# Patient Record
Sex: FEMALE | Race: BLACK OR AFRICAN AMERICAN | Employment: OTHER | URBAN - METROPOLITAN AREA
[De-identification: names, ages, dates, MRNs, and addresses within clinical notes are randomized per-mention and may not be internally consistent; named-entity substitution may affect disease eponyms.]

---

## 2017-07-26 PROBLEM — K29.60 REFLUX GASTRITIS: Status: ACTIVE | Noted: 2017-07-26

## 2018-02-01 PROBLEM — E05.90 THYROTOXICOSIS WITHOUT CRISIS: Status: ACTIVE | Noted: 2018-02-01

## 2018-02-01 PROBLEM — E04.2 MULTIPLE THYROID NODULES: Status: ACTIVE | Noted: 2018-02-01

## 2018-02-12 PROBLEM — E05.90 HYPERTHYROIDISM: Status: ACTIVE | Noted: 2018-02-12

## 2018-02-14 ENCOUNTER — HOSPITAL ENCOUNTER (OUTPATIENT)
Dept: NUCLEAR MEDICINE | Age: 58
Discharge: HOME | End: 2018-02-14
Attending: INTERNAL MEDICINE

## 2018-02-14 DIAGNOSIS — E05.90 THYROTOXICOSIS WITHOUT CRISIS: ICD-10-CM

## 2018-02-14 DIAGNOSIS — E04.2 MULTIPLE THYROID NODULES: ICD-10-CM

## 2018-02-15 ENCOUNTER — HOSPITAL ENCOUNTER (OUTPATIENT)
Dept: NUCLEAR MEDICINE | Age: 58
Discharge: HOME | End: 2018-02-15
Attending: INTERNAL MEDICINE
Payer: COMMERCIAL

## 2018-02-15 PROCEDURE — 78014 THYROID IMAGING W/BLOOD FLOW: CPT

## 2018-03-30 PROBLEM — E05.90 THYROTOXICOSIS WITHOUT CRISIS: Status: RESOLVED | Noted: 2018-02-01 | Resolved: 2018-03-30

## 2022-03-18 PROBLEM — E04.2 MULTIPLE THYROID NODULES: Status: ACTIVE | Noted: 2018-02-01

## 2022-03-19 PROBLEM — E05.90 HYPERTHYROIDISM: Status: ACTIVE | Noted: 2018-02-12

## 2022-03-19 PROBLEM — K29.60 REFLUX GASTRITIS: Status: ACTIVE | Noted: 2017-07-26

## 2022-05-31 DIAGNOSIS — E05.90 HYPERTHYROIDISM: Primary | ICD-10-CM

## 2022-06-27 ENCOUNTER — OFFICE VISIT (OUTPATIENT)
Dept: ENDOCRINOLOGY | Age: 62
End: 2022-06-27
Payer: COMMERCIAL

## 2022-06-27 VITALS
HEART RATE: 71 BPM | SYSTOLIC BLOOD PRESSURE: 136 MMHG | OXYGEN SATURATION: 95 % | BODY MASS INDEX: 30.61 KG/M2 | DIASTOLIC BLOOD PRESSURE: 78 MMHG | WEIGHT: 162 LBS

## 2022-06-27 DIAGNOSIS — E05.90 HYPERTHYROIDISM: Primary | ICD-10-CM

## 2022-06-27 DIAGNOSIS — E04.2 MULTIPLE THYROID NODULES: ICD-10-CM

## 2022-06-27 DIAGNOSIS — E05.00 GRAVES' DISEASE: ICD-10-CM

## 2022-06-27 PROCEDURE — 99214 OFFICE O/P EST MOD 30 MIN: CPT | Performed by: INTERNAL MEDICINE

## 2022-06-27 RX ORDER — METHIMAZOLE 5 MG/1
2.5 TABLET ORAL DAILY
Qty: 15 TABLET | Refills: 11 | Status: SHIPPED | OUTPATIENT
Start: 2022-06-27

## 2022-06-27 ASSESSMENT — ENCOUNTER SYMPTOMS
TROUBLE SWALLOWING: 0
VOICE CHANGE: 0

## 2022-06-27 NOTE — PROGRESS NOTES
Madison Bowers MD, [address] [facility] [place]            Reason for visit: Follow-up of hyperthyroidism and thyroid nodules      ASSESSMENT AND PLAN:    1. Hyperthyroidism  She is biochemically euthyroid on her current methimazole dose. Continue it as prescribed. Return in 6 months with repeat labs. - methIMAzole (TAPAZOLE) 5 MG tablet; Take 0.5 tablets by mouth daily  Dispense: 15 tablet; Refill: 11  - TSH; Future  - T4, Free; Future  - T3; Future  - Hepatic Function Panel; Future    2. Graves' disease    3. Multiple thyroid nodules  Prior biopsies of the dominant nodules in the left lobe in March 2018 were fortunately benign. Ultrasound was repeated in May 2021 and was unchanged. Repeat within the next year. .      Follow-up and Dispositions    · Return in about 6 months (around 12/27/2022). History of Present Illness:    THYROID DYSFUNCTION  Sona Isaacs (\"Ri-wilson-kelsea\") is seen for follow-up of hyperthyroidism (now known to be caused by Graves' disease) and thyroid nodules. Hyperthyroidism appears to have been present (off and on) since at least late 2015. Thyroid nodules were noted in February 2018 as part of the evaluation of hyperthyroidism.      Current symptoms: See review of systems below     Prior treatment: Methimazole 10 mg daily was started 2/12/2018.   Her dose has been adjusted as follows:  -5 mg daily 3/30/2018  -2.5 mg daily 10/5/2018  -discontinued 11/17/2021  -2.5 mg daily 2/25/2022     Pertinent labs:  12/14/2015: TSH 0.090, free T4 1.41, free T3 3.4.  1/4/2016: TSH 0.083.  6/13/2016: TSH 2.090.  1/20/2017: TSH 0.347.  9/21/2017: TSH 1.720.  1/26/2018: TSH less than 0.006.  2/1/2018: Free T4 2.19, T3 176, thyroid-stimulating immunoglobulins 419% (+).  3/26/2018: TSH less than 0.006, free T4 1.33, T3 112.  5/29/2018: TSH 0.006, free T4 1.21, T3 116.  10/4/2018: TSH 4.480, free T4 0.90, T3 89.  1/4/2019: TSH 2.820, free T4 1.13, T3 102.  2019: TSH 1.280, free T4 1.31, T3 95.  2019: TSH 0.734, free T4 1.33, T3 118.  2020: TSH 2.340, free T4 1.10, T3 98.  2020: TSH 2.030, free T4 1.19, T3 98.  2021: TSH 2.920, free T4 1.09, T3 86.  2021: TSH 4.150.  2021: TSH 2.710, free T4 1.15, T3 90, thyroid-stimulating immunoglobulins 0.36 (-).  2022: TSH 0.075, free T4 1.88, T3 96.  2022: TSH 0.457, free T4 1.15, T3 104.     Imagin2018: Ultrasound ([facility])- Right lobe 1.69 x 1.10 x 3.65 cm, isthmus 0.73 cm, left lobe 1.77 x 2.52 x 3.99 cm. Homogeneous echotexture. Normal blood flow. 0.30 x 0.46 x 0.50 cm slightly hyperechoic nodule without calcifications or increased blood flow at the left edge of the isthmus. 1.36 x 1.65 x 1.71 cm isoechoic nodule with small central cystic core and no calcifications or increased blood flow in the midportion of the left lobe. 1.39 x 1.48 x 1.55 cm isoechoic nodule with sonolucent rim and no calcifications or increased blood flow at the left lower pole.     2018: I-123 uptake and scan ([facility])- 4 hour uptake 15.9%, 24 hour uptake 45.4%. Scan demonstrates mildly heterogeneous tracer distribution without dominant photopenic nodules.     10/5/2018: Ultrasound ([facility])- Right lobe 1.48 x 1.21 x 2.93 cm, isthmus 0.54 cm, left lobe 1.77 x 2.62 x 3.46 cm. Homogeneous echotexture. Normal blood flow. 0.31 x 0.47 x 0.38 cm hypoechoic nodule without calcifications or increased blood flow at the left edge of the isthmus. 1.42 x 1.66 x 1.90 cm isoechoic nodule with small central cystic core and no calcifications or increased blood flow in the midportion of the left lobe. 1.73 x 1.79 x 1.55 cm isoechoic nodule with sonolucent rim and no calcifications or increased blood flow at the left lower pole.     2019: Ultrasound ([employer])- Right lobe 1.47 x 1.06 x 3.32 cm, isthmus 0.55 cm, left lobe 2.06 x 2.89 x 3.74 cm. Homogeneous echotexture. Normal blood flow. 0.29 x 0.43 x 0.55 cm slightly hypoechoic nodule without calcifications or increased blood flow at the right upper pole. 1.14 x 1.31 x 1.65 cm isoechoic nodule without calcifications or increased blood flow in the midportion of the left lobe. 1.65 x 1.83 x 1.59 cm isoechoic nodule with sonolucent rim and no calcifications or increased blood flow at the left lower pole.     5/21/2020: Ultrasound ([facility])- Right lobe 1.44 x 1.15 x 3.93 cm, isthmus 0.48 cm, left lobe 2.01 x 2.37 x 3.88 cm. Mildly heterogeneous echotexture. Normal blood flow. 0.32 x 0.64 x 0.69 cm isoechoic nodule without calcifications or increased blood flow at the right upper pole. 1.48 x 1.59 x 1.73 cm isoechoic nodule without calcifications or increased blood flow in the midportion of the left lobe. 1.39 x 1.71 x 1.59 cm slightly hypoechoic nodule without calcifications or increased blood flow at the left lower pole.     5/17/2021: Ultrasound ([facility])- Right lobe 1.74 x 1.12 x 3.8 cm, isthmus 0.41 cm, left lobe 2.07 x 2.12 x 3.66 cm. Mildly heterogeneous echotexture. Normal blood flow. 0.32 x 0.67 x 0.60 cm isoechoic nodule without calcifications or increased blood flow at the right upper pole. 1.63 x 1.55 x 1.69 cm isoechoic nodule with increased peripheral but not central blood flow and no calcifications in the midportion of the left lobe. 1.70 x 1.80 x 1.92 cm complex isoechoic nodule without calcifications or increased blood flow at the left lower pole.     Prior biopsies:  3/30/2018: Fine-needle aspiration biopsy of 1.39 x 1.64 x 1.87 cm hyperechoic nodule in the upper to midportion of the left lobe ([facility])- cytology BENIGN. 3/30/2018: Fine-needle aspiration biopsy of 1.58 x 1.61 x 1.55 cm isoechoic nodule with sonolucent rim at the left lower pole ([facility])- cytology BENIGN. Review of Systems   Constitutional: Positive for fatigue and unexpected weight change (intentionally lost 3 pounds in 7 months).    HENT: Negative for trouble swallowing and voice change. Eyes: Negative for visual disturbance. Cardiovascular: Negative for palpitations. Neurological: Negative for tremors. /78 (Site: Left Upper Arm, Position: Sitting)   Pulse 71   Wt 162 lb (73.5 kg)   SpO2 95%   BMI 30.61 kg/m²   Wt Readings from Last 3 Encounters:   06/27/22 162 lb (73.5 kg)   02/25/22 163 lb (73.9 kg)   11/17/21 165 lb (74.8 kg)       Physical Exam  HENT:      Head: Normocephalic. Neck:      Thyroid: No thyroid mass or thyromegaly. Cardiovascular:      Rate and Rhythm: Normal rate. Pulmonary:      Effort: No respiratory distress. Breath sounds: Normal breath sounds. Neurological:      Mental Status: She is alert.    Psychiatric:         Mood and Affect: Mood normal.         Behavior: Behavior normal.         Orders Placed This Encounter   Procedures    TSH     Standing Status:   Future     Standing Expiration Date:   6/27/2023    T4, Free     Standing Status:   Future     Standing Expiration Date:   6/27/2023    T3     Standing Status:   Future     Standing Expiration Date:   6/27/2023    Hepatic Function Panel     Standing Status:   Future     Standing Expiration Date:   6/27/2023       Current Outpatient Medications   Medication Sig Dispense Refill    methIMAzole (TAPAZOLE) 5 MG tablet Take 0.5 tablets by mouth daily 15 tablet 11    Biotin 2.5 MG TABS Take 2 tablets by mouth daily      cyanocobalamin 2500 MCG SUBL Take 2,500 mcg by mouth daily      fexofenadine-pseudoephedrine (ALLEGRA-D ALLERGY & CONGESTION) 180-240 MG per extended release tablet Take 1 tablet by mouth daily      Krill Oil (OMEGA-3) 500 MG CAPS Take by mouth      meloxicam (MOBIC) 7.5 MG tablet Take 7.5 mg by mouth as needed      omeprazole (PRILOSEC) 20 MG delayed release capsule Take 20 mg by mouth as needed      Red Yeast Rice Extract 600 MG CAPS Take 600 mg by mouth      rosuvastatin (CRESTOR) 20 MG tablet Take 20 mg by mouth No current facility-administered medications for this visit. Tanna Terry MD, FACE      Portions of this note were generated with the assistance of voice recognition software. As such, some errors in transcription may be present.

## 2022-07-11 ENCOUNTER — TELEPHONE (OUTPATIENT)
Dept: FAMILY MEDICINE CLINIC | Facility: CLINIC | Age: 62
End: 2022-07-11

## 2022-07-11 DIAGNOSIS — E78.2 MIXED HYPERLIPIDEMIA: Primary | ICD-10-CM

## 2022-07-11 RX ORDER — ROSUVASTATIN CALCIUM 20 MG/1
20 TABLET, COATED ORAL NIGHTLY
Qty: 30 TABLET | Refills: 0 | Status: SHIPPED | OUTPATIENT
Start: 2022-07-11 | End: 2022-07-27 | Stop reason: SDUPTHER

## 2022-07-11 NOTE — TELEPHONE ENCOUNTER
Pt. Is scheduled for 7/27/2022, but needs partial refill of Crestor sent to [facility] on [address]

## 2022-07-27 ENCOUNTER — OFFICE VISIT (OUTPATIENT)
Dept: FAMILY MEDICINE CLINIC | Facility: CLINIC | Age: 62
End: 2022-07-27
Payer: COMMERCIAL

## 2022-07-27 VITALS
SYSTOLIC BLOOD PRESSURE: 118 MMHG | OXYGEN SATURATION: 98 % | HEIGHT: 63 IN | TEMPERATURE: 97.2 F | HEART RATE: 79 BPM | DIASTOLIC BLOOD PRESSURE: 70 MMHG | BODY MASS INDEX: 29.06 KG/M2 | WEIGHT: 164 LBS | RESPIRATION RATE: 18 BRPM

## 2022-07-27 DIAGNOSIS — E78.2 MIXED HYPERLIPIDEMIA: Primary | ICD-10-CM

## 2022-07-27 DIAGNOSIS — E05.90 HYPERTHYROIDISM: ICD-10-CM

## 2022-07-27 DIAGNOSIS — Z11.59 ENCOUNTER FOR HEPATITIS C SCREENING TEST FOR LOW RISK PATIENT: ICD-10-CM

## 2022-07-27 LAB — HCV AB SER QL: NONREACTIVE

## 2022-07-27 PROCEDURE — 99213 OFFICE O/P EST LOW 20 MIN: CPT | Performed by: NURSE PRACTITIONER

## 2022-07-27 RX ORDER — ROSUVASTATIN CALCIUM 20 MG/1
20 TABLET, COATED ORAL NIGHTLY
Qty: 90 TABLET | Refills: 3 | Status: SHIPPED | OUTPATIENT
Start: 2022-07-27

## 2022-07-27 ASSESSMENT — ENCOUNTER SYMPTOMS
CONSTIPATION: 0
COUGH: 0
EYES NEGATIVE: 1
SHORTNESS OF BREATH: 0
SINUS PAIN: 0
PHOTOPHOBIA: 0
SORE THROAT: 0
NAUSEA: 0
GASTROINTESTINAL NEGATIVE: 1
ALLERGIC/IMMUNOLOGIC NEGATIVE: 1
TROUBLE SWALLOWING: 0
WHEEZING: 0
VOMITING: 0
RESPIRATORY NEGATIVE: 1
SINUS PRESSURE: 0
DIARRHEA: 0

## 2022-07-27 ASSESSMENT — PATIENT HEALTH QUESTIONNAIRE - PHQ9
1. LITTLE INTEREST OR PLEASURE IN DOING THINGS: 0
2. FEELING DOWN, DEPRESSED OR HOPELESS: 0
SUM OF ALL RESPONSES TO PHQ9 QUESTIONS 1 & 2: 0
SUM OF ALL RESPONSES TO PHQ QUESTIONS 1-9: 0

## 2022-07-27 NOTE — PROGRESS NOTES
Lizz Maria (:  1960) is a 59 y.o. female,Established patient, here for evaluation of the following chief complaint(s):  Follow-up (Follow up on Diabetes ) and Medication Refill    Note written by Lacie Clemente RN, student NP. I have examined the patient and agree with the note/plan. Helen Mcintosh       ASSESSMENT/PLAN:  1. Mixed hyperlipidemia  -     Hemoglobin A1C; Future  -     rosuvastatin (CRESTOR) 20 MG tablet; Take 1 tablet by mouth at bedtime, Disp-90 tablet, R-3Normal    Continue taking medication as prescribed. Include fresh vegetables in diet and avoid processed foods and foods high in fat. Recheck labs at time of CPE. 2. Hyperthyroidism  -     Hemoglobin A1C; Future  Recheck a1c. Follow up with endocrinologists for her hyperthyroidism. 3. Encounter for hepatitis C screening test for low risk patient  -     Hepatitis C Antibody; Future    Make appointment for annual physical exam with pap smear. Subjective   SUBJECTIVE/OBJECTIVE:  Patient in the office for routine follow up and medication refills. Patient states that she has been trying to watch what she eats and increase her weekly exercise. States that she walks indoors at stores 2 days a week with her  for 2 hours. States she can try to increase her exercise when it is not so hot outside. States she eats fruits and vegetables daily. Patient states that she only takes her Prilosec and Mobic as needed and does not need refills at this time. History of Hyperthyroidism and Grave's disease, which are actively managed my endocrinology. Patient declined her Tdap vaccination at this time. Patient agreed to Hep C screening. States her mammogram is scheduled for Aug 10, 2022. Cholesterol Problem   The history is provided by the patient. This is a chronic problem. The problem occurs daily. The problem has not changed since onset. Pertinent negatives include no chest pain, no abdominal pain, no headaches and no shortness of breath. Nothing aggravates the symptoms. The symptoms are relieved by medications. Treatments tried: Currently managing hyperlipidemia with Rosuvastatin. Allergies   Allergen Reactions    Hydrocodone Anxiety and Other (See Comments)     Depressive state      /70 (Site: Left Upper Arm, Position: Sitting, Cuff Size: Large Adult)   Pulse 79   Temp 97.2 °F (36.2 °C) (Temporal)   Resp 18   Ht 5' 3\" (1.6 m)   Wt 164 lb (74.4 kg)   SpO2 98%   BMI 29.05 kg/m²      Current Outpatient Medications   Medication Sig    rosuvastatin (CRESTOR) 20 MG tablet Take 1 tablet by mouth at bedtime    methIMAzole (TAPAZOLE) 5 MG tablet Take 0.5 tablets by mouth daily    Biotin 2.5 MG TABS Take 2 tablets by mouth daily    cyanocobalamin 2500 MCG SUBL Take 2,500 mcg by mouth daily    fexofenadine-pseudoephedrine (ALLEGRA-D 24HR) 180-240 MG per extended release tablet Take 1 tablet by mouth daily    Krill Oil (OMEGA-3) 500 MG CAPS Take by mouth    meloxicam (MOBIC) 7.5 MG tablet Take 7.5 mg by mouth as needed    omeprazole (PRILOSEC) 20 MG delayed release capsule Take 20 mg by mouth as needed    Red Yeast Rice Extract 600 MG CAPS Take 600 mg by mouth     No current facility-administered medications for this visit. Review of Systems   Constitutional: Negative. Negative for activity change, appetite change, fatigue, fever and unexpected weight change. HENT: Negative. Negative for congestion, ear discharge, ear pain, postnasal drip, sinus pressure, sinus pain, sore throat and trouble swallowing. Eyes: Negative. Negative for photophobia and visual disturbance. Respiratory: Negative. Negative for cough, shortness of breath and wheezing. Cardiovascular: Negative. Negative for chest pain, palpitations and leg swelling. Gastrointestinal: Negative. Negative for constipation, diarrhea, nausea and vomiting. Endocrine: Negative. Negative for polydipsia, polyphagia and polyuria.    Genitourinary: Negative. Negative for frequency and urgency. Musculoskeletal: Negative. Negative for neck pain and neck stiffness. Skin: Negative. Allergic/Immunologic: Negative. Neurological: Negative. Negative for dizziness, syncope, weakness, light-headedness and headaches. Hematological: Negative. Psychiatric/Behavioral: Negative. Negative for self-injury, sleep disturbance and suicidal ideas. Allergies   Allergen Reactions    Hydrocodone Anxiety and Other (See Comments)     Depressive state      /70 (Site: Left Upper Arm, Position: Sitting, Cuff Size: Large Adult)   Pulse 79   Temp 97.2 °F (36.2 °C) (Temporal)   Resp 18   Ht 5' 3\" (1.6 m)   Wt 164 lb (74.4 kg)   SpO2 98%   BMI 29.05 kg/m²      Current Outpatient Medications   Medication Sig    rosuvastatin (CRESTOR) 20 MG tablet Take 1 tablet by mouth at bedtime    methIMAzole (TAPAZOLE) 5 MG tablet Take 0.5 tablets by mouth daily    Biotin 2.5 MG TABS Take 2 tablets by mouth daily    cyanocobalamin 2500 MCG SUBL Take 2,500 mcg by mouth daily    fexofenadine-pseudoephedrine (ALLEGRA-D 24HR) 180-240 MG per extended release tablet Take 1 tablet by mouth daily    Krill Oil (OMEGA-3) 500 MG CAPS Take by mouth    meloxicam (MOBIC) 7.5 MG tablet Take 7.5 mg by mouth as needed    omeprazole (PRILOSEC) 20 MG delayed release capsule Take 20 mg by mouth as needed    Red Yeast Rice Extract 600 MG CAPS Take 600 mg by mouth     No current facility-administered medications for this visit. Objective   Physical Exam  Vitals and nursing note reviewed. Constitutional:       General: She is awake. She is not in acute distress. Appearance: Normal appearance. She is well-developed, well-groomed and overweight. She is not ill-appearing or toxic-appearing. HENT:      Head: Normocephalic and atraumatic.       Right Ear: Tympanic membrane, ear canal and external ear normal.      Left Ear: Tympanic membrane, ear canal and external ear normal. to authenticate this note.     --Omar Bermudez, APRN - CNP

## 2022-07-28 LAB
EST. AVERAGE GLUCOSE BLD GHB EST-MCNC: 117 MG/DL
HBA1C MFR BLD: 5.7 % (ref 4.8–5.6)

## 2022-08-01 ENCOUNTER — TELEPHONE (OUTPATIENT)
Dept: FAMILY MEDICINE CLINIC | Facility: CLINIC | Age: 62
End: 2022-08-01

## 2022-08-01 NOTE — TELEPHONE ENCOUNTER
Pt. Advised of lab results, she states her stools have been very dark for 3-4 days, she is nauseated c/o blood in stool. Pt. States that she has not been constipated.

## 2022-08-02 NOTE — TELEPHONE ENCOUNTER
Was taking pepto bismol for the nausea and then developed dark stools. Did not associate the two. Will stop the pepto and wait 46 hours. If she is still having dark stools on Thursday, will send to [facility]

## 2022-08-04 ENCOUNTER — HOSPITAL ENCOUNTER (EMERGENCY)
Age: 62
Discharge: HOME | End: 2022-08-04
Attending: EMERGENCY MEDICINE
Payer: COMMERCIAL

## 2022-08-04 VITALS
OXYGEN SATURATION: 98 % | RESPIRATION RATE: 19 BRPM | HEIGHT: 63 IN | BODY MASS INDEX: 28.35 KG/M2 | SYSTOLIC BLOOD PRESSURE: 147 MMHG | WEIGHT: 160 LBS | DIASTOLIC BLOOD PRESSURE: 75 MMHG | HEART RATE: 64 BPM | TEMPERATURE: 98.1 F

## 2022-08-04 DIAGNOSIS — N30.01 ACUTE CYSTITIS WITH HEMATURIA: Primary | ICD-10-CM

## 2022-08-04 DIAGNOSIS — R53.83 FATIGUE, UNSPECIFIED TYPE: ICD-10-CM

## 2022-08-04 LAB
ALBUMIN SERPL-MCNC: 4.5 G/DL (ref 3.2–4.6)
ALBUMIN/GLOB SERPL: 1.4 {RATIO}
ALP SERPL-CCNC: 107 U/L (ref 45–117)
ALT SERPL-CCNC: 14 U/L (ref 13–61)
ANION GAP SERPL CALC-SCNC: 10 MMOL/L (ref 7–16)
APPEARANCE UR: CLEAR
AST SERPL-CCNC: 23 U/L (ref 15–37)
BACTERIA URNS QL MICRO: ABNORMAL /HPF
BASOPHILS # BLD: 0 K/UL (ref 0–0.2)
BASOPHILS NFR BLD: 0 % (ref 0–2)
BILIRUB SERPL-MCNC: 0.6 MG/DL (ref 0.2–1.1)
BILIRUB UR QL: NEGATIVE
BUN SERPL-MCNC: 11 MG/DL (ref 7–18)
CALCIUM SERPL-MCNC: 9.7 MG/DL (ref 8.3–10.4)
CASTS URNS QL MICRO: 0 /LPF
CHLORIDE SERPL-SCNC: 104 MMOL/L (ref 98–107)
CO2 SERPL-SCNC: 29 MMOL/L (ref 21–32)
COLOR UR: ABNORMAL
CREAT SERPL-MCNC: 0.81 MG/DL (ref 0.6–1)
CRYSTALS URNS QL MICRO: 0 /LPF
DIFFERENTIAL METHOD BLD: ABNORMAL
EOSINOPHIL # BLD: 0.1 K/UL (ref 0–0.8)
EOSINOPHIL NFR BLD: 2 % (ref 0.5–7.8)
EPI CELLS #/AREA URNS HPF: ABNORMAL /HPF
ERYTHROCYTE [DISTWIDTH] IN BLOOD BY AUTOMATED COUNT: 12.4 % (ref 11.9–14.6)
GLOBULIN SER CALC-MCNC: 3.3 G/DL (ref 2.3–3.5)
GLUCOSE SERPL-MCNC: 118 MG/DL (ref 65–100)
GLUCOSE UR STRIP.AUTO-MCNC: NEGATIVE MG/DL
HCT VFR BLD AUTO: 40.7 % (ref 35.8–46.3)
HGB BLD-MCNC: 13.8 G/DL (ref 11.7–15.4)
HGB UR QL STRIP: ABNORMAL
IMM GRANULOCYTES # BLD AUTO: 0 K/UL (ref 0–0.5)
IMM GRANULOCYTES NFR BLD AUTO: 0 % (ref 0–5)
KETONES UR QL STRIP.AUTO: NEGATIVE MG/DL
LEUKOCYTE ESTERASE UR QL STRIP.AUTO: NEGATIVE
LYMPHOCYTES # BLD: 1.7 K/UL (ref 0.5–4.6)
LYMPHOCYTES NFR BLD: 36 % (ref 13–44)
MCH RBC QN AUTO: 29.3 PG (ref 26.1–32.9)
MCHC RBC AUTO-ENTMCNC: 33.9 G/DL (ref 31.4–35)
MCV RBC AUTO: 86.4 FL (ref 79.6–97.8)
MONOCYTES # BLD: 0.4 K/UL (ref 0.1–1.3)
MONOCYTES NFR BLD: 8 % (ref 4–12)
MUCOUS THREADS URNS QL MICRO: 0 /LPF
NEUTS SEG # BLD: 2.5 K/UL (ref 1.7–8.2)
NEUTS SEG NFR BLD: 54 % (ref 43–78)
NITRITE UR QL STRIP.AUTO: NEGATIVE
NRBC # BLD: 0 K/UL (ref 0–0.2)
OTHER OBSERVATIONS: ABNORMAL
PH UR STRIP: 7 [PH] (ref 5–9)
PLATELET # BLD AUTO: 238 K/UL (ref 150–450)
PMV BLD AUTO: 9.2 FL (ref 9.4–12.3)
POTASSIUM SERPL-SCNC: 3.8 MMOL/L (ref 3.5–5.1)
PROT SERPL-MCNC: 7.8 G/DL (ref 6.4–8.2)
PROT UR STRIP-MCNC: NEGATIVE MG/DL
RBC # BLD AUTO: 4.71 M/UL (ref 4.05–5.2)
RBC #/AREA URNS HPF: ABNORMAL /HPF
SARS-COV-2 RDRP RESP QL NAA+PROBE: NOT DETECTED
SODIUM SERPL-SCNC: 143 MMOL/L (ref 136–145)
SOURCE: NORMAL
SP GR UR REFRACTOMETRY: 1.01 (ref 1–1.02)
TROPONIN T SERPL HS-MCNC: 7.1 NG/L (ref 0–14)
TSH, 3RD GENERATION: 1.56 UIU/ML (ref 0.58–3.7)
UROBILINOGEN UR QL STRIP.AUTO: 0.2 EU/DL (ref 0.2–1)
WBC # BLD AUTO: 4.6 K/UL (ref 4.3–11.1)
WBC URNS QL MICRO: ABNORMAL /HPF

## 2022-08-04 PROCEDURE — 84484 ASSAY OF TROPONIN QUANT: CPT

## 2022-08-04 PROCEDURE — 99283 EMERGENCY DEPT VISIT LOW MDM: CPT

## 2022-08-04 PROCEDURE — 87635 SARS-COV-2 COVID-19 AMP PRB: CPT

## 2022-08-04 PROCEDURE — 84443 ASSAY THYROID STIM HORMONE: CPT

## 2022-08-04 PROCEDURE — 80053 COMPREHEN METABOLIC PANEL: CPT

## 2022-08-04 PROCEDURE — 85025 COMPLETE CBC W/AUTO DIFF WBC: CPT

## 2022-08-04 PROCEDURE — 81015 MICROSCOPIC EXAM OF URINE: CPT

## 2022-08-04 PROCEDURE — 81003 URINALYSIS AUTO W/O SCOPE: CPT

## 2022-08-04 RX ORDER — CEPHALEXIN 500 MG/1
500 CAPSULE ORAL 3 TIMES DAILY
Qty: 14 CAPSULE | Refills: 0 | Status: SHIPPED | OUTPATIENT
Start: 2022-08-04 | End: 2022-08-09

## 2022-08-04 ASSESSMENT — PAIN - FUNCTIONAL ASSESSMENT: PAIN_FUNCTIONAL_ASSESSMENT: NONE - DENIES PAIN

## 2022-08-04 ASSESSMENT — PAIN SCALES - GENERAL: PAINLEVEL_OUTOF10: 0

## 2022-08-04 NOTE — ED TRIAGE NOTES
Pt states she has had fatigue for past 3 days, denies fevers SOB or chest pain. States she has hx of graves disease. States urinary frequency started yesterday as well. Denies dysuria.

## 2022-08-04 NOTE — ED PROVIDER NOTES
[facility] Emergency Department Provider Note                   PCP:                MIR Zazueta CNP               Age: 57 y.o. Sex: female       ICD-10-CM    1. Acute cystitis with hematuria  N30.01       2. Fatigue, unspecified type  R53.83           DISPOSITION Decision To Discharge 08/04/2022 03:20:55 PM        MDM  Number of Diagnoses or Management Options  Acute cystitis with hematuria  Fatigue, unspecified type  Diagnosis management comments: Overall very well-appearing patient with some mild fatigue and symptoms of cystitis with increased urinary frequency. Given course of cephalexin. Low suspicion for sepsis, myasthenia gravis, or other acute emergent condition currently. EKG on my personal interpretation at 1402 shows sinus rhythm with a rate of 61. There is no ST elevation or depression. QT corrected is 395. Amount and/or Complexity of Data Reviewed  Clinical lab tests: reviewed    Risk of Complications, Morbidity, and/or Mortality  Presenting problems: moderate  Management options: moderate    Patient Progress  Patient progress: stable       Orders Placed This Encounter   Procedures    COVID-19, Rapid    CBC with Auto Differential    Comprehensive Metabolic Panel    TSH    Urinalysis w rflx microscopic    Troponin T    Urinalysis, Micro    EKG 12 Lead        Katie Pimentel is a 57 y.o. female who presents to the Emergency Department with chief complaint of    Chief Complaint   Patient presents with    Fatigue    Urinary Frequency      66-year-old female comes in with fatigue. Duration 3 days. Gradual onset. Mild to moderate. Constant. No exacerbating leaving factors or other modifiers. No focal weakness numbness ting slurred speech or facial droop. Endorse some increased urinary frequency which is mild to moderate, but no dysuria or hematuria or flank pain. No fever or chills. No abdominal pain. No chest pain or shortness of breath.   No sudden onset weakness or syncope or near syncope. Review of Systems   All other systems reviewed and are negative.     Past Medical History:   Diagnosis Date    Allergic rhinitis     GERD (gastroesophageal reflux disease)     Graves' disease     Hypercholesterolemia     Hyperthyroidism     Multiple thyroid nodules         Past Surgical History:   Procedure Laterality Date     SECTION      x2    CYST REMOVAL  2021    subcutaneous cyst from lateral neck (benign)    HYSTERECTOMY (CERVIX STATUS UNKNOWN)      fibroids (ovaries intact)        Family History   Problem Relation Age of Onset    Breast Cancer Sister     Breast Cancer Sister     Thyroid Disease Sister     Thyroid Cancer Neg Hx     Diabetes Neg Hx     Coronary Art Dis Mother         Social History     Socioeconomic History    Marital status:      Spouse name: None    Number of children: None    Years of education: None    Highest education level: None   Tobacco Use    Smoking status: Never    Smokeless tobacco: Never   Vaping Use    Vaping Use: Never used   Substance and Sexual Activity    Alcohol use: No    Drug use: No         Hydrocodone     Discharge Medication List as of 2022  3:30 PM        CONTINUE these medications which have NOT CHANGED    Details   rosuvastatin (CRESTOR) 20 MG tablet Take 1 tablet by mouth at bedtime, Disp-90 tablet, R-3Normal      methIMAzole (TAPAZOLE) 5 MG tablet Take 0.5 tablets by mouth daily, Disp-15 tablet, R-11Normal      Biotin 2.5 MG TABS Take 2 tablets by mouth dailyHistorical Med      cyanocobalamin 2500 MCG SUBL Take 2,500 mcg by mouth dailyHistorical Med      fexofenadine-pseudoephedrine (ALLEGRA-D 24HR) 180-240 MG per extended release tablet Take 1 tablet by mouth dailyHistorical Med      Krill Oil (OMEGA-3) 500 MG CAPS Take by mouthHistorical Med      meloxicam (MOBIC) 7.5 MG tablet Take 7.5 mg by mouth as neededHistorical Med      omeprazole (PRILOSEC) 20 MG delayed release capsule Take 20 mg by mouth as neededHistorical Med      Red Yeast Rice Extract 600 MG CAPS Take 600 mg by mouthHistorical Med              Vitals signs and nursing note reviewed. Patient Vitals for the past 4 hrs:   Temp Pulse Resp BP SpO2   08/04/22 1523 -- 64 19 -- --   08/04/22 1508 -- 58 17 -- --   08/04/22 1453 -- 57 18 -- --   08/04/22 1438 -- 57 18 -- --   08/04/22 1408 -- 68 21 -- --   08/04/22 1321 98.1 °F (36.7 °C) 82 18 (!) 147/75 98 %          Physical Exam  Vitals and nursing note reviewed. Constitutional:       General: She is not in acute distress. Appearance: Normal appearance. She is not ill-appearing. HENT:      Head: Normocephalic and atraumatic. Nose: Nose normal.      Mouth/Throat:      Mouth: Mucous membranes are moist.      Pharynx: No posterior oropharyngeal erythema. Eyes:      Extraocular Movements: Extraocular movements intact. Conjunctiva/sclera: Conjunctivae normal.      Pupils: Pupils are equal, round, and reactive to light. Cardiovascular:      Rate and Rhythm: Normal rate and regular rhythm. Heart sounds: No murmur heard. Pulmonary:      Effort: Pulmonary effort is normal. No respiratory distress. Breath sounds: No wheezing, rhonchi or rales. Abdominal:      General: There is no distension. Palpations: Abdomen is soft. Tenderness: There is no abdominal tenderness. There is no guarding. Musculoskeletal:         General: No swelling, tenderness or deformity. Cervical back: Neck supple. No tenderness. Skin:     General: Skin is warm and dry. Capillary Refill: Capillary refill takes less than 2 seconds. Coloration: Skin is not jaundiced. Neurological:      Mental Status: She is alert. Mental status is at baseline. Motor: No weakness.         Procedures      Labs Reviewed   CBC WITH AUTO DIFFERENTIAL - Abnormal; Notable for the following components:       Result Value    MPV 9.2 (*)     All other components within normal limits   COMPREHENSIVE

## 2022-08-04 NOTE — ED NOTES
I have reviewed discharge instructions with the patient. The patient verbalized understanding. Patient left ED via Discharge Method: ambulatory to Home with self. Opportunity for questions and clarification provided. Patient given 1 scripts. To continue your aftercare when you leave the hospital, you may receive an automated call from our care team to check in on how you are doing. This is a free service and part of our promise to provide the best care and service to meet your aftercare needs.  If you have questions, or wish to unsubscribe from this service please call [phone]. Thank you for Choosing our [facility] Emergency Department.         Betty Bonilla RN  08/04/22 0010

## 2022-08-16 ENCOUNTER — TELEPHONE (OUTPATIENT)
Dept: FAMILY MEDICINE CLINIC | Facility: CLINIC | Age: 62
End: 2022-08-16

## 2022-08-16 DIAGNOSIS — N63.20 LEFT BREAST MASS: Primary | ICD-10-CM

## 2022-08-16 NOTE — TELEPHONE ENCOUNTER
Merly with [facility] called in stating pt. Is coming in tomorrow as follow up. Pt. Needs orders entered in for left diagnostic mammo, and left breast US, diagnosis: breast mass.  Any questions to contact them [phone], or order faxed to [phone]

## 2022-08-18 ENCOUNTER — TELEPHONE (OUTPATIENT)
Dept: FAMILY MEDICINE CLINIC | Facility: CLINIC | Age: 62
End: 2022-08-18

## 2022-08-18 DIAGNOSIS — N63.20 LEFT BREAST MASS: ICD-10-CM

## 2022-08-18 NOTE — TELEPHONE ENCOUNTER
Maria Smyth with [facility] radiology called requesting order for breast biopsy left breast due to left breast mass. Any questions to contact or [phone] or fax order [phone].

## 2022-08-22 ENCOUNTER — OFFICE VISIT (OUTPATIENT)
Dept: FAMILY MEDICINE CLINIC | Facility: CLINIC | Age: 62
End: 2022-08-22
Payer: COMMERCIAL

## 2022-08-22 VITALS
OXYGEN SATURATION: 98 % | BODY MASS INDEX: 28.35 KG/M2 | HEIGHT: 63 IN | DIASTOLIC BLOOD PRESSURE: 84 MMHG | WEIGHT: 160 LBS | SYSTOLIC BLOOD PRESSURE: 112 MMHG | TEMPERATURE: 97.4 F | HEART RATE: 81 BPM | RESPIRATION RATE: 16 BRPM

## 2022-08-22 DIAGNOSIS — F41.1 GAD (GENERALIZED ANXIETY DISORDER): ICD-10-CM

## 2022-08-22 DIAGNOSIS — Z00.00 PHYSICAL EXAM, ANNUAL: Primary | ICD-10-CM

## 2022-08-22 PROCEDURE — 99396 PREV VISIT EST AGE 40-64: CPT | Performed by: NURSE PRACTITIONER

## 2022-08-22 RX ORDER — ALPRAZOLAM 0.5 MG/1
TABLET ORAL
Qty: 4 TABLET | Refills: 0 | Status: SHIPPED | OUTPATIENT
Start: 2022-08-22 | End: 2022-08-25

## 2022-08-22 ASSESSMENT — ENCOUNTER SYMPTOMS
WHEEZING: 0
RHINORRHEA: 0
EYE DISCHARGE: 0
ABDOMINAL PAIN: 0
EYE PAIN: 0
CONSTIPATION: 0
CHEST TIGHTNESS: 0
SHORTNESS OF BREATH: 0
DIARRHEA: 0
BLOOD IN STOOL: 0
VOMITING: 0
COUGH: 0

## 2022-08-22 ASSESSMENT — PATIENT HEALTH QUESTIONNAIRE - PHQ9
1. LITTLE INTEREST OR PLEASURE IN DOING THINGS: 0
SUM OF ALL RESPONSES TO PHQ9 QUESTIONS 1 & 2: 1
SUM OF ALL RESPONSES TO PHQ QUESTIONS 1-9: 1
2. FEELING DOWN, DEPRESSED OR HOPELESS: 1
SUM OF ALL RESPONSES TO PHQ QUESTIONS 1-9: 1

## 2022-08-22 NOTE — PROGRESS NOTES
Robe Butler (:  1960) is a 57 y.o. female,Established patient, here for evaluation of the following chief complaint(s): Annual Exam, Labs Only, and Gynecologic Exam         ASSESSMENT/PLAN:  1. Physical exam, annual  -     Lipid Panel; Future  -     PAP IG, Aptima HPV and rfx 16/18,45 ([id number]); Future    Anticipatory guidance for age-seatbelts, avoid cell phone use in car (may use hands free), no texting while driving, avoid social drugs and tobacco, limit alcohol, fall safety, personal safety. Encourage healthy diet and exercise daily. BMI Counseling:  Due to this patient's BMI, I have provided counseling regarding nutrition and physical activity. 2. FRANNY (generalized anxiety disorder)  -     ALPRAZolam (XANAX) 0.5 MG tablet; Take one tablet two hours beofre event and then 1/2 when you get to hospital., Disp-4 tablet, R-0Normal    Await results of biopsy. Instructed patient to take one pill prior to leaving home and a half prior to her procedure. Return in about 3 months (around 2022) for medication refills. Subjective   SUBJECTIVE/OBJECTIVE:  Physical Exam:  Has breast biopsy scheduled   is very anxious about having it done.  will be going with her. Feels well today. Trying to eat a healthy diet. No formal exercise plan. Abnormal mammogram and ultrasound. Up to date on her colonoscopy.        Allergies   Allergen Reactions    Hydrocodone Anxiety and Other (See Comments)     Depressive state     Current Outpatient Medications   Medication Sig    rosuvastatin (CRESTOR) 20 MG tablet Take 1 tablet by mouth at bedtime    methIMAzole (TAPAZOLE) 5 MG tablet Take 0.5 tablets by mouth daily    Biotin 2.5 MG TABS Take 2 tablets by mouth daily    cyanocobalamin 2500 MCG SUBL Take 2,500 mcg by mouth daily    fexofenadine-pseudoephedrine (ALLEGRA-D 24HR) 180-240 MG per extended release tablet Take 1 tablet by mouth daily    Krill Oil (OMEGA-3) 500 MG CAPS Take by mouth meloxicam (MOBIC) 7.5 MG tablet Take 7.5 mg by mouth as needed    omeprazole (PRILOSEC) 20 MG delayed release capsule Take 20 mg by mouth as needed    Red Yeast Rice Extract 600 MG CAPS Take 600 mg by mouth     No current facility-administered medications for this visit. Active Ambulatory Problems     Diagnosis Date Noted    BMI 27.0-27.9,adult 07/26/2017    Multiple thyroid nodules 02/01/2018    Graves' disease     Hyperthyroidism 02/12/2018    HLD (hyperlipidemia) 04/15/2014    Reflux gastritis 07/26/2017     Resolved Ambulatory Problems     Diagnosis Date Noted    No Resolved Ambulatory Problems     Past Medical History:   Diagnosis Date    Allergic rhinitis     GERD (gastroesophageal reflux disease)     Hypercholesterolemia      Family History   Problem Relation Age of Onset    Breast Cancer Sister     Breast Cancer Sister     Thyroid Disease Sister     Thyroid Cancer Neg Hx     Diabetes Neg Hx     Coronary Art Dis Mother            Review of Systems   Constitutional:  Negative for fatigue and fever. HENT:  Negative for congestion, hearing loss, postnasal drip and rhinorrhea. Eyes:  Negative for pain, discharge and visual disturbance. Respiratory:  Negative for cough, chest tightness, shortness of breath and wheezing. Cardiovascular:  Negative for chest pain, palpitations and leg swelling. Gastrointestinal:  Negative for abdominal pain, blood in stool, constipation, diarrhea and vomiting. Endocrine:        Hyperthyroidism:  asymptomatic. On Tapazole. Followed by endocrinology. Genitourinary:  Negative for difficulty urinating, frequency and urgency. Musculoskeletal:  Negative for arthralgias, gait problem and myalgias. Skin:  Negative for rash. Recent abnormal mammogram.  Has biopsy scheduled. Neurological:  Negative for dizziness, tremors, seizures and headaches. Psychiatric/Behavioral:  Negative for decreased concentration and sleep disturbance.  The patient is not nervous/anxious. /84   Pulse 81   Temp 97.4 °F (36.3 °C) (Tympanic)   Resp 16   Ht 5' 3\" (1.6 m)   Wt 160 lb (72.6 kg)   SpO2 98%   BMI 28.34 kg/m²       Objective   Physical Exam  Exam conducted with a chaperone present Sheri Hester). Constitutional:       Appearance: Normal appearance. HENT:      Head: Normocephalic and atraumatic. Right Ear: Tympanic membrane, ear canal and external ear normal.      Left Ear: Tympanic membrane, ear canal and external ear normal.      Nose: Nose normal.      Mouth/Throat:      Mouth: Mucous membranes are moist.   Eyes:      Extraocular Movements: Extraocular movements intact. Conjunctiva/sclera: Conjunctivae normal.      Pupils: Pupils are equal, round, and reactive to light. Cardiovascular:      Rate and Rhythm: Normal rate and regular rhythm. Pulses: Normal pulses. Heart sounds: Normal heart sounds. Pulmonary:      Effort: Pulmonary effort is normal.      Breath sounds: Normal breath sounds. Chest:      Comments: Breast Exam:  Breast similar in size. Unable to palpate masses. No axillary nodes palpated. Abdominal:      General: Bowel sounds are normal.   Genitourinary:     Comments: Normal female genitalia. Normal cervix. Moist vaginal tissue. Musculoskeletal:         General: Normal range of motion. Cervical back: Normal range of motion. Skin:     General: Skin is warm and dry. Neurological:      General: No focal deficit present. Mental Status: She is alert and oriented to person, place, and time. Psychiatric:         Mood and Affect: Mood normal.         Behavior: Behavior normal.         Thought Content:  Thought content normal.         Judgment: Judgment normal.        PHQ Scores 8/22/2022 7/27/2022 7/23/2021   PHQ2 Score 1 0 -   PHQ2 Score - - 0   PHQ9 Score 1 0 -     Interpretation of Total Score Depression Severity: 1-4 = Minimal depression, 5-9 = Mild depression, 10-14 = Moderate depression, 15-19 = Moderately severe depression, 20-27 = Severe depression    Body mass index is 28.34 kg/m². On this date 8/22/2022 I have spent 40 minutes reviewing previous notes, test results and face to face with the patient discussing the diagnosis and importance of compliance with the treatment plan as well as documenting on the day of the visit. An electronic signature was used to authenticate this note.     --Addi Cortes, MIR - CNP

## 2022-08-23 LAB
CHOLEST SERPL-MCNC: 152 MG/DL
HDLC SERPL-MCNC: 61 MG/DL (ref 40–60)
HDLC SERPL: 2.5 {RATIO}
LDLC SERPL CALC-MCNC: 72 MG/DL
TRIGL SERPL-MCNC: 95 MG/DL (ref 35–150)
VLDLC SERPL CALC-MCNC: 19 MG/DL (ref 6–23)

## 2022-08-25 LAB
CYTOLOGIST CVX/VAG CYTO: NORMAL
CYTOLOGY CVX/VAG DOC THIN PREP: NORMAL
HPV APTIMA: NEGATIVE
Lab: NORMAL
PATH REPORT.FINAL DX SPEC: NORMAL
STAT OF ADQ CVX/VAG CYTO-IMP: NORMAL

## 2022-08-26 ENCOUNTER — TELEPHONE (OUTPATIENT)
Dept: CASE MANAGEMENT | Age: 62
End: 2022-08-26

## 2022-08-26 NOTE — TELEPHONE ENCOUNTER
2022  Breast Navigation  intake complete for New Patient Breast Cancer. Received referral from [facility] with [address]. Patient had her imaging done at [facility] but wanted to come to [facility] for her follow up care. Reviewed role of navigation, gave contact information for navigators, discussed pathology report and  pending receptor status, reviewed upcoming appointments. Patient is retired from business making graduation items. Independent in self care No physical limitations. Barriers:  No financial, psychosocial,or transportation barriers noted. Lives with spouse Drea Jurado who is her primary support person. Verbal permission given to speak with spouse. Family history of breast cancer:  2 sisters, one  at age 47 from Breast Cancer, one diagnosed in her 63's and is 13 year survivor. Also has niece with breast caner. Type of cancer: Left breast IDC, high grade. MRI   none  Social determinants of health and Depression screen complete. Referring Provider:  Kristopher HESTER,CNP  Added MD and Navigator to treatment team   Appointment with Oncology: Dr. Juli Quintero 22 at 0900  Appointment with Surgery: Dr. Misty Hernandez 22 at 2:20 [address] presentation date:  pending for 22  My Chart message to patient with navigation contact information and upcoming appointments. Routed note to referring provider regarding intake and upcoming appointments.

## 2022-08-27 DIAGNOSIS — N63.20 LEFT BREAST MASS: ICD-10-CM

## 2022-08-31 ENCOUNTER — TELEPHONE (OUTPATIENT)
Dept: CASE MANAGEMENT | Age: 62
End: 2022-08-31

## 2022-08-31 ENCOUNTER — OFFICE VISIT (OUTPATIENT)
Dept: SURGERY | Age: 62
End: 2022-08-31
Payer: COMMERCIAL

## 2022-08-31 VITALS
WEIGHT: 159.5 LBS | SYSTOLIC BLOOD PRESSURE: 155 MMHG | HEART RATE: 79 BPM | BODY MASS INDEX: 28.25 KG/M2 | DIASTOLIC BLOOD PRESSURE: 74 MMHG

## 2022-08-31 DIAGNOSIS — C50.912 INVASIVE DUCTAL CARCINOMA OF BREAST, FEMALE, LEFT (HCC): ICD-10-CM

## 2022-08-31 PROBLEM — C50.911 INVASIVE DUCTAL CARCINOMA OF BREAST, RIGHT (HCC): Status: ACTIVE | Noted: 2022-08-31

## 2022-08-31 PROCEDURE — 99204 OFFICE O/P NEW MOD 45 MIN: CPT | Performed by: STUDENT IN AN ORGANIZED HEALTH CARE EDUCATION/TRAINING PROGRAM

## 2022-08-31 NOTE — Clinical Note
Can we please get this patient scheduled for MRI next week.  She also wishes to have genetic testing, this isnt going to change her surgical plan so not super urgent

## 2022-08-31 NOTE — PROGRESS NOTES
General Surgery New Patient Office Note:    8/31/2022    Wilmer Calvo  MRN: [id number]      CHIEF COMPLAINT: L Breast IDC      PRIMARY CARE PHYSICIAN: MIR Gayle CNP      HISTORY: Pt presents with a left breast mass. Pt found to have mass on screening mammo. She was noted to have 8mm lesion at the 9 oclock position. Patient denies any skin changes or nipple discharge. Patient does have a strong family history of breast cancer in her 2 sisters as well as her niece. She states that she does not think any of her family has had any genetic testing done. Patient denies any previous breast issues. Patient does have 2 daughters. REVIEW OF SYSTEMS: 10 Point ROS negative except what is in HPI      Past Medical History:   Diagnosis Date    Allergic rhinitis     GERD (gastroesophageal reflux disease)     Graves' disease     Hypercholesterolemia     Hyperthyroidism     Multiple thyroid nodules        Current Outpatient Medications   Medication Sig Dispense Refill    rosuvastatin (CRESTOR) 20 MG tablet Take 1 tablet by mouth at bedtime 90 tablet 3    methIMAzole (TAPAZOLE) 5 MG tablet Take 0.5 tablets by mouth daily 15 tablet 11    cyanocobalamin 2500 MCG SUBL Take 2,500 mcg by mouth daily      Red Yeast Rice Extract 600 MG CAPS Take 600 mg by mouth      Biotin 2.5 MG TABS Take 2 tablets by mouth daily (Patient not taking: Reported on 8/31/2022)      fexofenadine-pseudoephedrine (ALLEGRA-D 24HR) 180-240 MG per extended release tablet Take 1 tablet by mouth daily (Patient not taking: Reported on 8/31/2022)      Krill Oil (OMEGA-3) 500 MG CAPS Take by mouth (Patient not taking: Reported on 8/31/2022)      meloxicam (MOBIC) 7.5 MG tablet Take 7.5 mg by mouth as needed (Patient not taking: Reported on 8/31/2022)      omeprazole (PRILOSEC) 20 MG delayed release capsule Take 20 mg by mouth as needed (Patient not taking: Reported on 8/31/2022)       No current facility-administered medications for this visit. Family History   Problem Relation Age of Onset    Breast Cancer Sister     Breast Cancer Sister     Thyroid Disease Sister     Thyroid Cancer Neg Hx     Diabetes Neg Hx     Coronary Art Dis Mother        Social History     Socioeconomic History    Marital status:      Spouse name: None    Number of children: None    Years of education: None    Highest education level: None   Tobacco Use    Smoking status: Never    Smokeless tobacco: Never   Vaping Use    Vaping Use: Never used   Substance and Sexual Activity    Alcohol use: No    Drug use: No         PHYSICAL EXAMINATION:    BP (!) 155/74   Pulse 79   Wt 159 lb 8 oz (72.3 kg)   BMI 28.25 kg/m²     General Appearance AOx3, in no acute distress  Eyes Conjunctivae/corneas clear. PERRL, EOM's intact. No scleral icterus  Ears, Nose, Throat ENT exam normal, no neck nodes or sinus tenderness  Neck supple, no significant adenopathy. No notable JVD  Breast: Dense breast tissues bilaterally but no palpable masses or lymphadenopathy  Respiratory No chest wall deformities or tenderness, respiratory effort normal, no use of accessory muscles. Cardiovascular RRR. No chest wall tenderness. Gastrointestinal Abdomen soft, nontender, nondistended. BS x4. No rebound, guarding, or rigidity present. No palpable masses. No CVA tenderness  Lymphatics No palpable lymphadenopathy, no hepatosplenomegaly  Musculoskeletal No joint tenderness, deformity or swelling. Full ROM UE/LE. Distal pulses intact UE/LE. No edema, cyanosis, or venous stasis changes. Skin Normal coloration and turgor, no rashes, no suspicious skin lesions noted  Neurological alert, oriented, normal speech, no focal findings or movement disorder noted, CN II-XII intact  Psychiatric Alert and oriented, appropriate affect      STUDIES: MRI Result (most recent):  No results found for this or any previous visit from the past 3650 days.        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 ER/WI/Zdt0Mso status also reviewed and we discussed how this may modify her pre and post-surgical management including treatment with neoadjuvant chemo, adjuvant chemo, anti-estrogen agents or the need to stop any hormonal supplementation she may be taking. Patient is triple negative. She sees oncology tomorrow     Discussed family history as it may relate to any value to investigation of a genetic basis for her cancer. Patient would like genetic testing. I will put in this referral.  This is not going to change her surgical decision making     Discussed management options. Initial treatment should be surgical. Reviewed total vs. Partial mastectomy (w/ XRT) including different local recurrence rates but equivalent long term survival rates. Discussed potential indications for post-mastectomy XRT as well. Due to her dense breast tissue I would like to get an MRI to further evaluate this area    After discussion, we will proceed with bilateral mastectomy with left sentinel lymph node. We have discussed the technical details of the procedure(s) in detail. Risks reviewed include anesthetic risks, bleeding, infection, wound healing problems and cosmetic abnormalities, need for further surgical intervention depending on pathology reports, lymphedema if axillary procedure planned, and recurrence. All questions are answered.

## 2022-08-31 NOTE — PROGRESS NOTES
New Patient Abstract    Reason for Referral: Breast cancer    Referring Provider: Self-referral     Primary Care Provider: MIR Farrar CNP    Family History of Cancer/Hematologic Disorders: Family history is significant for two sisters and a niece with breast cancer. Presenting Symptoms: Abnormal routine screening mammogram     Narrative with recent with Results/Procedures/Biopsies and Dates completed: Ms. Martha King is a 70-year-old black female with a history of multiple thyroid nodules, hyperthyroidism, hypercholesterolemia, Grave's disease, GERD, uterine fibroids,  section x 2, cyst removal, and hysterectomy. She initially presented for a routine screening mammogram on 8/10/22 which identified a spiculated equal density mass in the left breast inferior medial quadrant posterior depth. Further evaluation with limited ultrasound of the left breast on 2022 confirmed an 8 x 7 x 8 mm round, hypoechoic mass with spiculated margins at the 9:00 position in the left breast, 6 cm from the nipple, demonstrating mild posterior acoustic enhancement and corresponding to the mass seen mammographically. Recommended ultrasound guided biopsy of the 9:00 left breast mass was performed on 22 with pathology revealing ER/NJ/HER-2 negative, grade 3, invasive carcinoma with high-grade DCIS focally present and no microcalcifications or lymphovascular space extension identified. Testing so far has been done at [facility]; however, Ms. Martha King wishes to transfer care to [facility]. She is self-referred to [facility] for Oncology evaluation and treatment of newly diagnosed breast cancer. She has also been referred to surgery and is scheduled for initial surgical evaluation with Dr. Angelia Coates, on 22. BILATERAL DIGITAL SCREENING MAMMOGRAM 3D/2D: 8/10/2022   FINDINGS: There are scattered areas of fibroglandular density (ACR Breast Density Composition Category B).   Digital mammography views were performed including tomosynthesis. There is a spiculated equal density mass in the left breast inferior medial quadrant posterior depth. No other significant masses, calcifications, or other findings are seen in either breast.     IMPRESSION: INCOMPLETE NEEDS ADDITIONAL IMAGING EVALUATION   The spiculated mass in the left breast is indeterminate. Ultrasound with possible additional views are recommended. There is no mammographic evidence of malignancy in the right breast. Patient will be notified of the results. LIMITED ULTRASOUND OF LEFT BREAST: 8/17/2022  FINDINGS: There is an 8 x 7 x 8 mm round, hypoechoic mass with spiculated margins at 9:00, 6 cm from the nipple. It demonstrates mild posterior acoustic enhancement. This corresponds to the mass seen mammographically. The left axilla is negative. IMPRESSION: SUSPICIOUS OF MALIGNANCY   The 8 mm mass at 9:00, 6 cm FN is suspicious and ultrasound-guided biopsy is recommended. These findings and recommendations were discussed with the patient at the time of her exam.  Her biopsy is scheduled for 8/25/2022 at 2:00 pm.     SURGICAL PATHOLOGY EXAMINATION 8/25/22        Notes from Referring Provider: N/A    Other Pertinent Information:   03/17/2021 (COVID-19, J&J, (age 18y+), IM, 0.5 mL)    Presented at Tumor Board:  Patient is scheduled to be presented at tumor board on 9-1-22.

## 2022-08-31 NOTE — PROGRESS NOTES
[address] [place] [address] Hematology and Oncology: New Patient - Consultation    Chief Complaint   Patient presents with    New Patient     Reason for Referral: Breast cancer  Referring Provider: Self-referral   Primary Care Provider: MIR Mendez CNP  Family History of Cancer/Hematologic Disorders: Family history is significant for two sisters and a niece with breast cancer. Presenting Symptoms: Abnormal routine screening mammogram     History of Present Illness:  Ms. Kaylan Andrade is a 57 y.o. female who presents today for consultation regarding breast cancer. The past medical history is significant for multiple thyroid nodules, hyperthyroidism, hypercholesterolemia, Grave's disease, GERD, uterine fibroids,  section x 2, cyst removal, and hysterectomy. She initially presented for a routine screening mammogram on 8/10/22 which identified a spiculated equal density mass in the left breast inferior medial quadrant posterior depth. Further evaluation with limited ultrasound of the left breast on 2022 confirmed an 8 x 7 x 8 mm round, hypoechoic mass with spiculated margins at the 9:00 position in the left breast, 6 cm from the nipple, demonstrating mild posterior acoustic enhancement and corresponding to the mass seen mammographically. US- guided biopsy of the 9:00 left breast mass was performed on 22 with pathology revealing ER/CT/HER-2 negative, grade 3, invasive carcinoma with high-grade DCIS focally present and no microcalcifications or lymphovascular space extension identified. Testing so far has been done at [facility]; however, Ms. Kaylan Andrade wishes to transfer care to [address] [place] [address]. She is self-referred to [facility] for Oncology evaluation and treatment of newly diagnosed breast cancer. She has also been referred to surgery and is scheduled for initial surgical evaluation with Dr. Kenneth Fowler, on 22. Today, she is here for consultation.   During today's visit we discussed the pathophysiology of breast cancer, staging, and the importance of receptor status in terms of treatment options. We then reviewed her medical history as well as oncologic history, recent imaging and pathology in detail. Next steps in management were reviewed. She is here with her . Chronological Events:   BILATERAL DIGITAL SCREENING MAMMOGRAM 3D/2D: 8/10/2022   FINDINGS: There are scattered areas of fibroglandular density (ACR Breast Density Composition Category B). Digital mammography views were performed including tomosynthesis. There is a spiculated equal density mass in the left breast inferior medial quadrant posterior depth. No other significant masses, calcifications, or other findings are seen in either breast.     IMPRESSION: INCOMPLETE NEEDS ADDITIONAL IMAGING EVALUATION   The spiculated mass in the left breast is indeterminate. Ultrasound with possible additional views are recommended. There is no mammographic evidence of malignancy in the right breast. Patient will be notified of the results. LIMITED ULTRASOUND OF LEFT BREAST: 8/17/2022  FINDINGS: There is an 8 x 7 x 8 mm round, hypoechoic mass with spiculated margins at 9:00, 6 cm from the nipple. It demonstrates mild posterior acoustic enhancement. This corresponds to the mass seen mammographically. The left axilla is negative. IMPRESSION: SUSPICIOUS OF MALIGNANCY   The 8 mm mass at 9:00, 6 cm FN is suspicious and ultrasound-guided biopsy is recommended.   These findings and recommendations were discussed with the patient at the time of her exam.  Her biopsy is scheduled for 8/25/2022 at 2:00 pm.      Evaristo 8/25/22 9/1/22 heme/onc consultation       Family History   Problem Relation Age of Onset    Breast Cancer Sister     Breast Cancer Sister     Thyroid Disease Sister     Thyroid Cancer Neg Hx     Diabetes Neg Hx     Coronary Art Dis Mother       Social History     Socioeconomic History    Marital status:      Spouse name: None    Number of children: None    Years of education: None    Highest education level: None   Tobacco Use    Smoking status: Never    Smokeless tobacco: Never   Vaping Use    Vaping Use: Never used   Substance and Sexual Activity    Alcohol use: No    Drug use: No        Review of Systems   Constitutional:  Negative for appetite change, chills, diaphoresis, fatigue, fever and unexpected weight change. HENT:   Negative for hearing loss, mouth sores, nosebleeds, sore throat, trouble swallowing and voice change. Eyes:  Negative for icterus. Respiratory:  Negative for chest tightness, hemoptysis, shortness of breath and wheezing. Cardiovascular:  Negative for chest pain, leg swelling and palpitations. Gastrointestinal:  Negative for abdominal distention, abdominal pain, blood in stool, constipation, diarrhea, nausea and vomiting. Endocrine: Negative for hot flashes. Genitourinary:  Negative for difficulty urinating, frequency, vaginal bleeding and vaginal discharge. Musculoskeletal:  Negative for arthralgias, flank pain, gait problem and myalgias. Skin:  Negative for itching, rash and wound. Neurological:  Negative for dizziness, extremity weakness, gait problem, headaches and numbness. Psychiatric/Behavioral:  Negative for confusion and depression. The patient is nervous/anxious.        Allergies   Allergen Reactions    Hydrocodone Anxiety and Other (See Comments)     Depressive state     Past Medical History:   Diagnosis Date    Allergic rhinitis     GERD (gastroesophageal reflux disease)     Graves' disease     Hypercholesterolemia     Hyperthyroidism     Multiple thyroid nodules      Past Surgical History:   Procedure Laterality Date     SECTION      x2    CYST REMOVAL  2021    subcutaneous cyst from lateral neck (benign)    HYSTERECTOMY (CERVIX STATUS UNKNOWN)      fibroids (ovaries intact)     Current Outpatient Medications   Medication Sig Dispense Refill    rosuvastatin (CRESTOR) 20 MG tablet Take 1 tablet by mouth at bedtime 90 tablet 3    methIMAzole (TAPAZOLE) 5 MG tablet Take 0.5 tablets by mouth daily 15 tablet 11    cyanocobalamin 2500 MCG SUBL Take 2,500 mcg by mouth daily      omeprazole (PRILOSEC) 20 MG delayed release capsule Take 20 mg by mouth as needed PRN      Red Yeast Rice Extract 600 MG CAPS Take 600 mg by mouth       No current facility-administered medications for this visit. No flowsheet data found. OBJECTIVE:  /75 (Site: Left Upper Arm, Position: Standing)   Pulse 80   Temp 98.1 °F (36.7 °C)   Resp 14   Ht 5' 3\" (1.6 m)   Wt 160 lb 6.4 oz (72.8 kg)   SpO2 97%   BMI 28.41 kg/m²       ECOG PERFORMANCE STATUS - 0-Fully active, able to carry on all pre-disease performance without restriction. Pain - 0 - No pain/10. None/Minimal pain - not affecting QOL     Fatigue - No flowsheet data found. Distress - No flowsheet data found. Physical Exam  Vitals reviewed. Exam conducted with a chaperone present. Constitutional:       General: She is not in acute distress. Appearance: Normal appearance. She is not ill-appearing or toxic-appearing. HENT:      Head: Normocephalic and atraumatic. Nose: Nose normal.      Mouth/Throat:      Mouth: Mucous membranes are moist.   Eyes:      General: No scleral icterus. Extraocular Movements: Extraocular movements intact. Conjunctiva/sclera: Conjunctivae normal.      Pupils: Pupils are equal, round, and reactive to light. Cardiovascular:      Rate and Rhythm: Normal rate and regular rhythm. Heart sounds: No murmur heard. Pulmonary:      Effort: No respiratory distress. Breath sounds: No wheezing or rales. Chest:   Breasts:     Right: No axillary adenopathy or supraclavicular adenopathy. Left: No axillary adenopathy or supraclavicular adenopathy.       Comments: Small palpable mass   No axillary LAD on exam   Abdominal:      General: There is no distension. Palpations: Abdomen is soft. Musculoskeletal:         General: Normal range of motion. Cervical back: Normal range of motion. Right lower leg: No edema. Left lower leg: No edema. Lymphadenopathy:      Cervical: No cervical adenopathy. Upper Body:      Right upper body: No supraclavicular or axillary adenopathy. Left upper body: No supraclavicular or axillary adenopathy. Skin:     General: Skin is warm and dry. Coloration: Skin is not jaundiced or pale. Findings: No rash. Neurological:      General: No focal deficit present. Mental Status: She is alert and oriented to person, place, and time. Gait: Gait normal.   Psychiatric:         Behavior: Behavior normal.         Thought Content: Thought content normal.        Labs:  No results found for this or any previous visit (from the past 168 hour(s)). Imaging: reviewed     PATHOLOGY:   per above       ASSESSMENT:     Diagnosis Orders   1. Invasive ductal carcinoma of breast, right [facility])            Ms. Kaylen Urena is here for evaluation of breast cancer. Left breast IDC, 9:00, s/p core bx/clip, at GABO, poorly diff, 8mm on US, MRI pending, LVI neg, DCIS high grade focally present, ER0/PR0, Her2 sera +1 - negative     - she is here for consultation regarding neoadjuvant chemotherapy for newly diagnosed triple negative breast cancer. During today's visit, we had a long conversation about breast cancer pathophysiology and staging in general.  We then reviewed her imaging and pathology in details, including receptor status and it's significance in terms of available treatment options. Neoadjuvant compared to adjuvant chemotherapy has similar long term outcomes when patients are given the same therapy. Preoperative treatment can render large, inoperable tumors operable and improve breast conservation, downstage tumor and decrease risk of postoperative lympedema.   It also provides information about tumor chemosensitivity. Patients with pathologic complete response have favorable disease free survival and overall survival and this correlation is strongest in TNBC. Preoperative systemic therapy can lead to possible overtreatment with systemic therapy of clinical stages overestimated. It can also lead to possible under treatment local regionally with radiotherapy if clinical stage is underestimated. There is also possibility of disease progression during preoperative systemic therapy. - we discussed options of AC-T +/- carbo/pembro and TC; she is aware that she will need chemotherapy regardless of her final decision (before/after sx), if she does chemo after, she would not be a candidate for pembro/carbo. Data from [facility] reviewed. - MRI pending - she is aware that tx plan may change depending on MR results   - pt met with Dr Meg Sauceda and after consultation elected to pw bilateral mastectomies with axillary dissection   - will need echo prior to tx, NP/FC and port   - genetics pending     RESUSCITATION DIRECTIVES/HOSPICE CARE: Full Support    RTC VV after MR next week   [60min - chart review, consultation and discussion of options, next steps, coordination of care and charting ]    MDM  Number of Diagnoses or Management Options  Invasive ductal carcinoma of breast, right ([facility] [address]): new, needed workup     Amount and/or Complexity of Data Reviewed  Clinical lab tests: reviewed  Tests in the radiology section of CPT®: reviewed  Obtain history from someone other than the patient: yes  Review and summarize past medical records: yes  Discuss the patient with other providers: yes  Independent visualization of images, tracings, or specimens: yes    Risk of Complications, Morbidity, and/or Mortality  Presenting problems: moderate  Diagnostic procedures: moderate  Management options: high        Lab studies and imaging studies were personally reviewed. Pertinent old records were reviewed.      All questions were asked and answered to the best of my ability. The patient verbalized understanding and agrees with the plan above.             Jyothi Matthews, [address] [facility] Hematology and Oncology  [address] [place] [address]  Office : [phone]  Fax : [phone]

## 2022-09-01 ENCOUNTER — OFFICE VISIT (OUTPATIENT)
Dept: ONCOLOGY | Age: 62
End: 2022-09-01
Payer: COMMERCIAL

## 2022-09-01 ENCOUNTER — CLINICAL DOCUMENTATION (OUTPATIENT)
Dept: CASE MANAGEMENT | Age: 62
End: 2022-09-01

## 2022-09-01 VITALS
OXYGEN SATURATION: 97 % | SYSTOLIC BLOOD PRESSURE: 126 MMHG | RESPIRATION RATE: 14 BRPM | TEMPERATURE: 98.1 F | HEIGHT: 63 IN | WEIGHT: 160.4 LBS | DIASTOLIC BLOOD PRESSURE: 75 MMHG | HEART RATE: 80 BPM | BODY MASS INDEX: 28.42 KG/M2

## 2022-09-01 DIAGNOSIS — C50.911 INVASIVE DUCTAL CARCINOMA OF BREAST, RIGHT (HCC): Primary | ICD-10-CM

## 2022-09-01 PROCEDURE — 99205 OFFICE O/P NEW HI 60 MIN: CPT | Performed by: INTERNAL MEDICINE

## 2022-09-01 ASSESSMENT — ENCOUNTER SYMPTOMS
ABDOMINAL PAIN: 0
SHORTNESS OF BREATH: 0
WHEEZING: 0
TROUBLE SWALLOWING: 0
CHEST TIGHTNESS: 0
NAUSEA: 0
DIARRHEA: 0
HEMOPTYSIS: 0
ABDOMINAL DISTENTION: 0
BLOOD IN STOOL: 0
SCLERAL ICTERUS: 0
VOMITING: 0
CONSTIPATION: 0
SORE THROAT: 0
VOICE CHANGE: 0

## 2022-09-01 ASSESSMENT — PATIENT HEALTH QUESTIONNAIRE - PHQ9
SUM OF ALL RESPONSES TO PHQ QUESTIONS 1-9: 1
SUM OF ALL RESPONSES TO PHQ QUESTIONS 1-9: 1
2. FEELING DOWN, DEPRESSED OR HOPELESS: 1
SUM OF ALL RESPONSES TO PHQ QUESTIONS 1-9: 1
SUM OF ALL RESPONSES TO PHQ QUESTIONS 1-9: 1

## 2022-09-06 NOTE — PROGRESS NOTES
[facility] - Initial Evaluation   Patient: Davon Lay   YOB: 1960      Location: [facility] HEMATOLOGY AND ONCOLOGY - Provider participated in today's evaluation via telemedicine in [place], [place]. Patient completed today's evaluation from home. Date of Evaluation: 9/15/2022      Referral Source: Arlen Becerra DO   Referral Reason: C50.911 (ICD-10-CM) - Invasive ductal carcinoma of breast, right [facility])      Genetic Counselor: Cheyenne Morrison MS, [facility]      Davon Lay was referred for evaluation for the potential of hereditary cancer due to her diagnosis of breast cancer. Nika Garcia has consented to a visit via telehealth in lieu of a face to face office visit during the coronavirus pandemic. Nika Garcia was accompanied to today's visit by her spouse Fabián Gil. Personalized risk assessment was performed and genetic testing options were reviewed. For full details, please see Risk Assessment and Discussion in this document. Following genetic counseling, Devi's action plan is:    PURSUES TESTING: BRCAPlus panel of 8 genes with reflex to Custom[employer]-Cancer+RNAinsight panel of 20 genes associated with hereditary breast cancer (including BRCA1 and BRCA2) was offered and accepted. Test results should be available in approximately 7-10 days, then 3-4 weeks. 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 Tumor markers were triple negative. Treatment will start with surgery. Devi's surgical decision is dependent on whether or not Nika Garcia has a hereditary predisposition to cancer. Genetic testing is of utmost importance in ensuring proper medical management of Nika Garcia. Surgery is tentatively scheduled for 10-7.     Hormonal history:  Age of Menarche: 13  Number of Pregnancies: 2  Number of Live Births: 2  Age of First Live Birth: 23  Breast Feeding History: Denies  Fertility Treatment: Denies  Contraception Use: OCP for 20 years  Age of Menopause: Partial hysterectomy  Hormonal Replacement Therapy: Denies    Screening history:  Last gynecological exam: August, had pap  Latest colonoscopy: 7-8 years ago, asked to return in 5 years  History of polyps: Yes  Last dermatological exam: Has seen to get mole removed  History of additional abnormal cancer screening: Denies    Social history:  Tobacco use: Denies  Alcohol use: Very seldom    General medical history:  Past Medical History:   Diagnosis Date    Allergic rhinitis     GERD (gastroesophageal reflux disease)     Graves' disease     Hypercholesterolemia     Hyperthyroidism     Multiple thyroid nodules        Surgical history:  Hysterectomy: Yes  Bilateral salpingo oophorectomy: Denies  Past Surgical History:   Procedure Laterality Date     SECTION      x2    CYST REMOVAL  2021    subcutaneous cyst from lateral neck (benign)    HYSTERECTOMY (CERVIX STATUS UNKNOWN)      fibroids (ovaries intact)        Family History   A detailed three-generation family history was taken today. Shukri Rebecca reported the following family history of cancer:   Breast cancer  Niece  Sister, diagnosed 59years of age  Sister, who passed at 47years of age   Bone cancer   Maternal grandmother   Unknown cancer   Nephew     There is no known Ashkenazi Tenriism ancestry. There is no report of consanguinity. The history is by Devi's report solely, and our counseling and risk assessment is based on the accuracy of this provided history. Should the information collected be found to be changed or different, our original assessment and recommendations may change. A copy of the pedigree can be found in media manager. Risk Assessment and Discussion   GENETICS OF HEREDITARY CANCER SYNDROMES: We first explained that our genetic material, which we inherit from both our parents, is like a set of directions that tells our body how to grow and function.  Our genetic material can be further broken down into sections called genes, and these are specific sets of directions that are important in specific body functions. Harmful differences in the amount, spelling or order of our genetic material that cause genes not to function correctly are called pathogenic variants, and can cause symptoms or disease. During the appointment we reviewed that around 5-10% of individuals diagnosed with cancer will have a hereditary cancer susceptibility syndrome. Hereditary cancer susceptibility syndromes are caused most often by pathogenic (harmful) variants (genetic differences) in a group of genes called tumor suppressor genes. Tumor suppressor genes typically function to protect an individual from developing cancer. If an individual has a pathogenic variant in one of their two tumor suppressor genes, that copy does not function as it usually would to protect the body from cancer development and the individual is at increased risk to develop cancer. When an individual is born with one nonfunctioning tumor suppressor gene, the one working copy that is protecting the individual from cancer development can acquire a change through various environmental factors (example: UV radiation, diet, smoking, etc.) and can become nonfunctional as well. With two nonfunctional tumor suppressor genes, the individual does not have any of the protection from that gene and is at risk to develop cancer. The specific cancer type(s) an individual is at increased risk for depend on the specific tumor suppressor gene identified to have a pathogenic variant. Changes to medical management may be recommended for this patient if they are shown to have a pathogenic variant in a tumor suppressor gene, and these are dependent on the gene. A majority of cases of hereditary breast cancer are due to pathogenic variants in the BRCA1 and BRCA2 genes, which are associated with Hereditary Breast and Ovarian Cancer Syndrome.  There are additional genes that can also cause increased risk for breast cancer, but these make up a smaller proportion of overall cases. We determined that Devi's family history is suspicious for a hereditary cancer syndrome because of her own diagnosis of triple negative breast cancer, coupled with the multiple breast cancer diagnoses in close blood relatives. INHERITANCE OF HEREDITARY CANCER SYNDROMES: We discussed that hereditary cancer syndromes usually run through families (or are inherited) in an autosomal dominant pattern. In autosomal dominant conditions, the term \"autosomal\" means that both females and males have an increased chance of having signs or symptoms of a condition. The term \"dominant\" means that an individual needs to have a harmful change in one copy of a gene (either the one inherited from a person's mother or the one inherited from a person's father) to be at risk for the signs or symptoms of a condition. Individuals who have a dominant genetic condition have a 50% (1 in 2) chance of passing on the harmful gene change for each pregnancy. POSSIBLE RESULTS OF GENETIC TESTING: We reviewed the possible results of genetic testing. We reviewed that testing could return a positive result, meaning we found a pathogenic variant that increases cancer risk. In the context of a positive result, a finding could be unexpected, such as finding a pathogenic variant in a gene that increases the lifetime risk for a cancer the patient and their family have no history of. It is very possible that a positive result would impact current medical management of the patient and could result in increased or additional screenings, or even consideration of prophylactic surgery to decrease cancer risk. We could receive a negative result, meaning we did not find a pathogenic variant.  We discussed that a negative result does not mean a patient will never get cancer, just that testing did not find a pathogenic variant that is responsible for increased cancer risk over the general population. We encouraged the patient that in the event of a negative result, it is still necessary to continue recommended cancer screening. We also discussed that the result could be negative for various reasons, and in some cases may not mean an individual's estimated risk should be lowered to population risk for cancer. Lastly, we could receive a result known as a variant (genetic difference) of uncertain significance. A variant of uncertain significance is a when we identify a genetic difference in a gene, but based on the current data and information available the testing laboratory cannot say whether or not that difference increases lifetime risk for cancer. We discussed that if we were to get this result, we would treat it as a negative, and not recommend changes to medical management or testing of additional family members for this variant unless requested by the laboratory to provide clarification. We discussed that changes in recommendations for medical management and family members eligible for testing would likely not take place unless the variant is reclassified. LIMITATIONS TO GENETIC TESTING: We also discussed several limitations regarding genetic testing in the context of hereditary cancer syndrome. We first discussed the concept of reduced penetrance. This refers to the idea that a person may carry a harmful, disease-causing genetic change but never develop signs of symptoms of the disease. This is the case with hereditary cancer syndromes. A person may carry a change that puts him/her at an increased risk of developing cancer but may never actually develop cancer. Even if we were to identify a disease-causing genetic change in [place], for example, we would not be able to predict whether or not she would develop the related complications and concerns. In addition, we discussed the idea of variable expressivity.  This refers to the idea that two people may carry the exact same genetic change but have different signs, symptoms, and progression of a disease. So overall, we discussed that even if a disease-causing change is identified in Charlene Khan, we would not be able to fully predict if/when she would develop symptoms and the progression of her disease. Finally, we discussed the implications of the Genetic Information Nondiscrimination Act (MYNOR) of 2008. This law provides federal protection from genetic discrimination in regards to health insurance and employment. MYNOR prevents health insurance companies from using genetic information when making decisions regarding eligibility or premiums. In addition, this law also protects against genetic discrimination by most employers. MYNOR does not apply to government employees, members of the [employer], or to employers with fewer than 15 employees. The other main limitation of MYNOR is that the law does not cover life insurance, long-term care insurance or disability insurance. Additionally, MYNOR does not protect an individual if they already have the disease; it only protects an individual that has a genetic predisposition to a disease. GENETIC TESTING FOR HEREDITARY CANCER SYNDROMES: We discussed that genetic testing for hereditary cancer syndromes can be done by looking at one specific gene, a few genes related to a specific type of cancer, or many genes related to common hereditary cancers. All of these testing options look for misspellings within the genes (called sequence variants) and any missing or extra pieces of genetic material in these genes (called deletions and duplications). After reviewing the benefits, risks, and limitations of testing, Charlene Khan consented to genetic testing for a hereditary cancer predisposition through [facility].  The panel chosen is called BRCAPlus panel of 8 genes with reflex to [employer]-Cancer+RNAinsight panel of 20 genes associated with hereditary breast cancer. We discussed that [facility] will bill Devi's insurance directly, and that the laboratory will contact Ashleigh Villarreal directly in order to discuss estimated out of pocket cost, if over $100, for the testing. If the patient is not comfortable with the out of pocket cost communicated, and testing is not yet being reported they can cancel the test with no penalty to them. If it is too late to cancel, or Ashleigh Villarreal wishes to proceed with testing, Ashleigh Villarreal would also have the option to utilize financial assistance or self-pay pricing (~$250). I encouraged Ashleigh Villarreal to respond as soon as possible if she was contacted, otherwise the lab may default to billing insurance. Dvei's blood sample will be sent to [facility] on 9/16/22 and results are expected 7-10 days, then 3-4 weeks after the sample is received. Once results are returned by the laboratory, I will reach out to the patient to deliver them. Summary   Based on her personal history of triple negative breast cancer with family history of breast cancer in three additional close blood relatives, Ashleigh Villarreal meets NCCN and Medicare criteria for testing. ELECTS TESTING  Ashleigh Villarreal provided informed consent for the BRCAPlus panel of 8 genes with reflex to [employer]-Cancer+RNAinsight panel of 20 genes associated with hereditary breast cancer . We can send genetic testing through [facility]. Test results will be available in approximately 7-10 days, then 3-4 weeks. Ashleigh Villarreal elected to receive her results via telephone. If she is found to carry a pathogenic variant, follow-up is recommended so the results and medical management recommendations can be discussed in detail. We encouraged Ashleigh Villarreal to stay in contact with us regarding changes to the family history, as new information may impact our risk assessment. We enjoyed meeting with Ashleigh Villarreal and hope that we were able to answer her questions.       Patria Schultz MS, [facility]  Genetic Counselor  [phone]    Time: 35 minutes with greater than 50% spent on counseling and coordination of care.     CC: Kaleigh Phipps, DO

## 2022-09-08 ENCOUNTER — HOSPITAL ENCOUNTER (OUTPATIENT)
Dept: MRI IMAGING | Age: 62
Discharge: HOME | End: 2022-09-11
Payer: COMMERCIAL

## 2022-09-08 ENCOUNTER — TELEPHONE (OUTPATIENT)
Dept: SURGERY | Age: 62
End: 2022-09-08

## 2022-09-08 DIAGNOSIS — C50.911 INVASIVE DUCTAL CARCINOMA OF BREAST, RIGHT (HCC): ICD-10-CM

## 2022-09-08 PROCEDURE — A9579 GAD-BASE MR CONTRAST NOS,1ML: HCPCS | Performed by: STUDENT IN AN ORGANIZED HEALTH CARE EDUCATION/TRAINING PROGRAM

## 2022-09-08 PROCEDURE — C8908 MRI W/O FOL W/CONT, BREAST,: HCPCS

## 2022-09-08 PROCEDURE — 6360000004 HC RX CONTRAST MEDICATION: Performed by: STUDENT IN AN ORGANIZED HEALTH CARE EDUCATION/TRAINING PROGRAM

## 2022-09-08 RX ADMIN — GADOTERIDOL 14 ML: 279.3 INJECTION, SOLUTION INTRAVENOUS at 19:35

## 2022-09-08 ASSESSMENT — ENCOUNTER SYMPTOMS
DIARRHEA: 0
WHEEZING: 0
SCLERAL ICTERUS: 0
HEMOPTYSIS: 0
ABDOMINAL DISTENTION: 0
SHORTNESS OF BREATH: 0
CHEST TIGHTNESS: 0
CONSTIPATION: 0
VOICE CHANGE: 0
BLOOD IN STOOL: 0
NAUSEA: 0
ABDOMINAL PAIN: 0
SORE THROAT: 0
TROUBLE SWALLOWING: 0
VOMITING: 0

## 2022-09-08 NOTE — PROGRESS NOTES
[address] [place] [address] Hematology and Oncology: Established patient - follow up     Chief Complaint   Patient presents with    Follow-up     Reason for Referral: Breast cancer  Referring Provider: Self-referral   Primary Care Provider: MIR Perrin CNP  Family History of Cancer/Hematologic Disorders: Family history is significant for two sisters and a niece with breast cancer. Presenting Symptoms: Abnormal routine screening mammogram     History of Present Illness:  Ms. Nino Medina is a 57 y.o. female who presents today for follow up regarding breast cancer. The past medical history is significant for multiple thyroid nodules, hyperthyroidism, hypercholesterolemia, Grave's disease, GERD, uterine fibroids,  section x 2, cyst removal, and hysterectomy. She initially presented for a routine screening mammogram on 8/10/22 which identified a spiculated equal density mass in the left breast inferior medial quadrant posterior depth. Further evaluation with limited ultrasound of the left breast on 2022 confirmed an 8 x 7 x 8 mm round, hypoechoic mass with spiculated margins at the 9:00 position in the left breast, 6 cm from the nipple, demonstrating mild posterior acoustic enhancement and corresponding to the mass seen mammographically. US- guided biopsy of the 9:00 left breast mass was performed on 22 with pathology revealing ER/CT/HER-2 negative, grade 3, invasive carcinoma with high-grade DCIS focally present and no microcalcifications or lymphovascular space extension identified. Testing so far has been done at [facility]; however, Ms. Nino Medina wishes to transfer care to [address] [place] [address]. She is self-referred to [facility] for Oncology evaluation and treatment of newly diagnosed breast cancer. She has also been referred to surgery and is scheduled for initial surgical evaluation with Dr. Saundra Gonzalez, on 22.      At consultation, we discussed the pathophysiology of breast cancer, staging, and the importance of receptor status in terms of treatment options. We then reviewed her medical history as well as oncologic history, recent imaging and pathology in detail. Next steps in management were reviewed. She was here with her . Patient is here today for follow-up after the MRI last night. Here with her . Results reviewed in detail and show evidence of disease in the breast but no worrisome findings in terms of lymphadenopathy or other evidence of disease. She is emotional and wishes to proceed with surgery upfront. We discussed need for chemotherapy because of triple negative status and she wishes to proceed with surgery upfront accepting risks. Pathology from surgery will determine ultimate staging and treatment plan. Sx will be notified of pt's preference. Chronological Events:   BILATERAL DIGITAL SCREENING MAMMOGRAM 3D/2D: 8/10/2022   FINDINGS: There are scattered areas of fibroglandular density (ACR Breast Density Composition Category B). Digital mammography views were performed including tomosynthesis. There is a spiculated equal density mass in the left breast inferior medial quadrant posterior depth. No other significant masses, calcifications, or other findings are seen in either breast.     IMPRESSION: INCOMPLETE NEEDS ADDITIONAL IMAGING EVALUATION   The spiculated mass in the left breast is indeterminate. Ultrasound with possible additional views are recommended. There is no mammographic evidence of malignancy in the right breast. Patient will be notified of the results. LIMITED ULTRASOUND OF LEFT BREAST: 8/17/2022  FINDINGS: There is an 8 x 7 x 8 mm round, hypoechoic mass with spiculated margins at 9:00, 6 cm from the nipple. It demonstrates mild posterior acoustic enhancement. This corresponds to the mass seen mammographically. The left axilla is negative.      IMPRESSION: SUSPICIOUS OF MALIGNANCY   The 8 mm mass at 9:00, 6 cm FN is suspicious and ultrasound-guided biopsy is recommended. These findings and recommendations were discussed with the patient at the time of her exam.  Her biopsy is scheduled for 8/25/2022 at 2:00 pm.      Evaristo 8/25/22 9/1/22 heme/onc consultation   9/9/22 FU after MRI - pt elected to p/w sx upfront; MRI results reviewed. Family History   Problem Relation Age of Onset    Breast Cancer Sister     Breast Cancer Sister     Thyroid Disease Sister     Thyroid Cancer Neg Hx     Diabetes Neg Hx     Coronary Art Dis Mother       Social History     Socioeconomic History    Marital status:      Spouse name: None    Number of children: None    Years of education: None    Highest education level: None   Tobacco Use    Smoking status: Never    Smokeless tobacco: Never   Vaping Use    Vaping Use: Never used   Substance and Sexual Activity    Alcohol use: No    Drug use: No        Review of Systems   Constitutional:  Positive for appetite change. Negative for chills, diaphoresis, fatigue, fever and unexpected weight change. HENT:   Negative for hearing loss, mouth sores, nosebleeds, sore throat, trouble swallowing and voice change. Eyes:  Negative for icterus. Respiratory:  Negative for chest tightness, hemoptysis, shortness of breath and wheezing. Cardiovascular:  Negative for chest pain, leg swelling and palpitations. Gastrointestinal:  Negative for abdominal distention, abdominal pain, blood in stool, constipation, diarrhea, nausea and vomiting. Endocrine: Negative for hot flashes. Genitourinary:  Negative for difficulty urinating, frequency, vaginal bleeding and vaginal discharge. Musculoskeletal:  Negative for arthralgias, flank pain, gait problem and myalgias. Skin:  Negative for itching, rash and wound. Neurological:  Negative for dizziness, extremity weakness, gait problem, headaches and numbness. Psychiatric/Behavioral:  Positive for sleep disturbance.  Negative for confusion and depression. The patient is nervous/anxious. Allergies   Allergen Reactions    Hydrocodone Anxiety and Other (See Comments)     Depressive state     Past Medical History:   Diagnosis Date    Allergic rhinitis     GERD (gastroesophageal reflux disease)     Graves' disease     Hypercholesterolemia     Hyperthyroidism     Multiple thyroid nodules      Past Surgical History:   Procedure Laterality Date     SECTION      x2    CYST REMOVAL  2021    subcutaneous cyst from lateral neck (benign)    HYSTERECTOMY (CERVIX STATUS UNKNOWN)      fibroids (ovaries intact)     Current Outpatient Medications   Medication Sig Dispense Refill    mirtazapine (REMERON) 15 MG tablet Take 0.5 tablets by mouth nightly 30 tablet 3    rosuvastatin (CRESTOR) 20 MG tablet Take 1 tablet by mouth at bedtime 90 tablet 3    methIMAzole (TAPAZOLE) 5 MG tablet Take 0.5 tablets by mouth daily 15 tablet 11    cyanocobalamin 2500 MCG SUBL Take 2,500 mcg by mouth daily      omeprazole (PRILOSEC) 20 MG delayed release capsule Take 20 mg by mouth as needed PRN      Red Yeast Rice Extract 600 MG CAPS Take 600 mg by mouth       No current facility-administered medications for this visit. No flowsheet data found. OBJECTIVE:  BP (!) 162/81 (Site: Left Upper Arm, Position: Sitting, Cuff Size: Medium Adult)   Pulse 70   Temp 98.1 °F (36.7 °C) (Oral)   Resp 19   Ht 5' 3\" (1.6 m)   Wt 158 lb 3.2 oz (71.8 kg)   SpO2 99%   BMI 28.02 kg/m²       ECOG PERFORMANCE STATUS - 0-Fully active, able to carry on all pre-disease performance without restriction. Pain - 0 - No pain/10. None/Minimal pain - not affecting QOL     Fatigue - No flowsheet data found. Distress - No flowsheet data found. Physical Exam  Vitals reviewed. Exam conducted with a chaperone present. Constitutional:       General: She is not in acute distress. Appearance: Normal appearance. She is not ill-appearing or toxic-appearing.    HENT:      Head: Normocephalic and atraumatic. Nose: Nose normal.      Mouth/Throat:      Mouth: Mucous membranes are moist.   Eyes:      General: No scleral icterus. Extraocular Movements: Extraocular movements intact. Conjunctiva/sclera: Conjunctivae normal.      Pupils: Pupils are equal, round, and reactive to light. Cardiovascular:      Rate and Rhythm: Normal rate and regular rhythm. Heart sounds: No murmur heard. Pulmonary:      Effort: No respiratory distress. Breath sounds: No wheezing or rales. Chest:   Breasts:     Right: No axillary adenopathy or supraclavicular adenopathy. Left: No axillary adenopathy or supraclavicular adenopathy. Comments: Hx: Small palpable mass   No axillary LAD on exam   Musculoskeletal:         General: Normal range of motion. Cervical back: Normal range of motion. Right lower leg: No edema. Left lower leg: No edema. Lymphadenopathy:      Cervical: No cervical adenopathy. Upper Body:      Right upper body: No supraclavicular or axillary adenopathy. Left upper body: No supraclavicular or axillary adenopathy. Skin:     General: Skin is warm and dry. Coloration: Skin is not jaundiced or pale. Findings: No rash. Neurological:      General: No focal deficit present. Mental Status: She is alert and oriented to person, place, and time. Gait: Gait normal.   Psychiatric:         Behavior: Behavior normal.         Thought Content: Thought content normal.        Labs:  No results found for this or any previous visit (from the past 168 hour(s)). Imaging: reviewed     PATHOLOGY:   per above       ASSESSMENT:     Diagnosis Orders   1. Decreased appetite  mirtazapine (REMERON) 15 MG tablet            Ms. Saran Cowan is here for evaluation of breast cancer.       Left breast IDC, 9:00, s/p core bx/clip, at GABO, poorly diff, 8mm on US, MRI pending, LVI neg, DCIS high grade focally present, ER0/PR0, Her2 sera +1 - negative   - MR - Biopsied mass (11mm) in the medial 8:30 left breast at 7 cm from the nipple. No additional pathologic enhancement or adenopathy in either breast.      - she is here for FU after MRI - she has elected to p/w sx upfront. Images reviewed. Understands final path will be determined by sx specimen/LN status. Dr Daria Ortega notifed by me re pt's decision.    - we discussed options of AC-T +/- carbo/pembro and TC; she is aware that she will need chemotherapy regardless of her final decision (before/after sx), if she does chemo after, she would not be a candidate for pembro/carbo. Data from [facility] reviewed. - pt met with Dr Daria Ortega and elected to pw bilateral mastectomies with axillary dissection   - will need echo prior to tx, NP/FC and port   - genetics pending   - decreased appetite, anxiety and sleep disturbance - will start with low dose mirtazapine and re-assess     RESUSCITATION DIRECTIVES/HOSPICE CARE: Full Support    RTC after sx or sooner as needed per her wishes     MDM  Number of Diagnoses or Management Options  Decreased appetite: new, no workup  Invasive ductal carcinoma of breast, right ([address]): new, needed workup     Amount and/or Complexity of Data Reviewed  Clinical lab tests: ordered and reviewed  Tests in the radiology section of CPT®: ordered and reviewed  Tests in the medicine section of CPT®: ordered  Obtain history from someone other than the patient: yes  Review and summarize past medical records: yes  Discuss the patient with other providers: yes  Independent visualization of images, tracings, or specimens: yes        Lab studies and imaging studies were personally reviewed. Pertinent old records were reviewed. Historical:   - she is here for consultation regarding neoadjuvant chemotherapy for newly diagnosed triple negative breast cancer.   During today's visit, we had a long conversation about breast cancer pathophysiology and staging in general.  We then reviewed her imaging and

## 2022-09-09 ENCOUNTER — OFFICE VISIT (OUTPATIENT)
Dept: ONCOLOGY | Age: 62
End: 2022-09-09
Payer: COMMERCIAL

## 2022-09-09 VITALS
HEIGHT: 63 IN | TEMPERATURE: 98.1 F | RESPIRATION RATE: 19 BRPM | HEART RATE: 70 BPM | BODY MASS INDEX: 28.03 KG/M2 | WEIGHT: 158.2 LBS | DIASTOLIC BLOOD PRESSURE: 81 MMHG | OXYGEN SATURATION: 99 % | SYSTOLIC BLOOD PRESSURE: 162 MMHG

## 2022-09-09 DIAGNOSIS — C50.911 INVASIVE DUCTAL CARCINOMA OF BREAST, RIGHT (HCC): ICD-10-CM

## 2022-09-09 DIAGNOSIS — R63.0 DECREASED APPETITE: Primary | ICD-10-CM

## 2022-09-09 PROCEDURE — 99214 OFFICE O/P EST MOD 30 MIN: CPT | Performed by: INTERNAL MEDICINE

## 2022-09-09 RX ORDER — MIRTAZAPINE 15 MG/1
7.5 TABLET, FILM COATED ORAL NIGHTLY
Qty: 30 TABLET | Refills: 3 | Status: SHIPPED | OUTPATIENT
Start: 2022-09-09

## 2022-09-09 ASSESSMENT — PATIENT HEALTH QUESTIONNAIRE - PHQ9
2. FEELING DOWN, DEPRESSED OR HOPELESS: 0
SUM OF ALL RESPONSES TO PHQ QUESTIONS 1-9: 0
SUM OF ALL RESPONSES TO PHQ QUESTIONS 1-9: 0
SUM OF ALL RESPONSES TO PHQ9 QUESTIONS 1 & 2: 0
SUM OF ALL RESPONSES TO PHQ QUESTIONS 1-9: 0
1. LITTLE INTEREST OR PLEASURE IN DOING THINGS: 0
SUM OF ALL RESPONSES TO PHQ QUESTIONS 1-9: 0

## 2022-09-09 NOTE — PATIENT INSTRUCTIONS
Patient Instructions from Today's Visit    Reason for Visit:  Follow up visit for breast cancer      Plan: Your MRI showed the tumor in the breast and no where else. You have elected to proceed with surgery first.  We will meet afterwards to discuss chemo. Once surgery gets scheduled, we will call you to arrange a follow up. Final pathology and staging will be determined at surgery. Echo scheduled 9/12. Meet with Chelsy Edwards ([employer]) on 9/15. 7.5 mg Remeron sent in to see if this helps increase your appetite. Take this nightly. Follow Up:  - after surgery    Recent Lab Results:       Treatment Summary has been discussed and given to patient: n/a        -------------------------------------------------------------------------------------------------------------------  Please call our office at [phone] if you have any  of the following symptoms:   Fever of 100.5 or greater  Chills  Shortness of breath  Swelling or pain in one leg    After office hours an answering service is available and will contact a provider for emergencies or if you are experiencing any of the above symptoms. Patient did express an interest in My Chart. My Chart log in information explained on the after visit summary printout at the [facility] [address] desk.     Shelley Hansen RN

## 2022-09-12 ENCOUNTER — TELEPHONE (OUTPATIENT)
Dept: SURGERY | Age: 62
End: 2022-09-12

## 2022-09-13 ENCOUNTER — PREP FOR PROCEDURE (OUTPATIENT)
Dept: SURGERY | Age: 62
End: 2022-09-13

## 2022-09-14 ENCOUNTER — TELEPHONE (OUTPATIENT)
Dept: ONCOLOGY | Age: 62
End: 2022-09-14

## 2022-09-15 ENCOUNTER — TELEMEDICINE (OUTPATIENT)
Dept: ONCOLOGY | Age: 62
End: 2022-09-15

## 2022-09-15 DIAGNOSIS — C50.911 INVASIVE DUCTAL CARCINOMA OF BREAST, RIGHT (HCC): Primary | ICD-10-CM

## 2022-09-15 DIAGNOSIS — Z80.3 FAMILY HISTORY OF MALIGNANT NEOPLASM OF BREAST: ICD-10-CM

## 2022-09-16 ENCOUNTER — HOSPITAL ENCOUNTER (OUTPATIENT)
Dept: LAB | Age: 62
Discharge: HOME | End: 2022-09-19
Payer: COMMERCIAL

## 2022-09-16 DIAGNOSIS — C50.911 INVASIVE DUCTAL CARCINOMA OF BREAST, RIGHT (HCC): ICD-10-CM

## 2022-09-16 DIAGNOSIS — Z80.3 FAMILY HISTORY OF MALIGNANT NEOPLASM OF BREAST: ICD-10-CM

## 2022-09-16 LAB
Lab: NORMAL
Lab: NORMAL
REFERENCE LAB: NORMAL

## 2022-09-16 PROCEDURE — 36415 COLL VENOUS BLD VENIPUNCTURE: CPT

## 2022-09-19 ENCOUNTER — PREP FOR PROCEDURE (OUTPATIENT)
Dept: SURGERY | Age: 62
End: 2022-09-19

## 2022-09-19 PROBLEM — Z12.11 SCREENING FOR COLON CANCER: Status: ACTIVE | Noted: 2022-09-19

## 2022-09-26 ENCOUNTER — CLINICAL DOCUMENTATION (OUTPATIENT)
Dept: ONCOLOGY | Age: 62
End: 2022-09-26

## 2022-09-26 ENCOUNTER — TELEPHONE (OUTPATIENT)
Dept: ONCOLOGY | Age: 62
End: 2022-09-26

## 2022-09-26 NOTE — TELEPHONE ENCOUNTER
PT called regarding her Remeron 15mg tablet medication/states is needing a refill but is worried Pharmacy will not refill since she is needing a refill already/states was supposed to cut pill in half but forgot/
Statement Selected

## 2022-09-26 NOTE — PROGRESS NOTES
Called Ms. Chavez to deliver the results of the initial testing, the BRCAPlus panel that was looking at 8 genes that can be implicated in increased risk for breast cancer, that have published management guidelines and may impact surgical decision. I explained that these initial results were negative, meaning we did not find any harmful genetic differences that increase cancer risk in this testing. I reminded Ms. Chavez that they had opted to also complete a larger testing panel and that those results would be back in a couple of weeks. I explained I will send this information to Dr. Renate Yee and that I will call again when all the results are back, as well as send a results letter at this time. The patient indicated understanding and I encouraged them to call back with any questions. Results of this testing are scanned into  and attached to this documentation.

## 2022-09-26 NOTE — TELEPHONE ENCOUNTER
PT NEEDS A REFILL OF HER PRESCRIPTION MIRTAZAPINE 0.5 PER DAY. PT WAS UNAWARE SHE ONLY NEEDED TO TAKE 1/2 PER DAY, SHE WAS TAKING THE WHOLE. PHARMACY IS [facility] ON [address] [place].

## 2022-09-29 ENCOUNTER — CLINICAL DOCUMENTATION (OUTPATIENT)
Dept: ONCOLOGY | Age: 62
End: 2022-09-29

## 2022-09-29 NOTE — PROGRESS NOTES
Patient: Kay Plaza  YOB: 1960    Location: [facility] HEMATOLOGY AND ONCOLOGY  Provider: Valdemar Chung, [facility], you were previously referred by Teo Lindsey DO for an initial genetic consultation due to your personal diagnosis. You were seen on September 15th, 2022 and consented to genetic testing, which was sent to [facility]. We spoke to go over the results of this testing on September 26th and September 29th. This letter is a summary of the results. Results    Following discussion of your personal and family history we discussed the benefits, limitations and possible impacts of genetic testing and you pursued a panel called BRCAPlus looking at 8 genes in which harmful differences may influence surgical decisions. You also opted to reflex to a larger test looking at 20 genes in which pathogenic variants (harmful genetic differences) are related to increased risk for multiple cancer types seen in your personal and family history. The genes included on this panel were:  KARISSA, BARD1, BLM, BRCA1, BRCA2, BRIP1, CDH1, CHEK2, FANCC, MUTYH, NBN, NF1, PALB2, PTEN, RAD51C, RAD51D, RECQL, STK11, TP53, and XRCC2. This testing did not identify any pathogenic variants (harmful genetic differences that increase cancer risk). 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 \"Variant of uncertain significance\" means that this genetic difference or variant has not been seen enough to know whether it is a harmful difference that increases cancer risk, or a harmless difference that some individuals have. \"c.932\" is like an address, telling us exactly where this genetic difference is located. \"G>A\" tells us in the genetic material there was a misspelling where a \"G\" was replaced with an \"A\". Additionally, the test report states that you are heterozygous for this variant of uncertain significance. This means that of your two copies of the MUTYH gene (one from your mother and one from your father), only one of them has this variant. At this time, we do not have enough data to conclude that having one pathogenic variant (harmful difference) in the MUTYH gene is associated with increased cancer risk. Having two pathogenic variants in MUTYH (one from each parent) is associated with a condition called MUTYH associated polyposis (MAP). This is a genetic condition characterized by increased risk for colorectal polyps and cancer, as well as some other cancer types. Individuals who have only one pathogenic variant in MUTYH are said to be carriers of MAP, because they carry a genetic difference that increases risk a future child could be impacted  (if their partner is also a carrier), but are not impacted with the condition themselves. It is important to note that because your variant is classified as a variant of uncertain significance at this time, this current result does not mean that you are at increased risk for to have a child impacted with MAP. Because we do not know enough about this specific variant, we do not recommend making any changes to medical management based on this finding. We recommend that medical management  be determined based on your personal and family history. Recommended screening based on this is outlined in the next section of this letter. It is possible that over time, as more people are tested and this variant is seen more often, we may be able to \"reclassify\" this variant and say whether it is a harmful or harmless variant. Because of this possibility, we recommend that you check in with our office periodically, and make sure to update us should any of your contact information change. Screening Recommendations  As discussed in our initial appointment, a majority of cancer is sporadic, or arises by chance.  Because of this, we know that you are still at risk to develop cancer, and that it is important that you continue the recommended cancer screenings. Due to your personal diagnosis of breast cancer, you should follow the breast screening recommendations proposed by Dr. Elvia Tijerina and the rest of your healthcare team following treatment. These may include recommendations such as:    Physical exam and history collection at least annually   May be more often depending on the recommendations of your healthcare team  Annual mammogram  The use of endocrine therapy (such as tamoxifen or aromatase inhibitors)1    Based on [employer] (NCCN) guidelines it is recommended that you follow routine surveillance and screening guidelines for other types of cancer including colorectal cancer screening via colonoscopy in the near future and gynecological exams as recommended by your healthcare team2,3. It is important to share these testing results and discuss guidelines with your healthcare team so that they can be aware if guidelines should change, and so they can continue to recommend screening based on your history. Lastly, it is important to note that certain lifestyle modifications can be made to help lower cancer risk. These include regular exercise, maintaining a diet high in fruits and vegetables and low in processed foods and red meat, limiting alcohol consumption, avoidance of smoking and maintaining a healthy weight2. Inheritance and family members  There is a 1 in 2 (50%) chance that your siblings and children also have this variant of uncertain significance. At this time, we do not recommend testing family members for this variant in order to influence medical management, as no changes to medical management are suggested based on this finding. Your results report includes a section that mentions a family studies program that the laboratory offers in order to try and clarify VUS results.  Application to take part in this program is not a guarantee that your family would be approved to participate, and participation in the program is not a guarantee that your variant would be reclassified. If you have more questions about this program, please feel free to reach out. I have included a copy of your results with this letter. I have also forwarded a copy of this letter to Dr. David Valdovinos to help coordinate your ongoing surveillance and care. A copy of your test report is attached to your records for your [facility] providers to view. It was a pleasure to speak with you, please reach out if you have any questions or would like to arrange for an appointment to go over these results in more detail. Sincerely,   Joel Lebron MS, [facility]  Certified Genetic Counselor  [phone]    Sources used:  [employer] ([id number]). Breast Cancer (version 5.2021). Retrieved from http://www.Memeo/  [employer] (7453). Colorectal Cancer Screening (version 1.2021). Retrieved from [employer].. pdf  Edwina Garcia, PhD, Elham Doran MD, Agustin Butcher MD, Alec Banuelos MD, Chris Ynu, PhD, Jf Davies MD, Anjali Greco MD, MPH, Mylene Clements MD, Юлия Lisa. Divina Robins MD, MPH, Courtney Carter. Mikael Dorman MD, Gilberto Oppenheim, MD, PhD, Nicolette Ocasio. Dayne Jin MD, Fernando Jeffers MD, John Ng MD, [address] [facility], Ragini Parks. Efren Liang MD, Pooja Dickinson MD, Dao Arguelles. Yordy Jernigan, PhD, MPH, Gregory Block.  Isabelle Michele MD, MPH, [address], [address] for Colposcopy and Cervical Pathology, and American Society for Clinical Pathology Screening Guidelines for the Prevention and Early Detection of Cervical Cancer, American Journal of Clinical Pathology, Volume 137, Issue 4, April 2012, Pages 566-975, https://doi.org/10.1309/MDKVWVZ88QQOTBTF     SUMMARY:                Alfredo Borjas previously consented to genetic testing, and it was sent to [facility] laboratories for the [employer]. Results showed a variant of uncertain significance in the MUTYH gene,  and no clinically actionable findings.     CC:   Anmol Patton, DO  Chart

## 2022-10-03 ENCOUNTER — TELEPHONE (OUTPATIENT)
Dept: SURGERY | Age: 62
End: 2022-10-03

## 2022-10-03 DIAGNOSIS — C50.911 INVASIVE DUCTAL CARCINOMA OF BREAST, RIGHT (HCC): Primary | ICD-10-CM

## 2022-10-04 ENCOUNTER — TELEPHONE (OUTPATIENT)
Dept: SURGERY | Age: 62
End: 2022-10-04

## 2022-10-04 RX ORDER — BIOTIN 1 MG
1 TABLET ORAL DAILY
COMMUNITY

## 2022-10-04 NOTE — PROGRESS NOTES
PLEASE CONTINUE TAKING ALL PRESCRIPTION MEDICATIONS UP TO THE DAY OF SURGERY UNLESS OTHERWISE DIRECTED BELOW. DISCONTINUE all vitamins and supplements 7 days prior to surgery. DISCONTINUE Non-Steriodal Anti-Inflammatory (NSAIDS) such as Advil and Aleve 5 days prior to surgery. Home Medications to take  the day of surgery    omeprazole           Home Medications   to Hold   Red yeast rice, multi vit, vit b12        Comments       On the day before surgery please take Acetaminophen 1000mg in the morning and then again before bed. You may substitute for Tylenol 650 mg. Please do not bring home medications with you on the day of surgery unless otherwise directed by your nurse. If you are instructed to bring home medications, please give them to your nurse as they will be administered by the nursing staff. If you have any questions, please call [facility] [phone] or [facility] [phone]. A copy of this note was provided to the patient for reference.

## 2022-10-04 NOTE — PROGRESS NOTES
Patient verified name and     Order for consent WAS NOT found in EHR and matches case posting; patient verified. Type 2 surgery, PHONE assessment complete. Labs per surgeon: PENDING ORDERS  Labs per anesthesia protocol: HGB DOS--ORDER PLACED IN CC --ORDER PLACED IN CC  EKG: NONE  OF NOTE--POSTED AS BILAT MASTECTOMY AND RIGHT NODE BX--PER PT-- NOTHING BEING DONE ON RIGHT-- ONLY LEFT--- ALSO PER PT OFFICE HAD SENT THIS TO HER INSURANCE COMPANY INCORRECTLY-- PT AWARE I WILL CALL SCHEDULING TO GET SITE CONFIRMED-- CALLED POSTING-- SPOKE TO WARREN-- SHE IS TO CALL OFFICE TO VERIFY SITE-- WILL PLACE NOTE  FOR CHART  TO /U    Hospital approved surgical skin cleanser and instructions given per hospital policy. Patient provided with and instructed on educational handouts including Guide to Surgery, Pain Management, Hand Hygiene, Blood Transfusion Education, and [facility] Brochure. Patient answered medical/surgical history questions at their best of ability. All prior to admission medications documented in [facility]. Original medication prescription bottle WAS NOT visualized during patient appointment. Patient instructed to hold all vitamins 7 days prior to surgery and NSAIDS 5 days prior to surgery, patient verbalized understanding. Patient teach back successful and patient demonstrates knowledge of instructions.

## 2022-10-04 NOTE — TELEPHONE ENCOUNTER
L/V/M to inform patient that after speaking with my Office Mgr., we would like to schedule an appt with Dr. Yokasta Webb in office tomorrow, 10/5/22 @ 1:15pm to discuss and verify her surgery details.

## 2022-10-05 ENCOUNTER — OFFICE VISIT (OUTPATIENT)
Dept: SURGERY | Age: 62
End: 2022-10-05
Payer: COMMERCIAL

## 2022-10-05 VITALS
SYSTOLIC BLOOD PRESSURE: 152 MMHG | BODY MASS INDEX: 29.56 KG/M2 | HEART RATE: 83 BPM | WEIGHT: 161.6 LBS | DIASTOLIC BLOOD PRESSURE: 71 MMHG

## 2022-10-05 DIAGNOSIS — C50.912 INVASIVE DUCTAL CARCINOMA OF BREAST, FEMALE, LEFT (HCC): Primary | ICD-10-CM

## 2022-10-05 PROBLEM — C50.911 INVASIVE DUCTAL CARCINOMA OF BREAST, RIGHT (HCC): Status: ACTIVE | Noted: 2022-09-13

## 2022-10-05 PROCEDURE — 99214 OFFICE O/P EST MOD 30 MIN: CPT | Performed by: STUDENT IN AN ORGANIZED HEALTH CARE EDUCATION/TRAINING PROGRAM

## 2022-10-05 NOTE — PROGRESS NOTES
General Surgery New Patient Office Note:    10/5/2022    Ghulam Barnett  MRN: [id number]      CHIEF COMPLAINT: left breast mass      PRIMARY CARE PHYSICIAN: MIR Ace CNP      HISTORY: Pt presents with a left breast mass. Patient presents for discussion again over her surgical plan. She has decided that she no longer wants a bilateral mastectomy. She would like to review all of the surgical details prior to surgery. REVIEW OF SYSTEMS: 10 Point ROS negative except what is in HPI      Past Medical History:   Diagnosis Date    Allergic rhinitis     Cancer ([facility] [address] 09/2022    left breast cancer--- plans for surg    GERD (gastroesophageal reflux disease)     controlled with med    Graves' disease     Hypercholesterolemia     Hyperthyroidism     Multiple thyroid nodules     followed by dr Mara Vargas       Current Outpatient Medications   Medication Sig Dispense Refill    Multiple Vitamin (MULTI-VITAMIN DAILY PO) Take 1 tablet by mouth daily      Biotin 1000 MCG TABS Take 1 tablet by mouth daily      mirtazapine (REMERON) 15 MG tablet Take 0.5 tablets by mouth nightly 30 tablet 3    rosuvastatin (CRESTOR) 20 MG tablet Take 1 tablet by mouth at bedtime 90 tablet 3    cyanocobalamin 2500 MCG SUBL Take 2,500 mcg by mouth daily      omeprazole (PRILOSEC) 20 MG delayed release capsule Take 20 mg by mouth as needed PRN      Red Yeast Rice Extract 600 MG CAPS Take 600 mg by mouth daily      methIMAzole (TAPAZOLE) 5 MG tablet Take 0.5 tablets by mouth daily (Patient not taking: Reported on 10/5/2022) 15 tablet 11     No current facility-administered medications for this visit.        Family History   Problem Relation Age of Onset    Coronary Art Dis Mother     Breast Cancer Sister     Breast Cancer Sister     Thyroid Disease Sister     Thyroid Cancer Neg Hx     Diabetes Neg Hx        Social History     Socioeconomic History    Marital status:      Spouse name: None    Number of children: None    Years of education: None    Highest education level: None   Tobacco Use    Smoking status: Never    Smokeless tobacco: Never   Vaping Use    Vaping Use: Never used   Substance and Sexual Activity    Alcohol use: No    Drug use: No         PHYSICAL EXAMINATION:    BP (!) 152/71   Pulse 83   Wt 161 lb 9.6 oz (73.3 kg)   BMI 29.56 kg/m²     General Appearance AOx3, in no acute distress  Eyes Conjunctivae/corneas clear. PERRL, EOM's intact. No scleral icterus  Ears, Nose, Throat ENT exam normal, no neck nodes or sinus tenderness  Neck supple, no significant adenopathy. No notable JVD  Respiratory No chest wall deformities or tenderness, respiratory effort normal, no use of accessory muscles. Cardiovascular RRR. No chest wall tenderness. Gastrointestinal Abdomen soft, nontender, nondistended. BS x4. No rebound, guarding, or rigidity present. No palpable masses. No CVA tenderness  Lymphatics No palpable lymphadenopathy, no hepatosplenomegaly  Musculoskeletal No joint tenderness, deformity or swelling. Full ROM UE/LE. Distal pulses intact UE/LE. No edema, cyanosis, or venous stasis changes. Skin Normal coloration and turgor, no rashes, no suspicious skin lesions noted  Neurological alert, oriented, normal speech, no focal findings or movement disorder noted, CN II-XII intact  Psychiatric Alert and oriented, appropriate affect      STUDIES: MRI Result (most recent):  MRI BREAST BILATERAL W WO CONTRAST 09/08/2022    Narrative  MRI OF THE BREASTS WITH AND WITHOUT CONTRAST    CLINICAL INDICATION:  Newly diagnosed triple negative left breast cancer-9:00  left breast; IDC.    COMPARISON: Outside mammograms present 8/25/2022 and 8/10/2022 along with  outside ultrasound 8/17/2022 and ultrasound-guided biopsy 8/25/2022. Prior  outside mammogram July 20, 2020    TECHNIQUE: Standard MRI sequences were obtained through the breasts in multiple  planes.  Images were obtained before and after intravenous infusion of 14 mL of  Prohance contrast. Images were reviewed with PACS and with Proximetry CAD software. FINDINGS: The breasts demonstrate moderate glandularity and minimal background  enhancement. Artifact is present from a biopsy clip within a biopsy mass in the medial 8:30  left breast at 7 cm from the nipple. There is minimal enhancement along the  biopsy tract. This mass measures approximately 11 mm by MRI. There is no evidence of suspicious enhancing mass, and no dominant or unique  nonmass enhancement, to suggest malignancy elsewhere in either breast.    There is a T2 hypointense, nonenhancing, smoothly bordered 10 mm mass in the  medial anterior right breast at 3 cm from the nipple. This corresponds to a  chronic round asymmetry on mammography and is most in keeping with a benign  entity such as a proteinaceous cyst.    There is no evidence of axillary or internal mammary lymphadenopathy. Elsewhere, limited visualization of the partially included thorax and upper  abdomen shows no acute abnormality. Impression  Biopsied mass in the medial 8:30 left breast at 7 cm from the  nipple. No additional pathologic enhancement or adenopathy in either breast.    BI-RADS Assessment Category 6: Known Biopsy Proven Malignancy       Mammogram Result (most recent):  MERYL DIGITAL DIAGNOSTIC W OR WO CAD LEFT        IMPRESSION:  Left breast invasive ductal carcinoma    PLAN:  Discussed pathology in detail with pt. ER/CA/Srt3Phd status also reviewed and we discussed how this may modify her pre and post-surgical management including treatment with neoadjuvant chemo, adjuvant chemo, anti-estrogen agents or the need to stop any hormonal supplementation she may be taking. Discussed family history as it may relate to any value to investigation of a genetic basis for her cancer. Discussed management options.  Initial treatment should be surgical. Reviewed total vs. Partial mastectomy (w/ XRT) including different local recurrence rates but equivalent long term survival rates. Discussed potential indications for post-mastectomy XRT as well. After discussion, we will proceed with left mastectomy with sentinel lymph node biopsy. We have discussed the technical details of the procedure(s) in detail. Risks reviewed include anesthetic risks, bleeding, infection, wound healing problems and cosmetic abnormalities, need for further surgical intervention depending on pathology reports, lymphedema if axillary procedure planned, and recurrence. All questions are answered.

## 2022-10-05 NOTE — H&P (VIEW-ONLY)
General Surgery New Patient Office Note:    10/5/2022    Adrienne Glez  MRN: [id number]      CHIEF COMPLAINT: left breast mass      PRIMARY CARE PHYSICIAN: MIR Christianson CNP      HISTORY: Pt presents with a left breast mass. Patient presents for discussion again over her surgical plan. She has decided that she no longer wants a bilateral mastectomy. She would like to review all of the surgical details prior to surgery. REVIEW OF SYSTEMS: 10 Point ROS negative except what is in HPI      Past Medical History:   Diagnosis Date    Allergic rhinitis     Cancer ([facility] [address] 09/2022    left breast cancer--- plans for surg    GERD (gastroesophageal reflux disease)     controlled with med    Graves' disease     Hypercholesterolemia     Hyperthyroidism     Multiple thyroid nodules     followed by dr Torrey Moy       Current Outpatient Medications   Medication Sig Dispense Refill    Multiple Vitamin (MULTI-VITAMIN DAILY PO) Take 1 tablet by mouth daily      Biotin 1000 MCG TABS Take 1 tablet by mouth daily      mirtazapine (REMERON) 15 MG tablet Take 0.5 tablets by mouth nightly 30 tablet 3    rosuvastatin (CRESTOR) 20 MG tablet Take 1 tablet by mouth at bedtime 90 tablet 3    cyanocobalamin 2500 MCG SUBL Take 2,500 mcg by mouth daily      omeprazole (PRILOSEC) 20 MG delayed release capsule Take 20 mg by mouth as needed PRN      Red Yeast Rice Extract 600 MG CAPS Take 600 mg by mouth daily      methIMAzole (TAPAZOLE) 5 MG tablet Take 0.5 tablets by mouth daily (Patient not taking: Reported on 10/5/2022) 15 tablet 11     No current facility-administered medications for this visit.        Family History   Problem Relation Age of Onset    Coronary Art Dis Mother     Breast Cancer Sister     Breast Cancer Sister     Thyroid Disease Sister     Thyroid Cancer Neg Hx     Diabetes Neg Hx        Social History     Socioeconomic History    Marital status:      Spouse name: None    Number of children: None    Years of education: None    Highest education level: None   Tobacco Use    Smoking status: Never    Smokeless tobacco: Never   Vaping Use    Vaping Use: Never used   Substance and Sexual Activity    Alcohol use: No    Drug use: No         PHYSICAL EXAMINATION:    BP (!) 152/71   Pulse 83   Wt 161 lb 9.6 oz (73.3 kg)   BMI 29.56 kg/m²     General Appearance AOx3, in no acute distress  Eyes Conjunctivae/corneas clear. PERRL, EOM's intact. No scleral icterus  Ears, Nose, Throat ENT exam normal, no neck nodes or sinus tenderness  Neck supple, no significant adenopathy. No notable JVD  Respiratory No chest wall deformities or tenderness, respiratory effort normal, no use of accessory muscles. Cardiovascular RRR. No chest wall tenderness. Gastrointestinal Abdomen soft, nontender, nondistended. BS x4. No rebound, guarding, or rigidity present. No palpable masses. No CVA tenderness  Lymphatics No palpable lymphadenopathy, no hepatosplenomegaly  Musculoskeletal No joint tenderness, deformity or swelling. Full ROM UE/LE. Distal pulses intact UE/LE. No edema, cyanosis, or venous stasis changes. Skin Normal coloration and turgor, no rashes, no suspicious skin lesions noted  Neurological alert, oriented, normal speech, no focal findings or movement disorder noted, CN II-XII intact  Psychiatric Alert and oriented, appropriate affect      STUDIES: MRI Result (most recent):  MRI BREAST BILATERAL W WO CONTRAST 09/08/2022    Narrative  MRI OF THE BREASTS WITH AND WITHOUT CONTRAST    CLINICAL INDICATION:  Newly diagnosed triple negative left breast cancer-9:00  left breast; IDC.    COMPARISON: Outside mammograms present 8/25/2022 and 8/10/2022 along with  outside ultrasound 8/17/2022 and ultrasound-guided biopsy 8/25/2022. Prior  outside mammogram July 20, 2020    TECHNIQUE: Standard MRI sequences were obtained through the breasts in multiple  planes.  Images were obtained before and after intravenous infusion of 14 mL of  Prohance equivalent long term survival rates. Discussed potential indications for post-mastectomy XRT as well. After discussion, we will proceed with left mastectomy with sentinel lymph node biopsy. We have discussed the technical details of the procedure(s) in detail. Risks reviewed include anesthetic risks, bleeding, infection, wound healing problems and cosmetic abnormalities, need for further surgical intervention depending on pathology reports, lymphedema if axillary procedure planned, and recurrence. All questions are answered.

## 2022-10-06 ENCOUNTER — ANESTHESIA EVENT (OUTPATIENT)
Dept: SURGERY | Age: 62
End: 2022-10-06
Payer: COMMERCIAL

## 2022-10-06 PROBLEM — C50.911 INVASIVE DUCTAL CARCINOMA OF BREAST, RIGHT (HCC): Status: ACTIVE | Noted: 2022-09-13

## 2022-10-06 PROBLEM — C50.911 INVASIVE DUCTAL CARCINOMA OF BREAST, RIGHT (HCC): Status: RESOLVED | Noted: 2022-09-13 | Resolved: 2022-10-06

## 2022-10-06 NOTE — PERIOP NOTE
Directly informed Jaylyn Lainez, family member, of pre op arrival time 0800 on 10/7. All questions answered. Pre op instructions reviewed. Left contact information for any additional questions or needs.

## 2022-10-07 ENCOUNTER — HOSPITAL ENCOUNTER (OUTPATIENT)
Age: 62
Discharge: HOME | End: 2022-10-08
Attending: STUDENT IN AN ORGANIZED HEALTH CARE EDUCATION/TRAINING PROGRAM | Admitting: STUDENT IN AN ORGANIZED HEALTH CARE EDUCATION/TRAINING PROGRAM
Payer: COMMERCIAL

## 2022-10-07 ENCOUNTER — ANESTHESIA (OUTPATIENT)
Dept: SURGERY | Age: 62
End: 2022-10-07
Payer: COMMERCIAL

## 2022-10-07 ENCOUNTER — HOSPITAL ENCOUNTER (OUTPATIENT)
Dept: NUCLEAR MEDICINE | Age: 62
Discharge: HOME | End: 2022-10-10
Payer: COMMERCIAL

## 2022-10-07 DIAGNOSIS — C50.911 INVASIVE DUCTAL CARCINOMA OF BREAST, RIGHT (HCC): ICD-10-CM

## 2022-10-07 DIAGNOSIS — C50.912 INVASIVE DUCTAL CARCINOMA OF BREAST, FEMALE, LEFT (HCC): Primary | ICD-10-CM

## 2022-10-07 LAB — HGB BLD-MCNC: 14.5 G/DL (ref 11.7–15.4)

## 2022-10-07 PROCEDURE — 3600000002 HC SURGERY LEVEL 2 BASE: Performed by: STUDENT IN AN ORGANIZED HEALTH CARE EDUCATION/TRAINING PROGRAM

## 2022-10-07 PROCEDURE — 2500000003 HC RX 250 WO HCPCS: Performed by: NURSE ANESTHETIST, CERTIFIED REGISTERED

## 2022-10-07 PROCEDURE — 3700000001 HC ADD 15 MINUTES (ANESTHESIA): Performed by: STUDENT IN AN ORGANIZED HEALTH CARE EDUCATION/TRAINING PROGRAM

## 2022-10-07 PROCEDURE — 85018 HEMOGLOBIN: CPT

## 2022-10-07 PROCEDURE — 7100000000 HC PACU RECOVERY - FIRST 15 MIN: Performed by: STUDENT IN AN ORGANIZED HEALTH CARE EDUCATION/TRAINING PROGRAM

## 2022-10-07 PROCEDURE — 3700000000 HC ANESTHESIA ATTENDED CARE: Performed by: STUDENT IN AN ORGANIZED HEALTH CARE EDUCATION/TRAINING PROGRAM

## 2022-10-07 PROCEDURE — 2709999900 HC NON-CHARGEABLE SUPPLY: Performed by: STUDENT IN AN ORGANIZED HEALTH CARE EDUCATION/TRAINING PROGRAM

## 2022-10-07 PROCEDURE — 6370000000 HC RX 637 (ALT 250 FOR IP): Performed by: ANESTHESIOLOGY

## 2022-10-07 PROCEDURE — 78195 LYMPH SYSTEM IMAGING: CPT

## 2022-10-07 PROCEDURE — 19303 MAST SIMPLE COMPLETE: CPT | Performed by: STUDENT IN AN ORGANIZED HEALTH CARE EDUCATION/TRAINING PROGRAM

## 2022-10-07 PROCEDURE — 3600000012 HC SURGERY LEVEL 2 ADDTL 15MIN: Performed by: STUDENT IN AN ORGANIZED HEALTH CARE EDUCATION/TRAINING PROGRAM

## 2022-10-07 PROCEDURE — 3430000000 HC RX DIAGNOSTIC RADIOPHARMACEUTICAL: Performed by: STUDENT IN AN ORGANIZED HEALTH CARE EDUCATION/TRAINING PROGRAM

## 2022-10-07 PROCEDURE — 88307 TISSUE EXAM BY PATHOLOGIST: CPT

## 2022-10-07 PROCEDURE — 6360000002 HC RX W HCPCS: Performed by: ANESTHESIOLOGY

## 2022-10-07 PROCEDURE — 6360000002 HC RX W HCPCS: Performed by: NURSE ANESTHETIST, CERTIFIED REGISTERED

## 2022-10-07 PROCEDURE — 2580000003 HC RX 258: Performed by: STUDENT IN AN ORGANIZED HEALTH CARE EDUCATION/TRAINING PROGRAM

## 2022-10-07 PROCEDURE — 6370000000 HC RX 637 (ALT 250 FOR IP): Performed by: STUDENT IN AN ORGANIZED HEALTH CARE EDUCATION/TRAINING PROGRAM

## 2022-10-07 PROCEDURE — 2580000003 HC RX 258: Performed by: NURSE ANESTHETIST, CERTIFIED REGISTERED

## 2022-10-07 PROCEDURE — 7100000001 HC PACU RECOVERY - ADDTL 15 MIN: Performed by: STUDENT IN AN ORGANIZED HEALTH CARE EDUCATION/TRAINING PROGRAM

## 2022-10-07 PROCEDURE — 38525 BIOPSY/REMOVAL LYMPH NODES: CPT | Performed by: STUDENT IN AN ORGANIZED HEALTH CARE EDUCATION/TRAINING PROGRAM

## 2022-10-07 PROCEDURE — 6360000002 HC RX W HCPCS: Performed by: STUDENT IN AN ORGANIZED HEALTH CARE EDUCATION/TRAINING PROGRAM

## 2022-10-07 PROCEDURE — C2626 INFUSION PUMP, NON-PROG,TEMP: HCPCS | Performed by: STUDENT IN AN ORGANIZED HEALTH CARE EDUCATION/TRAINING PROGRAM

## 2022-10-07 PROCEDURE — 2500000003 HC RX 250 WO HCPCS: Performed by: STUDENT IN AN ORGANIZED HEALTH CARE EDUCATION/TRAINING PROGRAM

## 2022-10-07 PROCEDURE — A9541 TC99M SULFUR COLLOID: HCPCS | Performed by: STUDENT IN AN ORGANIZED HEALTH CARE EDUCATION/TRAINING PROGRAM

## 2022-10-07 RX ORDER — MIDAZOLAM HYDROCHLORIDE 2 MG/2ML
2 INJECTION, SOLUTION INTRAMUSCULAR; INTRAVENOUS
Status: DISCONTINUED | OUTPATIENT
Start: 2022-10-07 | End: 2022-10-07 | Stop reason: HOSPADM

## 2022-10-07 RX ORDER — ROSUVASTATIN CALCIUM 20 MG/1
20 TABLET, COATED ORAL NIGHTLY
Status: DISCONTINUED | OUTPATIENT
Start: 2022-10-07 | End: 2022-10-08 | Stop reason: HOSPADM

## 2022-10-07 RX ORDER — OXYCODONE HYDROCHLORIDE AND ACETAMINOPHEN 5; 325 MG/1; MG/1
1 TABLET ORAL EVERY 4 HOURS PRN
Qty: 15 TABLET | Refills: 0 | Status: SHIPPED | OUTPATIENT
Start: 2022-10-07 | End: 2022-10-12

## 2022-10-07 RX ORDER — SODIUM CHLORIDE 0.9 % (FLUSH) 0.9 %
5-40 SYRINGE (ML) INJECTION PRN
Status: DISCONTINUED | OUTPATIENT
Start: 2022-10-07 | End: 2022-10-08 | Stop reason: HOSPADM

## 2022-10-07 RX ORDER — ONDANSETRON 2 MG/ML
4 INJECTION INTRAMUSCULAR; INTRAVENOUS EVERY 6 HOURS PRN
Status: DISCONTINUED | OUTPATIENT
Start: 2022-10-07 | End: 2022-10-08 | Stop reason: HOSPADM

## 2022-10-07 RX ORDER — EPHEDRINE SULFATE/0.9% NACL/PF 50 MG/5 ML
SYRINGE (ML) INTRAVENOUS PRN
Status: DISCONTINUED | OUTPATIENT
Start: 2022-10-07 | End: 2022-10-07 | Stop reason: SDUPTHER

## 2022-10-07 RX ORDER — SENNA AND DOCUSATE SODIUM 50; 8.6 MG/1; MG/1
1 TABLET, FILM COATED ORAL 2 TIMES DAILY
Status: DISCONTINUED | OUTPATIENT
Start: 2022-10-07 | End: 2022-10-08 | Stop reason: HOSPADM

## 2022-10-07 RX ORDER — ONDANSETRON 2 MG/ML
4 INJECTION INTRAMUSCULAR; INTRAVENOUS
Status: COMPLETED | OUTPATIENT
Start: 2022-10-07 | End: 2022-10-07

## 2022-10-07 RX ORDER — OXYCODONE HYDROCHLORIDE 5 MG/1
5 TABLET ORAL EVERY 4 HOURS PRN
Status: DISCONTINUED | OUTPATIENT
Start: 2022-10-07 | End: 2022-10-08 | Stop reason: HOSPADM

## 2022-10-07 RX ORDER — ONDANSETRON 2 MG/ML
INJECTION INTRAMUSCULAR; INTRAVENOUS PRN
Status: DISCONTINUED | OUTPATIENT
Start: 2022-10-07 | End: 2022-10-07 | Stop reason: SDUPTHER

## 2022-10-07 RX ORDER — ONDANSETRON 4 MG/1
4 TABLET, ORALLY DISINTEGRATING ORAL EVERY 8 HOURS PRN
Status: DISCONTINUED | OUTPATIENT
Start: 2022-10-07 | End: 2022-10-08 | Stop reason: HOSPADM

## 2022-10-07 RX ORDER — SODIUM CHLORIDE 9 MG/ML
INJECTION, SOLUTION INTRAVENOUS PRN
Status: DISCONTINUED | OUTPATIENT
Start: 2022-10-07 | End: 2022-10-08 | Stop reason: HOSPADM

## 2022-10-07 RX ORDER — HYDROMORPHONE HCL 110MG/55ML
PATIENT CONTROLLED ANALGESIA SYRINGE INTRAVENOUS PRN
Status: DISCONTINUED | OUTPATIENT
Start: 2022-10-07 | End: 2022-10-07 | Stop reason: SDUPTHER

## 2022-10-07 RX ORDER — ACETAMINOPHEN 325 MG/1
650 TABLET ORAL EVERY 6 HOURS
Status: DISCONTINUED | OUTPATIENT
Start: 2022-10-07 | End: 2022-10-08 | Stop reason: HOSPADM

## 2022-10-07 RX ORDER — MIDAZOLAM HYDROCHLORIDE 1 MG/ML
INJECTION INTRAMUSCULAR; INTRAVENOUS PRN
Status: DISCONTINUED | OUTPATIENT
Start: 2022-10-07 | End: 2022-10-07 | Stop reason: SDUPTHER

## 2022-10-07 RX ORDER — OXYCODONE HYDROCHLORIDE 5 MG/1
5 TABLET ORAL
Status: DISCONTINUED | OUTPATIENT
Start: 2022-10-07 | End: 2022-10-07 | Stop reason: HOSPADM

## 2022-10-07 RX ORDER — LIDOCAINE HYDROCHLORIDE 20 MG/ML
INJECTION, SOLUTION EPIDURAL; INFILTRATION; INTRACAUDAL; PERINEURAL PRN
Status: DISCONTINUED | OUTPATIENT
Start: 2022-10-07 | End: 2022-10-07 | Stop reason: SDUPTHER

## 2022-10-07 RX ORDER — HYDROMORPHONE HYDROCHLORIDE 2 MG/ML
0.5 INJECTION, SOLUTION INTRAMUSCULAR; INTRAVENOUS; SUBCUTANEOUS EVERY 10 MIN PRN
Status: DISCONTINUED | OUTPATIENT
Start: 2022-10-07 | End: 2022-10-07 | Stop reason: HOSPADM

## 2022-10-07 RX ORDER — HALOPERIDOL 5 MG/ML
1 INJECTION INTRAMUSCULAR
Status: DISCONTINUED | OUTPATIENT
Start: 2022-10-07 | End: 2022-10-07 | Stop reason: HOSPADM

## 2022-10-07 RX ORDER — DEXAMETHASONE SODIUM PHOSPHATE 4 MG/ML
INJECTION, SOLUTION INTRA-ARTICULAR; INTRALESIONAL; INTRAMUSCULAR; INTRAVENOUS; SOFT TISSUE PRN
Status: DISCONTINUED | OUTPATIENT
Start: 2022-10-07 | End: 2022-10-07 | Stop reason: SDUPTHER

## 2022-10-07 RX ORDER — FENTANYL CITRATE 50 UG/ML
INJECTION, SOLUTION INTRAMUSCULAR; INTRAVENOUS PRN
Status: DISCONTINUED | OUTPATIENT
Start: 2022-10-07 | End: 2022-10-07 | Stop reason: SDUPTHER

## 2022-10-07 RX ORDER — LIDOCAINE HYDROCHLORIDE 10 MG/ML
1 INJECTION, SOLUTION INFILTRATION; PERINEURAL
Status: DISCONTINUED | OUTPATIENT
Start: 2022-10-07 | End: 2022-10-07 | Stop reason: HOSPADM

## 2022-10-07 RX ORDER — DOCUSATE SODIUM 100 MG/1
100 CAPSULE, LIQUID FILLED ORAL 2 TIMES DAILY
Qty: 60 CAPSULE | Refills: 0 | Status: SHIPPED | OUTPATIENT
Start: 2022-10-07 | End: 2022-11-06

## 2022-10-07 RX ORDER — PROPOFOL 10 MG/ML
INJECTION, EMULSION INTRAVENOUS PRN
Status: DISCONTINUED | OUTPATIENT
Start: 2022-10-07 | End: 2022-10-07 | Stop reason: SDUPTHER

## 2022-10-07 RX ORDER — SODIUM CHLORIDE 0.9 % (FLUSH) 0.9 %
5-40 SYRINGE (ML) INJECTION EVERY 12 HOURS SCHEDULED
Status: DISCONTINUED | OUTPATIENT
Start: 2022-10-07 | End: 2022-10-08 | Stop reason: HOSPADM

## 2022-10-07 RX ORDER — MIRTAZAPINE 15 MG/1
7.5 TABLET, FILM COATED ORAL NIGHTLY
Status: DISCONTINUED | OUTPATIENT
Start: 2022-10-07 | End: 2022-10-08 | Stop reason: HOSPADM

## 2022-10-07 RX ORDER — ACETAMINOPHEN 500 MG
1000 TABLET ORAL ONCE
Status: COMPLETED | OUTPATIENT
Start: 2022-10-07 | End: 2022-10-07

## 2022-10-07 RX ORDER — FENTANYL CITRATE 50 UG/ML
50 INJECTION, SOLUTION INTRAMUSCULAR; INTRAVENOUS EVERY 5 MIN PRN
Status: DISCONTINUED | OUTPATIENT
Start: 2022-10-07 | End: 2022-10-07 | Stop reason: HOSPADM

## 2022-10-07 RX ORDER — SODIUM CHLORIDE, SODIUM LACTATE, POTASSIUM CHLORIDE, CALCIUM CHLORIDE 600; 310; 30; 20 MG/100ML; MG/100ML; MG/100ML; MG/100ML
INJECTION, SOLUTION INTRAVENOUS CONTINUOUS PRN
Status: DISCONTINUED | OUTPATIENT
Start: 2022-10-07 | End: 2022-10-07 | Stop reason: SDUPTHER

## 2022-10-07 RX ORDER — HYDROMORPHONE HYDROCHLORIDE 1 MG/ML
INJECTION, SOLUTION INTRAMUSCULAR; INTRAVENOUS; SUBCUTANEOUS PRN
Status: DISCONTINUED | OUTPATIENT
Start: 2022-10-07 | End: 2022-10-07

## 2022-10-07 RX ORDER — BUPIVACAINE HYDROCHLORIDE 5 MG/ML
INJECTION, SOLUTION EPIDURAL; INTRACAUDAL PRN
Status: DISCONTINUED | OUTPATIENT
Start: 2022-10-07 | End: 2022-10-07 | Stop reason: HOSPADM

## 2022-10-07 RX ORDER — PANTOPRAZOLE SODIUM 40 MG/1
40 TABLET, DELAYED RELEASE ORAL
Status: DISCONTINUED | OUTPATIENT
Start: 2022-10-08 | End: 2022-10-08 | Stop reason: HOSPADM

## 2022-10-07 RX ADMIN — SODIUM CHLORIDE, PRESERVATIVE FREE 10 ML: 5 INJECTION INTRAVENOUS at 21:24

## 2022-10-07 RX ADMIN — ONDANSETRON 4 MG: 2 INJECTION INTRAMUSCULAR; INTRAVENOUS at 14:24

## 2022-10-07 RX ADMIN — ACETAMINOPHEN 650 MG: 325 TABLET ORAL at 17:00

## 2022-10-07 RX ADMIN — Medication 271 MICRO CURIE: at 11:04

## 2022-10-07 RX ADMIN — HYDROMORPHONE HYDROCHLORIDE 1 MG: 2 INJECTION INTRAMUSCULAR; INTRAVENOUS; SUBCUTANEOUS at 13:58

## 2022-10-07 RX ADMIN — FENTANYL CITRATE 25 MCG: 50 INJECTION, SOLUTION INTRAMUSCULAR; INTRAVENOUS at 13:47

## 2022-10-07 RX ADMIN — FENTANYL CITRATE 25 MCG: 50 INJECTION, SOLUTION INTRAMUSCULAR; INTRAVENOUS at 13:45

## 2022-10-07 RX ADMIN — ONDANSETRON 4 MG: 2 INJECTION INTRAMUSCULAR; INTRAVENOUS at 15:28

## 2022-10-07 RX ADMIN — SODIUM CHLORIDE, SODIUM LACTATE, POTASSIUM CHLORIDE, AND CALCIUM CHLORIDE: 600; 310; 30; 20 INJECTION, SOLUTION INTRAVENOUS at 13:19

## 2022-10-07 RX ADMIN — ACETAMINOPHEN 650 MG: 325 TABLET ORAL at 23:15

## 2022-10-07 RX ADMIN — HYDROMORPHONE HYDROCHLORIDE 1 MG: 2 INJECTION INTRAMUSCULAR; INTRAVENOUS; SUBCUTANEOUS at 14:40

## 2022-10-07 RX ADMIN — Medication 20 MG: at 13:33

## 2022-10-07 RX ADMIN — MIDAZOLAM 2 MG: 1 INJECTION INTRAMUSCULAR; INTRAVENOUS at 13:30

## 2022-10-07 RX ADMIN — MIRTAZAPINE 7.5 MG: 15 TABLET, FILM COATED ORAL at 21:18

## 2022-10-07 RX ADMIN — PROPOFOL 200 MG: 10 INJECTION, EMULSION INTRAVENOUS at 13:33

## 2022-10-07 RX ADMIN — SENNOSIDES AND DOCUSATE SODIUM 1 TABLET: 8.6; 5 TABLET ORAL at 21:18

## 2022-10-07 RX ADMIN — DEXAMETHASONE SODIUM PHOSPHATE 4 MG: 4 INJECTION, SOLUTION INTRAMUSCULAR; INTRAVENOUS at 13:47

## 2022-10-07 RX ADMIN — ROSUVASTATIN CALCIUM 20 MG: 20 TABLET, COATED ORAL at 21:18

## 2022-10-07 RX ADMIN — ACETAMINOPHEN 1000 MG: 500 TABLET, FILM COATED ORAL at 10:17

## 2022-10-07 RX ADMIN — FENTANYL CITRATE 25 MCG: 50 INJECTION, SOLUTION INTRAMUSCULAR; INTRAVENOUS at 13:52

## 2022-10-07 RX ADMIN — LIDOCAINE HYDROCHLORIDE 100 MG: 20 INJECTION, SOLUTION EPIDURAL; INFILTRATION; INTRACAUDAL; PERINEURAL at 13:33

## 2022-10-07 RX ADMIN — FENTANYL CITRATE 25 MCG: 50 INJECTION, SOLUTION INTRAMUSCULAR; INTRAVENOUS at 13:49

## 2022-10-07 RX ADMIN — Medication 2000 MG: at 13:39

## 2022-10-07 RX ADMIN — Medication 228 MICRO CURIE: at 11:04

## 2022-10-07 ASSESSMENT — PAIN DESCRIPTION - LOCATION
LOCATION: BREAST
LOCATION: BREAST

## 2022-10-07 ASSESSMENT — PAIN DESCRIPTION - ORIENTATION
ORIENTATION: LEFT
ORIENTATION: LEFT

## 2022-10-07 ASSESSMENT — PAIN - FUNCTIONAL ASSESSMENT: PAIN_FUNCTIONAL_ASSESSMENT: 0-10

## 2022-10-07 ASSESSMENT — PAIN SCALES - GENERAL
PAINLEVEL_OUTOF10: 4
PAINLEVEL_OUTOF10: 0
PAINLEVEL_OUTOF10: 0
PAINLEVEL_OUTOF10: 2

## 2022-10-07 NOTE — ANESTHESIA PROCEDURE NOTES
Airway  Date/Time: 10/7/2022 1:20 PM  Urgency: elective    Airway not difficult    General Information and Staff    Patient location during procedure: OR  Anesthesiologist: Rosalina oLving MD  Resident/CRNA: MIR Laguna CRNA  Performed: resident/CRNA     Indications and Patient Condition  Indications for airway management: anesthesia and airway protection  Spontaneous ventilation: present  Sedation level: deep  Preoxygenated: yes  Patient position: sniffing  MILS not maintained throughout  Mask difficulty assessment: not attempted    Final Airway Details  Final airway type: supraglottic airway      Successful airway: oropharyngeal  Size 4     Number of attempts at approach: 1  Ventilation between attempts: none  Number of other approaches attempted: 0

## 2022-10-07 NOTE — PERIOP NOTE
TRANSFER - OUT REPORT:    Verbal report given to Elvira Omer RN on [facility] [place] [facility]  being transferred to [facility] for routine post-op       Report consisted of patients Situation, Background, Assessment and   Recommendations(SBAR). Information from the following report(s) ED SBAR, MAR, Cardiac Rhythm SR, and Quality Measures was reviewed with the receiving nurse. Lines:   Peripheral IV 10/07/22 Right;Posterior Hand (Active)   Site Assessment Clean, dry & intact 10/07/22 1443   Line Status Normal saline locked 10/07/22 1515   Line Care Connections checked and tightened 10/07/22 1515   Phlebitis Assessment No symptoms 10/07/22 1515   Infiltration Assessment 0 10/07/22 1515   Dressing Status Clean, dry & intact 10/07/22 1515   Dressing Type Transparent 10/07/22 1515        Opportunity for questions and clarification was provided. Patient transported with:   O2 @ 2 liters  Tech    VTE prophylaxis orders have been written for [facility]. Patient and family given floor number and nurses name. Family updated re: pt status after security code verified.

## 2022-10-07 NOTE — ANESTHESIA POSTPROCEDURE EVALUATION
Department of Anesthesiology  Postprocedure Note    Patient: Marylene El  MRN: [id number]  YOB: 1960  Date of evaluation: 10/7/2022      Procedure Summary     Date: 10/07/22 Room / Location: [facility] MAIN OR 08 / [facility] MAIN OR    Anesthesia Start: 9262 Anesthesia Stop: 1449    Procedures:       LEFT BREAST MASTECTOMY (Left: Chest)      LEFT SENTINEL LYMPH NODE BIOPSY PREOP @ 0900, LYMPHO @ 5297, SX @ 1999 (Left: Chest) Diagnosis:       Invasive ductal carcinoma of breast, right ([address]      (Invasive ductal carcinoma of breast, right ([facility] [address] [C50.911])    Providers: Giles Lentz DO Responsible Provider: Kip Slade MD    Anesthesia Type: General ASA Status: 2          Anesthesia Type: General    Monica Phase I: Monica Score: 8    Monica Phase II: Monica Score: 9      Anesthesia Post Evaluation    Patient location during evaluation: PACU  Patient participation: complete - patient participated  Level of consciousness: awake and awake and alert  Airway patency: patent  Nausea & Vomiting: no nausea  Complications: no  Cardiovascular status: hemodynamically stable  Respiratory status: acceptable  Hydration status: euvolemic  Multimodal analgesia pain management approach
Statement Selected

## 2022-10-07 NOTE — DISCHARGE INSTRUCTIONS
DC Breast instructions:    What are my post-op activity restrictions? Do not drive or operate power equipment or engage in activities that require coordination or the ability to respond quickly for 24 hours. · Walking: You may walk without help   · Lifting: Limit lifting to 10 pounds. · Straining:  Avoid activities that cause you to strain. · Stairs: You may climb stairs. · Strenuous activities:  Strenuous, repetitive activities are to be avoided with the affected arm. · Bathing: You may shower in 48 hours. · Weight bearing: Full weight bearing on the affected side. · Elevate: Elevate arm on the surgery side when at rest  · Driving: If you drove prior to surgery, you may resume driving when you are not taking pain medication and able to move the affected arm freely. Do not drive while taking pain medications. · Special Exercises: Light stretching of the affected arm is fine. What care does my wound require? Remove your ace wrap or surgical bra in 48 hours. You may get your incision wet in 48 hours. You may replace your dressing if needed. Wear bra at all times,when not in the shower, if you have had a mastectomy. What can I eat? You can resume eating your previous diet. What do I need to know concerning my pain medication? Avoid alcohol while taking pain medications. Pain medications may cause constipation. You  may increase fluids. You may increase  fiber intake. You  may take an over the counter laxative or stool softener per package instructions if needed. Special Instructions:    Empty drains 2-3 times/day and record amount. Support drains at all times. Remove pain ball when empty, if applicable. 10 deep breaths every hour while awake. You may experience some of the following which are normal:     · It is normal to have pain after surgery. Take medications as ordered to reduce the pain to a tolerable level. · Pain medications may make you sleepy.   · Incision- you may expect a minimal amount of blood or drainage. · Bruising at the operative site   · If you have had general anesthesia, you may have a sore throat for the first 24 hours due to the airway placed in your windpipe. You may also experience dizziness, drowsiness, or lightheadedness after anesthesia or sedation. · You may feel tired, rest as needed        Report the following signs and symptoms of complications to your surgeon:  · Fever above 101 degrees not responding to Tylenol and lasting 4 hours. · Persistent nausea and vomiting. · Excessive pain not controlled with prescribed medication. · Difficulty breathing or unusual shortness of breath. · Unexpected amount of blood/drainage,   · New numbness or tingling. · Difficulty urinating. Call Julia Wills DO or the physician on-call by contacting the office to report concerns or for after hours contact information. If the doctor is not available, please go to your local emergency room. Mastectomy: What to Expect at [address] [place] [address]     Right after the surgery, you will probably feel weak, and you may feel sore for 2 to 3 days. You may feel pulling or stretching near or under your arm. You may also have itching, tingling, and throbbing in the area. This will get better in a few days. You will likely have several drains near your incision. These help with healing. The drains will be removed when the fluid buildup slows. Drains are usually removed in the first few weeks after surgery. You may be able to go back to your normal routine or return to work in several weeks, but it may take longer. How long it takes you to recover will depend on the type of surgery you had. It also depends on whether you had breast reconstruction at the same time, or if you need other treatment. Your doctor or nurse will be able to give you an idea of what you can expect.   When you find out that you have cancer, you may feel many emotions and may need some help coping. This is common. Seek out family, friends, and counselors for support. You also can do things at home to make yourself feel better while you go through treatment. Call the [facility] (2-788.893.9365) or visit its website at [address] to learn more. This care sheet gives you a general idea about how long it will take for you to recover. But each person recovers at a different pace. Follow the steps below to get better as quickly as possible. How can you care for yourself at home? Activity    Rest when you feel tired. Getting enough sleep will help you recover. After any activity, rest and raise your affected arm for a period of time equal to your activity time. Try to walk each day. Start by walking a little more than you did the day before. Bit by bit, increase the amount you walk. Walking boosts blood flow and helps prevent pneumonia and constipation. Avoid strenuous activities, such as biking, jogging, weightlifting, or aerobic exercise, until your doctor says it is okay. This includes housework, especially if you have to use your affected arm. You will probably be able to do your normal activities in 3 to 6 weeks. Avoid repeated motions with your affected arm, such as weed pulling, window cleaning, or vacuuming, for 6 months. Avoid lifting anything over 10 to 15 pounds for 4 to 6 weeks. This may include a child, grocery bags, a heavy briefcase or backpack, cat litter or dog food bags, or a vacuum . Ask your doctor when you can drive again. You will probably be able to go back to work or your normal routine in 3 to 6 weeks. This depends on the type of work you do and any further treatment. Your doctor will let you know how soon you can take showers or baths. Diet    You can eat your normal diet. If your stomach is upset, try bland, low-fat foods like plain rice, broiled chicken, toast, and yogurt.      Drink plenty of fluids (unless your doctor tells you not to). You may notice that your bowels are not regular right after your surgery. This is common. Try to avoid constipation and straining with bowel movements. Take a fiber supplement such as Citrucel or Metamucil every day. If you have not had a bowel movement after a couple of days, take a mild laxative like Milk of Magnesia or a stool softener like Colace. Medicines    Your doctor will tell you if and when you can restart your medicines. He or she will also give you instructions about taking any new medicines. If you stopped taking aspirin or some other blood thinner, your doctor will tell you when to start taking it again. Take pain medicines exactly as directed. If the doctor gave you a prescription medicine for pain, take it as prescribed. If you are not taking a prescription pain medicine, ask your doctor if you can take an over-the-counter medicine. If your doctor prescribed antibiotics, take them as directed. Do not stop taking them just because you feel better. You need to take the full course of antibiotics. If you think your pain medicine is making you sick to your stomach: Take your medicine after meals (unless your doctor has told you not to). Ask your doctor for a different pain medicine. Incision care    You will have a dressing over the cut (incision). A dressing helps the incision heal and protects it. Your doctor will tell you how to take care of this. A woman may wear a special bra (surgi-bra) that holds the dressing in place after the surgery. The doctor will say when the bra is no longer needed. Drain care    You may have one or more drains near your incision. Your doctor will tell you how to take care of them. Arm exercises    If you had any lymph nodes removed from under your arm, your doctor will advise you to do arm exercises. Do not do the exercises until your doctor says it is okay. Ice and elevation    Do not use ice for swelling or pain. Prop up your arm on a pillow when you sit or lie down. Try to keep your arm above the level of your heart. This will help reduce swelling. Follow-up care is a key part of your treatment and safety. Be sure to make and go to all appointments, and call your doctor if you are having problems. It's also a good idea to know your test results and keep a list of the medicines you take. When should you call for help? Call 911 anytime you think you may need emergency care. For example, call if:    You passed out (lost consciousness). You have chest pain, are short of breath, or cough up blood. Call your doctor now or seek immediate medical care if:    You are sick to your stomach or cannot drink fluids. You cannot pass stools or gas. You have pain that does not get better after you take your pain medicine. You have loose stitches, or your incision comes open. Bright red blood has soaked through the bandage over your incision. You have signs of a blood clot in your leg (called a deep vein thrombosis), such as:  Pain in your calf, back of the knee, thigh, or groin. Redness or swelling in your leg. You have signs of infection, such as: Increased pain, swelling, warmth, or redness. Red streaks leading from the incision. Pus draining from the incision. A fever. Watch closely for changes in your health, and be sure to contact your doctor if:    You have any problems. You have new or worse swelling or pain in your arm. Where can you learn more? Go to https://[employer]imbula.Search123. org and sign in to your EUROB[employer] account. Enter U156 in the [facility] box to learn more about \"Mastectomy: What to Expect at Home. \"     If you do not have an account, please click on the \"Sign Up Now\" link. Current as of: May 4, 2022               Content Version: 13.4  © 9977-3676 Healthwise, Incorporated. Care instructions adapted under license by [facility] ([facility]).  If you have questions doctor gives you. How often you empty the bulb depends on how much fluid is draining. Empty the bulb when it is half full. Wash your hands with soap and water. Take the plug out of the bulb. Empty the bulb. If your doctor asks you to measure the fluid, empty the fluid into a measuring cup, and write down the color and how much you collected. Your doctor will want to know this information. Clean the plug with alcohol. Squeeze the bulb until it is flat. This removes all the air from the bulb. You may need to put the bulb on a table or a counter to flatten it. Keep the bulb flat, and put the plug in. The bulb should stay flat after you put the plug back in. This creates the suction that pulls the fluid into the bulb. Empty the fluid into the toilet. Wash your hands. How do you change the dressing around your surgical drain? You may have a dressing (bandage). The dressing is often made of gauze pads held on with tape. Your doctor will tell you how often to change it. Wash your hands with soap and water. Take off the dressing from around the drain. Clean the drain site and the skin around it with soap and water. Use gauze or a cotton swab. When the site is dry, put on a new dressing. The way your dressing is put on depends on what kind of drain you have. You will get instructions for your type of drain. Wash your hands again with soap and water. Your doctor may ask you to keep track of your dressing changes. Write down the time of day and the amount and color of the fluid on the dressing. How do you help prevent clogs in your surgical drain? Squeezing or \"milking\" the tube of your surgical drain can help prevent clogs so that it drains correctly. Your doctor will tell you when you need to do this. In general, you do this when: You see a clot in the tube that prevents fluid from draining. The clot may look like a dark, stringy lining.   You see fluid leaking around the tube where it goes into the skin. Follow these steps for milking the tube. Use one hand to hold and pinch the tube where it leaves the skin. With the thumb and first finger of your other hand, pinch the tube just below where you're holding it. Slowly and firmly push your thumb and first finger down the tubing toward the end of the tube. Repeat this as many times as needed to move the clot. If you have a Vasile-German (JOSSY) drain, the clot should move down the tube and into the bulb. If you have a Penrose drain, the clot should move into the dressing. When should you call for help? Call your doctor now or seek immediate medical care if:    You have signs of infection, such as: Increased pain, swelling, warmth, or redness around the area. Red streaks leading from the area. Pus draining from the area. A fever. You see a sudden change in the color or smell of the drainage. The tube is coming loose where it leaves your skin. Watch closely for changes in your health, and be sure to contact your doctor if:    You see a lot of fluid around the drain. You cannot remove a clot from the tube by milking the tube. Where can you learn more? Go to https://[facility].Job App Plus. org and sign in to your [employer] account. Enter K117 in the [facility] box to learn more about \"Surgical Drain Care: Care Instructions. \"     If you do not have an account, please click on the \"Sign Up Now\" link. Current as of: January 20, 2022               Content Version: 13.4  © 2006-2022 Healthwise, Incorporated. Care instructions adapted under license by [facility] ([facility]). If you have questions about a medical condition or this instruction, always ask your healthcare professional. [place] [address] any warranty or liability for your use of this information.

## 2022-10-07 NOTE — PROGRESS NOTES
TRANSFER - IN REPORT:    Verbal report received from Armando Schwab RN on Kim Zuniga  being received from PACU for routine post-op      Report consisted of patient's Situation, Background, Assessment and   Recommendations(SBAR). Information from the following report(s) Nurse Handoff Report, Surgery Report, Intake/Output, and MAR was reviewed with the receiving nurse. Opportunity for questions and clarification was provided. Assessment completed upon patient's arrival to unit and care assumed.

## 2022-10-07 NOTE — INTERVAL H&P NOTE
Update History & Physical    The patient's History and Physical of October 5, 2022 was reviewed with the patient and I examined the patient. There was no change. The surgical site was confirmed by the patient and me. Plan: The risks, benefits, expected outcome, and alternative to the recommended procedure have been discussed with the patient. Patient understands and wants to proceed with the procedure.      Electronically signed by Jean Ramirez DO on 10/7/2022 at 11:54 AM

## 2022-10-07 NOTE — ANESTHESIA PRE PROCEDURE
Department of Anesthesiology  Preprocedure Note       Name:  Mariza Germain   Age:  57 y.o.  :  1960                                          MRN:  [id number]         Date:  10/7/2022      Surgeon: Catarino Ramos):  Paul Mera DO    Procedure: Procedure(s):  LEFT BREAST MASTECTOMY  LEFT SENTINEL LYMPH NODE BIOPSY PREOP @ 0900, LYMPHO @ 7562, SX @ 1230    Medications prior to admission:   Prior to Admission medications    Medication Sig Start Date End Date Taking? Authorizing Provider   Multiple Vitamin (MULTI-VITAMIN DAILY PO) Take 1 tablet by mouth daily   Yes Historical Provider, MD   Biotin 1000 MCG TABS Take 1 tablet by mouth daily   Yes Historical Provider, MD   mirtazapine (REMERON) 15 MG tablet Take 0.5 tablets by mouth nightly 22   Marcia Mulligan MD   rosuvastatin (CRESTOR) 20 MG tablet Take 1 tablet by mouth at bedtime 22   Gwendolyn Castleman, APRN - CNP   methIMAzole (TAPAZOLE) 5 MG tablet Take 0.5 tablets by mouth daily  Patient not taking: No sig reported 22   Chuck Oneill MD   cyanocobalamin 2500 MCG SUBL Take 2,500 mcg by mouth daily    Ar Automatic Reconciliation   omeprazole (PRILOSEC) 20 MG delayed release capsule Take 20 mg by mouth as needed PRN 21   Ar Automatic Reconciliation   Red Yeast Rice Extract 600 MG CAPS Take 600 mg by mouth daily    Ar Automatic Reconciliation       Current medications:    Current Facility-Administered Medications   Medication Dose Route Frequency Provider Last Rate Last Admin    lidocaine 1 % injection 1 mL  1 mL IntraDERmal Once PRN Jacob Soto MD        famotidine (PEPCID) 20 mg in sodium chloride (PF) 10 mL injection  20 mg IntraVENous Once PRN Jacob Soto MD        midazolam PF (VERSED) injection 2 mg  2 mg IntraVENous Once PRN Jacob Soto MD        ceFAZolin (ANCEF) 2000 mg in sterile water 20 mL IV syringe  2,000 mg IntraVENous On Call to [address] [place] [address]           Allergies:     Allergies Allergen Reactions    Hydrocodone Anxiety and Other (See Comments)     Depressive state       Problem List:    Patient Active Problem List   Diagnosis Code    BMI 27.0-27.9,adult Z68.27    Multiple thyroid nodules E04.2    Graves' disease E05.00    Hyperthyroidism E05.90    HLD (hyperlipidemia) E78.5    Reflux gastritis K29.60    Invasive ductal carcinoma of breast, female, left ([facility] C50.912    Decreased appetite R63.0    Screening for colon cancer Z12.11    Invasive ductal carcinoma of breast, right ([facility] C50.911       Past Medical History:        Diagnosis Date    Allergic rhinitis     [facility] 2022    left breast cancer--- plans for surg    GERD (gastroesophageal reflux disease)     controlled with med   Xiomara Dines' disease     Hypercholesterolemia     Hyperthyroidism     Multiple thyroid nodules     followed by dr Marni North       Past Surgical History:        Procedure Laterality Date    BREAST SURGERY Left 2022    bx     SECTION      x2    CYST REMOVAL  2021    subcutaneous cyst from lateral neck (benign)    HYSTERECTOMY (CERVIX STATUS UNKNOWN)      fibroids (ovaries intact)       Social History:    Social History     Tobacco Use    Smoking status: Never    Smokeless tobacco: Never   Substance Use Topics    Alcohol use:  No                                Counseling given: Not Answered      Vital Signs (Current):   Vitals:    10/04/22 1420 10/07/22 1015   BP:  (!) 172/76   Pulse:  63   Resp:  18   Temp:  98.4 °F (36.9 °C)   TempSrc:  Oral   SpO2:  97%   Weight: 159 lb (72.1 kg) 160 lb (72.6 kg)   Height: 5' 2\" (1.575 m) 5' 2\" (1.575 m)                                              BP Readings from Last 3 Encounters:   10/07/22 (!) 172/76   10/05/22 (!) 152/71   22 (!) 140/76       NPO Status: Time of last liquid consumption: 0000                        Time of last solid consumption: 0000                        Date of last liquid consumption: 10/06/22 Date of last solid food consumption: 10/06/22    BMI:   Wt Readings from Last 3 Encounters:   10/07/22 160 lb (72.6 kg)   10/05/22 161 lb 9.6 oz (73.3 kg)   09/12/22 160 lb (72.6 kg)     Body mass index is 29.26 kg/m². CBC:   Lab Results   Component Value Date/Time    WBC 4.6 08/04/2022 01:34 PM    RBC 4.71 08/04/2022 01:34 PM    HGB 14.5 10/07/2022 09:58 AM    HCT 40.7 08/04/2022 01:34 PM    MCV 86.4 08/04/2022 01:34 PM    RDW 12.4 08/04/2022 01:34 PM     08/04/2022 01:34 PM       CMP:   Lab Results   Component Value Date/Time     08/04/2022 01:34 PM    K 3.8 08/04/2022 01:34 PM     08/04/2022 01:34 PM    CO2 29 08/04/2022 01:34 PM    BUN 11 08/04/2022 01:34 PM    CREATININE 0.81 08/04/2022 01:34 PM    GFRAA >92 08/04/2022 01:34 PM    AGRATIO 1.7 07/23/2021 11:13 AM    LABGLOM >60 08/04/2022 01:34 PM    GLUCOSE 118 08/04/2022 01:34 PM    PROT 7.8 08/04/2022 01:34 PM    CALCIUM 9.7 08/04/2022 01:34 PM    BILITOT 0.6 08/04/2022 01:34 PM    ALKPHOS 107 08/04/2022 01:34 PM    ALKPHOS 99 11/12/2021 01:41 PM    AST 23 08/04/2022 01:34 PM    ALT 14 08/04/2022 01:34 PM       POC Tests: No results for input(s): POCGLU, POCNA, POCK, POCCL, POCBUN, POCHEMO, POCHCT in the last 72 hours.     Coags: No results found for: PROTIME, INR, APTT    HCG (If Applicable): No results found for: PREGTESTUR, PREGSERUM, HCG, HCGQUANT     ABGs: No results found for: PHART, PO2ART, XFJ5TOS, GWU0JXY, BEART, A3GGPIIT     Type & Screen (If Applicable):  No results found for: LABABO, LABRH    Drug/Infectious Status (If Applicable):  Lab Results   Component Value Date/Time    HEPCAB NONREACTIVE 07/27/2022 08:56 AM       COVID-19 Screening (If Applicable):   Lab Results   Component Value Date/Time    COVID19 Not detected 08/04/2022 01:43 PM           Anesthesia Evaluation  Patient summary reviewed  Airway: Mallampati: II          Dental: normal exam         Pulmonary:Negative Pulmonary ROS breath sounds clear to auscultation                             Cardiovascular:  Exercise tolerance: good (>4 METS),       (-) past MI, murmur and peripheral edema      Rhythm: regular  Rate: normal                    Neuro/Psych:   Negative Neuro/Psych ROS     (-) CVA           GI/Hepatic/Renal: Neg GI/Hepatic/Renal ROS  (+) GERD:,           Endo/Other: Negative Endo/Other ROS   (+) hyperthyroidism::., .                 Abdominal:             Vascular: negative vascular ROS. Other Findings:           Anesthesia Plan      general     ASA 2     (LMA)  Induction: intravenous. Anesthetic plan and risks discussed with patient.                         Gloria Sylvester MD   10/7/2022

## 2022-10-07 NOTE — OP NOTE
Left Simple mastectomy w/SLN    BREAST SURGERY OPERATIVE REPORT     Name:  Connie Szymanski  Date/Time of Admission: 10/7/2022  7:57 AM  CSN: [id number]  Attending Provider: Ana Puente, Maegan  Room/Bed:  MOR/PL  : 1960  57 y.o. SURGEON: Ester TRAYLOR DO     ANESTHESIA:   General     PRE-OP DX:  L IDC     POST-OP DX:  same    PROCEDURE:   Left simple mastectomy with left sentinel node biopsy. INDICATION FOR SURGERY:  The patient is a 57-year-old female diagnosed with left IDC. DESCRIPTION    After proper consent was signed and patient was identified by myself, patient was taken to the OR suite and placed in the supine position. General anesthesia was applied by the anesthesia department. Patient was prepped and draped in sterile fashion. Attention was first turned to the left breast.  An  elliptical incision was made using a 10 blade scalpel. The inferior skin flap was created first with electrocautery from the sternum to the inframammary fold to the latissimus dorsi muscle. The superior skin flap was also treated with electrocautery from the sternum to the clavicle to the latissimus dorsi muscle. The breast and pectoralis major fascia removed from medial to lateral again utilizing electrocautery. The breast was marked with suture long lateral and short superior and sent to pathology for permanent section. the clavipectoral fascia was opened. Using the neoprobe, 1 sentinel nodes were identified and removed and sent to pathology for frozen section. A 15 Welsh round fluted drain was placed through separate stab incision and secured in place using a Prolene. An On-Q pain catheter was placed to the superior skin flap and secured in place using a Tegaderm. Insorb was used to close the skin and then prineo was placed over the wounds. Fluff dressing and a surgical bra were applied. All sponge and needle counts were correct.   Patient tolerated procedure well and was taken to the PACU in stable condition.       Electronically signed by: Chris Jefferson DO 10/7/2022

## 2022-10-08 VITALS
HEART RATE: 78 BPM | OXYGEN SATURATION: 99 % | TEMPERATURE: 98.1 F | BODY MASS INDEX: 29.44 KG/M2 | SYSTOLIC BLOOD PRESSURE: 124 MMHG | HEIGHT: 62 IN | WEIGHT: 160 LBS | DIASTOLIC BLOOD PRESSURE: 66 MMHG | RESPIRATION RATE: 17 BRPM

## 2022-10-08 PROCEDURE — 2580000003 HC RX 258: Performed by: STUDENT IN AN ORGANIZED HEALTH CARE EDUCATION/TRAINING PROGRAM

## 2022-10-08 PROCEDURE — 6370000000 HC RX 637 (ALT 250 FOR IP): Performed by: STUDENT IN AN ORGANIZED HEALTH CARE EDUCATION/TRAINING PROGRAM

## 2022-10-08 RX ADMIN — ACETAMINOPHEN 650 MG: 325 TABLET ORAL at 06:00

## 2022-10-08 RX ADMIN — PANTOPRAZOLE SODIUM 40 MG: 40 TABLET, DELAYED RELEASE ORAL at 06:18

## 2022-10-08 RX ADMIN — ACETAMINOPHEN 650 MG: 325 TABLET ORAL at 12:15

## 2022-10-08 RX ADMIN — SENNOSIDES AND DOCUSATE SODIUM 1 TABLET: 8.6; 5 TABLET ORAL at 09:03

## 2022-10-08 RX ADMIN — SODIUM CHLORIDE, PRESERVATIVE FREE 10 ML: 5 INJECTION INTRAVENOUS at 09:03

## 2022-10-08 ASSESSMENT — PAIN DESCRIPTION - LOCATION
LOCATION: BREAST

## 2022-10-08 ASSESSMENT — PAIN DESCRIPTION - ORIENTATION
ORIENTATION: LEFT

## 2022-10-08 ASSESSMENT — PAIN DESCRIPTION - DESCRIPTORS
DESCRIPTORS: SORE
DESCRIPTORS: SORE

## 2022-10-08 ASSESSMENT — PAIN SCALES - GENERAL
PAINLEVEL_OUTOF10: 5
PAINLEVEL_OUTOF10: 7
PAINLEVEL_OUTOF10: 4

## 2022-10-08 ASSESSMENT — PAIN DESCRIPTION - PAIN TYPE: TYPE: SURGICAL PAIN

## 2022-10-08 ASSESSMENT — PAIN DESCRIPTION - ONSET: ONSET: ON-GOING

## 2022-10-08 ASSESSMENT — PAIN DESCRIPTION - FREQUENCY: FREQUENCY: CONTINUOUS

## 2022-10-08 ASSESSMENT — PAIN - FUNCTIONAL ASSESSMENT: PAIN_FUNCTIONAL_ASSESSMENT: ACTIVITIES ARE NOT PREVENTED

## 2022-10-08 NOTE — PROGRESS NOTES
D/c instructions reviewed with pt and spouse at bedside. All questions answered. Rx available at pharmacy. PIV removed. Esigned acknowledgement/understanding. Pt d/c from unit via w/c accompanied by staff in no acute distress.

## 2022-10-08 NOTE — CARE COORDINATION
Chart reviewed by CM. Patient insured with PCP listed and spouse listed as emergency contact. No PT/OT consults placed/needed. Patient is medically stable for discharge - patient will be getting discharged home with no supportive care services at this time.

## 2022-10-13 ENCOUNTER — CLINICAL DOCUMENTATION (OUTPATIENT)
Dept: CASE MANAGEMENT | Age: 62
End: 2022-10-13

## 2022-10-18 ENCOUNTER — HOSPITAL ENCOUNTER (OUTPATIENT)
Dept: LAB | Age: 62
Setting detail: SPECIMEN
Discharge: HOME | End: 2022-10-21

## 2022-10-19 ENCOUNTER — OFFICE VISIT (OUTPATIENT)
Dept: SURGERY | Age: 62
End: 2022-10-19

## 2022-10-19 ENCOUNTER — PREP FOR PROCEDURE (OUTPATIENT)
Dept: SURGERY | Age: 62
End: 2022-10-19

## 2022-10-19 ENCOUNTER — TELEPHONE (OUTPATIENT)
Dept: SURGERY | Age: 62
End: 2022-10-19

## 2022-10-19 DIAGNOSIS — Z12.11 SCREENING FOR COLON CANCER: ICD-10-CM

## 2022-10-19 DIAGNOSIS — Z09 POSTOPERATIVE EXAMINATION: Primary | ICD-10-CM

## 2022-10-19 PROCEDURE — 99024 POSTOP FOLLOW-UP VISIT: CPT | Performed by: STUDENT IN AN ORGANIZED HEALTH CARE EDUCATION/TRAINING PROGRAM

## 2022-10-19 NOTE — TELEPHONE ENCOUNTER
Lizeth Domingo called regarding needing the FMLA dates changed on the FMLA. I updated the forms with the new dates of 10/3-10/7 and re-faxed the forms. She voiced understanding.

## 2022-10-19 NOTE — PROGRESS NOTES
General Surgery Office Visit:    Pt presents s/p left breast mastectomy with sentinel lymph node biopsy. Pt overall doing well, minimal pain. Tolerating diet and having bowel function. Medications:   Current Outpatient Medications   Medication Sig Dispense Refill    docusate sodium (COLACE) 100 MG capsule Take 1 capsule by mouth 2 times daily 60 capsule 0    Multiple Vitamin (MULTI-VITAMIN DAILY PO) Take 1 tablet by mouth daily      Biotin 1000 MCG TABS Take 1 tablet by mouth daily      mirtazapine (REMERON) 15 MG tablet Take 0.5 tablets by mouth nightly 30 tablet 3    rosuvastatin (CRESTOR) 20 MG tablet Take 1 tablet by mouth at bedtime 90 tablet 3    cyanocobalamin 2500 MCG SUBL Take 2,500 mcg by mouth daily      omeprazole (PRILOSEC) 20 MG delayed release capsule Take 20 mg by mouth as needed PRN      Red Yeast Rice Extract 600 MG CAPS Take 600 mg by mouth daily      methIMAzole (TAPAZOLE) 5 MG tablet Take 0.5 tablets by mouth daily (Patient not taking: No sig reported) 15 tablet 11     No current facility-administered medications for this visit. Allergies:    Allergies   Allergen Reactions    Hydrocodone Anxiety and Other (See Comments)     Depressive state        Past History:  Past Medical History:   Diagnosis Date    Allergic rhinitis     Cancer ([facility] [address] 2022    left breast cancer--- plans for surg    GERD (gastroesophageal reflux disease)     controlled with med    Graves' disease     Hypercholesterolemia     Hyperthyroidism     Multiple thyroid nodules     followed by dr Herbert Olsen      Past Surgical History:   Procedure Laterality Date    BREAST BIOPSY Left 10/7/2022    LEFT SENTINEL LYMPH NODE BIOPSY PREOP @ 0900, LYMPHO @ 1030, SX @ 4238 performed by Madison Boswell DO at [place] [id number] Left 2022    bx     SECTION      x2    CYST REMOVAL  2021    subcutaneous cyst from lateral neck (benign)    HYSTERECTOMY (CERVIX STATUS UNKNOWN)      fibroids (ovaries intact) MASTECTOMY Left 10/7/2022    LEFT BREAST MASTECTOMY performed by Sandra Gardner DO at [facility] MAIN OR        Family and Social History:  Family History   Problem Relation Age of Onset    Coronary Art Dis Mother     Breast Cancer Sister     Breast Cancer Sister     Thyroid Disease Sister     Thyroid Cancer Neg Hx     Diabetes Neg Hx      Social History     Socioeconomic History    Marital status:      Spouse name: Not on file    Number of children: Not on file    Years of education: Not on file    Highest education level: Not on file   Occupational History    Not on file   Tobacco Use    Smoking status: Never    Smokeless tobacco: Never   Vaping Use    Vaping Use: Never used   Substance and Sexual Activity    Alcohol use: No    Drug use: No    Sexual activity: Not on file   Other Topics Concern    Not on file   Social History Narrative    Not on file     Social Determinants of Health     Financial Resource Strain: Not on file   Food Insecurity: Not on file   Transportation Needs: Not on file   Physical Activity: Not on file   Stress: Not on file   Social Connections: Not on file   Intimate Partner Violence: Not on file   Housing Stability: Not on file        REVIEW OF SYSTEMS: [address] negative except what is in HPI    PHYSICAL EXAMINATION:    There were no vitals taken for this visit. General Appearance AOx3, in no acute distress  Respiratory No chest wall deformities or tenderness, respiratory effort normal, no use of accessory muscles. Cardiovascular RRR. No chest wall tenderness. Gastrointestinal Abdomen soft, nontender, nondistended. BS x4. No rebound, guarding, or rigidity present. No palpable masses. No CVA tenderness   Incisions: healing appropriately     DIAGNOSIS        A:  \"LEFT BREAST\":             INVASIVE DUCTAL CARCINOMA, EMANI GRADE II/III. HIGH GRADE DUCTAL CARCINOMA IN SITU. CARCINOMA IS 15 MM IN GREATEST DIMENSION.              MARGINS ARE NEGATIVE FOR CARCINOMA. BIOPSY SITE. SEE ATTACHED CANCER CHECKLIST. B:  \"SENTINEL LYMPH NODE\":             TWO LYMPH NODES NEGATIVE FOR METASTATIC CARCINOMA (0/2). Assessment:  Left invasive ductal carcinoma  Screening colonoscopy    Plan:  Pt overall doing well. I did remove her drain. We will continue with yearly mammograms on the right and self breast exams on the left. She is following up with oncology next week. Continue to slowly increase their activities of daily living. No concerns at this time. Pt can see me on an as needed basis. She is due for a screening colonoscopy. Risks of endoscopy reviewed including but not limited to bleeding, perforation, missed lesion and incomplete exam.  All questions answered.

## 2022-10-21 ASSESSMENT — ENCOUNTER SYMPTOMS
SORE THROAT: 0
VOMITING: 0
NAUSEA: 0
HEMOPTYSIS: 0
DIARRHEA: 0
CONSTIPATION: 0
SHORTNESS OF BREATH: 0
SCLERAL ICTERUS: 0
ABDOMINAL PAIN: 0
VOICE CHANGE: 0
WHEEZING: 0
CHEST TIGHTNESS: 0
ABDOMINAL DISTENTION: 0
BLOOD IN STOOL: 0
TROUBLE SWALLOWING: 0

## 2022-10-21 NOTE — PROGRESS NOTES
[facility] Hematology and Oncology: Established patient - follow up     Chief Complaint   Patient presents with    Follow-up     Reason for Referral: Breast cancer  Referring Provider: Self-referral   Primary Care Provider: MIR Olivier CNP  Family History of Cancer/Hematologic Disorders: Family history is significant for two sisters and a niece with breast cancer. Presenting Symptoms: Abnormal routine screening mammogram     History of Present Illness:  Ms. Chris Coffey is a 57 y.o. female who presents today for follow up regarding breast cancer. The past medical history is significant for multiple thyroid nodules, hyperthyroidism, hypercholesterolemia, Grave's disease, GERD, uterine fibroids,  section x 2, cyst removal, and hysterectomy. She initially presented for a routine screening mammogram on 8/10/22 which identified a spiculated equal density mass in the left breast inferior medial quadrant posterior depth. Further evaluation with limited ultrasound of the left breast on 2022 confirmed an 8 x 7 x 8 mm round, hypoechoic mass with spiculated margins at the 9:00 position in the left breast, 6 cm from the nipple, demonstrating mild posterior acoustic enhancement and corresponding to the mass seen mammographically. US- guided biopsy of the 9:00 left breast mass was performed on 22 with pathology revealing ER/NV/HER-2 negative, grade 3, invasive carcinoma with high-grade DCIS focally present and no microcalcifications or lymphovascular space extension identified. Testing so far has been done at [facility]; however, Ms. Chris Coffey wishes to transfer care to [facility]. She is self-referred to [facility] for Oncology evaluation and treatment of newly diagnosed breast cancer. She has also been referred to surgery and is scheduled for initial surgical evaluation with Dr. Rochelle Fajardo, on 22.    Patient's daughter lives in [place], works at a hospital.    At consultation, we discussed the pathophysiology of breast cancer, staging, and the importance of receptor status in terms of treatment options. We then reviewed her medical history as well as oncologic history, recent imaging and pathology in detail. Next steps in management were reviewed. She was here with her . Fu after MRI - Results reviewed in detail and show evidence of disease in the breast but no worrisome findings in terms of lymphadenopathy or other evidence of disease. She was emotional and wished to proceed with surgery upfront. We discussed need for chemotherapy because of triple negative status and she wishes to proceed with surgery upfront accepting risks. Pathology from surgery will determine ultimate staging and treatment plan. Today, patient is here for follow-up. She completed surgery and we discussed her pathology in detail. Her tumor was 15 mm and 0 out of 2 lymph nodes. This is pathological stage Ib. We discussed her high risk features triple negative breast cancer and grade 2-3. We discussed role of chemotherapy and recommendations of dose dense ACT versus TC. We discussed side effect details. She is emotional and tearful. Of note, she lost her sister to metastatic breast cancer about 10 years ago at [facility]. She is very scared of chemotherapy. She will need a port. 1 will be placed. She will also need an evaluation by FC/NP for consent once she decides which option she chooses. We talked about the role of adjuvant chemotherapy. We also talked about the increased risk of cardiac complications especially in -American woman. She is aware of anthracycline increased risk of cardiac toxicity as well as leukemia. She wishes to think things over. Printed information will be given regarding all agents. She will let me know as soon as she can which option she wishes to pursue. Role of G-CSF also reviewed with both options. Increased risk of infection noted.   Medical emergency of neutropenic fever discussed with patient and her  in detail. Chronological Events:   BILATERAL DIGITAL SCREENING MAMMOGRAM 3D/2D: 8/10/2022   FINDINGS: There are scattered areas of fibroglandular density (ACR Breast Density Composition Category B). Digital mammography views were performed including tomosynthesis. There is a spiculated equal density mass in the left breast inferior medial quadrant posterior depth. No other significant masses, calcifications, or other findings are seen in either breast.     IMPRESSION: INCOMPLETE NEEDS ADDITIONAL IMAGING EVALUATION   The spiculated mass in the left breast is indeterminate. Ultrasound with possible additional views are recommended. There is no mammographic evidence of malignancy in the right breast. Patient will be notified of the results. LIMITED ULTRASOUND OF LEFT BREAST: 8/17/2022  FINDINGS: There is an 8 x 7 x 8 mm round, hypoechoic mass with spiculated margins at 9:00, 6 cm from the nipple. It demonstrates mild posterior acoustic enhancement. This corresponds to the mass seen mammographically. The left axilla is negative. IMPRESSION: SUSPICIOUS OF MALIGNANCY   The 8 mm mass at 9:00, 6 cm FN is suspicious and ultrasound-guided biopsy is recommended. These findings and recommendations were discussed with the patient at the time of her exam.  Her biopsy is scheduled for 8/25/2022 at 2:00 pm.      [place] 8/25/22 9/1/22 heme/onc consultation   9/9/22 FU after MRI - pt elected to p/w sx upfront; MRI results reviewed. 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   10/19/22 post op with Dr Conrado Carmen - drain removed   10/24/22 FU after sx - discussed adjuvant chemotherapy for TNBC     Family History   Problem Relation Age of Onset    Coronary Art Dis Mother     Breast Cancer Sister Breast Cancer Sister     Thyroid Disease Sister     Thyroid Cancer Neg Hx     Diabetes Neg Hx       Social History     Socioeconomic History    Marital status:      Spouse name: None    Number of children: None    Years of education: None    Highest education level: None   Tobacco Use    Smoking status: Never    Smokeless tobacco: Never   Vaping Use    Vaping Use: Never used   Substance and Sexual Activity    Alcohol use: No    Drug use: No        Review of Systems   Constitutional:  Positive for appetite change. Negative for chills, diaphoresis, fatigue, fever and unexpected weight change. HENT:   Negative for hearing loss, mouth sores, nosebleeds, sore throat, trouble swallowing and voice change. Eyes:  Negative for icterus. Respiratory:  Negative for chest tightness, hemoptysis, shortness of breath and wheezing. Cardiovascular:  Negative for chest pain, leg swelling and palpitations. Gastrointestinal:  Negative for abdominal distention, abdominal pain, blood in stool, constipation, diarrhea, nausea and vomiting. Endocrine: Negative for hot flashes. Genitourinary:  Negative for difficulty urinating, frequency, vaginal bleeding and vaginal discharge. Musculoskeletal:  Negative for arthralgias, flank pain, gait problem and myalgias. Skin:  Negative for itching, rash and wound. Neurological:  Negative for dizziness, extremity weakness, gait problem, headaches and numbness. Psychiatric/Behavioral:  Positive for sleep disturbance. Negative for confusion and depression. The patient is nervous/anxious.        Allergies   Allergen Reactions    Hydrocodone Anxiety and Other (See Comments)     Depressive state     Past Medical History:   Diagnosis Date    Allergic rhinitis     [facility]2022    left breast cancer---    GERD (gastroesophageal reflux disease)     controlled with med    Graves' disease     Hypercholesterolemia     Hyperthyroidism     Multiple thyroid nodules     followed by dr Olga Allen     Past Surgical History:   Procedure Laterality Date    BREAST BIOPSY Left 10/7/2022    LEFT SENTINEL LYMPH NODE BIOPSY PREOP @ 0900, LYMPHO @ 1030, SX @ 0106 performed by Ana Puente DO at [facility] [address] Left 2022    bx     SECTION      x2    CYST REMOVAL  2021    subcutaneous cyst from lateral neck (benign)    HYSTERECTOMY (CERVIX STATUS UNKNOWN)      fibroids (ovaries intact)    MASTECTOMY Left 10/7/2022    LEFT BREAST MASTECTOMY performed by Ana Puente DO at [facility] MAIN OR     Current Outpatient Medications   Medication Sig Dispense Refill    Mastectomy Bra MISC by Does not apply route Mastectomy bra + prosthesis 1 each 2    docusate sodium (COLACE) 100 MG capsule Take 1 capsule by mouth 2 times daily 60 capsule 0    Multiple Vitamin (MULTI-VITAMIN DAILY PO) Take 1 tablet by mouth daily      Biotin 1000 MCG TABS Take 1 tablet by mouth daily      mirtazapine (REMERON) 15 MG tablet Take 0.5 tablets by mouth nightly 30 tablet 3    rosuvastatin (CRESTOR) 20 MG tablet Take 1 tablet by mouth at bedtime 90 tablet 3    methIMAzole (TAPAZOLE) 5 MG tablet Take 0.5 tablets by mouth daily 15 tablet 11    cyanocobalamin 2500 MCG SUBL Take 2,500 mcg by mouth daily      omeprazole (PRILOSEC) 20 MG delayed release capsule Take 20 mg by mouth as needed PRN      Red Yeast Rice Extract 600 MG CAPS Take 600 mg by mouth daily       No current facility-administered medications for this visit. No flowsheet data found. OBJECTIVE:  BP (!) 145/79   Pulse 86   Temp 98.4 °F (36.9 °C) (Oral)   Resp 14   Ht 5' 3\" (1.6 m)   Wt 160 lb 6.4 oz (72.8 kg)   SpO2 97%   BMI 28.41 kg/m²       ECOG PERFORMANCE STATUS - 0-Fully active, able to carry on all pre-disease performance without restriction. Pain - 0 - No pain/10. None/Minimal pain - not affecting QOL     Fatigue - No flowsheet data found. Distress - No flowsheet data found.         Physical Exam  Vitals reviewed. Exam conducted with a chaperone present. Constitutional:       General: She is not in acute distress. Appearance: Normal appearance. She is not ill-appearing or toxic-appearing. HENT:      Head: Normocephalic and atraumatic. Nose: Nose normal.      Mouth/Throat:      Mouth: Mucous membranes are moist.   Eyes:      General: No scleral icterus. Extraocular Movements: Extraocular movements intact. Conjunctiva/sclera: Conjunctivae normal.      Pupils: Pupils are equal, round, and reactive to light. Cardiovascular:      Rate and Rhythm: Normal rate and regular rhythm. Heart sounds: No murmur heard. Pulmonary:      Effort: No respiratory distress. Breath sounds: No wheezing or rales. Chest:          Comments: S/p mastectomy - healing well after sx   No axillary LAD on exam   Abdominal:      General: There is no distension. Tenderness: There is no abdominal tenderness. There is no right CVA tenderness or guarding. Musculoskeletal:         General: Normal range of motion. Cervical back: Normal range of motion. Right lower leg: No edema. Left lower leg: No edema. Lymphadenopathy:      Cervical: No cervical adenopathy. Upper Body:      Right upper body: No supraclavicular or axillary adenopathy. Left upper body: No supraclavicular or axillary adenopathy. Skin:     General: Skin is warm and dry. Coloration: Skin is not jaundiced or pale. Findings: No rash. Neurological:      General: No focal deficit present. Mental Status: She is alert and oriented to person, place, and time. Gait: Gait normal.   Psychiatric:         Behavior: Behavior normal.         Thought Content: Thought content normal.        Labs:  No results found for this or any previous visit (from the past 168 hour(s)). Imaging: reviewed     PATHOLOGY:   per above     10/2022        ASSESSMENT:     Diagnosis Orders   1.  Invasive ductal carcinoma of breast, right [facility])  [facility] - Betty Mai, Social Work (Hematology/Oncology Only)    IR PORT PLACEMENT > 5 YEARS          Ms. Jose Angel Morales is here for FU of breast cancer. Left breast IDC, 9:00, s/p core bx/clip, at GABO, poorly diff, 8mm on US, MRI pending, LVI neg, DCIS high grade focally present, ER0/PR0, Her2 sera +1 - negative   - MR - Biopsied mass (11mm) in the medial 8:30 left breast at 7 cm from the nipple. No additional pathologic enhancement or adenopathy in either breast.    - s/p left mastectomy and 0/2 SLnc - [id number] (0/2) - 15mm with neg margins, grade 2   - genetics - VUS     - she is here for FU with her . We discussed her path results in detail that showed 15mm TNBC. She is aware that pt's are considered high risk per GEICAM/2003-02 trial if they have node neg disease but were <35, had neg er/pr receptors, histo grade 2-3, and T>2cm. She meets two criteria for consideration of anthracycline therapy. She wishes to think this over. We discussed risks of chemotherapy incuding irriversible heart disease (including acute LV failure - we reviewed this) and also leukemia. Gave options of ddAC-T and TC with growth factor support. - of note, following standard adjuvant chemo for [id number] or N+ TNBC, may consider metronomic capecitabine (650mg/m2 BID continuously for 1 year) as extended maintenance - SYSUCC-001 trial estimated 5-year DFS vs observation: 82.8 vs 73% (Velasquez et al 2021). - We discussed the potential side effects including but not limited to hair loss (which could be permanent), nausea, vomiting, diarrhea or constipation, mucositis, rashes, allergic reactions, organ damage including liver and kidney failure, cardiotoxicity, and direct effects on bone marrow which can lead to anemia and thrombocytopenia necessitating transfusion or neutropenia putting patient at risk for infection, sepsis and death if not treated.     - echo EF 60-65%; LVI normal size and no WMA  - port placement - will need NP/FC once decides on the tx option - she wishes to discuss this further with her    - decreased appetite, anxiety and sleep disturbance - will start with low dose mirtazapine and re-assess   - Rx mastectomy bra/prosthesis   - refer to SW     2. Surveillance   - H&P q3-6 mo for years 1-3, q6mo years 4-5; mammogram yearly  - DEXA q2 years postmenopausal   - labs/imaging studies are not recommended without symptoms     Orders placed for: Dose-Dense AC-T (Paclitaxel Weekly) - [Doxorubicin + Cyclophosphamide q14 Days x 4 Cycles, Followed by Paclitaxel 80 mg/m² Weekly x 12 Weeks]  Cycles 1 through 4: A cycle is every 14 days:  Doxorubicin  60 mg/m² IV once on day 1 of cycles 1 through 4  Cyclophosphamide  600 mg/m² IV once on day 1 of cycles 1 through 4  [id number]-xxxx  6 mg flat dose subcutaneously once on day 2 of cycles 1 through 4  Cycles 5 through 16: A cycle is every 7 days:  Paclitaxel  80 mg/m² IV once on day 1 of cycles 5 through 16      RESUSCITATION DIRECTIVES/HOSPICE CARE: Full Support    RTC to start tx or sooner as needed   Pt to contact the office to let us know re her tx choice.     [50min - chart review, review of results and discussion of options, next steps, coordination of care and charting ]      MDM  Number of Diagnoses or Management Options  Invasive ductal carcinoma of breast, right ([facility] [address]): established, improving     Amount and/or Complexity of Data Reviewed  Clinical lab tests: ordered and reviewed  Tests in the radiology section of CPT®: ordered and reviewed  Tests in the medicine section of CPT®: ordered  Obtain history from someone other than the patient: yes  Review and summarize past medical records: yes  Independent visualization of images, tracings, or specimens: yes    Risk of Complications, Morbidity, and/or Mortality  Presenting problems: moderate  Diagnostic procedures: moderate  Management options: high        Lab studies and imaging studies were personally reviewed. Pertinent old records were reviewed. Historical:   - she is here for consultation regarding neoadjuvant chemotherapy for newly diagnosed triple negative breast cancer. During today's visit, we had a long conversation about breast cancer pathophysiology and staging in general.  We then reviewed her imaging and pathology in details, including receptor status and it's significance in terms of available treatment options. Neoadjuvant compared to adjuvant chemotherapy has similar long term outcomes when patients are given the same therapy. Preoperative treatment can render large, inoperable tumors operable and improve breast conservation, downstage tumor and decrease risk of postoperative lympedema. It also provides information about tumor chemosensitivity. Patients with pathologic complete response have favorable disease free survival and overall survival and this correlation is strongest in TNBC. Preoperative systemic therapy can lead to possible overtreatment with systemic therapy of clinical stages overestimated. It can also lead to possible under treatment local regionally with radiotherapy if clinical stage is underestimated. There is also possibility of disease progression during preoperative systemic therapy. - MRI pending - she is aware that tx plan may change depending on MR results   - she is here for FU after MRI - she has elected to p/w sx upfront. Images reviewed. Understands final path will be determined by sx specimen/LN status. Dr Luis Obando notifed by me re pt's decision.    - we discussed options of AC-T +/- carbo/pembro and TC; she is aware that she will need chemotherapy regardless of her final decision (before/after sx), if she does chemo after, she would not be a candidate for pembro/carbo. Data from [facility] reviewed.     - pt met with Dr Luis Obando and elected to pw bilateral mastectomies with axillary dissection     All questions were asked and answered to the best of my ability. The patient verbalized understanding and agrees with the plan above.             Jyothi Calvo, [facility] Hematology and Oncology  [address] [place] [address]  Office : [phone]  Fax : [phone]

## 2022-10-24 ENCOUNTER — OFFICE VISIT (OUTPATIENT)
Dept: ONCOLOGY | Age: 62
End: 2022-10-24
Payer: COMMERCIAL

## 2022-10-24 ENCOUNTER — PREP FOR PROCEDURE (OUTPATIENT)
Dept: SURGERY | Age: 62
End: 2022-10-24

## 2022-10-24 VITALS
SYSTOLIC BLOOD PRESSURE: 145 MMHG | WEIGHT: 160.4 LBS | HEART RATE: 86 BPM | HEIGHT: 63 IN | OXYGEN SATURATION: 97 % | DIASTOLIC BLOOD PRESSURE: 79 MMHG | TEMPERATURE: 98.4 F | BODY MASS INDEX: 28.42 KG/M2 | RESPIRATION RATE: 14 BRPM

## 2022-10-24 DIAGNOSIS — C50.911 INVASIVE DUCTAL CARCINOMA OF BREAST, RIGHT (HCC): Primary | ICD-10-CM

## 2022-10-24 DIAGNOSIS — C50.912 INVASIVE DUCTAL CARCINOMA OF BREAST, FEMALE, LEFT (HCC): ICD-10-CM

## 2022-10-24 DIAGNOSIS — R63.0 DECREASED APPETITE: ICD-10-CM

## 2022-10-24 PROBLEM — Z12.11 ENCOUNTER FOR SCREENING COLONOSCOPY: Status: ACTIVE | Noted: 2022-10-19

## 2022-10-24 PROCEDURE — 99215 OFFICE O/P EST HI 40 MIN: CPT | Performed by: INTERNAL MEDICINE

## 2022-10-24 RX ORDER — SOD SULF/POT CHLORIDE/MAG SULF 1.479 G
12 TABLET ORAL 2 TIMES DAILY
Qty: 24 TABLET | Refills: 0 | Status: CANCELLED | OUTPATIENT
Start: 2022-10-24

## 2022-10-24 ASSESSMENT — PATIENT HEALTH QUESTIONNAIRE - PHQ9
SUM OF ALL RESPONSES TO PHQ QUESTIONS 1-9: 0
SUM OF ALL RESPONSES TO PHQ9 QUESTIONS 1 & 2: 0
SUM OF ALL RESPONSES TO PHQ QUESTIONS 1-9: 0
2. FEELING DOWN, DEPRESSED OR HOPELESS: 0
1. LITTLE INTEREST OR PLEASURE IN DOING THINGS: 0

## 2022-10-24 NOTE — PATIENT INSTRUCTIONS
Patient Instructions from Today's Visit    Reason for Visit:  Follow up visit for breast cancer  10/7 left mastectomy by Dr. Fan Level:    Now that you have gotten surgery, we recommend chemotherapy since your breast cancer was triple negative. Below are the two options that we recommend. Option #1 is recommended higher than option #2. AC-T: adriamycin, cytoxan, taxane (paclitaxel). AC is given every 2 weeks for a total of 4 times. Then paclitaxel is given weekly x 12 weeks (20 weeks total)  TC: taxane (docetaxel), cytoxan. Docetaxol and cytoxan given once every 3 weeks x 4 times (12 weeks total)    We want to start chemo within 8 weeks after surgery. General side effects of chemotherapy: nausea, vomiting, hair loss, decrease in blood counts, neuropathy, changes in nails. Printouts of each chemotherapy drug will be given to you and side effects will be discussed more in detail at chemo education appointment. Once you decide on which chemotherapy, let us know. We will set you up for a port placement as well as a meeting for chemo education with one of our nurse practitioners. Will give you a prescription for a mastectomy bra. Below are resources to obtain one, but you can also get a mastectomy bra from [facility] [address] [address] [facility] on their website. Wig and Mastectomy Resources        [facility]  [phone]  [address]  Wigs, mastectomy bras and prothesis, lymphedema sleeves, etc.  www. [employer]. net    [employer]  [phone]  [address] [facility] [address], [address]  Wigs, hair pieces, hats, turbans, scarves, etc.  www.[employer] [facility]    Can[facility] [employer] of [facility]  [phone]  [address]  [place], [address]  Patient services, cancer support, resources, cancer related supplies, support groups, etc  Www.cancersocietygc. org      [employer]  [phone] toll free   Multiple locations in the [place] [place], [facility], [place], [place]y [place], [place]everly G[place],   Mastectomy apparel, mastectomy forms, mastectomy prosthesis, compression sleeves, etc.       [facility] to Zbigniew Patel  [phone] [facility]  Mastectomy apparel, prostheses and supplies, wigs, hair products, etc.      [facility]  [phone]  [place], [address] [place] [address]  Mastectomy apparel, breast prostheses, post-op bras, etc.      Follow Up:  - port placement  - chemo education    Recent Lab Results:       Treatment Summary has been discussed and given to patient: n/a        -------------------------------------------------------------------------------------------------------------------  Please call our office at [phone] if you have any  of the following symptoms:   Fever of 100.5 or greater  Chills  Shortness of breath  Swelling or pain in one leg    After office hours an answering service is available and will contact a provider for emergencies or if you are experiencing any of the above symptoms. Patient did express an interest in My Chart. My Chart log in information explained on the after visit summary printout at the [facility] [address] desk.     Levy Mendoza RN

## 2022-10-24 NOTE — ONCOLOGY
START ON PATHWAY REGIMEN - Breast    [id number]        Doxorubicin       Cyclophosphamide       Pegfilgrastim-xxxx       Paclitaxel     **Always confirm dose/schedule in your pharmacy ordering system**    Patient Characteristics:  Postoperative without Neoadjuvant Therapy (Pathologic Staging), Invasive Disease, Adjuvant Therapy, HER2 Negative/Unknown/Equivocal, ER Negative/Unknown, Node Negative, pT1a-c, N1mi or pT1c or Higher, pN0  Therapeutic Status: Postoperative without Neoadjuvant Therapy (Pathologic Staging)  AJCC Grade: G2  AJCC N Category: pN0  AJCC M Category: cM0  ER Status: Negative (-)  AJCC 8 Stage Grouping: IB  HER2 Status: Negative (-)  Oncotype Dx Recurrence Score: Not Appropriate  AJCC T Category: pT1c  WV Status: Negative (-)  Adjuvant Therapy Status: No Adjuvant Therapy Received Yet or Changing Initial Adjuvant Regimen due to Tolerance  Intent of Therapy:  Curative Intent, Discussed with Patient

## 2022-10-26 DIAGNOSIS — C50.911 INVASIVE DUCTAL CARCINOMA OF BREAST, RIGHT (HCC): Primary | ICD-10-CM

## 2022-11-08 ENCOUNTER — CLINICAL DOCUMENTATION (OUTPATIENT)
Dept: ONCOLOGY | Age: 62
End: 2022-11-08

## 2022-11-08 ENCOUNTER — HOSPITAL ENCOUNTER (OUTPATIENT)
Dept: LAB | Age: 62
Discharge: HOME | End: 2022-11-11
Payer: COMMERCIAL

## 2022-11-08 ENCOUNTER — OFFICE VISIT (OUTPATIENT)
Dept: ONCOLOGY | Age: 62
End: 2022-11-08
Payer: COMMERCIAL

## 2022-11-08 VITALS
HEART RATE: 71 BPM | HEIGHT: 63 IN | BODY MASS INDEX: 28.35 KG/M2 | WEIGHT: 160 LBS | DIASTOLIC BLOOD PRESSURE: 77 MMHG | TEMPERATURE: 98.7 F | RESPIRATION RATE: 16 BRPM | OXYGEN SATURATION: 97 % | SYSTOLIC BLOOD PRESSURE: 148 MMHG

## 2022-11-08 DIAGNOSIS — R11.2 CHEMOTHERAPY INDUCED NAUSEA AND VOMITING: ICD-10-CM

## 2022-11-08 DIAGNOSIS — R63.0 DECREASED APPETITE: ICD-10-CM

## 2022-11-08 DIAGNOSIS — Z95.828 PORT-A-CATH IN PLACE: ICD-10-CM

## 2022-11-08 DIAGNOSIS — T45.1X5A CHEMOTHERAPY INDUCED NAUSEA AND VOMITING: ICD-10-CM

## 2022-11-08 DIAGNOSIS — Z71.9 ENCOUNTER FOR EDUCATION: ICD-10-CM

## 2022-11-08 DIAGNOSIS — G47.00 INSOMNIA, UNSPECIFIED TYPE: ICD-10-CM

## 2022-11-08 DIAGNOSIS — C50.911 INVASIVE DUCTAL CARCINOMA OF BREAST, RIGHT (HCC): ICD-10-CM

## 2022-11-08 DIAGNOSIS — C50.912 INVASIVE DUCTAL CARCINOMA OF BREAST, FEMALE, LEFT (HCC): Primary | ICD-10-CM

## 2022-11-08 LAB
ALBUMIN SERPL-MCNC: 3.9 G/DL (ref 3.2–4.6)
ALBUMIN/GLOB SERPL: 1.1 {RATIO} (ref 0.4–1.6)
ALP SERPL-CCNC: 98 U/L (ref 50–136)
ALT SERPL-CCNC: 36 U/L (ref 12–65)
ANION GAP SERPL CALC-SCNC: 5 MMOL/L (ref 2–11)
APTT PPP: 32.7 SEC (ref 24.5–34.2)
AST SERPL-CCNC: 22 U/L (ref 15–37)
BASOPHILS # BLD: 0 K/UL (ref 0–0.2)
BASOPHILS NFR BLD: 1 % (ref 0–2)
BILIRUB SERPL-MCNC: 0.6 MG/DL (ref 0.2–1.1)
BUN SERPL-MCNC: 11 MG/DL (ref 8–23)
CALCIUM SERPL-MCNC: 9.2 MG/DL (ref 8.3–10.4)
CHLORIDE SERPL-SCNC: 106 MMOL/L (ref 101–110)
CO2 SERPL-SCNC: 29 MMOL/L (ref 21–32)
CREAT SERPL-MCNC: 0.9 MG/DL (ref 0.6–1)
DIFFERENTIAL METHOD BLD: ABNORMAL
EOSINOPHIL # BLD: 0.2 K/UL (ref 0–0.8)
EOSINOPHIL NFR BLD: 6 % (ref 0.5–7.8)
ERYTHROCYTE [DISTWIDTH] IN BLOOD BY AUTOMATED COUNT: 13.2 % (ref 11.9–14.6)
GLOBULIN SER CALC-MCNC: 3.7 G/DL (ref 2.8–4.5)
GLUCOSE SERPL-MCNC: 101 MG/DL (ref 65–100)
HBV SURFACE AB SERPL IA-ACNC: 3.16 MIU/ML
HBV SURFACE AG SER QL: NONREACTIVE
HCT VFR BLD AUTO: 39.7 % (ref 35.8–46.3)
HGB BLD-MCNC: 13.1 G/DL (ref 11.7–15.4)
IMM GRANULOCYTES # BLD AUTO: 0 K/UL (ref 0–0.5)
IMM GRANULOCYTES NFR BLD AUTO: 0 % (ref 0–5)
INR PPP: 1.1
LYMPHOCYTES # BLD: 1.6 K/UL (ref 0.5–4.6)
LYMPHOCYTES NFR BLD: 42 % (ref 13–44)
MAGNESIUM SERPL-MCNC: 2.7 MG/DL (ref 1.8–2.4)
MCH RBC QN AUTO: 29.2 PG (ref 26.1–32.9)
MCHC RBC AUTO-ENTMCNC: 33 G/DL (ref 31.4–35)
MCV RBC AUTO: 88.4 FL (ref 82–102)
MONOCYTES # BLD: 0.4 K/UL (ref 0.1–1.3)
MONOCYTES NFR BLD: 10 % (ref 4–12)
NEUTS SEG # BLD: 1.6 K/UL (ref 1.7–8.2)
NEUTS SEG NFR BLD: 41 % (ref 43–78)
NRBC # BLD: 0 K/UL (ref 0–0.2)
PLATELET # BLD AUTO: 225 K/UL (ref 150–450)
PMV BLD AUTO: 9.4 FL (ref 9.4–12.3)
POTASSIUM SERPL-SCNC: 4 MMOL/L (ref 3.5–5.1)
PROT SERPL-MCNC: 7.6 G/DL (ref 6.3–8.2)
PROTHROMBIN TIME: 14.4 SEC (ref 12.6–14.3)
RBC # BLD AUTO: 4.49 M/UL (ref 4.05–5.2)
SODIUM SERPL-SCNC: 140 MMOL/L (ref 133–143)
WBC # BLD AUTO: 3.8 K/UL (ref 4.3–11.1)

## 2022-11-08 PROCEDURE — 85025 COMPLETE CBC W/AUTO DIFF WBC: CPT

## 2022-11-08 PROCEDURE — 99214 OFFICE O/P EST MOD 30 MIN: CPT | Performed by: NURSE PRACTITIONER

## 2022-11-08 PROCEDURE — 86706 HEP B SURFACE ANTIBODY: CPT

## 2022-11-08 PROCEDURE — 85730 THROMBOPLASTIN TIME PARTIAL: CPT

## 2022-11-08 PROCEDURE — 85610 PROTHROMBIN TIME: CPT

## 2022-11-08 PROCEDURE — 36415 COLL VENOUS BLD VENIPUNCTURE: CPT

## 2022-11-08 PROCEDURE — 80053 COMPREHEN METABOLIC PANEL: CPT

## 2022-11-08 PROCEDURE — 83735 ASSAY OF MAGNESIUM: CPT

## 2022-11-08 PROCEDURE — 86704 HEP B CORE ANTIBODY TOTAL: CPT

## 2022-11-08 PROCEDURE — 87340 HEPATITIS B SURFACE AG IA: CPT

## 2022-11-08 RX ORDER — PROCHLORPERAZINE MALEATE 10 MG
10 TABLET ORAL EVERY 6 HOURS PRN
Qty: 60 TABLET | Refills: 2 | Status: SHIPPED | OUTPATIENT
Start: 2022-11-08

## 2022-11-08 RX ORDER — LIDOCAINE AND PRILOCAINE 25; 25 MG/G; MG/G
CREAM TOPICAL
Qty: 30 G | Refills: 2 | Status: SHIPPED | OUTPATIENT
Start: 2022-11-08 | End: 2022-11-09 | Stop reason: SDUPTHER

## 2022-11-08 RX ORDER — ONDANSETRON HYDROCHLORIDE 8 MG/1
8 TABLET, FILM COATED ORAL EVERY 8 HOURS PRN
Qty: 60 TABLET | Refills: 2 | Status: SHIPPED | OUTPATIENT
Start: 2022-11-08

## 2022-11-08 NOTE — PROGRESS NOTES
I spoke with Jada Newton and her  via the telephone regarding her [address] insurance coverage, potential oral medication authorizations, enrollment in the [employer] ([employer]) and the [address] [place] [address] ([address]), and assistance organization resource sheet. Patient stated they may be changing insurance next year. The patient will review the cancer programs. Next, I spoke with Jada Newton and her  about billing questions and treatment services. We discussed copayments, deductibles, and out of pocket maximums. Next, I spoke with Jada Newton and her  regarding copay and foundation opportunities. Patient gave verbal permission to be enrolled. Next, I spoke with Jada Newton and her  regarding potential oral medication authorizations. I told them that if she ever had any problems getting her oral medications filled to give the dedicated [facility]  a call. Most of the time, it is simply an authorization that needs to be done with the insurance company. Lastly, I explained to Jada Newton and her  forms with various resource organizations that could assist with specific needs (example:  transportation, lodging, preparing meals, home cleaning)  I will leave a folder of the information discussed and my contact information at the  at the [facility] for her to  at her next appointment. Jan Beneditc and her  expressed understanding of the information above and all questions were answered to their satisfaction.

## 2022-11-08 NOTE — PROGRESS NOTES
IV Chemotherapy/Biotherapy Education:  Zain James  is a 57y.o. year old female with breast cancer who presents for chemotherapy education for the following medication(s): adriamycin, cytoxan, taxol, pegfilmgrastin. Schedule and frequency of chemotherapy were discussed with the patient and her . The patient was given handouts published by the [facility] titled \"Chemotherapy and You\" and \"Eating Hints Before, During, and After Cancer Treatment\" for reference. Medication specific information was printed from [employer] and distributed to the patient. Self-care guidelines were distributed and discussed with the patient and included the followin) Potential long term and short term side effects of therapy including fertility risks for appropriate patients    2) Symptoms and side effects that require the patient to contact [address] [place] [address] or require immediate attention    3) Symptoms or events that require immediate discontinuation of oral or self-administered treatments    4) Procedures for handling medications at home, including storage, safe handling, and management of unused medications    5) Procedures for handling bodily fluids and waste in the home    6) The [address] [place] [address]'s contact information with availability and instructions on who and when to call    7) The [address] [place] [address] missed appointment policy and expectations for rescheduling or canceling    Patient denies any needs or referrals at this time. Prescriptions for Zofran, Compazine, and Chandni have been sent electronically to the local pharmacy. Proper use and frequency of these meds were reviewed with patient and patient verbalized understanding of the treatment recommendations. Patient asked appropriate questions and was very involved and engaged during the educational session.   There were no barriers to learning that were observed or demonstrated during the encounter. Preference in learning style assessed as visual, written and verbal.  Patient acknowledged her readiness to learn by responding appropriately to open ended questions about chemo education, disease process, treatment plan and possible side effects, etc.  Patient offered feedback on learning and the educational encounter. She acknowledges the importance of and agrees to adhering to the treatment plan regimen. Upcoming appointments for her were reviewed with the patient. Time was provided for the patient to ask questions. Pretty Chavez verbalized understanding of the treatment plan. Vitals:    Vitals:    22 0758   BP: (!) 148/77   Pulse: 71   Resp: 16   Temp: 98.7 °F (37.1 °C)   SpO2: 97%       Informed Consent for Administration of Chemotherapy or Biotherapy for Rosalina Naidu was signed and scanned into Epic under the Media tab. Total time spent for chemo education/counselin minutes, 100% in direct counseling.        SEKOU EchevarriaC  [address] Hematology Oncology

## 2022-11-09 ENCOUNTER — ANESTHESIA EVENT (OUTPATIENT)
Dept: INTERVENTIONAL RADIOLOGY/VASCULAR | Age: 62
End: 2022-11-09

## 2022-11-09 ENCOUNTER — ANESTHESIA (OUTPATIENT)
Dept: INTERVENTIONAL RADIOLOGY/VASCULAR | Age: 62
End: 2022-11-09

## 2022-11-09 ENCOUNTER — HOSPITAL ENCOUNTER (OUTPATIENT)
Dept: INTERVENTIONAL RADIOLOGY/VASCULAR | Age: 62
Discharge: HOME | End: 2022-11-12
Payer: COMMERCIAL

## 2022-11-09 VITALS
OXYGEN SATURATION: 96 % | WEIGHT: 160 LBS | DIASTOLIC BLOOD PRESSURE: 61 MMHG | BODY MASS INDEX: 28.35 KG/M2 | RESPIRATION RATE: 18 BRPM | HEIGHT: 63 IN | SYSTOLIC BLOOD PRESSURE: 122 MMHG | HEART RATE: 71 BPM | TEMPERATURE: 97.9 F

## 2022-11-09 DIAGNOSIS — C50.912 INVASIVE DUCTAL CARCINOMA OF BREAST, FEMALE, LEFT (HCC): ICD-10-CM

## 2022-11-09 DIAGNOSIS — Z95.828 PORT-A-CATH IN PLACE: ICD-10-CM

## 2022-11-09 DIAGNOSIS — C50.911 INVASIVE DUCTAL CARCINOMA OF BREAST, RIGHT (HCC): ICD-10-CM

## 2022-11-09 LAB — HBV CORE AB SERPL QL IA: NEGATIVE

## 2022-11-09 PROCEDURE — C1788 PORT, INDWELLING, IMP: HCPCS

## 2022-11-09 PROCEDURE — 3700000001 HC ADD 15 MINUTES (ANESTHESIA)

## 2022-11-09 PROCEDURE — 3700000000 HC ANESTHESIA ATTENDED CARE

## 2022-11-09 RX ORDER — PROPOFOL 10 MG/ML
INJECTION, EMULSION INTRAVENOUS CONTINUOUS PRN
Status: DISCONTINUED | OUTPATIENT
Start: 2022-11-09 | End: 2022-11-09 | Stop reason: SDUPTHER

## 2022-11-09 RX ORDER — ONDANSETRON 2 MG/ML
INJECTION INTRAMUSCULAR; INTRAVENOUS PRN
Status: DISCONTINUED | OUTPATIENT
Start: 2022-11-09 | End: 2022-11-09 | Stop reason: SDUPTHER

## 2022-11-09 RX ORDER — PROPOFOL 10 MG/ML
INJECTION, EMULSION INTRAVENOUS PRN
Status: DISCONTINUED | OUTPATIENT
Start: 2022-11-09 | End: 2022-11-09 | Stop reason: SDUPTHER

## 2022-11-09 RX ORDER — MIDAZOLAM HYDROCHLORIDE 1 MG/ML
INJECTION INTRAMUSCULAR; INTRAVENOUS PRN
Status: DISCONTINUED | OUTPATIENT
Start: 2022-11-09 | End: 2022-11-09 | Stop reason: SDUPTHER

## 2022-11-09 RX ORDER — CEFAZOLIN SODIUM 1 G/3ML
INJECTION, POWDER, FOR SOLUTION INTRAMUSCULAR; INTRAVENOUS PRN
Status: DISCONTINUED | OUTPATIENT
Start: 2022-11-09 | End: 2022-11-09 | Stop reason: SDUPTHER

## 2022-11-09 RX ORDER — LIDOCAINE HYDROCHLORIDE 20 MG/ML
INJECTION, SOLUTION EPIDURAL; INFILTRATION; INTRACAUDAL; PERINEURAL PRN
Status: DISCONTINUED | OUTPATIENT
Start: 2022-11-09 | End: 2022-11-09 | Stop reason: SDUPTHER

## 2022-11-09 RX ORDER — SODIUM CHLORIDE, SODIUM LACTATE, POTASSIUM CHLORIDE, CALCIUM CHLORIDE 600; 310; 30; 20 MG/100ML; MG/100ML; MG/100ML; MG/100ML
INJECTION, SOLUTION INTRAVENOUS CONTINUOUS PRN
Status: DISCONTINUED | OUTPATIENT
Start: 2022-11-09 | End: 2022-11-09 | Stop reason: SDUPTHER

## 2022-11-09 RX ORDER — LIDOCAINE AND PRILOCAINE 25; 25 MG/G; MG/G
CREAM TOPICAL
Qty: 30 G | Refills: 2 | Status: SHIPPED | OUTPATIENT
Start: 2022-11-09

## 2022-11-09 RX ADMIN — PROPOFOL 200 MCG/KG/MIN: 10 INJECTION, EMULSION INTRAVENOUS at 09:42

## 2022-11-09 RX ADMIN — LIDOCAINE HYDROCHLORIDE 50 MG: 20 INJECTION, SOLUTION EPIDURAL; INFILTRATION; INTRACAUDAL; PERINEURAL at 09:43

## 2022-11-09 RX ADMIN — SODIUM CHLORIDE, SODIUM LACTATE, POTASSIUM CHLORIDE, CALCIUM CHLORIDE: 600; 310; 30; 20 INJECTION, SOLUTION INTRAVENOUS at 09:34

## 2022-11-09 RX ADMIN — CEFAZOLIN SODIUM 2 G: 1 INJECTION, POWDER, FOR SOLUTION INTRAMUSCULAR; INTRAVENOUS at 09:45

## 2022-11-09 RX ADMIN — ONDANSETRON 4 MG: 2 INJECTION INTRAMUSCULAR; INTRAVENOUS at 10:01

## 2022-11-09 RX ADMIN — PROPOFOL 30 MG: 10 INJECTION, EMULSION INTRAVENOUS at 09:42

## 2022-11-09 RX ADMIN — MIDAZOLAM HYDROCHLORIDE 2 MG: 1 INJECTION INTRAMUSCULAR; INTRAVENOUS at 09:45

## 2022-11-09 ASSESSMENT — PAIN - FUNCTIONAL ASSESSMENT: PAIN_FUNCTIONAL_ASSESSMENT: NONE - DENIES PAIN

## 2022-11-09 ASSESSMENT — LIFESTYLE VARIABLES: SMOKING_STATUS: 1

## 2022-11-09 NOTE — H&P
Department of Interventional Radiology  [phone]    History and Physical    Patient:  Jd Silva MRN:  [id number]  SSN:  [id number]    YOB: 1960  Age:  57 y.o. Sex:  female      Primary Care Provider:  MIR Enriquez - CNP  Referring Physician:  Milton Mei MD    Subjective:     Chief Complaint: port    History of the Present Illness: The patient is a 57 y.o. female with breast cancer who presents for venous chest port placement. NPO. Past Medical History:   Diagnosis Date    Allergic rhinitis     Cancer ([address] 2022    left breast cancer---    GERD (gastroesophageal reflux disease)     controlled with med    Graves' disease     Hypercholesterolemia     Hyperthyroidism     Multiple thyroid nodules     followed by dr Ar Hawley     Past Surgical History:   Procedure Laterality Date    BREAST BIOPSY Left 10/7/2022    LEFT SENTINEL LYMPH NODE BIOPSY PREOP @ 0900, LYMPHO @ 0126, SX @ 8338 performed by Marshall Castro DO at [facility] [address] Left 2022    bx     SECTION      x2    CYST REMOVAL  2021    subcutaneous cyst from lateral neck (benign)    HYSTERECTOMY (CERVIX STATUS UNKNOWN)      fibroids (ovaries intact)    MASTECTOMY Left 10/7/2022    LEFT BREAST MASTECTOMY performed by Marshall Castro DO at [facility] MAIN OR        Review of Systems:    Pertinent items are noted in HPI. Prior to Admission medications    Medication Sig Start Date End Date Taking? Authorizing Provider   lidocaine-prilocaine (EMLA) 2.5-2.5 % cream Apply topically as needed.   Patient not taking: Reported on 2022   LILIA Sarkar   ondansetron (ZOFRAN) 8 MG tablet Take 1 tablet by mouth every 8 hours as needed for Nausea or Vomiting  Patient not taking: Reported on 2022   LILIA Sarkar   prochlorperazine (COMPAZINE) 10 MG tablet Take 1 tablet by mouth every 6 hours as needed (nausea vomiting chemo)  Patient not taking: Reported on 11/9/2022 11/8/22   Iliana Badillo NP-MILLA   Mastectomy Bra MISC by Does not apply route Mastectomy bra + prosthesis 10/24/22   Telly Boucher MD   Multiple Vitamin (MULTI-VITAMIN DAILY PO) Take 1 tablet by mouth daily    Historical Provider, MD   Biotin 1000 MCG TABS Take 1 tablet by mouth daily    Historical Provider, MD   mirtazapine (REMERON) 15 MG tablet Take 0.5 tablets by mouth nightly 9/9/22   Telly Boucher MD   rosuvastatin (CRESTOR) 20 MG tablet Take 1 tablet by mouth at bedtime 7/27/22   MIR Briggs CNP   methIMAzole (TAPAZOLE) 5 MG tablet Take 0.5 tablets by mouth daily 6/27/22   Mati Mai MD   cyanocobalamin 2500 MCG SUBL Take 2,500 mcg by mouth daily    Ar Automatic Reconciliation   omeprazole (PRILOSEC) 20 MG delayed release capsule Take 20 mg by mouth as needed PRN 7/23/21   Ar Automatic Reconciliation   Red Yeast Rice Extract 600 MG CAPS Take 600 mg by mouth daily    Ar Automatic Reconciliation        Allergies   Allergen Reactions    Hydrocodone Anxiety and Other (See Comments)     Depressive state       Family History   Problem Relation Age of Onset    Coronary Art Dis Mother     Breast Cancer Sister     Breast Cancer Sister     Thyroid Disease Sister     Thyroid Cancer Neg Hx     Diabetes Neg Hx      Social History     Tobacco Use    Smoking status: Never    Smokeless tobacco: Never   Substance Use Topics    Alcohol use: No        Not in a hospital admission.     Objective:       Physical Examination:    Vitals:    11/09/22 0739 11/09/22 0742 11/09/22 0745   BP: (!) 197/81  (!) 143/65   Pulse: 79  66   Resp: 16     Temp: 97.5 °F (36.4 °C)     TempSrc: Temporal     SpO2: 97%  97%   Weight:  160 lb (72.6 kg)    Height:  5' 3\" (1.6 m)        Pain Assessment     denies                                                HEART: regular rate and rhythm  LUNG: clear to auscultation bilaterally  ABDOMEN: normal findings: soft, non-tender  EXTREMITIES: warm, no edmea    Laboratory:     Lab Results   Component Value Date/Time     11/08/2022 07:44 AM     08/04/2022 01:34 PM    K 4.0 11/08/2022 07:44 AM    K 3.8 08/04/2022 01:34 PM     11/08/2022 07:44 AM     08/04/2022 01:34 PM    CO2 29 11/08/2022 07:44 AM    CO2 29 08/04/2022 01:34 PM    BUN 11 11/08/2022 07:44 AM    BUN 11 08/04/2022 01:34 PM    GFRAA >92 08/04/2022 01:34 PM    GFRAA 83 07/23/2021 11:13 AM    MG 2.7 11/08/2022 07:44 AM    GLOB 3.7 11/08/2022 07:44 AM    GLOB 3.3 08/04/2022 01:34 PM    ALT 36 11/08/2022 07:44 AM    ALT 14 08/04/2022 01:34 PM     Lab Results   Component Value Date/Time    WBC 3.8 11/08/2022 07:44 AM    WBC 4.6 08/04/2022 01:34 PM    HGB 13.1 11/08/2022 07:44 AM    HGB 14.5 10/07/2022 09:58 AM    HCT 39.7 11/08/2022 07:44 AM    HCT 40.7 08/04/2022 01:34 PM     11/08/2022 07:44 AM     08/04/2022 01:34 PM     Lab Results   Component Value Date/Time    APTT 32.7 11/08/2022 07:44 AM    INR 1.1 11/08/2022 07:44 AM       Assessment:     Breast cancer    [unfilled]    Plan:     Planned Procedure:  port placement    Risks, benefits, and alternatives reviewed with patient and she agrees to proceed with the procedure.       Signed By: Og Huang PA-C     November 9, 2022

## 2022-11-09 NOTE — ANESTHESIA POSTPROCEDURE EVALUATION
Department of Anesthesiology  Postprocedure Note    Patient: Geovani Manuel  MRN: [id number]  YOB: 1960  Date of evaluation: 11/9/2022      Procedure Summary     Date: 11/09/22 Room / Location: [facility]    Anesthesia Start: 6305 Anesthesia Stop: 0429    Procedure: IR PORT PLACEMENT EQUAL OR GREATER THAN 5 YEARS Diagnosis: Invasive ductal carcinoma of breast, right ([address]    Scheduled Providers: Rolly Juarez MD; MIR Sommer - CRNA Responsible Provider: Rolly Juarez MD    Anesthesia Type: MAC, TIVA ASA Status: 2          Anesthesia Type: MAC, TIVA    Monica Phase I: Monica Score: 10    Monica Phase II:        Anesthesia Post Evaluation    Patient location during evaluation: PACU  Patient participation: complete - patient participated  Level of consciousness: awake and alert  Airway patency: patent  Nausea & Vomiting: no nausea  Cardiovascular status: hemodynamically stable  Respiratory status: acceptable  Hydration status: euvolemic

## 2022-11-09 NOTE — ANESTHESIA PRE PROCEDURE
Department of Anesthesiology  Preprocedure Note       Name:  Kim Zuniga   Age:  57 y.o.  :  1960                                          MRN:  [id number]         Date:  2022      Surgeon: * No surgeons listed *    Procedure: * No procedures listed *    Medications prior to admission:   Prior to Admission medications    Medication Sig Start Date End Date Taking? Authorizing Provider   lidocaine-prilocaine (EMLA) 2.5-2.5 % cream Apply topically as needed.   Patient not taking: Reported on 2022   Lesley Ortizr, NP-C   ondansetron (ZOFRAN) 8 MG tablet Take 1 tablet by mouth every 8 hours as needed for Nausea or Vomiting  Patient not taking: Reported on 2022   Lesley Huizar, NP-C   prochlorperazine (COMPAZINE) 10 MG tablet Take 1 tablet by mouth every 6 hours as needed (nausea vomiting chemo)  Patient not taking: Reported on 2022   Lesley Huizar, NP-C   Mastectomy Bra MISC by Does not apply route Mastectomy bra + prosthesis 10/24/22   Nicole Humphreys MD   Multiple Vitamin (MULTI-VITAMIN DAILY PO) Take 1 tablet by mouth daily    Historical Provider, MD   Biotin 1000 MCG TABS Take 1 tablet by mouth daily    Historical Provider, MD   mirtazapine (REMERON) 15 MG tablet Take 0.5 tablets by mouth nightly 22   Nicole Humphreys MD   rosuvastatin (CRESTOR) 20 MG tablet Take 1 tablet by mouth at bedtime 22   MIR Torres - CNP   methIMAzole (TAPAZOLE) 5 MG tablet Take 0.5 tablets by mouth daily 22   Areli Lerma MD   cyanocobalamin 2500 MCG SUBL Take 2,500 mcg by mouth daily    Ar Automatic Reconciliation   omeprazole (PRILOSEC) 20 MG delayed release capsule Take 20 mg by mouth as needed PRN 21   Ar Automatic Reconciliation   Red Yeast Rice Extract 600 MG CAPS Take 600 mg by mouth daily    Ar Automatic Reconciliation       Current medications:    Current Outpatient Medications   Medication Sig Dispense Refill    lidocaine-prilocaine (EMLA) 2.5-2.5 % cream Apply topically as needed. (Patient not taking: Reported on 11/9/2022) 30 g 2    ondansetron (ZOFRAN) 8 MG tablet Take 1 tablet by mouth every 8 hours as needed for Nausea or Vomiting (Patient not taking: Reported on 11/9/2022) 60 tablet 2    prochlorperazine (COMPAZINE) 10 MG tablet Take 1 tablet by mouth every 6 hours as needed (nausea vomiting chemo) (Patient not taking: Reported on 11/9/2022) 60 tablet 2    Mastectomy Bra MISC by Does not apply route Mastectomy bra + prosthesis 1 each 2    Multiple Vitamin (MULTI-VITAMIN DAILY PO) Take 1 tablet by mouth daily      Biotin 1000 MCG TABS Take 1 tablet by mouth daily      mirtazapine (REMERON) 15 MG tablet Take 0.5 tablets by mouth nightly 30 tablet 3    rosuvastatin (CRESTOR) 20 MG tablet Take 1 tablet by mouth at bedtime 90 tablet 3    methIMAzole (TAPAZOLE) 5 MG tablet Take 0.5 tablets by mouth daily 15 tablet 11    cyanocobalamin 2500 MCG SUBL Take 2,500 mcg by mouth daily      omeprazole (PRILOSEC) 20 MG delayed release capsule Take 20 mg by mouth as needed PRN      Red Yeast Rice Extract 600 MG CAPS Take 600 mg by mouth daily       No current facility-administered medications for this encounter. Allergies:     Allergies   Allergen Reactions    Hydrocodone Anxiety and Other (See Comments)     Depressive state       Problem List:    Patient Active Problem List   Diagnosis Code    BMI 27.0-27.9,adult Z68.27    Multiple thyroid nodules E04.2    Graves' disease E05.00    Hyperthyroidism E05.90    HLD (hyperlipidemia) E78.5    Reflux gastritis K29.60    Invasive ductal carcinoma of breast, female, left ([facility] [address] C50.912    Decreased appetite R63.0    Encounter for screening colonoscopy Z12.11       Past Medical History:        Diagnosis Date    Allergic rhinitis     Cancer ([facility] 09/2022    left breast cancer---    GERD (gastroesophageal reflux disease)     controlled with med   Lavinia Lora' disease     Hypercholesterolemia     Hyperthyroidism     Multiple thyroid nodules     followed by dr Niranjan Concepcion       Past Surgical History:        Procedure Laterality Date    BREAST BIOPSY Left 10/7/2022    LEFT SENTINEL LYMPH NODE BIOPSY PREOP @ 0900, LYMPHO @ 7867, SX @ 8471 performed by Marylin Navarro DO at [address] [place] [address] BREAST SURGERY Left 2022    bx     SECTION      x2    CYST REMOVAL  2021    subcutaneous cyst from lateral neck (benign)    HYSTERECTOMY (CERVIX STATUS UNKNOWN)      fibroids (ovaries intact)    MASTECTOMY Left 10/7/2022    LEFT BREAST MASTECTOMY performed by Marylin Navarro DO at [facility] MAIN OR       Social History:    Social History     Tobacco Use    Smoking status: Never    Smokeless tobacco: Never   Substance Use Topics    Alcohol use: No                                Counseling given: Not Answered      Vital Signs (Current):   Vitals:    22 0739 22 0742 22 0745   BP: (!) 197/81  (!) 143/65   Pulse: 79  66   Resp: 16     Temp: 97.5 °F (36.4 °C)     TempSrc: Temporal     SpO2: 97%  97%   Weight:  160 lb (72.6 kg)    Height:  5' 3\" (1.6 m)                                               BP Readings from Last 3 Encounters:   22 (!) 143/65   22 (!) 148/77   10/24/22 (!) 145/79       NPO Status:                                                   Date of last liquid consumption: 22                        Date of last solid food consumption: 22    BMI:   Wt Readings from Last 3 Encounters:   22 160 lb (72.6 kg)   22 160 lb (72.6 kg)   10/24/22 160 lb 6.4 oz (72.8 kg)     Body mass index is 28.34 kg/m².     CBC:   Lab Results   Component Value Date/Time    WBC 3.8 2022 07:44 AM    RBC 4.49 2022 07:44 AM    HGB 13.1 2022 07:44 AM    HCT 39.7 2022 07:44 AM    MCV 88.4 2022 07:44 AM    RDW 13.2 2022 07:44 AM     2022 07:44 AM       CMP:   Lab Results   Component Value Date/Time     11/08/2022 07:44 AM    K 4.0 11/08/2022 07:44 AM     11/08/2022 07:44 AM    CO2 29 11/08/2022 07:44 AM    BUN 11 11/08/2022 07:44 AM    CREATININE 0.90 11/08/2022 07:44 AM    GFRAA >92 08/04/2022 01:34 PM    AGRATIO 1.7 07/23/2021 11:13 AM    LABGLOM >60 11/08/2022 07:44 AM    GLUCOSE 101 11/08/2022 07:44 AM    PROT 7.6 11/08/2022 07:44 AM    CALCIUM 9.2 11/08/2022 07:44 AM    BILITOT 0.6 11/08/2022 07:44 AM    ALKPHOS 98 11/08/2022 07:44 AM    ALKPHOS 99 11/12/2021 01:41 PM    AST 22 11/08/2022 07:44 AM    ALT 36 11/08/2022 07:44 AM       POC Tests: No results for input(s): POCGLU, POCNA, POCK, POCCL, POCBUN, POCHEMO, POCHCT in the last 72 hours.     Coags:   Lab Results   Component Value Date/Time    PROTIME 14.4 11/08/2022 07:44 AM    INR 1.1 11/08/2022 07:44 AM    APTT 32.7 11/08/2022 07:44 AM       HCG (If Applicable): No results found for: PREGTESTUR, PREGSERUM, HCG, HCGQUANT     ABGs: No results found for: PHART, PO2ART, VAF9CHQ, HYN2TMK, BEART, O5OWVVJD     Type & Screen (If Applicable):  No results found for: LABABO, LABRH    Drug/Infectious Status (If Applicable):  Lab Results   Component Value Date/Time    HEPCAB NONREACTIVE 07/27/2022 08:56 AM       COVID-19 Screening (If Applicable):   Lab Results   Component Value Date/Time    COVID19 Not detected 08/04/2022 01:43 PM           Anesthesia Evaluation  Patient summary reviewed and Nursing notes reviewed no history of anesthetic complications:   Airway: Mallampati: II  TM distance: >3 FB   Neck ROM: full  Mouth opening: > = 3 FB   Dental:    (+) bridge  Comment: upper    Pulmonary: breath sounds clear to auscultation  (+) current smoker                           Cardiovascular:  Exercise tolerance: good (>4 METS),   (+) hyperlipidemia    (-) CAD      Rhythm: regular  Rate: normal                    Neuro/Psych:      (-) TIA and CVA           GI/Hepatic/Renal:   (+) GERD:,           Endo/Other:    (+) hyperthyroidism::., malignancy/cancer (breast). Abdominal:             Vascular:     - DVT and PE. Other Findings:           Anesthesia Plan      general     ASA 2       Induction: intravenous. Anesthetic plan and risks discussed with patient.                         Tena Oleary MD   11/9/2022

## 2022-11-09 NOTE — BRIEF OP NOTE
Department of Interventional Radiology  [phone]        Interventional Radiology Brief Procedure Note    Patient: Mariza Germain MRN: [id number]  SSN: [id number]    YOB: 1960  Age: 57 y.o.   Sex: female      Date of Procedure: 11/9/2022    Pre-Procedure Diagnosis: breast cancer    Post-Procedure Diagnosis: SAME    Procedure(s): Venous Chest Port Placement    Brief Description of Procedure: as above    Performed By: Crispin Maguire PA-C     Assistants: None    Anesthesia:TIVS/MAC    Estimated Blood Loss: Less than 10ml    Specimens:  None    Implants:  Subcutaneous Port    Findings: catheter tip in right atrium     Complications: None    Recommendations: ok to use port     Follow Up: prn    Signed By: Crispin Maguire PA-C     November 9, 2022

## 2022-11-09 NOTE — DISCHARGE INSTRUCTIONS
If you have any questions about your procedure, please call the Interventional Radiology department at [phone]. After business hours (5pm) and weekends, call the answering service at [phone] and ask for the Radiologist on call to be paged. Si tiene Preguntas acerca del procedimiento, por favor llame al departamento de Radiología Intervencional al [phone]. Después de horas de oficina (5 pm) y los fines de [place], llamar al Karime Erik Wong al [phone] y pregunte por el Radiologo de [facility]. Interventional Radiology General Nurse Discharge    After general anesthesia or intravenous sedation, for 24 hours or while taking prescription Narcotics:  Limit your activities  Do not drive and operate hazardous machinery  Do not make important personal or business decisions  Do  not drink alcoholic beverages  If you have not urinated within 8 hours after discharge, please contact your surgeon on call. * Please give a list of your current medications to your Primary Care Provider. * Please update this list whenever your medications are discontinued, doses are     changed, or new medications (including over-the-counter products) are added. * Please carry medication information at all times in case of emergency situations. These are general instructions for a healthy lifestyle:    No smoking/ No tobacco products/ Avoid exposure to second hand smoke  Surgeon General's Warning:  Quitting smoking now greatly reduces serious risk to your health.     Obesity, smoking, and sedentary lifestyle greatly increases your risk for illness  A healthy diet, regular physical exercise & weight monitoring are important for maintaining a healthy lifestyle    You may be retaining fluid if you have a history of heart failure or if you experience any of the following symptoms:  Weight gain of 3 pounds or more overnight or 5 pounds in a week, increased swelling in our hands or feet or shortness of breath while lying flat in bed. Please call your doctor as soon as you notice any of these symptoms; do not wait until your next office visit. Recognize signs and symptoms of STROKE:  F-face looks uneven    A-arms unable to move or move unevenly    S-speech slurred or non-existent    T-time-call 911 as soon as signs and symptoms begin-DO NOT go       Back to bed or wait to see if you get better-TIME IS BRAIN.

## 2022-11-10 DIAGNOSIS — C50.912 INVASIVE DUCTAL CARCINOMA OF BREAST, FEMALE, LEFT (HCC): Primary | ICD-10-CM

## 2022-11-14 ENCOUNTER — HOSPITAL ENCOUNTER (OUTPATIENT)
Dept: INFUSION THERAPY | Age: 62
Discharge: HOME | End: 2022-11-14
Payer: COMMERCIAL

## 2022-11-14 ENCOUNTER — CLINICAL DOCUMENTATION (OUTPATIENT)
Dept: ONCOLOGY | Age: 62
End: 2022-11-14

## 2022-11-14 ENCOUNTER — OFFICE VISIT (OUTPATIENT)
Dept: ONCOLOGY | Age: 62
End: 2022-11-14
Payer: COMMERCIAL

## 2022-11-14 VITALS
WEIGHT: 161.1 LBS | HEIGHT: 63 IN | RESPIRATION RATE: 16 BRPM | DIASTOLIC BLOOD PRESSURE: 75 MMHG | TEMPERATURE: 98.3 F | SYSTOLIC BLOOD PRESSURE: 144 MMHG | HEART RATE: 99 BPM | BODY MASS INDEX: 28.54 KG/M2 | OXYGEN SATURATION: 95 %

## 2022-11-14 DIAGNOSIS — C50.912 INVASIVE DUCTAL CARCINOMA OF BREAST, FEMALE, LEFT (HCC): ICD-10-CM

## 2022-11-14 DIAGNOSIS — R63.0 DECREASED APPETITE: ICD-10-CM

## 2022-11-14 DIAGNOSIS — C50.912 INVASIVE DUCTAL CARCINOMA OF BREAST, FEMALE, LEFT (HCC): Primary | ICD-10-CM

## 2022-11-14 LAB
ALBUMIN SERPL-MCNC: 3.8 G/DL (ref 3.2–4.6)
ALBUMIN/GLOB SERPL: 1 {RATIO} (ref 0.4–1.6)
ALP SERPL-CCNC: 105 U/L (ref 50–136)
ALT SERPL-CCNC: 26 U/L (ref 12–65)
ANION GAP SERPL CALC-SCNC: 4 MMOL/L (ref 2–11)
AST SERPL-CCNC: 19 U/L (ref 15–37)
BASOPHILS # BLD: 0 K/UL (ref 0–0.2)
BASOPHILS NFR BLD: 0 % (ref 0–2)
BILIRUB SERPL-MCNC: 0.4 MG/DL (ref 0.2–1.1)
BUN SERPL-MCNC: 12 MG/DL (ref 8–23)
CALCIUM SERPL-MCNC: 9.1 MG/DL (ref 8.3–10.4)
CHLORIDE SERPL-SCNC: 107 MMOL/L (ref 101–110)
CO2 SERPL-SCNC: 30 MMOL/L (ref 21–32)
CREAT SERPL-MCNC: 1 MG/DL (ref 0.6–1)
DIFFERENTIAL METHOD BLD: NORMAL
EOSINOPHIL # BLD: 0.2 K/UL (ref 0–0.8)
EOSINOPHIL NFR BLD: 4 % (ref 0.5–7.8)
ERYTHROCYTE [DISTWIDTH] IN BLOOD BY AUTOMATED COUNT: 13.1 % (ref 11.9–14.6)
GLOBULIN SER CALC-MCNC: 3.7 G/DL (ref 2.8–4.5)
GLUCOSE SERPL-MCNC: 147 MG/DL (ref 65–100)
HCT VFR BLD AUTO: 39.6 % (ref 35.8–46.3)
HGB BLD-MCNC: 12.7 G/DL (ref 11.7–15.4)
IMM GRANULOCYTES # BLD AUTO: 0 K/UL (ref 0–0.5)
IMM GRANULOCYTES NFR BLD AUTO: 0 % (ref 0–5)
LYMPHOCYTES # BLD: 1.9 K/UL (ref 0.5–4.6)
LYMPHOCYTES NFR BLD: 38 % (ref 13–44)
MCH RBC QN AUTO: 28.5 PG (ref 26.1–32.9)
MCHC RBC AUTO-ENTMCNC: 32.1 G/DL (ref 31.4–35)
MCV RBC AUTO: 88.8 FL (ref 82–102)
MONOCYTES # BLD: 0.4 K/UL (ref 0.1–1.3)
MONOCYTES NFR BLD: 8 % (ref 4–12)
NEUTS SEG # BLD: 2.4 K/UL (ref 1.7–8.2)
NEUTS SEG NFR BLD: 50 % (ref 43–78)
NRBC # BLD: 0 K/UL (ref 0–0.2)
PLATELET # BLD AUTO: 234 K/UL (ref 150–450)
PMV BLD AUTO: 9.5 FL (ref 9.4–12.3)
POTASSIUM SERPL-SCNC: 3.7 MMOL/L (ref 3.5–5.1)
PROT SERPL-MCNC: 7.5 G/DL (ref 6.3–8.2)
RBC # BLD AUTO: 4.46 M/UL (ref 4.05–5.2)
SODIUM SERPL-SCNC: 141 MMOL/L (ref 133–143)
WBC # BLD AUTO: 4.9 K/UL (ref 4.3–11.1)

## 2022-11-14 PROCEDURE — 2580000003 HC RX 258: Performed by: INTERNAL MEDICINE

## 2022-11-14 PROCEDURE — 80053 COMPREHEN METABOLIC PANEL: CPT

## 2022-11-14 PROCEDURE — 85025 COMPLETE CBC W/AUTO DIFF WBC: CPT

## 2022-11-14 PROCEDURE — 96411 CHEMO IV PUSH ADDL DRUG: CPT

## 2022-11-14 PROCEDURE — 96367 TX/PROPH/DG ADDL SEQ IV INF: CPT

## 2022-11-14 PROCEDURE — 96375 TX/PRO/DX INJ NEW DRUG ADDON: CPT

## 2022-11-14 PROCEDURE — 96413 CHEMO IV INFUSION 1 HR: CPT

## 2022-11-14 PROCEDURE — 99214 OFFICE O/P EST MOD 30 MIN: CPT | Performed by: NURSE PRACTITIONER

## 2022-11-14 PROCEDURE — 36591 DRAW BLOOD OFF VENOUS DEVICE: CPT

## 2022-11-14 PROCEDURE — 6360000002 HC RX W HCPCS: Performed by: INTERNAL MEDICINE

## 2022-11-14 RX ORDER — SODIUM CHLORIDE 0.9 % (FLUSH) 0.9 %
5-40 SYRINGE (ML) INJECTION PRN
Status: CANCELLED | OUTPATIENT
Start: 2022-11-14

## 2022-11-14 RX ORDER — EPINEPHRINE 1 MG/ML
0.3 INJECTION, SOLUTION, CONCENTRATE INTRAVENOUS PRN
Status: DISCONTINUED | OUTPATIENT
Start: 2022-11-14 | End: 2022-11-15 | Stop reason: HOSPADM

## 2022-11-14 RX ORDER — SOD SULF/POT CHLORIDE/MAG SULF 1.479 G
TABLET ORAL
COMMUNITY
Start: 2022-10-24

## 2022-11-14 RX ORDER — SODIUM CHLORIDE 0.9 % (FLUSH) 0.9 %
10 SYRINGE (ML) INJECTION PRN
Status: DISCONTINUED | OUTPATIENT
Start: 2022-11-14 | End: 2022-11-15 | Stop reason: HOSPADM

## 2022-11-14 RX ORDER — HEPARIN SODIUM (PORCINE) LOCK FLUSH IV SOLN 100 UNIT/ML 100 UNIT/ML
500 SOLUTION INTRAVENOUS PRN
Status: CANCELLED | OUTPATIENT
Start: 2022-11-14

## 2022-11-14 RX ORDER — DIPHENHYDRAMINE HYDROCHLORIDE 50 MG/ML
50 INJECTION INTRAMUSCULAR; INTRAVENOUS
Status: DISCONTINUED | OUTPATIENT
Start: 2022-11-14 | End: 2022-11-15 | Stop reason: HOSPADM

## 2022-11-14 RX ORDER — ONDANSETRON 2 MG/ML
8 INJECTION INTRAMUSCULAR; INTRAVENOUS
Status: DISCONTINUED | OUTPATIENT
Start: 2022-11-14 | End: 2022-11-15 | Stop reason: HOSPADM

## 2022-11-14 RX ORDER — EPINEPHRINE 1 MG/ML
0.3 INJECTION, SOLUTION, CONCENTRATE INTRAVENOUS PRN
Status: CANCELLED | OUTPATIENT
Start: 2022-11-14

## 2022-11-14 RX ORDER — SODIUM CHLORIDE 9 MG/ML
5-40 INJECTION INTRAVENOUS PRN
Status: CANCELLED | OUTPATIENT
Start: 2022-11-14

## 2022-11-14 RX ORDER — SODIUM CHLORIDE 9 MG/ML
5-250 INJECTION, SOLUTION INTRAVENOUS PRN
Status: CANCELLED | OUTPATIENT
Start: 2022-11-14

## 2022-11-14 RX ORDER — SODIUM CHLORIDE 9 MG/ML
INJECTION, SOLUTION INTRAVENOUS CONTINUOUS
Status: CANCELLED | OUTPATIENT
Start: 2022-11-14

## 2022-11-14 RX ORDER — ONDANSETRON 2 MG/ML
8 INJECTION INTRAMUSCULAR; INTRAVENOUS ONCE
Status: COMPLETED | OUTPATIENT
Start: 2022-11-14 | End: 2022-11-14

## 2022-11-14 RX ORDER — ACETAMINOPHEN 325 MG/1
650 TABLET ORAL
Status: CANCELLED | OUTPATIENT
Start: 2022-11-14

## 2022-11-14 RX ORDER — DIPHENHYDRAMINE HYDROCHLORIDE 50 MG/ML
50 INJECTION INTRAMUSCULAR; INTRAVENOUS
Status: CANCELLED | OUTPATIENT
Start: 2022-11-14

## 2022-11-14 RX ORDER — SODIUM CHLORIDE 9 MG/ML
5-250 INJECTION, SOLUTION INTRAVENOUS PRN
Status: DISCONTINUED | OUTPATIENT
Start: 2022-11-14 | End: 2022-11-15 | Stop reason: HOSPADM

## 2022-11-14 RX ORDER — FAMOTIDINE 10 MG/ML
20 INJECTION, SOLUTION INTRAVENOUS
Status: CANCELLED | OUTPATIENT
Start: 2022-11-14

## 2022-11-14 RX ORDER — DOXORUBICIN HYDROCHLORIDE 2 MG/ML
60 INJECTION, SOLUTION INTRAVENOUS ONCE
Status: COMPLETED | OUTPATIENT
Start: 2022-11-14 | End: 2022-11-14

## 2022-11-14 RX ORDER — ONDANSETRON 2 MG/ML
8 INJECTION INTRAMUSCULAR; INTRAVENOUS
Status: CANCELLED | OUTPATIENT
Start: 2022-11-14

## 2022-11-14 RX ORDER — SODIUM CHLORIDE 0.9 % (FLUSH) 0.9 %
5-40 SYRINGE (ML) INJECTION PRN
Status: DISCONTINUED | OUTPATIENT
Start: 2022-11-14 | End: 2022-11-15 | Stop reason: HOSPADM

## 2022-11-14 RX ORDER — ALBUTEROL SULFATE 90 UG/1
4 AEROSOL, METERED RESPIRATORY (INHALATION) PRN
Status: CANCELLED | OUTPATIENT
Start: 2022-11-14

## 2022-11-14 RX ORDER — ONDANSETRON 2 MG/ML
8 INJECTION INTRAMUSCULAR; INTRAVENOUS ONCE
Status: CANCELLED | OUTPATIENT
Start: 2022-11-14 | End: 2022-11-14

## 2022-11-14 RX ORDER — MEPERIDINE HYDROCHLORIDE 50 MG/ML
12.5 INJECTION INTRAMUSCULAR; INTRAVENOUS; SUBCUTANEOUS PRN
Status: CANCELLED | OUTPATIENT
Start: 2022-11-14

## 2022-11-14 RX ORDER — DOXORUBICIN HYDROCHLORIDE 2 MG/ML
60 INJECTION, SOLUTION INTRAVENOUS ONCE
Status: CANCELLED | OUTPATIENT
Start: 2022-11-14 | End: 2022-11-14

## 2022-11-14 RX ADMIN — SODIUM CHLORIDE, PRESERVATIVE FREE 10 ML: 5 INJECTION INTRAVENOUS at 09:11

## 2022-11-14 RX ADMIN — FOSAPREPITANT 150 MG: 150 INJECTION, POWDER, LYOPHILIZED, FOR SOLUTION INTRAVENOUS at 10:55

## 2022-11-14 RX ADMIN — CYCLOPHOSPHAMIDE 1080 MG: 1 INJECTION, POWDER, FOR SOLUTION INTRAVENOUS; ORAL at 11:42

## 2022-11-14 RX ADMIN — SODIUM CHLORIDE, PRESERVATIVE FREE 10 ML: 5 INJECTION INTRAVENOUS at 12:45

## 2022-11-14 RX ADMIN — ONDANSETRON 8 MG: 2 INJECTION INTRAMUSCULAR; INTRAVENOUS at 10:30

## 2022-11-14 RX ADMIN — SODIUM CHLORIDE 100 ML/HR: 9 INJECTION, SOLUTION INTRAVENOUS at 10:10

## 2022-11-14 RX ADMIN — DEXAMETHASONE SODIUM PHOSPHATE 12 MG: 4 INJECTION, SOLUTION INTRAMUSCULAR; INTRAVENOUS at 10:32

## 2022-11-14 RX ADMIN — DOXORUBICIN HYDROCHLORIDE 108 MG: 2 INJECTION, SOLUTION INTRAVENOUS at 11:33

## 2022-11-14 RX ADMIN — SODIUM CHLORIDE, PRESERVATIVE FREE 10 ML: 5 INJECTION INTRAVENOUS at 10:10

## 2022-11-14 ASSESSMENT — PATIENT HEALTH QUESTIONNAIRE - PHQ9
SUM OF ALL RESPONSES TO PHQ9 QUESTIONS 1 & 2: 0
1. LITTLE INTEREST OR PLEASURE IN DOING THINGS: 0
SUM OF ALL RESPONSES TO PHQ QUESTIONS 1-9: 0
SUM OF ALL RESPONSES TO PHQ QUESTIONS 1-9: 0
2. FEELING DOWN, DEPRESSED OR HOPELESS: 0
SUM OF ALL RESPONSES TO PHQ QUESTIONS 1-9: 0
SUM OF ALL RESPONSES TO PHQ QUESTIONS 1-9: 0

## 2022-11-14 ASSESSMENT — ENCOUNTER SYMPTOMS
CONSTIPATION: 0
HEMOPTYSIS: 0
SHORTNESS OF BREATH: 0
BLOOD IN STOOL: 0
NAUSEA: 0
ABDOMINAL DISTENTION: 0
SCLERAL ICTERUS: 0
TROUBLE SWALLOWING: 0
ABDOMINAL PAIN: 0
CHEST TIGHTNESS: 0
DIARRHEA: 0
WHEEZING: 0
VOMITING: 0
SORE THROAT: 0
VOICE CHANGE: 0

## 2022-11-14 NOTE — PROGRESS NOTES
Patient arrived to port lab for port access and lab draw   Jarred Kennedy 43 accessed and labs drawn per protocol   Port remains accessed   Patient discharged from port lab ambulatory
18-Jan-2018

## 2022-11-14 NOTE — PROGRESS NOTES
Arrived to the [facility]. D1C1 Adriamycin/Cytoxan completed. Patient tolerated well. Chemo education done prior, chemo consent verified in chart. Any issues or concerns during appointment: Patient developed reaction to Emend about 5 minutes into this medication infusing. She developed generalized flushing, coughing, anxiety. Infusion stopped, VSS, given 500 cc bolus NS. No emergency meds were given because reaction resolved within 5 min. Zohreh Maldonado, NP called and updated. Patient aware of next infusion appointment on 11/15 at 3:00 pm.   Patient instructed to call provider with temperature of 100.4 or greater or nausea/vomiting/ diarrhea or pain not controlled by medications  Discharged ambulatory to home.

## 2022-11-14 NOTE — PROGRESS NOTES
Clinical Social Work Note  Name: Connie Szymanski    : 1960    MRN: [id number]    Date of Service: 2022    Type of Service: Health and Behavior Intervention     Length of Service: 30  minutes    Referral Source: Infusion RN    Reason for Visit: D1C1    Subject: Seen patient with her supportive . Couple has been  for 7 years. Michelle TREJO introduced self, presented Support Services Groups, Therapeutic Art Classes and others. MAYA discussed Distress by using NCCN Guidelines for Patients' Distress. Gave patient information on Cancer and Mental Health. Mini Mental Status Exam: Connie Szymanski was dressed properly. No abnormal psychomotor movements observed. Intellectual functioning appeared to be intact. Insight was adequate. Judgment was adequate. Patient did not report suicidal ideations, intent or plans. Speech was coherent. Thought process was clear. Patient did not report homicidal ideations, intent or plans. Patient was oriented to self, place, time and situation. Protective Factors: Current care for physical and mental illness, adequate insight and judgment, family support, cultural and Evangelical beliefs and values that support self-care. Next Steps: MAYA intends to follow up as needed.       Electronically Signed By:  QUENTIN Lopez

## 2022-11-14 NOTE — PROGRESS NOTES
[facility] Hematology and Oncology: Established patient - follow up     Chief Complaint   Patient presents with    Follow-up     Reason for Referral: Breast cancer  Referring Provider: Self-referral   Primary Care Provider: MIR Bai CNP  Family History of Cancer/Hematologic Disorders: Family history is significant for two sisters and a niece with breast cancer. Presenting Symptoms: Abnormal routine screening mammogram     History of Present Illness:  Ms. Karin Keller is a 57 y.o. female who presents today for follow up regarding breast cancer. The past medical history is significant for multiple thyroid nodules, hyperthyroidism, hypercholesterolemia, Grave's disease, GERD, uterine fibroids,  section x 2, cyst removal, and hysterectomy. She initially presented for a routine screening mammogram on 8/10/22 which identified a spiculated equal density mass in the left breast inferior medial quadrant posterior depth. Further evaluation with limited ultrasound of the left breast on 2022 confirmed an 8 x 7 x 8 mm round, hypoechoic mass with spiculated margins at the 9:00 position in the left breast, 6 cm from the nipple, demonstrating mild posterior acoustic enhancement and corresponding to the mass seen mammographically. US- guided biopsy of the 9:00 left breast mass was performed on 22 with pathology revealing ER/CT/HER-2 negative, grade 3, invasive carcinoma with high-grade DCIS focally present and no microcalcifications or lymphovascular space extension identified. Testing so far has been done at [facility]; however, Ms. Karin Keller wishes to transfer care to [facility]. She is self-referred to [facility] for Oncology evaluation and treatment of newly diagnosed breast cancer. She has also been referred to surgery and is scheduled for initial surgical evaluation with Dr. Damon Luu, on 22.    Patient's daughter lives in [place], works at a hospital.    At consultation, we discussed the or other findings are seen in either breast.     IMPRESSION: INCOMPLETE NEEDS ADDITIONAL IMAGING EVALUATION   The spiculated mass in the left breast is indeterminate. Ultrasound with possible additional views are recommended. There is no mammographic evidence of malignancy in the right breast. Patient will be notified of the results. LIMITED ULTRASOUND OF LEFT BREAST: 8/17/2022  FINDINGS: There is an 8 x 7 x 8 mm round, hypoechoic mass with spiculated margins at 9:00, 6 cm from the nipple. It demonstrates mild posterior acoustic enhancement. This corresponds to the mass seen mammographically. The left axilla is negative. IMPRESSION: SUSPICIOUS OF MALIGNANCY   The 8 mm mass at 9:00, 6 cm FN is suspicious and ultrasound-guided biopsy is recommended. These findings and recommendations were discussed with the patient at the time of her exam.  Her biopsy is scheduled for 8/25/2022 at 2:00 pm.      Evaristo 8/25/22 9/1/22 heme/onc consultation   9/9/22 FU after MRI - pt elected to p/w sx upfront; MRI results reviewed. 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 10/19/22 post op with Dr Dimple Lux - drain removed   10/24/22 FU after sx - discussed adjuvant chemotherapy for TNBC.  11/14/22 Cycle 1 ddAC. EF 60-65%.         Family History   Problem Relation Age of Onset    Coronary Art Dis Mother     Breast Cancer Sister     Breast Cancer Sister     Thyroid Disease Sister     Thyroid Cancer Neg Hx     Diabetes Neg Hx       Social History     Socioeconomic History    Marital status:      Spouse name: None    Number of children: None    Years of education: None    Highest education level: None   Tobacco Use    Smoking status: Never    Smokeless tobacco: Never   Vaping Use    Vaping Use: Never used   Substance and Sexual Activity Alcohol use: No    Drug use: No        Review of Systems   Constitutional:  Positive for appetite change (improved on remeron). Negative for chills, diaphoresis, fatigue, fever and unexpected weight change. HENT:   Negative for hearing loss, mouth sores, nosebleeds, sore throat, trouble swallowing and voice change. Eyes:  Negative for icterus. Respiratory:  Negative for chest tightness, hemoptysis, shortness of breath and wheezing. Cardiovascular:  Negative for chest pain, leg swelling and palpitations. Gastrointestinal:  Negative for abdominal distention, abdominal pain, blood in stool, constipation, diarrhea, nausea and vomiting. Endocrine: Negative for hot flashes. Genitourinary:  Negative for difficulty urinating, frequency, vaginal bleeding and vaginal discharge. Musculoskeletal:  Negative for arthralgias, flank pain, gait problem and myalgias. Skin:  Negative for itching, rash and wound. Neurological:  Negative for dizziness, extremity weakness, gait problem, headaches and numbness. Psychiatric/Behavioral:  Positive for sleep disturbance. Negative for confusion and depression. The patient is nervous/anxious.        Allergies   Allergen Reactions    Emend [Fosaprepitant Dimeglumine] Anaphylaxis    Hydrocodone Anxiety and Other (See Comments)     Depressive state     Past Medical History:   Diagnosis Date    Allergic rhinitis     Cancer ([facility] [address] 2022    left breast cancer---    GERD (gastroesophageal reflux disease)     controlled with med    Graves' disease     Hypercholesterolemia     Hyperthyroidism     Multiple thyroid nodules     followed by dr Shane Olea     Past Surgical History:   Procedure Laterality Date    BREAST BIOPSY Left 10/7/2022    LEFT SENTINEL LYMPH NODE BIOPSY PREOP @ 0900, LYMPHO @ 1030, SX @ 1230 performed by Jocelyn Walker DO at [facility] [address] Left 2022    bx     SECTION      x2    CYST REMOVAL  2021    subcutaneous cyst from lateral neck (benign)    HYSTERECTOMY (CERVIX STATUS UNKNOWN)      fibroids (ovaries intact)    IR PORT PLACEMENT EQUAL OR GREATER THAN 5 YEARS  11/9/2022    IR PORT PLACEMENT EQUAL OR GREATER THAN 5 YEARS 11/9/2022 [facility] RADIOLOGY SPECIALS    MASTECTOMY Left 10/7/2022    LEFT BREAST MASTECTOMY performed by Sandra Thompson DO at [facility] MAIN OR     Current Outpatient Medications   Medication Sig Dispense Refill    lidocaine-prilocaine (EMLA) 2.5-2.5 % cream Apply topically weekly as needed prior to port access. 30 g 2    Mastectomy Bra MISC by Does not apply route Mastectomy bra + prosthesis 1 each 2    Multiple Vitamin (MULTI-VITAMIN DAILY PO) Take 1 tablet by mouth daily      Biotin 1000 MCG TABS Take 1 tablet by mouth daily      mirtazapine (REMERON) 15 MG tablet Take 0.5 tablets by mouth nightly (Patient taking differently: Take 7.5 mg by mouth as needed) 30 tablet 3    rosuvastatin (CRESTOR) 20 MG tablet Take 1 tablet by mouth at bedtime 90 tablet 3    methIMAzole (TAPAZOLE) 5 MG tablet Take 0.5 tablets by mouth daily 15 tablet 11    cyanocobalamin 2500 MCG SUBL Take 2,500 mcg by mouth daily      omeprazole (PRILOSEC) 20 MG delayed release capsule Take 20 mg by mouth as needed PRN      Red Yeast Rice Extract 600 MG CAPS Take 600 mg by mouth daily      SUTAB 5165-882-674 MG TABS TAKE AS DIRECTED (Patient not taking: Reported on 11/14/2022)      ondansetron (ZOFRAN) 8 MG tablet Take 1 tablet by mouth every 8 hours as needed for Nausea or Vomiting (Patient not taking: Reported on 11/9/2022) 60 tablet 2    prochlorperazine (COMPAZINE) 10 MG tablet Take 1 tablet by mouth every 6 hours as needed (nausea vomiting chemo) (Patient not taking: Reported on 11/9/2022) 60 tablet 2     No current facility-administered medications for this visit.      Facility-Administered Medications Ordered in Other Visits   Medication Dose Route Frequency Provider Last Rate Last Admin    sodium chloride flush 0.9 % injection 10 mL  10 mL IntraVENous PRN Tone Menchaca MD   10 mL at 11/14/22 0911    0.9 % sodium chloride infusion  5-250 mL/hr IntraVENous PRN Lupillo Bernal MD        cyclophosphamide (CYTOXAN) 1,080 mg in sodium chloride 0.9 % 250 mL chemo IVPB  600 mg/m2 (Treatment Plan Recorded) IntraVENous Once Lupillo Bernal  mL/hr at 11/14/22 1142 1,080 mg at 11/14/22 1142    sodium chloride flush 0.9 % injection 5-40 mL  5-40 mL IntraVENous PRN Lupillo Brenal MD   10 mL at 11/14/22 1010    hydrocortisone sodium succinate PF (SOLU-CORTEF) injection 100 mg  100 mg IntraVENous Once PRN Lupillo Bernal MD        famotidine (PEPCID) 20 mg in sodium chloride (PF) 0.9 % 10 mL injection  20 mg IntraVENous Once PRN Lupillo Bernal MD        diphenhydrAMINE (BENADRYL) injection 50 mg  50 mg IntraVENous Once PRN Lupillo Bernal MD        ondansetron [facility]) injection 8 mg  8 mg IntraVENous Once PRN Lupillo Bernal MD        EPINEPHrine PF 1 MG/ML injection (Anaphylaxis) 0.3 mg  0.3 mg IntraMUSCular PRN Lupillo Bernal MD           No flowsheet data found. OBJECTIVE:  BP (!) 144/75 (Site: Right Upper Arm, Position: Standing, Cuff Size: Large Adult)   Pulse 99   Temp 98.3 °F (36.8 °C) (Oral)   Resp 16   Ht 5' 3\" (1.6 m)   Wt 161 lb 1.6 oz (73.1 kg)   SpO2 95%   BMI 28.54 kg/m²       ECOG PERFORMANCE STATUS - 0-Fully active, able to carry on all pre-disease performance without restriction. Pain - 0 - No pain/10. None/Minimal pain - not affecting QOL     Fatigue - No flowsheet data found. Distress - No flowsheet data found. Physical Exam  Vitals reviewed. Exam conducted with a chaperone present. Constitutional:       General: She is not in acute distress. Appearance: Normal appearance. She is not ill-appearing or toxic-appearing. HENT:      Head: Normocephalic and atraumatic. Nose: Nose normal.      Mouth/Throat:      Mouth: Mucous membranes are moist.   Eyes:      General: No scleral icterus. Extraocular Movements: Extraocular movements intact. Conjunctiva/sclera: Conjunctivae normal.      Pupils: Pupils are equal, round, and reactive to light. Cardiovascular:      Rate and Rhythm: Normal rate and regular rhythm. Heart sounds: No murmur heard. Pulmonary:      Effort: No respiratory distress. Breath sounds: No wheezing or rales. Chest:          Comments: S/p mastectomy. Abdominal:      General: There is no distension. Tenderness: There is no abdominal tenderness. There is no right CVA tenderness or guarding. Musculoskeletal:         General: Normal range of motion. Cervical back: Normal range of motion. Right lower leg: No edema. Left lower leg: No edema. Lymphadenopathy:      Cervical: No cervical adenopathy. Upper Body:      Right upper body: No supraclavicular or axillary adenopathy. Left upper body: No supraclavicular or axillary adenopathy. Skin:     General: Skin is warm and dry. Coloration: Skin is not jaundiced or pale. Findings: No rash. Neurological:      General: No focal deficit present. Mental Status: She is alert and oriented to person, place, and time. Gait: Gait normal.   Psychiatric:         Behavior: Behavior normal.         Thought Content:  Thought content normal.        Labs:  Recent Results (from the past 168 hour(s))   CBC with Auto Differential    Collection Time: 11/08/22  7:44 AM   Result Value Ref Range    WBC 3.8 (L) 4.3 - 11.1 K/uL    RBC 4.49 4.05 - 5.2 M/uL    Hemoglobin 13.1 11.7 - 15.4 g/dL    Hematocrit 39.7 35.8 - 46.3 %    MCV 88.4 82.0 - 102.0 FL    MCH 29.2 26.1 - 32.9 PG    MCHC 33.0 31.4 - 35.0 g/dL    RDW 13.2 11.9 - 14.6 %    Platelets 098 724 - 192 K/uL    MPV 9.4 9.4 - 12.3 FL    nRBC 0.00 0.0 - 0.2 K/uL    Seg Neutrophils 41 (L) 43 - 78 %    Lymphocytes 42 13 - 44 %    Monocytes 10 4.0 - 12.0 %    Eosinophils % 6 0.5 - 7.8 %    Basophils 1 0.0 - 2.0 %    Immature Granulocytes 0 0.0 - 5.0 %    Segs Absolute 1.6 (L) 1.7 - 8.2 K/UL    Absolute Lymph # 1.6 0.5 - 4.6 K/UL    Absolute Mono # 0.4 0.1 - 1.3 K/UL    Absolute Eos # 0.2 0.0 - 0.8 K/UL    Basophils Absolute 0.0 0.0 - 0.2 K/UL    Absolute Immature Granulocyte 0.0 0.0 - 0.5 K/UL    Differential Type AUTOMATED     Comprehensive Metabolic Panel    Collection Time: 11/08/22  7:44 AM   Result Value Ref Range    Sodium 140 133 - 143 mmol/L    Potassium 4.0 3.5 - 5.1 mmol/L    Chloride 106 101 - 110 mmol/L    CO2 29 21 - 32 mmol/L    Anion Gap 5 2 - 11 mmol/L    Glucose 101 (H) 65 - 100 mg/dL    BUN 11 8 - 23 MG/DL    Creatinine 0.90 0.6 - 1.0 MG/DL    Est, Glom Filt Rate >60 >60 ml/min/1.73m2    Calcium 9.2 8.3 - 10.4 MG/DL    Total Bilirubin 0.6 0.2 - 1.1 MG/DL    ALT 36 12 - 65 U/L    AST 22 15 - 37 U/L    Alk Phosphatase 98 50 - 136 U/L    Total Protein 7.6 6.3 - 8.2 g/dL    Albumin 3.9 3.2 - 4.6 g/dL    Globulin 3.7 2.8 - 4.5 g/dL    Albumin/Globulin Ratio 1.1 0.4 - 1.6     Magnesium    Collection Time: 11/08/22  7:44 AM   Result Value Ref Range    Magnesium 2.7 (H) 1.8 - 2.4 mg/dL   Protime-INR    Collection Time: 11/08/22  7:44 AM   Result Value Ref Range    Protime 14.4 (H) 12.6 - 14.3 sec    INR 1.1     APTT    Collection Time: 11/08/22  7:44 AM   Result Value Ref Range    PTT 32.7 24.5 - 34.2 SEC   Hepatitis B Surface Antigen    Collection Time: 11/08/22  7:44 AM   Result Value Ref Range    Hepatitis B Surface Ag NONREACTIVE NR     Hepatitis B Core Antibody, Total    Collection Time: 11/08/22  7:44 AM   Result Value Ref Range    Hep B Core Total Ab Negative Negative     Hepatitis B Surface Antibody    Collection Time: 11/08/22  7:44 AM   Result Value Ref Range    Hep B S Ab 3.16 mIU/mL   CBC with Auto Differential    Collection Time: 11/14/22  8:57 AM   Result Value Ref Range    WBC 4.9 4.3 - 11.1 K/uL    RBC 4.46 4.05 - 5.2 M/uL    Hemoglobin 12.7 11.7 - 15.4 g/dL    Hematocrit 39.6 35.8 - 46.3 %    MCV 88.8 82.0 - 102.0 FL    MCH 28.5 26.1 - 32.9 PG    MCHC 32.1 31.4 - 35.0 g/dL    RDW 13.1 11.9 - 14.6 %    Platelets 843 281 - 795 K/uL    MPV 9.5 9.4 - 12.3 FL    nRBC 0.00 0.0 - 0.2 K/uL    Seg Neutrophils 50 43 - 78 %    Lymphocytes 38 13 - 44 %    Monocytes 8 4.0 - 12.0 %    Eosinophils % 4 0.5 - 7.8 %    Basophils 0 0.0 - 2.0 %    Immature Granulocytes 0 0.0 - 5.0 %    Segs Absolute 2.4 1.7 - 8.2 K/UL    Absolute Lymph # 1.9 0.5 - 4.6 K/UL    Absolute Mono # 0.4 0.1 - 1.3 K/UL    Absolute Eos # 0.2 0.0 - 0.8 K/UL    Basophils Absolute 0.0 0.0 - 0.2 K/UL    Absolute Immature Granulocyte 0.0 0.0 - 0.5 K/UL    Differential Type AUTOMATED     Comprehensive Metabolic Panel    Collection Time: 11/14/22  8:57 AM   Result Value Ref Range    Sodium 141 133 - 143 mmol/L    Potassium 3.7 3.5 - 5.1 mmol/L    Chloride 107 101 - 110 mmol/L    CO2 30 21 - 32 mmol/L    Anion Gap 4 2 - 11 mmol/L    Glucose 147 (H) 65 - 100 mg/dL    BUN 12 8 - 23 MG/DL    Creatinine 1.00 0.6 - 1.0 MG/DL    Est, Glom Filt Rate >60 >60 ml/min/1.73m2    Calcium 9.1 8.3 - 10.4 MG/DL    Total Bilirubin 0.4 0.2 - 1.1 MG/DL    ALT 26 12 - 65 U/L    AST 19 15 - 37 U/L    Alk Phosphatase 105 50 - 136 U/L    Total Protein 7.5 6.3 - 8.2 g/dL    Albumin 3.8 3.2 - 4.6 g/dL    Globulin 3.7 2.8 - 4.5 g/dL    Albumin/Globulin Ratio 1.0 0.4 - 1.6         Imaging: reviewed     PATHOLOGY:   per above     10/2022        ASSESSMENT:     Diagnosis Orders   1.  Invasive ductal carcinoma of breast, female, left ([facility] [address]  CBC With Auto Differential    Comprehensive metabolic panel    HEPATITIS B SURFACE ANTIGEN    Hepatitis B core antibody, total    Hepatitis B surface antibody    DISCONTINUED: 0.9 % sodium chloride infusion    DISCONTINUED: fosaprepitant (EMEND) 150 mg in sodium chloride 0.9 % 150 mL IVPB    DISCONTINUED: dexamethasone (DECADRON) 12 mg in sodium chloride 0.9 % 50 mL IVPB    DISCONTINUED: ondansetron (ZOFRAN) injection 8 mg    DISCONTINUED: DOXOrubicin HCl (ADRIAMYCIN) chemo syringe 108 mg DISCONTINUED: cyclophosphamide (CYTOXAN) 1,080 mg in sodium chloride 0.9 % 250 mL chemo IVPB    DISCONTINUED: sodium chloride flush 0.9 % injection 5-40 mL    DISCONTINUED: diphenhydrAMINE (BENADRYL) injection 50 mg    DISCONTINUED: famotidine (PEPCID) injection 20 mg    DISCONTINUED: hydrocortisone sodium succinate PF (SOLU-CORTEF) injection 100 mg    DISCONTINUED: ondansetron (ZOFRAN) injection 8 mg    DISCONTINUED: EPINEPHrine PF 1 MG/ML injection (Anaphylaxis) 0.3 mg      2. Decreased appetite            Ms. Luca Britt is here for FU of breast cancer. Left breast IDC, 9:00, s/p core bx/clip, at GABO, poorly diff, 8mm on US, MRI pending, LVI neg, DCIS high grade focally present, ER0/PR0, Her2 sera +1 - negative   - MR - Biopsied mass (11mm) in the medial 8:30 left breast at 7 cm from the nipple. No additional pathologic enhancement or adenopathy in either breast.    - s/p left mastectomy and 0/2 SLnc - [id number] (0/2) - 15mm with neg margins, grade 2   - genetics - VUS     - she is here for FU with her .    - labs reviewed/EF adequate to proceed with cycle 1 ddAC. - on remeron 7.5 mg nightly for sleep/appetite and this is working well.  - nausea/vomiting chemo: discussed Zofran and/or Compazine. - continue good oral nutrition and hydration. - encouraged frequent activity throughout the day and rest as needed to combat fatigue.   - call with any fevers, uncontrolled side effects from treatment or any other worrisome/concerning symptoms. - follow up in two weeks for cycle 2 or sooner if needed. - We discussed her path results in detail that showed 15mm TNBC. She is aware that pt's are considered high risk per GEICAM/2003-02 trial if they have node neg disease but were <35, had neg er/pr receptors, histo grade 2-3, and T>2cm. She meets two criteria for consideration of anthracycline therapy. She wishes to think this over.   We discussed risks of chemotherapy incuding irriversible heart disease (including acute LV failure - we reviewed this) and also leukemia. Gave options of ddAC-T and TC with growth factor support. - of note, following standard adjuvant chemo for [id number] or N+ TNBC, may consider metronomic capecitabine (650mg/m2 BID continuously for 1 year) as extended maintenance - SYSUCC-001 trial estimated 5-year DFS vs observation: 82.8 vs 73% (Velasquez et al 2021). - We discussed the potential side effects including but not limited to hair loss (which could be permanent), nausea, vomiting, diarrhea or constipation, mucositis, rashes, allergic reactions, organ damage including liver and kidney failure, cardiotoxicity, and direct effects on bone marrow which can lead to anemia and thrombocytopenia necessitating transfusion or neutropenia putting patient at risk for infection, sepsis and death if not treated. - echo EF 60-65%; LVI normal size and no WMA  - port placement   - decreased appetite, anxiety and sleep disturbance - on low dose mirtazapine and improved  - Rx mastectomy bra/prosthesis       2. Surveillance   - H&P q3-6 mo for years 1-3, q6mo years 4-5; mammogram yearly  - DEXA q2 years postmenopausal   - labs/imaging studies are not recommended without symptoms     Orders placed for: Dose-Dense AC-T (Paclitaxel Weekly) - [Doxorubicin + Cyclophosphamide q14 Days x 4 Cycles, Followed by Paclitaxel 80 mg/m² Weekly x 12 Weeks]  Cycles 1 through 4: A cycle is every 14 days:  Doxorubicin  60 mg/m² IV once on day 1 of cycles 1 through 4  Cyclophosphamide  600 mg/m² IV once on day 1 of cycles 1 through 4  Pegfilgrastim-xxxx  6 mg flat dose subcutaneously once on day 2 of cycles 1 through 4  Cycles 5 through 16: A cycle is every 7 days:  Paclitaxel  80 mg/m² IV once on day 1 of cycles 5 through 16      RESUSCITATION DIRECTIVES/HOSPICE CARE: Full Support    RTC to start tx or sooner as needed   Pt to contact the office to let us know re her tx choice.     [50min - chart review, review of results and discussion of options, next steps, coordination of care and charting ]      MDM      Lab studies and imaging studies were personally reviewed. Pertinent old records were reviewed. Historical:   - she is here for consultation regarding neoadjuvant chemotherapy for newly diagnosed triple negative breast cancer. During today's visit, we had a long conversation about breast cancer pathophysiology and staging in general.  We then reviewed her imaging and pathology in details, including receptor status and it's significance in terms of available treatment options. Neoadjuvant compared to adjuvant chemotherapy has similar long term outcomes when patients are given the same therapy. Preoperative treatment can render large, inoperable tumors operable and improve breast conservation, downstage tumor and decrease risk of postoperative lympedema. It also provides information about tumor chemosensitivity. Patients with pathologic complete response have favorable disease free survival and overall survival and this correlation is strongest in TNBC. Preoperative systemic therapy can lead to possible overtreatment with systemic therapy of clinical stages overestimated. It can also lead to possible under treatment local regionally with radiotherapy if clinical stage is underestimated. There is also possibility of disease progression during preoperative systemic therapy. - MRI pending - she is aware that tx plan may change depending on MR results   - she is here for FU after MRI - she has elected to p/w sx upfront. Images reviewed. Understands final path will be determined by sx specimen/LN status. Dr Matti Pop notifed by me re pt's decision.    - we discussed options of AC-T +/- carbo/pembro and TC; she is aware that she will need chemotherapy regardless of her final decision (before/after sx), if she does chemo after, she would not be a candidate for pembro/carbo. Data from [facility] reviewed.     - pt met with Dr Conrado Carmen and elected to pw bilateral mastectomies with axillary dissection     All questions were asked and answered to the best of my ability. The patient verbalized understanding and agrees with the plan above.           LILIA Goodrich  [address] [place] [address] Hematology and Oncology  [address] [place] [address]  Office : [phone]  Fax : [phone]

## 2022-11-15 ENCOUNTER — HOSPITAL ENCOUNTER (OUTPATIENT)
Dept: INFUSION THERAPY | Age: 62
Discharge: HOME | End: 2022-11-15
Payer: COMMERCIAL

## 2022-11-15 VITALS
RESPIRATION RATE: 16 BRPM | TEMPERATURE: 97.8 F | OXYGEN SATURATION: 96 % | HEART RATE: 86 BPM | SYSTOLIC BLOOD PRESSURE: 145 MMHG | DIASTOLIC BLOOD PRESSURE: 62 MMHG

## 2022-11-15 DIAGNOSIS — C50.912 INVASIVE DUCTAL CARCINOMA OF BREAST, FEMALE, LEFT (HCC): Primary | ICD-10-CM

## 2022-11-15 PROCEDURE — 6360000002 HC RX W HCPCS: Performed by: INTERNAL MEDICINE

## 2022-11-15 PROCEDURE — 96372 THER/PROPH/DIAG INJ SC/IM: CPT

## 2022-11-15 RX ADMIN — PEGFILGRASTIM-CBQV 6 MG: 6 INJECTION, SOLUTION SUBCUTANEOUS at 15:18

## 2022-11-15 NOTE — PROGRESS NOTES
Patient arrived to [facility]. Assessment completed. No needs voiced at this time. Patient tolerated injection well and is aware of next appointment on 11/28/2022 @0830. Patient discharged ambulatory.

## 2022-11-21 DIAGNOSIS — C50.912 INVASIVE DUCTAL CARCINOMA OF BREAST, FEMALE, LEFT (HCC): Primary | ICD-10-CM

## 2022-11-23 PROBLEM — Z12.11 ENCOUNTER FOR SCREENING COLONOSCOPY: Status: RESOLVED | Noted: 2022-10-19 | Resolved: 2022-11-23

## 2022-11-28 ENCOUNTER — OFFICE VISIT (OUTPATIENT)
Dept: ONCOLOGY | Age: 62
End: 2022-11-28
Payer: COMMERCIAL

## 2022-11-28 ENCOUNTER — HOSPITAL ENCOUNTER (OUTPATIENT)
Dept: INFUSION THERAPY | Age: 62
Discharge: HOME | End: 2022-11-28
Payer: COMMERCIAL

## 2022-11-28 VITALS
WEIGHT: 160 LBS | BODY MASS INDEX: 28.35 KG/M2 | HEIGHT: 63 IN | DIASTOLIC BLOOD PRESSURE: 76 MMHG | SYSTOLIC BLOOD PRESSURE: 136 MMHG | RESPIRATION RATE: 20 BRPM | HEART RATE: 89 BPM | TEMPERATURE: 98.6 F | OXYGEN SATURATION: 98 %

## 2022-11-28 VITALS — RESPIRATION RATE: 18 BRPM | DIASTOLIC BLOOD PRESSURE: 70 MMHG | SYSTOLIC BLOOD PRESSURE: 139 MMHG | HEART RATE: 66 BPM

## 2022-11-28 DIAGNOSIS — C50.912 INVASIVE DUCTAL CARCINOMA OF BREAST, FEMALE, LEFT (HCC): ICD-10-CM

## 2022-11-28 DIAGNOSIS — K59.00 CONSTIPATION, UNSPECIFIED CONSTIPATION TYPE: ICD-10-CM

## 2022-11-28 DIAGNOSIS — R53.83 FATIGUE DUE TO TREATMENT: ICD-10-CM

## 2022-11-28 DIAGNOSIS — C50.912 INVASIVE DUCTAL CARCINOMA OF BREAST, FEMALE, LEFT (HCC): Primary | ICD-10-CM

## 2022-11-28 LAB
ALBUMIN SERPL-MCNC: 3.5 G/DL (ref 3.2–4.6)
ALBUMIN/GLOB SERPL: 1 {RATIO} (ref 0.4–1.6)
ALP SERPL-CCNC: 110 U/L (ref 50–136)
ALT SERPL-CCNC: 18 U/L (ref 12–65)
ANION GAP SERPL CALC-SCNC: 5 MMOL/L (ref 2–11)
AST SERPL-CCNC: 14 U/L (ref 15–37)
BASOPHILS # BLD: 0.1 K/UL (ref 0–0.2)
BASOPHILS NFR BLD: 1 % (ref 0–2)
BILIRUB SERPL-MCNC: 0.3 MG/DL (ref 0.2–1.1)
BUN SERPL-MCNC: 10 MG/DL (ref 8–23)
CALCIUM SERPL-MCNC: 8.8 MG/DL (ref 8.3–10.4)
CHLORIDE SERPL-SCNC: 109 MMOL/L (ref 101–110)
CO2 SERPL-SCNC: 27 MMOL/L (ref 21–32)
CREAT SERPL-MCNC: 0.9 MG/DL (ref 0.6–1)
DIFFERENTIAL METHOD BLD: ABNORMAL
EOSINOPHIL # BLD: 0.1 K/UL (ref 0–0.8)
EOSINOPHIL NFR BLD: 2 % (ref 0.5–7.8)
ERYTHROCYTE [DISTWIDTH] IN BLOOD BY AUTOMATED COUNT: 12.7 % (ref 11.9–14.6)
GLOBULIN SER CALC-MCNC: 3.5 G/DL (ref 2.8–4.5)
GLUCOSE SERPL-MCNC: 128 MG/DL (ref 65–100)
HCT VFR BLD AUTO: 35.7 % (ref 35.8–46.3)
HGB BLD-MCNC: 11.8 G/DL (ref 11.7–15.4)
IMM GRANULOCYTES # BLD AUTO: 0.4 K/UL (ref 0–0.5)
IMM GRANULOCYTES NFR BLD AUTO: 7 % (ref 0–5)
LYMPHOCYTES # BLD: 1.4 K/UL (ref 0.5–4.6)
LYMPHOCYTES NFR BLD: 26 % (ref 13–44)
MCH RBC QN AUTO: 28.8 PG (ref 26.1–32.9)
MCHC RBC AUTO-ENTMCNC: 33.1 G/DL (ref 31.4–35)
MCV RBC AUTO: 87.1 FL (ref 82–102)
MONOCYTES # BLD: 0.6 K/UL (ref 0.1–1.3)
MONOCYTES NFR BLD: 11 % (ref 4–12)
NEUTS SEG # BLD: 2.7 K/UL (ref 1.7–8.2)
NEUTS SEG NFR BLD: 53 % (ref 43–78)
NRBC # BLD: 0.02 K/UL (ref 0–0.2)
PLATELET # BLD AUTO: 212 K/UL (ref 150–450)
PLATELET COMMENT: ADEQUATE
PMV BLD AUTO: 9.6 FL (ref 9.4–12.3)
POTASSIUM SERPL-SCNC: 3.6 MMOL/L (ref 3.5–5.1)
PROT SERPL-MCNC: 7 G/DL (ref 6.3–8.2)
RBC # BLD AUTO: 4.1 M/UL (ref 4.05–5.2)
RBC MORPH BLD: ABNORMAL
RBC MORPH BLD: ABNORMAL
SODIUM SERPL-SCNC: 141 MMOL/L (ref 133–143)
WBC # BLD AUTO: 5.3 K/UL (ref 4.3–11.1)
WBC MORPH BLD: ABNORMAL

## 2022-11-28 PROCEDURE — 80053 COMPREHEN METABOLIC PANEL: CPT

## 2022-11-28 PROCEDURE — 96413 CHEMO IV INFUSION 1 HR: CPT

## 2022-11-28 PROCEDURE — 2580000003 HC RX 258: Performed by: INTERNAL MEDICINE

## 2022-11-28 PROCEDURE — 96411 CHEMO IV PUSH ADDL DRUG: CPT

## 2022-11-28 PROCEDURE — 2580000003 HC RX 258: Performed by: NURSE PRACTITIONER

## 2022-11-28 PROCEDURE — 6360000002 HC RX W HCPCS: Performed by: NURSE PRACTITIONER

## 2022-11-28 PROCEDURE — 85025 COMPLETE CBC W/AUTO DIFF WBC: CPT

## 2022-11-28 PROCEDURE — 96375 TX/PRO/DX INJ NEW DRUG ADDON: CPT

## 2022-11-28 PROCEDURE — 36591 DRAW BLOOD OFF VENOUS DEVICE: CPT

## 2022-11-28 PROCEDURE — 96367 TX/PROPH/DG ADDL SEQ IV INF: CPT

## 2022-11-28 PROCEDURE — 99214 OFFICE O/P EST MOD 30 MIN: CPT | Performed by: NURSE PRACTITIONER

## 2022-11-28 RX ORDER — SODIUM CHLORIDE 9 MG/ML
INJECTION, SOLUTION INTRAVENOUS CONTINUOUS
Status: CANCELLED | OUTPATIENT
Start: 2022-11-28

## 2022-11-28 RX ORDER — MEPERIDINE HYDROCHLORIDE 50 MG/ML
12.5 INJECTION INTRAMUSCULAR; INTRAVENOUS; SUBCUTANEOUS PRN
Status: CANCELLED | OUTPATIENT
Start: 2022-11-28

## 2022-11-28 RX ORDER — HEPARIN SODIUM (PORCINE) LOCK FLUSH IV SOLN 100 UNIT/ML 100 UNIT/ML
500 SOLUTION INTRAVENOUS PRN
Status: CANCELLED | OUTPATIENT
Start: 2022-11-28

## 2022-11-28 RX ORDER — SODIUM CHLORIDE 0.9 % (FLUSH) 0.9 %
5-40 SYRINGE (ML) INJECTION PRN
Status: CANCELLED | OUTPATIENT
Start: 2022-11-28

## 2022-11-28 RX ORDER — SODIUM CHLORIDE 9 MG/ML
5-250 INJECTION, SOLUTION INTRAVENOUS PRN
Status: CANCELLED | OUTPATIENT
Start: 2022-11-28

## 2022-11-28 RX ORDER — ONDANSETRON 2 MG/ML
8 INJECTION INTRAMUSCULAR; INTRAVENOUS ONCE
Status: COMPLETED | OUTPATIENT
Start: 2022-11-28 | End: 2022-11-28

## 2022-11-28 RX ORDER — ONDANSETRON 2 MG/ML
8 INJECTION INTRAMUSCULAR; INTRAVENOUS ONCE
Status: CANCELLED | OUTPATIENT
Start: 2022-11-28 | End: 2022-11-28

## 2022-11-28 RX ORDER — SODIUM CHLORIDE 9 MG/ML
5-40 INJECTION INTRAVENOUS PRN
Status: CANCELLED | OUTPATIENT
Start: 2022-11-28

## 2022-11-28 RX ORDER — SODIUM CHLORIDE 0.9 % (FLUSH) 0.9 %
10 SYRINGE (ML) INJECTION PRN
Status: DISCONTINUED | OUTPATIENT
Start: 2022-11-28 | End: 2022-11-28 | Stop reason: HOSPADM

## 2022-11-28 RX ORDER — FAMOTIDINE 10 MG/ML
20 INJECTION, SOLUTION INTRAVENOUS
Status: CANCELLED | OUTPATIENT
Start: 2022-11-28

## 2022-11-28 RX ORDER — DOXORUBICIN HYDROCHLORIDE 2 MG/ML
60 INJECTION, SOLUTION INTRAVENOUS ONCE
Status: CANCELLED | OUTPATIENT
Start: 2022-11-28 | End: 2022-11-28

## 2022-11-28 RX ORDER — DIPHENHYDRAMINE HYDROCHLORIDE 50 MG/ML
50 INJECTION INTRAMUSCULAR; INTRAVENOUS
Status: CANCELLED | OUTPATIENT
Start: 2022-11-28

## 2022-11-28 RX ORDER — DOXORUBICIN HYDROCHLORIDE 2 MG/ML
60 INJECTION, SOLUTION INTRAVENOUS ONCE
Status: COMPLETED | OUTPATIENT
Start: 2022-11-28 | End: 2022-11-28

## 2022-11-28 RX ORDER — EPINEPHRINE 1 MG/ML
0.3 INJECTION, SOLUTION, CONCENTRATE INTRAVENOUS PRN
Status: CANCELLED | OUTPATIENT
Start: 2022-11-28

## 2022-11-28 RX ORDER — SODIUM CHLORIDE 9 MG/ML
5-250 INJECTION, SOLUTION INTRAVENOUS PRN
Status: DISCONTINUED | OUTPATIENT
Start: 2022-11-28 | End: 2022-11-29 | Stop reason: HOSPADM

## 2022-11-28 RX ORDER — ALBUTEROL SULFATE 90 UG/1
4 AEROSOL, METERED RESPIRATORY (INHALATION) PRN
Status: CANCELLED | OUTPATIENT
Start: 2022-11-28

## 2022-11-28 RX ORDER — ACETAMINOPHEN 325 MG/1
650 TABLET ORAL
Status: CANCELLED | OUTPATIENT
Start: 2022-11-28

## 2022-11-28 RX ORDER — ONDANSETRON 2 MG/ML
8 INJECTION INTRAMUSCULAR; INTRAVENOUS
Status: CANCELLED | OUTPATIENT
Start: 2022-11-28

## 2022-11-28 RX ORDER — SODIUM CHLORIDE 9 MG/ML
5-40 INJECTION INTRAVENOUS PRN
Status: DISCONTINUED | OUTPATIENT
Start: 2022-11-28 | End: 2022-11-29 | Stop reason: HOSPADM

## 2022-11-28 RX ADMIN — ONDANSETRON 8 MG: 2 INJECTION INTRAMUSCULAR; INTRAVENOUS at 10:12

## 2022-11-28 RX ADMIN — SODIUM CHLORIDE 100 ML/HR: 9 INJECTION, SOLUTION INTRAVENOUS at 10:56

## 2022-11-28 RX ADMIN — DOXORUBICIN HYDROCHLORIDE 108 MG: 2 INJECTION, SOLUTION INTRAVENOUS at 10:56

## 2022-11-28 RX ADMIN — CYCLOPHOSPHAMIDE 1080 MG: 1 INJECTION, POWDER, FOR SOLUTION INTRAVENOUS; ORAL at 11:04

## 2022-11-28 RX ADMIN — SODIUM CHLORIDE, PRESERVATIVE FREE 10 ML: 5 INJECTION INTRAVENOUS at 10:12

## 2022-11-28 RX ADMIN — DEXAMETHASONE SODIUM PHOSPHATE 12 MG: 4 INJECTION, SOLUTION INTRAMUSCULAR; INTRAVENOUS at 10:15

## 2022-11-28 RX ADMIN — SODIUM CHLORIDE, PRESERVATIVE FREE 10 ML: 5 INJECTION INTRAVENOUS at 09:01

## 2022-11-28 RX ADMIN — SODIUM CHLORIDE, PRESERVATIVE FREE 10 ML: 5 INJECTION INTRAVENOUS at 11:44

## 2022-11-28 ASSESSMENT — ENCOUNTER SYMPTOMS
HEMOPTYSIS: 0
VOMITING: 0
ABDOMINAL DISTENTION: 0
BLOOD IN STOOL: 0
NAUSEA: 0
CHEST TIGHTNESS: 0
WHEEZING: 0
DIARRHEA: 0
CONSTIPATION: 1
SHORTNESS OF BREATH: 0
SORE THROAT: 0
VOICE CHANGE: 0
TROUBLE SWALLOWING: 0
SCLERAL ICTERUS: 0
ABDOMINAL PAIN: 0

## 2022-11-28 ASSESSMENT — PATIENT HEALTH QUESTIONNAIRE - PHQ9
1. LITTLE INTEREST OR PLEASURE IN DOING THINGS: 0
SUM OF ALL RESPONSES TO PHQ QUESTIONS 1-9: 0
SUM OF ALL RESPONSES TO PHQ QUESTIONS 1-9: 0
SUM OF ALL RESPONSES TO PHQ9 QUESTIONS 1 & 2: 0
2. FEELING DOWN, DEPRESSED OR HOPELESS: 0
SUM OF ALL RESPONSES TO PHQ QUESTIONS 1-9: 0
SUM OF ALL RESPONSES TO PHQ QUESTIONS 1-9: 0

## 2022-11-28 NOTE — PROGRESS NOTES
Patient arrived to port lab ambulatory for port access and lab draw. Port accessed and labs drawn per protocol with no complications. *Port remains accessed. Patient discharged from port lab ambulatory.

## 2022-11-28 NOTE — PROGRESS NOTES
Pt arrived ambulatory, Chemo completed, pt tolerated well, pt c/o \" not feeling right \" during decadron infusion, stated she felt warm, VSS, decadron infusion slowed to 100ml/hr, pt tolerated well, discharged home ambulatory aware of appointment on 11/30 at 4pm

## 2022-11-28 NOTE — PROGRESS NOTES
[facility]ology [facility] Oncology: Established patient - follow up     Chief Complaint   Patient presents with    Follow-up     Reason for Referral: Breast cancer  Referring Provider: Self-referral   Primary Care Provider: MIR Handy CNP  Family History of Cancer/Hematologic Disorders: Family history is significant for two sisters and a niece with breast cancer. Presenting Symptoms: Abnormal routine screening mammogram     History of Present Illness:  Ms. Jeff Baron is a 57 y.o. female who presents today for follow up regarding breast cancer. The past medical history is significant for multiple thyroid nodules, hyperthyroidism, hypercholesterolemia, Grave's disease, GERD, uterine fibroids,  section x 2, cyst removal, and hysterectomy. She initially presented for a routine screening mammogram on 8/10/22 which identified a spiculated equal density mass in the left breast inferior medial quadrant posterior depth. Further evaluation with limited ultrasound of the left breast on 2022 confirmed an 8 x 7 x 8 mm round, hypoechoic mass with spiculated margins at the 9:00 position in the left breast, 6 cm from the nipple, demonstrating mild posterior acoustic enhancement and corresponding to the mass seen mammographically. US- guided biopsy of the 9:00 left breast mass was performed on 22 with pathology revealing ER/VA/HER-2 negative, grade 3, invasive carcinoma with high-grade DCIS focally present and no microcalcifications or lymphovascular space extension identified. Testing so far has been done at [place]; however, Ms. Jeff Baron wishes to transfer care to [facility]. She is self-referred to [facility] for Oncology evaluation and treatment of newly diagnosed breast cancer. She has also been referred to surgery and is scheduled for initial surgical evaluation with Dr. Sourav Tony, on 22.    Patient's daughter lives in [place], works at a hospital.    At consultation, we discussed the pathophysiology of breast cancer, staging, and the importance of receptor status in terms of treatment options. We then reviewed her medical history as well as oncologic history, recent imaging and pathology in detail. Next steps in management were reviewed. She was here with her . Fu after MRI - Results reviewed in detail and show evidence of disease in the breast but no worrisome findings in terms of lymphadenopathy or other evidence of disease. She was emotional and wished to proceed with surgery upfront. We discussed need for chemotherapy because of triple negative status and she wishes to proceed with surgery upfront accepting risks. Pathology from surgery will determine ultimate staging and treatment plan. She completed surgery and we discussed her pathology in detail. Her tumor was 15 mm and 0 out of 2 lymph nodes. This is pathological stage Ib. We discussed her high risk features triple negative breast cancer and grade 2-3. We discussed role of chemotherapy and recommendations of dose dense ACT versus TC. She chose ACT. She returns today for consideration of cycle 2 ddAC. She tolerated cycle 1 well overall. She had mild fatigue for a few days but her  made her stay active. She had no nausea or vomiting. She had no mucositis but did have some sensitivity around her teeth that have crowns. She is using Sensodyne with relief. She had mild constipation for which prunes and stool softeners were helpful. She had no bone pain after G-CSF. She denies any bleeding, fevers or infectious symptoms. Will omit Emend with following cycles as she did have a reaction to this drug. Chronological Events:   BILATERAL DIGITAL SCREENING MAMMOGRAM 3D/2D: 8/10/2022   FINDINGS: There are scattered areas of fibroglandular density (ACR Breast Density Composition Category B). Digital mammography views were performed including tomosynthesis.  There is a spiculated equal density mass in the left breast inferior medial quadrant posterior depth. No other significant masses, calcifications, or other findings are seen in either breast.     IMPRESSION: INCOMPLETE NEEDS ADDITIONAL IMAGING EVALUATION   The spiculated mass in the left breast is indeterminate. Ultrasound with possible additional views are recommended. There is no mammographic evidence of malignancy in the right breast. Patient will be notified of the results. LIMITED ULTRASOUND OF LEFT BREAST: 8/17/2022  FINDINGS: There is an 8 x 7 x 8 mm round, hypoechoic mass with spiculated margins at 9:00, 6 cm from the nipple. It demonstrates mild posterior acoustic enhancement. This corresponds to the mass seen mammographically. The left axilla is negative. IMPRESSION: SUSPICIOUS OF MALIGNANCY   The 8 mm mass at 9:00, 6 cm FN is suspicious and ultrasound-guided biopsy is recommended. These findings and recommendations were discussed with the patient at the time of her exam.  Her biopsy is scheduled for 8/25/2022 at 2:00 pm.      [place] 8/25/22 9/1/22 heme/onc consultation   9/9/22 FU after MRI - pt elected to p/w sx upfront; MRI results reviewed. 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 10/19/22 post op with Dr Melina Ta - drain removed   10/24/22 FU after sx - discussed adjuvant chemotherapy for TNBC.  11/14/22 Cycle 1 ddAC. EF 60-65%. 11/28/22  Surendra 2 ddAC. Tolerated well.         Family History   Problem Relation Age of Onset    Coronary Art Dis Mother     Breast Cancer Sister     Breast Cancer Sister     Thyroid Disease Sister     Thyroid Cancer Neg Hx     Diabetes Neg Hx       Social History     Socioeconomic History    Marital status:      Spouse name: None    Number of children: None    Years of education: None    Highest education level: None Tobacco Use    Smoking status: Never    Smokeless tobacco: Never   Vaping Use    Vaping Use: Never used   Substance and Sexual Activity    Alcohol use: No    Drug use: No        Review of Systems   Constitutional:  Positive for appetite change (improved on remeron) and fatigue. Negative for chills, diaphoresis, fever and unexpected weight change. HENT:   Negative for hearing loss, mouth sores, nosebleeds, sore throat, trouble swallowing and voice change. Eyes:  Negative for icterus. Respiratory:  Negative for chest tightness, hemoptysis, shortness of breath and wheezing. Cardiovascular:  Negative for chest pain, leg swelling and palpitations. Gastrointestinal:  Positive for constipation. Negative for abdominal distention, abdominal pain, blood in stool, diarrhea, nausea and vomiting. Endocrine: Negative for hot flashes. Genitourinary:  Negative for difficulty urinating, frequency, vaginal bleeding and vaginal discharge. Musculoskeletal:  Negative for arthralgias, flank pain, gait problem and myalgias. Skin:  Negative for itching, rash and wound. Neurological:  Negative for dizziness, extremity weakness, gait problem, headaches and numbness. Psychiatric/Behavioral:  Positive for sleep disturbance. Negative for confusion and depression. The patient is nervous/anxious.        Allergies   Allergen Reactions    Emend [Fosaprepitant Dimeglumine] Anaphylaxis     Flushing, coughing    Hydrocodone Anxiety and Other (See Comments)     Depressive state     Past Medical History:   Diagnosis Date    Allergic rhinitis     Cancer ([facility] [address] 09/2022    left breast cancer---    GERD (gastroesophageal reflux disease)     controlled with med    Graves' disease     Hypercholesterolemia     Hyperthyroidism     Multiple thyroid nodules     followed by dr Angelo Doss     Past Surgical History:   Procedure Laterality Date    BREAST BIOPSY Left 10/7/2022    LEFT SENTINEL LYMPH NODE BIOPSY PREOP @ 0900, LYMPHO @ 1030, SX @ 1230 performed by Marshall Castro DO at [facility] [address] Left 2022    bx     SECTION      x2    CYST REMOVAL  2021    subcutaneous cyst from lateral neck (benign)    HYSTERECTOMY (CERVIX STATUS UNKNOWN)      fibroids (ovaries intact)    IR PORT PLACEMENT EQUAL OR GREATER THAN 5 YEARS  2022    IR PORT PLACEMENT EQUAL OR GREATER THAN 5 YEARS 2022 [facility] SPECIALS    MASTECTOMY Left 10/7/2022    LEFT BREAST MASTECTOMY performed by Marshall Castro DO at [facility] MAIN OR     Current Outpatient Medications   Medication Sig Dispense Refill    lidocaine-prilocaine (EMLA) 2.5-2.5 % cream Apply topically weekly as needed prior to port access. 30 g 2    Mastectomy Bra MISC by Does not apply route Mastectomy bra + prosthesis 1 each 2    Multiple Vitamin (MULTI-VITAMIN DAILY PO) Take 1 tablet by mouth daily      Biotin 1000 MCG TABS Take 1 tablet by mouth daily      mirtazapine (REMERON) 15 MG tablet Take 0.5 tablets by mouth nightly (Patient taking differently: Take 7.5 mg by mouth as needed) 30 tablet 3    rosuvastatin (CRESTOR) 20 MG tablet Take 1 tablet by mouth at bedtime 90 tablet 3    methIMAzole (TAPAZOLE) 5 MG tablet Take 0.5 tablets by mouth daily 15 tablet 11    cyanocobalamin 2500 MCG SUBL Take 2,500 mcg by mouth daily      omeprazole (PRILOSEC) 20 MG delayed release capsule Take 20 mg by mouth as needed PRN      Red Yeast Rice Extract 600 MG CAPS Take 600 mg by mouth daily      SUTAB 8159-518-179 MG TABS TAKE AS DIRECTED (Patient not taking: No sig reported)      ondansetron (ZOFRAN) 8 MG tablet Take 1 tablet by mouth every 8 hours as needed for Nausea or Vomiting (Patient not taking: No sig reported) 60 tablet 2    prochlorperazine (COMPAZINE) 10 MG tablet Take 1 tablet by mouth every 6 hours as needed (nausea vomiting chemo) (Patient not taking: No sig reported) 60 tablet 2     No current facility-administered medications for this visit. Facility-Administered Medications Ordered in Other Visits   Medication Dose Route Frequency Provider Last Rate Last Admin    sodium chloride flush 0.9 % injection 10 mL  10 mL IntraVENous PRN Massiel Holt MD   10 mL at 11/28/22 0901       No flowsheet data found. OBJECTIVE:  /76 Comment: standing  Pulse 89   Temp 98.6 °F (37 °C) (Oral)   Resp 20   Ht 5' 3\" (1.6 m)   Wt 160 lb (72.6 kg)   SpO2 98%   BMI 28.34 kg/m²       ECOG PERFORMANCE STATUS - 0-Fully active, able to carry on all pre-disease performance without restriction. Pain - 0 - No pain/10. None/Minimal pain - not affecting QOL     Fatigue - No flowsheet data found. Distress - No flowsheet data found. Physical Exam  Vitals reviewed. Exam conducted with a chaperone present. Constitutional:       General: She is not in acute distress. Appearance: Normal appearance. She is not ill-appearing or toxic-appearing. HENT:      Head: Normocephalic and atraumatic. Nose: Nose normal.      Mouth/Throat:      Mouth: Mucous membranes are moist.   Eyes:      General: No scleral icterus. Extraocular Movements: Extraocular movements intact. Conjunctiva/sclera: Conjunctivae normal.      Pupils: Pupils are equal, round, and reactive to light. Cardiovascular:      Rate and Rhythm: Normal rate and regular rhythm. Heart sounds: No murmur heard. Pulmonary:      Effort: No respiratory distress. Breath sounds: No wheezing or rales. Chest:          Comments: S/p mastectomy. Abdominal:      General: There is no distension. Tenderness: There is no abdominal tenderness. There is no right CVA tenderness or guarding. Musculoskeletal:         General: Normal range of motion. Cervical back: Normal range of motion. Right lower leg: No edema. Left lower leg: No edema. Lymphadenopathy:      Cervical: No cervical adenopathy.       Upper Body:      Right upper body: No supraclavicular or axillary adenopathy. Left upper body: No supraclavicular or axillary adenopathy. Skin:     General: Skin is warm and dry. Coloration: Skin is not jaundiced or pale. Findings: No rash. Neurological:      General: No focal deficit present. Mental Status: She is alert and oriented to person, place, and time. Gait: Gait normal.   Psychiatric:         Behavior: Behavior normal.         Thought Content:  Thought content normal.        Labs:  Recent Results (from the past 168 hour(s))   CBC with Auto Differential    Collection Time: 11/28/22  8:51 AM   Result Value Ref Range    WBC 5.3 4.3 - 11.1 K/uL    RBC 4.10 4.05 - 5.2 M/uL    Hemoglobin 11.8 11.7 - 15.4 g/dL    Hematocrit 35.7 (L) 35.8 - 46.3 %    MCV 87.1 82.0 - 102.0 FL    MCH 28.8 26.1 - 32.9 PG    MCHC 33.1 31.4 - 35.0 g/dL    RDW 12.7 11.9 - 14.6 %    Platelets 999 455 - 811 K/uL    MPV 9.6 9.4 - 12.3 FL    nRBC 0.02 0.0 - 0.2 K/uL    Seg Neutrophils 53 43 - 78 %    Lymphocytes 26 13 - 44 %    Monocytes 11 4.0 - 12.0 %    Eosinophils % 2 0.5 - 7.8 %    Basophils 1 0.0 - 2.0 %    Immature Granulocytes 7 (H) 0.0 - 5.0 %    Segs Absolute 2.7 1.7 - 8.2 K/UL    Absolute Lymph # 1.4 0.5 - 4.6 K/UL    Absolute Mono # 0.6 0.1 - 1.3 K/UL    Absolute Eos # 0.1 0.0 - 0.8 K/UL    Basophils Absolute 0.1 0.0 - 0.2 K/UL    Absolute Immature Granulocyte 0.4 0.0 - 0.5 K/UL    RBC Comment SLIGHT  ANISOCYTOSIS + POIKILOCYTOSIS        RBC Comment OCCASIONAL  OVALOCYTES        WBC Comment Result Confirmed By Smear      Platelet Comment ADEQUATE      Differential Type AUTOMATED     Comprehensive Metabolic Panel    Collection Time: 11/28/22  8:51 AM   Result Value Ref Range    Sodium 141 133 - 143 mmol/L    Potassium 3.6 3.5 - 5.1 mmol/L    Chloride 109 101 - 110 mmol/L    CO2 27 21 - 32 mmol/L    Anion Gap 5 2 - 11 mmol/L    Glucose 128 (H) 65 - 100 mg/dL    BUN 10 8 - 23 MG/DL    Creatinine 0.90 0.6 - 1.0 MG/DL    Est, Glom Filt Rate >60 >60 ml/min/1.73m2 Calcium 8.8 8.3 - 10.4 MG/DL    Total Bilirubin 0.3 0.2 - 1.1 MG/DL    ALT 18 12 - 65 U/L    AST 14 (L) 15 - 37 U/L    Alk Phosphatase 110 50 - 136 U/L    Total Protein 7.0 6.3 - 8.2 g/dL    Albumin 3.5 3.2 - 4.6 g/dL    Globulin 3.5 2.8 - 4.5 g/dL    Albumin/Globulin Ratio 1.0 0.4 - 1.6         Imaging: reviewed     PATHOLOGY:   per above     10/2022        ASSESSMENT:     Diagnosis Orders   1. Invasive ductal carcinoma of breast, female, left ([facility] [address]  CBC With Auto Differential    Comprehensive metabolic panel      2. Fatigue due to treatment        3. Constipation, unspecified constipation type            Ms. Karin Keller is here for FU of breast cancer. Left breast IDC, 9:00, s/p core bx/clip, at GABO, poorly diff, 8mm on US, MRI pending, LVI neg, DCIS high grade focally present, ER0/PR0, Her2 sera +1 - negative   - MR - Biopsied mass (11mm) in the medial 8:30 left breast at 7 cm from the nipple. No additional pathologic enhancement or adenopathy in either breast.    - s/p left mastectomy and 0/2 SLnc - [id number] (0/2) - 15mm with neg margins, grade 2   - genetics - VUS     - she is here for FU with her .    - labs reviewed, proceed with cycle 2 ddAC.   - EF adequate prior to starting treatment - EF 60-65%. - on remeron 7.5 mg nightly for sleep/appetite and this is working well.  - nausea/vomiting chemo: discussed Zofran and/or Compazine. No Emend due to reaction. - constipation: stool softeners, prunes. - continue good oral nutrition and hydration. - encouraged frequent activity throughout the day and rest as needed to combat fatigue.   - call with any fevers, uncontrolled side effects from treatment or any other worrisome/concerning symptoms. - follow up in two weeks for cycle 3 or sooner if needed. - We discussed her path results in detail that showed 15mm TNBC.   She is aware that pt's are considered high risk per GEICAM/2003-02 trial if they have node neg disease but were <35, had neg er/pr receptors, histo grade 2-3, and T>2cm. She meets two criteria for consideration of anthracycline therapy. She wishes to think this over. We discussed risks of chemotherapy incuding irriversible heart disease (including acute LV failure - we reviewed this) and also leukemia. Gave options of ddAC-T and TC with growth factor support. - of note, following standard adjuvant chemo for [id number] or N+ TNBC, may consider metronomic capecitabine (650mg/m2 BID continuously for 1 year) as extended maintenance - SYSUCC-001 trial estimated 5-year DFS vs observation: 82.8 vs 73% (Velasquez et al 2021). - We discussed the potential side effects including but not limited to hair loss (which could be permanent), nausea, vomiting, diarrhea or constipation, mucositis, rashes, allergic reactions, organ damage including liver and kidney failure, cardiotoxicity, and direct effects on bone marrow which can lead to anemia and thrombocytopenia necessitating transfusion or neutropenia putting patient at risk for infection, sepsis and death if not treated. - echo EF 60-65%; LVI normal size and no WMA  - port placement   - decreased appetite, anxiety and sleep disturbance - on low dose mirtazapine and improved  - Rx mastectomy bra/prosthesis       2. Surveillance   - H&P q3-6 mo for years 1-3, q6mo years 4-5; mammogram yearly  - DEXA q2 years postmenopausal   - labs/imaging studies are not recommended without symptoms     Orders placed for: Dose-Dense AC-T (Paclitaxel Weekly) - [Doxorubicin + Cyclophosphamide q14 Days x 4 Cycles, Followed by Paclitaxel 80 mg/m² Weekly x 12 Weeks]  Cycles 1 through 4: A cycle is every 14 days:  Doxorubicin  60 mg/m² IV once on day 1 of cycles 1 through 4  Cyclophosphamide  600 mg/m² IV once on day 1 of cycles 1 through 4  Pegfilgrastim-xxxx  6 mg flat dose subcutaneously once on day 2 of cycles 1 through 4  Cycles 5 through 16:  A cycle is every 7 days:  Paclitaxel  80 mg/m² IV once on day 1 of cycles 5 through 16      RESUSCITATION DIRECTIVES/HOSPICE CARE: Full Support    RTC to start tx or sooner as needed   Pt to contact the office to let us know re her tx choice. [50min - chart review, review of results and discussion of options, next steps, coordination of care and charting ]      MDM      Lab studies and imaging studies were personally reviewed. Pertinent old records were reviewed. Historical:   - she is here for consultation regarding neoadjuvant chemotherapy for newly diagnosed triple negative breast cancer. During today's visit, we had a long conversation about breast cancer pathophysiology and staging in general.  We then reviewed her imaging and pathology in details, including receptor status and it's significance in terms of available treatment options. Neoadjuvant compared to adjuvant chemotherapy has similar long term outcomes when patients are given the same therapy. Preoperative treatment can render large, inoperable tumors operable and improve breast conservation, downstage tumor and decrease risk of postoperative lympedema. It also provides information about tumor chemosensitivity. Patients with pathologic complete response have favorable disease free survival and overall survival and this correlation is strongest in TNBC. Preoperative systemic therapy can lead to possible overtreatment with systemic therapy of clinical stages overestimated. It can also lead to possible under treatment local regionally with radiotherapy if clinical stage is underestimated. There is also possibility of disease progression during preoperative systemic therapy. - MRI pending - she is aware that tx plan may change depending on MR results   - she is here for FU after MRI - she has elected to p/w sx upfront. Images reviewed. Understands final path will be determined by sx specimen/LN status.   Dr Kristel Hernandez notifed by me re pt's decision.    - we discussed options of AC-T +/- carbo/pembro and TC; she is aware that she will need chemotherapy regardless of her final decision (before/after sx), if she does chemo after, she would not be a candidate for pembro/carbo. Data from [address] [place] [address] reviewed. - pt met with Dr Debbi Tate and elected to pw bilateral mastectomies with axillary dissection     All questions were asked and answered to the best of my ability. The patient verbalized understanding and agrees with the plan above.           LILIA Arrington  [facility] Hematology [facility] Oncology  [address] [place] [address]  Office : [phone]  Fax : [phone]

## 2022-11-30 ENCOUNTER — HOSPITAL ENCOUNTER (OUTPATIENT)
Dept: INFUSION THERAPY | Age: 62
Discharge: HOME | End: 2022-11-30
Payer: COMMERCIAL

## 2022-11-30 VITALS
DIASTOLIC BLOOD PRESSURE: 62 MMHG | TEMPERATURE: 97.9 F | RESPIRATION RATE: 18 BRPM | OXYGEN SATURATION: 99 % | HEART RATE: 88 BPM | SYSTOLIC BLOOD PRESSURE: 126 MMHG

## 2022-11-30 DIAGNOSIS — C50.912 INVASIVE DUCTAL CARCINOMA OF BREAST, FEMALE, LEFT (HCC): Primary | ICD-10-CM

## 2022-11-30 PROCEDURE — 6360000002 HC RX W HCPCS: Performed by: NURSE PRACTITIONER

## 2022-11-30 PROCEDURE — 96372 THER/PROPH/DIAG INJ SC/IM: CPT

## 2022-11-30 RX ADMIN — PEGFILGRASTIM-CBQV 6 MG: 6 INJECTION, SOLUTION SUBCUTANEOUS at 16:27

## 2022-11-30 NOTE — PROGRESS NOTES
[facility] [place] completed. Patient tolerated well. Any issues or concerns during appointment: none. Patient instructed to call provider with temperature of 100.4 or greater or nausea/vomiting/ diarrhea or pain not controlled by medications. Discharged ambulatory.

## 2022-12-05 ENCOUNTER — OFFICE VISIT (OUTPATIENT)
Dept: FAMILY MEDICINE CLINIC | Facility: CLINIC | Age: 62
End: 2022-12-05
Payer: COMMERCIAL

## 2022-12-05 VITALS
DIASTOLIC BLOOD PRESSURE: 70 MMHG | WEIGHT: 162 LBS | TEMPERATURE: 97.4 F | BODY MASS INDEX: 29.81 KG/M2 | SYSTOLIC BLOOD PRESSURE: 106 MMHG | HEIGHT: 62 IN | HEART RATE: 89 BPM | OXYGEN SATURATION: 97 % | RESPIRATION RATE: 18 BRPM

## 2022-12-05 DIAGNOSIS — R35.0 URINARY FREQUENCY: Primary | ICD-10-CM

## 2022-12-05 DIAGNOSIS — N30.00 ACUTE CYSTITIS WITHOUT HEMATURIA: ICD-10-CM

## 2022-12-05 LAB
BACTERIA URINE, POC: ABNORMAL
BILIRUBIN, URINE, POC: NEGATIVE
BLOOD URINE, POC: NEGATIVE
CASTS URINE, POC: 0
EPI CELLS URINE, POC: ABNORMAL
GLUCOSE URINE, POC: NEGATIVE
KETONES, URINE, POC: NEGATIVE
LEUKOCYTE ESTERASE, URINE, POC: ABNORMAL
NITRITE, URINE, POC: NEGATIVE
PH, URINE, POC: 6 (ref 4.6–8)
PROTEIN,URINE, POC: NEGATIVE
RBC, URINE, POC: ABNORMAL
SPECIFIC GRAVITY, URINE, POC: 1.01 (ref 1–1.03)
TRICHOMONAS URINE, POC: NEGATIVE
URINALYSIS CLARITY, POC: CLEAR
URINALYSIS COLOR, POC: YELLOW
UROBILINOGEN, POC: ABNORMAL
WBC, URINE, POC: ABNORMAL
YEAST, URINE, POC: NEGATIVE

## 2022-12-05 PROCEDURE — 81000 URINALYSIS NONAUTO W/SCOPE: CPT | Performed by: NURSE PRACTITIONER

## 2022-12-05 PROCEDURE — 99213 OFFICE O/P EST LOW 20 MIN: CPT | Performed by: NURSE PRACTITIONER

## 2022-12-05 RX ORDER — CEPHALEXIN 250 MG/1
250 CAPSULE ORAL 3 TIMES DAILY
Qty: 15 CAPSULE | Refills: 0 | Status: SHIPPED | OUTPATIENT
Start: 2022-12-05

## 2022-12-05 RX ORDER — FLUCONAZOLE 150 MG/1
150 TABLET ORAL
Qty: 2 TABLET | Refills: 0 | Status: SHIPPED | OUTPATIENT
Start: 2022-12-05 | End: 2022-12-13

## 2022-12-05 RX ORDER — MELOXICAM 7.5 MG/1
7.5 TABLET ORAL DAILY
COMMUNITY

## 2022-12-05 ASSESSMENT — PATIENT HEALTH QUESTIONNAIRE - PHQ9
SUM OF ALL RESPONSES TO PHQ QUESTIONS 1-9: 0
2. FEELING DOWN, DEPRESSED OR HOPELESS: 0
SUM OF ALL RESPONSES TO PHQ QUESTIONS 1-9: 0
SUM OF ALL RESPONSES TO PHQ QUESTIONS 1-9: 0
SUM OF ALL RESPONSES TO PHQ9 QUESTIONS 1 & 2: 0
1. LITTLE INTEREST OR PLEASURE IN DOING THINGS: 0
SUM OF ALL RESPONSES TO PHQ QUESTIONS 1-9: 0

## 2022-12-05 ASSESSMENT — ENCOUNTER SYMPTOMS
RHINORRHEA: 0
COUGH: 0
EYE DISCHARGE: 0
SHORTNESS OF BREATH: 0
EYE PAIN: 0
BLOOD IN STOOL: 0
CHEST TIGHTNESS: 0
WHEEZING: 0
NAUSEA: 0

## 2022-12-08 LAB
BACTERIA SPEC CULT: NORMAL
SERVICE CMNT-IMP: NORMAL

## 2022-12-09 DIAGNOSIS — C50.912 INVASIVE DUCTAL CARCINOMA OF BREAST, FEMALE, LEFT (HCC): Primary | ICD-10-CM

## 2022-12-09 ASSESSMENT — ENCOUNTER SYMPTOMS
CHEST TIGHTNESS: 0
ABDOMINAL DISTENTION: 0
VOMITING: 0
TROUBLE SWALLOWING: 0
SCLERAL ICTERUS: 0
VOICE CHANGE: 0
BLOOD IN STOOL: 0
DIARRHEA: 0
SHORTNESS OF BREATH: 0
WHEEZING: 0
NAUSEA: 0
CONSTIPATION: 1
SORE THROAT: 0
ABDOMINAL PAIN: 0
HEMOPTYSIS: 0

## 2022-12-09 NOTE — PROGRESS NOTES
[facility] Hematology and Oncology: Established patient - follow up     Chief Complaint   Patient presents with    Follow-up     Reason for Referral: Breast cancer  Referring Provider: Self-referral   Primary Care Provider: MIR Uribe CNP  Family History of Cancer/Hematologic Disorders: Family history is significant for two sisters and a niece with breast cancer. Presenting Symptoms: Abnormal routine screening mammogram     History of Present Illness:  Ms. Ceferino Graf is a 57 y.o. female who presents today for follow up regarding breast cancer. The past medical history is significant for multiple thyroid nodules, hyperthyroidism, hypercholesterolemia, Grave's disease, GERD, uterine fibroids,  section x 2, cyst removal, and hysterectomy. She initially presented for a routine screening mammogram on 8/10/22 which identified a spiculated equal density mass in the left breast inferior medial quadrant posterior depth. Further evaluation with limited ultrasound of the left breast on 2022 confirmed an 8 x 7 x 8 mm round, hypoechoic mass with spiculated margins at the 9:00 position in the left breast, 6 cm from the nipple, demonstrating mild posterior acoustic enhancement and corresponding to the mass seen mammographically. US- guided biopsy of the 9:00 left breast mass was performed on 22 with pathology revealing ER/[place]/HER-2 negative, grade 3, invasive carcinoma with high-grade DCIS focally present and no microcalcifications or lymphovascular space extension identified. Testing so far has been done at [facility]; however, Ms. Ceferino Graf wishes to transfer care to [facility]. She is self-referred to [facility] for Oncology evaluation and treatment of newly diagnosed breast cancer. She has also been referred to surgery and is scheduled for initial surgical evaluation with Dr. Luisa Wen, on 22.    Patient's daughter lives in [place], works at a hospital.    At consultation, we discussed the pathophysiology of breast cancer, staging, and the importance of receptor status in terms of treatment options. We then reviewed her medical history as well as oncologic history, recent imaging and pathology in detail. Next steps in management were reviewed. She was here with her . Fu after MRI - Results reviewed in detail and show evidence of disease in the breast but no worrisome findings in terms of lymphadenopathy or other evidence of disease. She was emotional and wished to proceed with surgery upfront. We discussed need for chemotherapy because of triple negative status and she wishes to proceed with surgery upfront accepting risks. Pathology from surgery will determine ultimate staging and treatment plan. She completed surgery and we discussed her pathology in detail. Her tumor was 15 mm and 0 out of 2 lymph nodes. This is pathological stage Ib. We discussed her high risk features triple negative breast cancer and grade 2-3. We discussed role of chemotherapy and recommendations of dose dense ACT versus TC. She chose ACT. Will omit Emend with following cycles as she did have a reaction to this drug. She tolerated cycle 1 well overall. She had mild fatigue for a few days but her  made her stay active. She had no nausea or vomiting. She had no mucositis but did have some sensitivity around her teeth that have crowns. She is using Sensodyne with relief. She had mild constipation for which prunes and stool softeners were helpful. She had no bone pain after G-CSF. Today, she is here for consideration of cycle 3 DD AC. She tolerated the first 2 cycles quite well overall. She still has some mild fatigue. She was on antibiotics for UTI. Diflucan ordered for yeast infection. No neuropathy. No mucositis. Labs reviewed. No infectious symptoms. No pain with G-CSF. Looking forward to [place] and one of her daughters is coming. She is here with her . Chronological Events:   BILATERAL DIGITAL SCREENING MAMMOGRAM 3D/2D: 8/10/2022   FINDINGS: There are scattered areas of fibroglandular density (ACR Breast Density Composition Category B). Digital mammography views were performed including tomosynthesis. There is a spiculated equal density mass in the left breast inferior medial quadrant posterior depth. No other significant masses, calcifications, or other findings are seen in either breast.     IMPRESSION: INCOMPLETE NEEDS ADDITIONAL IMAGING EVALUATION   The spiculated mass in the left breast is indeterminate. Ultrasound with possible additional views are recommended. There is no mammographic evidence of malignancy in the right breast. Patient will be notified of the results. LIMITED ULTRASOUND OF LEFT BREAST: 8/17/2022  FINDINGS: There is an 8 x 7 x 8 mm round, hypoechoic mass with spiculated margins at 9:00, 6 cm from the nipple. It demonstrates mild posterior acoustic enhancement. This corresponds to the mass seen mammographically. The left axilla is negative. IMPRESSION: SUSPICIOUS OF MALIGNANCY   The 8 mm mass at 9:00, 6 cm FN is suspicious and ultrasound-guided biopsy is recommended. These findings and recommendations were discussed with the patient at the time of her exam.  Her biopsy is scheduled for 8/25/2022 at 2:00 pm.      [place] 8/25/22 9/1/22 heme/onc consultation   9/9/22 FU after MRI - pt elected to p/w sx upfront; MRI results reviewed. 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 10/19/22 post op with Dr Conrado Carmen - drain removed   10/24/22 FU after sx - discussed adjuvant chemotherapy for TNBC.  11/14/22 Cycle 1 ddAC. EF 60-65%. 11/28/22  Surendra 2 ddAC.  Tolerated well.   12/12/22 FU C3 ddAC - tolerating well        Family History   Problem Relation Age of Onset    Coronary Art Dis Mother     Breast Cancer Sister     Breast Cancer Sister     Thyroid Disease Sister     Thyroid Cancer Neg Hx     Diabetes Neg Hx       Social History     Socioeconomic History    Marital status:      Spouse name: None    Number of children: None    Years of education: None    Highest education level: None   Tobacco Use    Smoking status: Never    Smokeless tobacco: Never   Vaping Use    Vaping Use: Never used   Substance and Sexual Activity    Alcohol use: No    Drug use: No        Review of Systems   Constitutional:  Positive for appetite change (improved on remeron) and fatigue. Negative for chills, diaphoresis, fever and unexpected weight change. HENT:   Negative for hearing loss, mouth sores, nosebleeds, sore throat, trouble swallowing and voice change. Eyes:  Negative for icterus. Respiratory:  Negative for chest tightness, hemoptysis, shortness of breath and wheezing. Cardiovascular:  Negative for chest pain, leg swelling and palpitations. Gastrointestinal:  Positive for constipation. Negative for abdominal distention, abdominal pain, blood in stool, diarrhea, nausea and vomiting. Endocrine: Negative for hot flashes. Genitourinary:  Negative for difficulty urinating, frequency, vaginal bleeding and vaginal discharge. Musculoskeletal:  Negative for arthralgias, flank pain, gait problem and myalgias. Skin:  Negative for itching, rash and wound. Neurological:  Negative for dizziness, extremity weakness, gait problem, headaches and numbness. Psychiatric/Behavioral:  Positive for sleep disturbance. Negative for confusion and depression. The patient is nervous/anxious (much better).        Allergies   Allergen Reactions    Emend [Fosaprepitant Dimeglumine] Anaphylaxis     Flushing, coughing    Hydrocodone Anxiety and Other (See Comments)     Depressive state     Past Medical History:   Diagnosis Date    Allergic rhinitis     [facility] 09/2022    left breast cancer---    GERD (gastroesophageal reflux disease)     controlled with med    Graves' disease     Hypercholesterolemia     Hyperthyroidism     Multiple thyroid nodules     followed by dr Kellee Vasquez     Past Surgical History:   Procedure Laterality Date    BREAST BIOPSY Left 10/7/2022    LEFT SENTINEL LYMPH NODE BIOPSY PREOP @ 0900, LYMPHO @ 4172, SX @ 0670 performed by Amy Ratliff DO at [place] [id number] Left 2022    bx     SECTION      x2    CYST REMOVAL  2021    subcutaneous cyst from lateral neck (benign)    HYSTERECTOMY (CERVIX STATUS UNKNOWN)      fibroids (ovaries intact)    IR PORT PLACEMENT EQUAL OR GREATER THAN 5 YEARS  2022    IR PORT PLACEMENT EQUAL OR GREATER THAN 5 YEARS 2022 [facility] SPECIALS    MASTECTOMY Left 10/7/2022    LEFT BREAST MASTECTOMY performed by Amy Ratliff DO at [facility] MAIN OR     Current Outpatient Medications   Medication Sig Dispense Refill    Loratadine (CLARITIN PO) Take by mouth      lidocaine-prilocaine (EMLA) 2.5-2.5 % cream Apply topically weekly as needed prior to port access.  30 g 2    ondansetron (ZOFRAN) 8 MG tablet Take 1 tablet by mouth every 8 hours as needed for Nausea or Vomiting 60 tablet 2    prochlorperazine (COMPAZINE) 10 MG tablet Take 1 tablet by mouth every 6 hours as needed (nausea vomiting chemo) 60 tablet 2    Mastectomy Bra MISC by Does not apply route Mastectomy bra + prosthesis 1 each 2    Multiple Vitamin (MULTI-VITAMIN DAILY PO) Take 1 tablet by mouth daily      mirtazapine (REMERON) 15 MG tablet Take 0.5 tablets by mouth nightly (Patient taking differently: Take 7.5 mg by mouth as needed) 30 tablet 3    rosuvastatin (CRESTOR) 20 MG tablet Take 1 tablet by mouth at bedtime 90 tablet 3    methIMAzole (TAPAZOLE) 5 MG tablet Take 0.5 tablets by mouth daily 15 tablet 11    omeprazole (PRILOSEC) 20 MG delayed release capsule Take 20 mg by mouth as needed PRN      Red Yeast Rice Extract 600 MG CAPS Take 600 mg by mouth daily      meloxicam (MOBIC) 7.5 MG tablet Take 7.5 mg by mouth daily (Patient not taking: Reported on 12/12/2022)      cephALEXin (KEFLEX) 250 MG capsule Take 1 capsule by mouth 3 times daily (Patient not taking: Reported on 12/12/2022) 15 capsule 0    fluconazole (DIFLUCAN) 150 MG tablet Take 1 tablet by mouth every 7 days for 2 doses (Patient not taking: Reported on 12/12/2022) 2 tablet 0    SUTAB 0299-819-994 MG TABS TAKE AS DIRECTED (Patient not taking: No sig reported)      Biotin 1000 MCG TABS Take 1 tablet by mouth daily (Patient not taking: Reported on 12/12/2022)      cyanocobalamin 2500 MCG SUBL Take 2,500 mcg by mouth daily (Patient not taking: Reported on 12/12/2022)       No current facility-administered medications for this visit. Facility-Administered Medications Ordered in Other Visits   Medication Dose Route Frequency Provider Last Rate Last Admin    sodium chloride flush 0.9 % injection 10 mL  10 mL IntraVENous PRN Loni Mcburney, MD        sodium chloride flush 0.9 % injection 10 mL  10 mL IntraVENous PRN Loni Mcburney, MD   10 mL at 12/12/22 1207       No flowsheet data found. OBJECTIVE:  BP (!) 151/78 (Site: Right Upper Arm, Position: Standing)   Pulse 97   Temp 97.9 °F (36.6 °C)   Resp 12   Ht 5' 3\" (1.6 m)   Wt 159 lb 6.4 oz (72.3 kg)   SpO2 98%   BMI 28.24 kg/m²       ECOG PERFORMANCE STATUS - 0-Fully active, able to carry on all pre-disease performance without restriction. Pain - 0 - No pain/10. None/Minimal pain - not affecting QOL     Fatigue - No flowsheet data found. Distress - No flowsheet data found. Physical Exam  Vitals reviewed. Exam conducted with a chaperone present. Constitutional:       General: She is not in acute distress. Appearance: Normal appearance. She is not ill-appearing or toxic-appearing. HENT:      Head: Normocephalic and atraumatic.       Nose: Nose normal.      Mouth/Throat:      Mouth: Mucous membranes are moist.   Eyes:      General: No scleral icterus. Extraocular Movements: Extraocular movements intact. Conjunctiva/sclera: Conjunctivae normal.      Pupils: Pupils are equal, round, and reactive to light. Cardiovascular:      Rate and Rhythm: Normal rate and regular rhythm. Heart sounds: No murmur heard. Pulmonary:      Effort: No respiratory distress. Breath sounds: No wheezing or rales. Chest:          Comments: S/p mastectomy. Abdominal:      General: There is no distension. Tenderness: There is no abdominal tenderness. There is no right CVA tenderness or guarding. Musculoskeletal:         General: Normal range of motion. Cervical back: Normal range of motion. Right lower leg: No edema. Left lower leg: No edema. Lymphadenopathy:      Cervical: No cervical adenopathy. Upper Body:      Right upper body: No supraclavicular or axillary adenopathy. Left upper body: No supraclavicular or axillary adenopathy. Skin:     General: Skin is warm and dry. Coloration: Skin is not jaundiced or pale. Findings: No rash. Neurological:      General: No focal deficit present. Mental Status: She is alert and oriented to person, place, and time. Gait: Gait normal.   Psychiatric:         Behavior: Behavior normal.         Thought Content:  Thought content normal.        Labs:  Recent Results (from the past 168 hour(s))   Culture, Urine    Collection Time: 12/05/22  1:49 PM    Specimen: Bladder   Result Value Ref Range    Special Requests NO SPECIAL REQUESTS      Culture        10,000 to 50,000 COLONIES/mL MIXED SKIN ARJUN ISOLATED   CBC with Auto Differential    Collection Time: 12/12/22 11:58 AM   Result Value Ref Range    WBC 5.3 4.3 - 11.1 K/uL    RBC 3.97 (L) 4.05 - 5.2 M/uL    Hemoglobin 11.6 (L) 11.7 - 15.4 g/dL    Hematocrit 35.1 (L) 35.8 - 46.3 %    MCV 88.4 82.0 - 102.0 FL    MCH 29.2 26.1 - 32.9 PG    MCHC 33.0 31.4 - 35.0 g/dL    RDW 13.3 11.9 - 14.6 %    Platelets 044 480 - 381 K/uL    MPV 9.3 (L) 9.4 - 12.3 FL    nRBC 0.03 0.0 - 0.2 K/uL    Seg Neutrophils 52 43 - 78 %    Lymphocytes 22 13 - 44 %    Monocytes 15 (H) 4.0 - 12.0 %    Eosinophils % 3 0.5 - 7.8 %    Basophils 1 0.0 - 2.0 %    Immature Granulocytes 7 (H) 0.0 - 5.0 %    Segs Absolute 2.6 1.7 - 8.2 K/UL    Absolute Lymph # 1.2 0.5 - 4.6 K/UL    Absolute Mono # 0.8 0.1 - 1.3 K/UL    Absolute Eos # 0.2 0.0 - 0.8 K/UL    Basophils Absolute 0.1 0.0 - 0.2 K/UL    Absolute Immature Granulocyte 0.4 0.0 - 0.5 K/UL    RBC Comment SLIGHT  ANISOCYTOSIS + POIKILOCYTOSIS        RBC Comment SLIGHT  POLYCHROMASIA        RBC Comment OCCASIONAL  TEARDROP CELLS        WBC Comment Result Confirmed By Smear      Platelet Comment ADEQUATE      Differential Type AUTOMATED     Comprehensive Metabolic Panel    Collection Time: 12/12/22 11:58 AM   Result Value Ref Range    Sodium 141 133 - 143 mmol/L    Potassium 4.0 3.5 - 5.1 mmol/L    Chloride 108 101 - 110 mmol/L    CO2 27 21 - 32 mmol/L    Anion Gap 6 2 - 11 mmol/L    Glucose 130 (H) 65 - 100 mg/dL    BUN 9 8 - 23 MG/DL    Creatinine 0.80 0.6 - 1.0 MG/DL    Est, Glom Filt Rate >60 >60 ml/min/1.73m2    Calcium 9.3 8.3 - 10.4 MG/DL    Total Bilirubin 0.3 0.2 - 1.1 MG/DL    ALT 18 12 - 65 U/L    AST 18 15 - 37 U/L    Alk Phosphatase 109 50 - 136 U/L    Total Protein 7.3 6.3 - 8.2 g/dL    Albumin 3.7 3.2 - 4.6 g/dL    Globulin 3.6 2.8 - 4.5 g/dL    Albumin/Globulin Ratio 1.0 0.4 - 1.6         Imaging: reviewed     PATHOLOGY:   per above     10/2022        ASSESSMENT:     Diagnosis Orders   1. Invasive ductal carcinoma of breast, female, left ([facility]  CBC with Auto Differential    Comprehensive Metabolic Panel    Magnesium        Ms. Raúl Ramirez is here for FU of breast cancer.       Left breast IDC, 9:00, s/p core bx/clip, at GABO, poorly diff, 8mm on US, MRI pending, LVI neg, DCIS high grade focally present, ER0/PR0, Her2 sera +1 - negative   - MR - Biopsied mass (11mm) in the medial 8:30 left breast at 7 cm from the nipple. No additional pathologic enhancement or adenopathy in either breast.    - s/p left mastectomy and 0/2 SLnc - [id number] (0/2) - 15mm with neg margins, grade 2   - genetics - VUS     - here for consideration of cycle 3 DD AC. She tolerated the first 2 cycles quite well overall. She still has some mild fatigue. Upon labs and exam will pw tx as planned   - UTI - She was on antibiotics for UTI. Diflucan ordered for yeast infection.    - No neuropathy. No mucositis. Labs reviewed. No infectious symptoms. No pain with G-CSF.    - EF adequate prior to starting treatment - EF 60-65%. PLan echo with any ss or CHF and also after tx completed   - on remeron 7.5 mg nightly for sleep/appetite and this is working well.  - nausea/vomiting chemo: discussed Zofran and/or Compazine. No Emend due to reaction. - constipation: stool softeners, prunes. - continue good oral nutrition and hydration. - encouraged frequent activity throughout the day and rest as needed to combat fatigue.   - call with any fevers, uncontrolled side effects from treatment or any other worrisome/concerning symptoms. 2. Surveillance   - H&P q3-6 mo for years 1-3, q6mo years 4-5; mammogram yearly  - DEXA q2 years postmenopausal   - labs/imaging studies are not recommended without symptoms     Orders placed for: Dose-Dense AC-T (Paclitaxel Weekly) - [Doxorubicin + Cyclophosphamide q14 Days x 4 Cycles, Followed by Paclitaxel 80 mg/m² Weekly x 12 Weeks]  Cycles 1 through 4: A cycle is every 14 days:  Doxorubicin  60 mg/m² IV once on day 1 of cycles 1 through 4  Cyclophosphamide  600 mg/m² IV once on day 1 of cycles 1 through 4  Pegfilgrastim-xxxx  6 mg flat dose subcutaneously once on day 2 of cycles 1 through 4  Cycles 5 through 16:  A cycle is every 7 days:  Paclitaxel  80 mg/m² IV once on day 1 of cycles 5 through 16      RESUSCITATION DIRECTIVES/HOSPICE CARE: Full Support    RTC per protocol or sooner as needed     MDM  Number of Diagnoses or Management Options  Chemotherapy follow-up examination: established, improving  Decreased appetite: established, improving  High risk medication use: established, improving  Invasive ductal carcinoma of breast, female, left ([facility]): established, improving     Amount and/or Complexity of Data Reviewed  Clinical lab tests: ordered and reviewed  Tests in the radiology section of CPT®: ordered and reviewed  Tests in the medicine section of CPT®: ordered  Obtain history from someone other than the patient: yes  Review and summarize past medical records: yes  Independent visualization of images, tracings, or specimens: yes    Risk of Complications, Morbidity, and/or Mortality  Presenting problems: moderate  Diagnostic procedures: moderate  Management options: moderate        Lab studies and imaging studies were personally reviewed. Pertinent old records were reviewed. Historical:   - she is here for consultation regarding neoadjuvant chemotherapy for newly diagnosed triple negative breast cancer. During today's visit, we had a long conversation about breast cancer pathophysiology and staging in general.  We then reviewed her imaging and pathology in details, including receptor status and it's significance in terms of available treatment options. Neoadjuvant compared to adjuvant chemotherapy has similar long term outcomes when patients are given the same therapy. Preoperative treatment can render large, inoperable tumors operable and improve breast conservation, downstage tumor and decrease risk of postoperative lympedema. It also provides information about tumor chemosensitivity. Patients with pathologic complete response have favorable disease free survival and overall survival and this correlation is strongest in TNBC. Preoperative systemic therapy can lead to possible overtreatment with systemic therapy of clinical stages overestimated.   It can also lead to possible under treatment local regionally with radiotherapy if clinical stage is underestimated. There is also possibility of disease progression during preoperative systemic therapy. - MRI pending - she is aware that tx plan may change depending on MR results   - she is here for FU after MRI - she has elected to p/w sx upfront. Images reviewed. Understands final path will be determined by sx specimen/LN status. Dr Liana Duncan notifed by me re pt's decision.    - we discussed options of AC-T +/- carbo/pembro and TC; she is aware that she will need chemotherapy regardless of her final decision (before/after sx), if she does chemo after, she would not be a candidate for pembro/carbo. Data from [facility] reviewed. - pt met with Dr Liana Duncan and elected to pw bilateral mastectomies with axillary dissection   - We discussed her path results in detail that showed 15mm TNBC. She is aware that pt's are considered high risk per GEICAM/2003-02 trial if they have node neg disease but were <35, had neg er/pr receptors, histo grade 2-3, and T>2cm. She meets two criteria for consideration of anthracycline therapy. She wishes to think this over. We discussed risks of chemotherapy incuding irriversible heart disease (including acute LV failure - we reviewed this) and also leukemia. Gave options of ddAC-T and TC with growth factor support. - of note, following standard adjuvant chemo for [id number] or N+ TNBC, may consider metronomic capecitabine (650mg/m2 BID continuously for 1 year) as extended maintenance - SYSUCC-001 trial estimated 5-year DFS vs observation: 82.8 vs 73% (Velasquez et al 2021).     - We discussed the potential side effects including but not limited to hair loss (which could be permanent), nausea, vomiting, diarrhea or constipation, mucositis, rashes, allergic reactions, organ damage including liver and kidney failure, cardiotoxicity, and direct effects on bone marrow which can lead to anemia and thrombocytopenia necessitating transfusion or neutropenia putting patient at risk for infection, sepsis and death if not treated. - echo EF 60-65%; LVI normal size and no WMA  - port placement   - decreased appetite, anxiety and sleep disturbance - on low dose mirtazapine and improved  - Rx mastectomy bra/prosthesis     All questions were asked and answered to the best of my ability. The patient verbalized understanding and agrees with the plan above.           Nicole Humphreys MD, MD  [address] [place] [address] Hematology and Oncology  [address]  Office : [phone]  Fax : [phone]

## 2022-12-12 ENCOUNTER — HOSPITAL ENCOUNTER (OUTPATIENT)
Dept: INFUSION THERAPY | Age: 62
Discharge: HOME | End: 2022-12-12
Payer: COMMERCIAL

## 2022-12-12 ENCOUNTER — OFFICE VISIT (OUTPATIENT)
Dept: ONCOLOGY | Age: 62
End: 2022-12-12
Payer: COMMERCIAL

## 2022-12-12 VITALS
SYSTOLIC BLOOD PRESSURE: 151 MMHG | HEART RATE: 97 BPM | OXYGEN SATURATION: 98 % | BODY MASS INDEX: 28.24 KG/M2 | WEIGHT: 159.4 LBS | DIASTOLIC BLOOD PRESSURE: 78 MMHG | RESPIRATION RATE: 12 BRPM | HEIGHT: 63 IN | TEMPERATURE: 97.9 F

## 2022-12-12 DIAGNOSIS — R63.0 DECREASED APPETITE: ICD-10-CM

## 2022-12-12 DIAGNOSIS — Z79.899 HIGH RISK MEDICATION USE: ICD-10-CM

## 2022-12-12 DIAGNOSIS — C50.912 INVASIVE DUCTAL CARCINOMA OF BREAST, FEMALE, LEFT (HCC): Primary | ICD-10-CM

## 2022-12-12 DIAGNOSIS — Z09 CHEMOTHERAPY FOLLOW-UP EXAMINATION: ICD-10-CM

## 2022-12-12 DIAGNOSIS — C50.912 INVASIVE DUCTAL CARCINOMA OF BREAST, FEMALE, LEFT (HCC): ICD-10-CM

## 2022-12-12 LAB
ALBUMIN SERPL-MCNC: 3.7 G/DL (ref 3.2–4.6)
ALBUMIN/GLOB SERPL: 1 {RATIO} (ref 0.4–1.6)
ALP SERPL-CCNC: 109 U/L (ref 50–136)
ALT SERPL-CCNC: 18 U/L (ref 12–65)
ANION GAP SERPL CALC-SCNC: 6 MMOL/L (ref 2–11)
AST SERPL-CCNC: 18 U/L (ref 15–37)
BASOPHILS # BLD: 0.1 K/UL (ref 0–0.2)
BASOPHILS NFR BLD: 1 % (ref 0–2)
BILIRUB SERPL-MCNC: 0.3 MG/DL (ref 0.2–1.1)
BUN SERPL-MCNC: 9 MG/DL (ref 8–23)
CALCIUM SERPL-MCNC: 9.3 MG/DL (ref 8.3–10.4)
CHLORIDE SERPL-SCNC: 108 MMOL/L (ref 101–110)
CO2 SERPL-SCNC: 27 MMOL/L (ref 21–32)
CREAT SERPL-MCNC: 0.8 MG/DL (ref 0.6–1)
DIFFERENTIAL METHOD BLD: ABNORMAL
EOSINOPHIL # BLD: 0.2 K/UL (ref 0–0.8)
EOSINOPHIL NFR BLD: 3 % (ref 0.5–7.8)
ERYTHROCYTE [DISTWIDTH] IN BLOOD BY AUTOMATED COUNT: 13.3 % (ref 11.9–14.6)
GLOBULIN SER CALC-MCNC: 3.6 G/DL (ref 2.8–4.5)
GLUCOSE SERPL-MCNC: 130 MG/DL (ref 65–100)
HCT VFR BLD AUTO: 35.1 % (ref 35.8–46.3)
HGB BLD-MCNC: 11.6 G/DL (ref 11.7–15.4)
IMM GRANULOCYTES # BLD AUTO: 0.4 K/UL (ref 0–0.5)
IMM GRANULOCYTES NFR BLD AUTO: 7 % (ref 0–5)
LYMPHOCYTES # BLD: 1.2 K/UL (ref 0.5–4.6)
LYMPHOCYTES NFR BLD: 22 % (ref 13–44)
MCH RBC QN AUTO: 29.2 PG (ref 26.1–32.9)
MCHC RBC AUTO-ENTMCNC: 33 G/DL (ref 31.4–35)
MCV RBC AUTO: 88.4 FL (ref 82–102)
MONOCYTES # BLD: 0.8 K/UL (ref 0.1–1.3)
MONOCYTES NFR BLD: 15 % (ref 4–12)
NEUTS SEG # BLD: 2.6 K/UL (ref 1.7–8.2)
NEUTS SEG NFR BLD: 52 % (ref 43–78)
NRBC # BLD: 0.03 K/UL (ref 0–0.2)
PLATELET # BLD AUTO: 207 K/UL (ref 150–450)
PLATELET COMMENT: ADEQUATE
PMV BLD AUTO: 9.3 FL (ref 9.4–12.3)
POTASSIUM SERPL-SCNC: 4 MMOL/L (ref 3.5–5.1)
PROT SERPL-MCNC: 7.3 G/DL (ref 6.3–8.2)
RBC # BLD AUTO: 3.97 M/UL (ref 4.05–5.2)
RBC MORPH BLD: ABNORMAL
SODIUM SERPL-SCNC: 141 MMOL/L (ref 133–143)
WBC # BLD AUTO: 5.3 K/UL (ref 4.3–11.1)
WBC MORPH BLD: ABNORMAL

## 2022-12-12 PROCEDURE — 96413 CHEMO IV INFUSION 1 HR: CPT

## 2022-12-12 PROCEDURE — 6360000002 HC RX W HCPCS: Performed by: INTERNAL MEDICINE

## 2022-12-12 PROCEDURE — 99214 OFFICE O/P EST MOD 30 MIN: CPT | Performed by: INTERNAL MEDICINE

## 2022-12-12 PROCEDURE — 96411 CHEMO IV PUSH ADDL DRUG: CPT

## 2022-12-12 PROCEDURE — 2580000003 HC RX 258: Performed by: INTERNAL MEDICINE

## 2022-12-12 PROCEDURE — 96375 TX/PRO/DX INJ NEW DRUG ADDON: CPT

## 2022-12-12 PROCEDURE — 80053 COMPREHEN METABOLIC PANEL: CPT

## 2022-12-12 PROCEDURE — 85025 COMPLETE CBC W/AUTO DIFF WBC: CPT

## 2022-12-12 PROCEDURE — 36591 DRAW BLOOD OFF VENOUS DEVICE: CPT

## 2022-12-12 RX ORDER — SODIUM CHLORIDE 9 MG/ML
5-250 INJECTION, SOLUTION INTRAVENOUS PRN
Status: CANCELLED | OUTPATIENT
Start: 2022-12-12

## 2022-12-12 RX ORDER — DOXORUBICIN HYDROCHLORIDE 2 MG/ML
60 INJECTION, SOLUTION INTRAVENOUS ONCE
Status: COMPLETED | OUTPATIENT
Start: 2022-12-12 | End: 2022-12-12

## 2022-12-12 RX ORDER — ONDANSETRON 2 MG/ML
8 INJECTION INTRAMUSCULAR; INTRAVENOUS ONCE
Status: CANCELLED | OUTPATIENT
Start: 2022-12-12 | End: 2022-12-12

## 2022-12-12 RX ORDER — SODIUM CHLORIDE 9 MG/ML
5-250 INJECTION, SOLUTION INTRAVENOUS PRN
Status: DISCONTINUED | OUTPATIENT
Start: 2022-12-12 | End: 2022-12-13 | Stop reason: HOSPADM

## 2022-12-12 RX ORDER — DOXORUBICIN HYDROCHLORIDE 2 MG/ML
60 INJECTION, SOLUTION INTRAVENOUS ONCE
Status: CANCELLED | OUTPATIENT
Start: 2022-12-12 | End: 2022-12-12

## 2022-12-12 RX ORDER — SODIUM CHLORIDE 9 MG/ML
5-40 INJECTION INTRAVENOUS PRN
Status: CANCELLED | OUTPATIENT
Start: 2022-12-12

## 2022-12-12 RX ORDER — HEPARIN SODIUM (PORCINE) LOCK FLUSH IV SOLN 100 UNIT/ML 100 UNIT/ML
500 SOLUTION INTRAVENOUS PRN
Status: CANCELLED | OUTPATIENT
Start: 2022-12-12

## 2022-12-12 RX ORDER — SODIUM CHLORIDE 0.9 % (FLUSH) 0.9 %
10 SYRINGE (ML) INJECTION PRN
Status: DISCONTINUED | OUTPATIENT
Start: 2022-12-12 | End: 2022-12-13 | Stop reason: HOSPADM

## 2022-12-12 RX ORDER — SODIUM CHLORIDE 0.9 % (FLUSH) 0.9 %
5-40 SYRINGE (ML) INJECTION PRN
Status: CANCELLED | OUTPATIENT
Start: 2022-12-12

## 2022-12-12 RX ORDER — MEPERIDINE HYDROCHLORIDE 50 MG/ML
12.5 INJECTION INTRAMUSCULAR; INTRAVENOUS; SUBCUTANEOUS PRN
Status: CANCELLED | OUTPATIENT
Start: 2022-12-12

## 2022-12-12 RX ORDER — SODIUM CHLORIDE 0.9 % (FLUSH) 0.9 %
5-40 SYRINGE (ML) INJECTION PRN
Status: DISCONTINUED | OUTPATIENT
Start: 2022-12-12 | End: 2022-12-13 | Stop reason: HOSPADM

## 2022-12-12 RX ORDER — SODIUM CHLORIDE 9 MG/ML
INJECTION, SOLUTION INTRAVENOUS CONTINUOUS
Status: CANCELLED | OUTPATIENT
Start: 2022-12-12

## 2022-12-12 RX ORDER — DIPHENHYDRAMINE HYDROCHLORIDE 50 MG/ML
50 INJECTION INTRAMUSCULAR; INTRAVENOUS
Status: CANCELLED | OUTPATIENT
Start: 2022-12-12

## 2022-12-12 RX ORDER — ONDANSETRON 2 MG/ML
8 INJECTION INTRAMUSCULAR; INTRAVENOUS
Status: CANCELLED | OUTPATIENT
Start: 2022-12-12

## 2022-12-12 RX ORDER — ONDANSETRON 2 MG/ML
8 INJECTION INTRAMUSCULAR; INTRAVENOUS ONCE
Status: COMPLETED | OUTPATIENT
Start: 2022-12-12 | End: 2022-12-12

## 2022-12-12 RX ORDER — EPINEPHRINE 1 MG/ML
0.3 INJECTION, SOLUTION, CONCENTRATE INTRAVENOUS PRN
Status: CANCELLED | OUTPATIENT
Start: 2022-12-12

## 2022-12-12 RX ORDER — ACETAMINOPHEN 325 MG/1
650 TABLET ORAL
Status: CANCELLED | OUTPATIENT
Start: 2022-12-12

## 2022-12-12 RX ORDER — SODIUM CHLORIDE 0.9 % (FLUSH) 0.9 %
10 SYRINGE (ML) INJECTION PRN
Status: DISCONTINUED | OUTPATIENT
Start: 2022-12-12 | End: 2023-02-11 | Stop reason: HOSPADM

## 2022-12-12 RX ORDER — ALBUTEROL SULFATE 90 UG/1
4 AEROSOL, METERED RESPIRATORY (INHALATION) PRN
Status: CANCELLED | OUTPATIENT
Start: 2022-12-12

## 2022-12-12 RX ORDER — FAMOTIDINE 10 MG/ML
20 INJECTION, SOLUTION INTRAVENOUS
Status: CANCELLED | OUTPATIENT
Start: 2022-12-12

## 2022-12-12 RX ADMIN — SODIUM CHLORIDE, PRESERVATIVE FREE 10 ML: 5 INJECTION INTRAVENOUS at 12:07

## 2022-12-12 RX ADMIN — SODIUM CHLORIDE, PRESERVATIVE FREE 10 ML: 5 INJECTION INTRAVENOUS at 14:47

## 2022-12-12 RX ADMIN — DOXORUBICIN HYDROCHLORIDE 108 MG: 2 INJECTION, SOLUTION INTRAVENOUS at 14:49

## 2022-12-12 RX ADMIN — SODIUM CHLORIDE 125 ML/HR: 9 INJECTION, SOLUTION INTRAVENOUS at 14:30

## 2022-12-12 RX ADMIN — ONDANSETRON 8 MG: 2 INJECTION INTRAMUSCULAR; INTRAVENOUS at 13:55

## 2022-12-12 RX ADMIN — DEXAMETHASONE SODIUM PHOSPHATE 12 MG: 4 INJECTION, SOLUTION INTRAMUSCULAR; INTRAVENOUS at 13:59

## 2022-12-12 RX ADMIN — CYCLOPHOSPHAMIDE 1080 MG: 1 INJECTION, POWDER, FOR SOLUTION INTRAVENOUS; ORAL at 14:55

## 2022-12-12 ASSESSMENT — PATIENT HEALTH QUESTIONNAIRE - PHQ9
SUM OF ALL RESPONSES TO PHQ QUESTIONS 1-9: 0
2. FEELING DOWN, DEPRESSED OR HOPELESS: 0
SUM OF ALL RESPONSES TO PHQ QUESTIONS 1-9: 0

## 2022-12-12 NOTE — PROGRESS NOTES
Patient arrived to port lab for port access and lab draw   Jarred Kennedy 45 accessed and labs drawn per protocol   *Port remains accessed   Patient discharged from port lab ambulatory*

## 2022-12-12 NOTE — PROGRESS NOTES
Patient arrived to [facility] for Donald/Cytoxan. Assessment completed. No needs voiced at this time. Patient tolerated infusion well and is aware of next appointment 12/13/2022 @1600. Patient discharged ambulatory.

## 2022-12-12 NOTE — PATIENT INSTRUCTIONS
Patient Instructions from Today's Visit    Reason for Visit:  Follow up. Diagnosis Information:  [email or web address]      Plan:  Continue with Cycle 3. Follow Up:  As scheduled.       Recent Lab Results:  Hospital Outpatient Visit on 12/12/2022   Component Date Value Ref Range Status    WBC 12/12/2022 5.3  4.3 - 11.1 K/uL Final    RBC 12/12/2022 3.97 (A)  4.05 - 5.2 M/uL Final    Hemoglobin 12/12/2022 11.6 (A)  11.7 - 15.4 g/dL Final    Hematocrit 12/12/2022 35.1 (A)  35.8 - 46.3 % Final    MCV 12/12/2022 88.4  82.0 - 102.0 FL Final    MCH 12/12/2022 29.2  26.1 - 32.9 PG Final    MCHC 12/12/2022 33.0  31.4 - 35.0 g/dL Final    RDW 12/12/2022 13.3  11.9 - 14.6 % Final    Platelets 74/36/6866 207  150 - 450 K/uL Final    MPV 12/12/2022 9.3 (A)  9.4 - 12.3 FL Final    nRBC 12/12/2022 0.03  0.0 - 0.2 K/uL Final    **Note: Absolute NRBC parameter is now reported with Hemogram**    Seg Neutrophils 12/12/2022 52  43 - 78 % Final    Lymphocytes 12/12/2022 22  13 - 44 % Final    Monocytes 12/12/2022 15 (A)  4.0 - 12.0 % Final    Eosinophils % 12/12/2022 3  0.5 - 7.8 % Final    Basophils 12/12/2022 1  0.0 - 2.0 % Final    Immature Granulocytes 12/12/2022 7 (A)  0.0 - 5.0 % Final    Segs Absolute 12/12/2022 2.6  1.7 - 8.2 K/UL Final    Absolute Lymph # 12/12/2022 1.2  0.5 - 4.6 K/UL Final    Absolute Mono # 12/12/2022 0.8  0.1 - 1.3 K/UL Final    Absolute Eos # 12/12/2022 0.2  0.0 - 0.8 K/UL Final    Basophils Absolute 12/12/2022 0.1  0.0 - 0.2 K/UL Final    Absolute Immature Granulocyte 12/12/2022 0.4  0.0 - 0.5 K/UL Final    RBC Comment 12/12/2022     Final                    Value:SLIGHT  ANISOCYTOSIS + POIKILOCYTOSIS      RBC Comment 12/12/2022     Final                    Value:SLIGHT  POLYCHROMASIA      RBC Comment 12/12/2022     Final                    Value:OCCASIONAL  TEARDROP CELLS      WBC Comment 12/12/2022 Result Confirmed By Smear    Final    Comment: OCCASIONAL  ATYPICAL LYMPHOCYTES PRESENT      Platelet Comment 55/41/6044 ADEQUATE    Final    Differential Type 12/12/2022 AUTOMATED    Final    Sodium 12/12/2022 141  133 - 143 mmol/L Final    Potassium 12/12/2022 4.0  3.5 - 5.1 mmol/L Final    Chloride 12/12/2022 108  101 - 110 mmol/L Final    CO2 12/12/2022 27  21 - 32 mmol/L Final    Anion Gap 12/12/2022 6  2 - 11 mmol/L Final    Glucose 12/12/2022 130 (A)  65 - 100 mg/dL Final    BUN 12/12/2022 9  8 - 23 MG/DL Final    Creatinine 12/12/2022 0.80  0.6 - 1.0 MG/DL Final    Est, Glom Filt Rate 12/12/2022 >60  >60 ml/min/1.73m2 Final    Comment:      Pediatric calculator link: [email or web address]       These results are not intended for use in patients <25years of age. eGFR results are calculated without a race factor using  the 2021 CKD-EPI equation. Careful clinical correlation is recommended, particularly when comparing to results calculated using previous equations. The CKD-EPI equation is less accurate in patients with extremes of muscle mass, extra-renal metabolism of creatinine, excessive creatine ingestion, or following therapy that affects renal tubular secretion.       Calcium 12/12/2022 9.3  8.3 - 10.4 MG/DL Final    Total Bilirubin 12/12/2022 0.3  0.2 - 1.1 MG/DL Final    ALT 12/12/2022 18  12 - 65 U/L Final    AST 12/12/2022 18  15 - 37 U/L Final    Alk Phosphatase 12/12/2022 109  50 - 136 U/L Final    Total Protein 12/12/2022 7.3  6.3 - 8.2 g/dL Final    Albumin 12/12/2022 3.7  3.2 - 4.6 g/dL Final    Globulin 12/12/2022 3.6  2.8 - 4.5 g/dL Final    Albumin/Globulin Ratio 12/12/2022 1.0  0.4 - 1.6   Final         Treatment Summary has been discussed and given to patient: n/a        -------------------------------------------------------------------------------------------------------------------  Please call our office at [phone] if you have any  of the following symptoms:   Fever of 100.5 or greater  Chills  Shortness of breath  Swelling or pain in one leg    After office hours an answering service is available and will contact a provider for emergencies or if you are experiencing any of the above symptoms. Patient did express an interest in My Chart. My Chart log in information explained on the after visit summary printout at the [facility] [address] [address] desk.     Rachel Maurer RN

## 2022-12-13 ENCOUNTER — HOSPITAL ENCOUNTER (OUTPATIENT)
Dept: INFUSION THERAPY | Age: 62
Discharge: HOME | End: 2022-12-13
Payer: COMMERCIAL

## 2022-12-13 VITALS
SYSTOLIC BLOOD PRESSURE: 146 MMHG | DIASTOLIC BLOOD PRESSURE: 67 MMHG | HEART RATE: 98 BPM | OXYGEN SATURATION: 98 % | RESPIRATION RATE: 18 BRPM | TEMPERATURE: 98.2 F

## 2022-12-13 DIAGNOSIS — C50.912 INVASIVE DUCTAL CARCINOMA OF BREAST, FEMALE, LEFT (HCC): Primary | ICD-10-CM

## 2022-12-13 PROCEDURE — 6360000002 HC RX W HCPCS: Performed by: INTERNAL MEDICINE

## 2022-12-13 PROCEDURE — 96372 THER/PROPH/DIAG INJ SC/IM: CPT

## 2022-12-13 RX ADMIN — PEGFILGRASTIM-CBQV 6 MG: 6 INJECTION, SOLUTION SUBCUTANEOUS at 15:44

## 2022-12-13 NOTE — PROGRESS NOTES
Arrived to the [facility]. Udenyca injection completed. Provided education on [facility]. Patient has had this medication before. Opportunity for questions provided. Patient instructed to report any side affects to ordering provider. Patient tolerated well. Any issues or concerns during appointment: no.  Patient aware of next lab/OV appointment on 12/27/2022 (date) at 12:45 pm (time). Patient aware of next infusion appointment on 12/27/2022 (date) at 1:30 pm (time). Discharged ambulatory with self.

## 2022-12-15 NOTE — PATIENT INSTRUCTIONS
Patient Education        A Healthy Lifestyle: Care Instructions  A healthy lifestyle can help you feel good, have more energy, and stay at a weight that's healthy for you. You can share a healthy lifestyle with your friends and family. And you can do it on your own. Eat meals with your friends or family. You could try cooking together. Plan activities with other people. Go for a walk with a friend, try a free online fitness class, or join a sports league. Eat a variety of healthy foods. These include fruits, vegetables, whole grains, low-fat dairy, and lean protein. Choose healthy portions of food. You can use the Nutrition Facts label on food packages as a guide. Eat more fruits and vegetables. You could add vegetables to sandwiches or add fruit to cereal.   Drink water when you are thirsty. Limit soda, juice, and sports drinks. Try to exercise most days. Aim for at least 2½ hours of exercise each week. Keep moving. Work in the garden or take your dog on a walk. Use the stairs instead of the elevator. If you use tobacco or nicotine, try to quit. Ask your doctor about programs and medicines to help you quit. Limit alcohol. Men should have no more than 2 drinks a day. Women should have no more than 1. For some people, no alcohol is the best choice. Follow-up care is a key part of your treatment and safety. Be sure to make and go to all appointments, and call your doctor if you are having problems. It's also a good idea to know your test results and keep a list of the medicines you take. Where can you learn more? Go to http://www.ventura.com/ and enter U807 to learn more about \"A Healthy Lifestyle: Care Instructions. \"  Current as of: March 9, 2022               Content Version: 13.5  © 2390-0583 [employer], [employer]. Care instructions adapted under license by [facility] ([facility]).  If you have questions about a medical condition or this instruction, always ask your healthcare professional. [address] any warranty or liability for your use of this information.

## 2022-12-20 PROBLEM — Z79.899 HIGH RISK MEDICATION USE: Status: ACTIVE | Noted: 2022-12-20

## 2022-12-20 PROBLEM — Z09 CHEMOTHERAPY FOLLOW-UP EXAMINATION: Status: ACTIVE | Noted: 2022-12-20

## 2022-12-27 ENCOUNTER — HOSPITAL ENCOUNTER (OUTPATIENT)
Dept: INFUSION THERAPY | Age: 62
Discharge: HOME | End: 2022-12-27
Payer: COMMERCIAL

## 2022-12-27 ENCOUNTER — OFFICE VISIT (OUTPATIENT)
Dept: ONCOLOGY | Age: 62
End: 2022-12-27
Payer: COMMERCIAL

## 2022-12-27 VITALS
SYSTOLIC BLOOD PRESSURE: 135 MMHG | RESPIRATION RATE: 21 BRPM | TEMPERATURE: 98 F | BODY MASS INDEX: 28.62 KG/M2 | OXYGEN SATURATION: 94 % | DIASTOLIC BLOOD PRESSURE: 77 MMHG | WEIGHT: 161.5 LBS | HEART RATE: 100 BPM | HEIGHT: 63 IN

## 2022-12-27 DIAGNOSIS — C50.912 INVASIVE DUCTAL CARCINOMA OF BREAST, FEMALE, LEFT (HCC): ICD-10-CM

## 2022-12-27 DIAGNOSIS — D64.9 ANEMIA, UNSPECIFIED TYPE: ICD-10-CM

## 2022-12-27 DIAGNOSIS — E05.90 HYPERTHYROIDISM: ICD-10-CM

## 2022-12-27 DIAGNOSIS — C50.912 INVASIVE DUCTAL CARCINOMA OF BREAST, FEMALE, LEFT (HCC): Primary | ICD-10-CM

## 2022-12-27 DIAGNOSIS — D64.9 ANEMIA, UNSPECIFIED TYPE: Primary | ICD-10-CM

## 2022-12-27 LAB
ALBUMIN SERPL-MCNC: 3.6 G/DL (ref 3.2–4.6)
ALBUMIN/GLOB SERPL: 1.1 {RATIO} (ref 0.4–1.6)
ALP SERPL-CCNC: 107 U/L (ref 50–136)
ALT SERPL-CCNC: 19 U/L (ref 12–65)
ANION GAP SERPL CALC-SCNC: 6 MMOL/L (ref 2–11)
AST SERPL-CCNC: 13 U/L (ref 15–37)
BASOPHILS # BLD: 0 K/UL (ref 0–0.2)
BASOPHILS NFR BLD: 1 % (ref 0–2)
BILIRUB DIRECT SERPL-MCNC: <0.1 MG/DL
BILIRUB SERPL-MCNC: 0.3 MG/DL (ref 0.2–1.1)
BUN SERPL-MCNC: 7 MG/DL (ref 8–23)
CALCIUM SERPL-MCNC: 9.3 MG/DL (ref 8.3–10.4)
CHLORIDE SERPL-SCNC: 108 MMOL/L (ref 101–110)
CO2 SERPL-SCNC: 28 MMOL/L (ref 21–32)
CREAT SERPL-MCNC: 0.8 MG/DL (ref 0.6–1)
DIFFERENTIAL METHOD BLD: ABNORMAL
EOSINOPHIL # BLD: 0.1 K/UL (ref 0–0.8)
EOSINOPHIL NFR BLD: 3 % (ref 0.5–7.8)
ERYTHROCYTE [DISTWIDTH] IN BLOOD BY AUTOMATED COUNT: 14.3 % (ref 11.9–14.6)
FERRITIN SERPL-MCNC: 388 NG/ML (ref 8–388)
GLOBULIN SER CALC-MCNC: 3.3 G/DL (ref 2.8–4.5)
GLUCOSE SERPL-MCNC: 130 MG/DL (ref 65–100)
HCT VFR BLD AUTO: 29.1 % (ref 35.8–46.3)
HGB BLD-MCNC: 9.7 G/DL (ref 11.7–15.4)
IMM GRANULOCYTES # BLD AUTO: 0.1 K/UL (ref 0–0.5)
IMM GRANULOCYTES NFR BLD AUTO: 4 % (ref 0–5)
IRON SATN MFR SERPL: 24 %
IRON SERPL-MCNC: 63 UG/DL (ref 35–150)
LYMPHOCYTES # BLD: 0.8 K/UL (ref 0.5–4.6)
LYMPHOCYTES NFR BLD: 20 % (ref 13–44)
MAGNESIUM SERPL-MCNC: 2.3 MG/DL (ref 1.8–2.4)
MCH RBC QN AUTO: 29.1 PG (ref 26.1–32.9)
MCHC RBC AUTO-ENTMCNC: 33.3 G/DL (ref 31.4–35)
MCV RBC AUTO: 87.4 FL (ref 82–102)
MONOCYTES # BLD: 0.7 K/UL (ref 0.1–1.3)
MONOCYTES NFR BLD: 18 % (ref 4–12)
NEUTS SEG # BLD: 2.2 K/UL (ref 1.7–8.2)
NEUTS SEG NFR BLD: 55 % (ref 43–78)
NRBC # BLD: 0.02 K/UL (ref 0–0.2)
PLATELET # BLD AUTO: 174 K/UL (ref 150–450)
PMV BLD AUTO: 9.3 FL (ref 9.4–12.3)
POTASSIUM SERPL-SCNC: 3.6 MMOL/L (ref 3.5–5.1)
PROT SERPL-MCNC: 6.9 G/DL (ref 6.3–8.2)
RBC # BLD AUTO: 3.33 M/UL (ref 4.05–5.2)
SODIUM SERPL-SCNC: 142 MMOL/L (ref 133–143)
T3 SERPL-MCNC: 0.88 NG/ML (ref 0.6–1.81)
T4 FREE SERPL-MCNC: 0.8 NG/DL (ref 0.78–1.4)
TIBC SERPL-MCNC: 261 UG/DL (ref 250–450)
TSH, 3RD GENERATION: 1.88 UIU/ML (ref 0.36–3)
WBC # BLD AUTO: 4 K/UL (ref 4.3–11.1)

## 2022-12-27 PROCEDURE — 84439 ASSAY OF FREE THYROXINE: CPT

## 2022-12-27 PROCEDURE — 82728 ASSAY OF FERRITIN: CPT

## 2022-12-27 PROCEDURE — 2580000003 HC RX 258: Performed by: INTERNAL MEDICINE

## 2022-12-27 PROCEDURE — 85025 COMPLETE CBC W/AUTO DIFF WBC: CPT

## 2022-12-27 PROCEDURE — 84480 ASSAY TRIIODOTHYRONINE (T3): CPT

## 2022-12-27 PROCEDURE — 2580000003 HC RX 258: Performed by: NURSE PRACTITIONER

## 2022-12-27 PROCEDURE — 6360000002 HC RX W HCPCS: Performed by: NURSE PRACTITIONER

## 2022-12-27 PROCEDURE — 96375 TX/PRO/DX INJ NEW DRUG ADDON: CPT

## 2022-12-27 PROCEDURE — 99214 OFFICE O/P EST MOD 30 MIN: CPT | Performed by: NURSE PRACTITIONER

## 2022-12-27 PROCEDURE — 84443 ASSAY THYROID STIM HORMONE: CPT

## 2022-12-27 PROCEDURE — 36591 DRAW BLOOD OFF VENOUS DEVICE: CPT

## 2022-12-27 PROCEDURE — 80053 COMPREHEN METABOLIC PANEL: CPT

## 2022-12-27 PROCEDURE — 83540 ASSAY OF IRON: CPT

## 2022-12-27 PROCEDURE — 96413 CHEMO IV INFUSION 1 HR: CPT

## 2022-12-27 PROCEDURE — 96411 CHEMO IV PUSH ADDL DRUG: CPT

## 2022-12-27 PROCEDURE — 83735 ASSAY OF MAGNESIUM: CPT

## 2022-12-27 PROCEDURE — 82248 BILIRUBIN DIRECT: CPT

## 2022-12-27 RX ORDER — DOXORUBICIN HYDROCHLORIDE 2 MG/ML
60 INJECTION, SOLUTION INTRAVENOUS ONCE
Status: COMPLETED | OUTPATIENT
Start: 2022-12-27 | End: 2022-12-27

## 2022-12-27 RX ORDER — SODIUM CHLORIDE 9 MG/ML
5-250 INJECTION, SOLUTION INTRAVENOUS PRN
Status: CANCELLED | OUTPATIENT
Start: 2022-12-27

## 2022-12-27 RX ORDER — HEPARIN SODIUM (PORCINE) LOCK FLUSH IV SOLN 100 UNIT/ML 100 UNIT/ML
500 SOLUTION INTRAVENOUS PRN
Status: CANCELLED | OUTPATIENT
Start: 2022-12-27

## 2022-12-27 RX ORDER — MEPERIDINE HYDROCHLORIDE 50 MG/ML
12.5 INJECTION INTRAMUSCULAR; INTRAVENOUS; SUBCUTANEOUS PRN
Status: CANCELLED | OUTPATIENT
Start: 2022-12-27

## 2022-12-27 RX ORDER — SODIUM CHLORIDE 0.9 % (FLUSH) 0.9 %
5-40 SYRINGE (ML) INJECTION PRN
Status: DISCONTINUED | OUTPATIENT
Start: 2022-12-27 | End: 2022-12-28 | Stop reason: HOSPADM

## 2022-12-27 RX ORDER — ONDANSETRON 2 MG/ML
8 INJECTION INTRAMUSCULAR; INTRAVENOUS ONCE
Status: CANCELLED | OUTPATIENT
Start: 2022-12-27 | End: 2022-12-27

## 2022-12-27 RX ORDER — ALBUTEROL SULFATE 90 UG/1
4 AEROSOL, METERED RESPIRATORY (INHALATION) PRN
Status: CANCELLED | OUTPATIENT
Start: 2022-12-27

## 2022-12-27 RX ORDER — SODIUM CHLORIDE 9 MG/ML
5-250 INJECTION, SOLUTION INTRAVENOUS PRN
Status: DISCONTINUED | OUTPATIENT
Start: 2022-12-27 | End: 2022-12-28 | Stop reason: HOSPADM

## 2022-12-27 RX ORDER — EPINEPHRINE 1 MG/ML
0.3 INJECTION, SOLUTION, CONCENTRATE INTRAVENOUS PRN
Status: CANCELLED | OUTPATIENT
Start: 2022-12-27

## 2022-12-27 RX ORDER — ACETAMINOPHEN 325 MG/1
650 TABLET ORAL
Status: CANCELLED | OUTPATIENT
Start: 2022-12-27

## 2022-12-27 RX ORDER — ONDANSETRON 2 MG/ML
8 INJECTION INTRAMUSCULAR; INTRAVENOUS ONCE
Status: COMPLETED | OUTPATIENT
Start: 2022-12-27 | End: 2022-12-27

## 2022-12-27 RX ORDER — ONDANSETRON 2 MG/ML
8 INJECTION INTRAMUSCULAR; INTRAVENOUS
Status: CANCELLED | OUTPATIENT
Start: 2022-12-27

## 2022-12-27 RX ORDER — SODIUM CHLORIDE 9 MG/ML
INJECTION, SOLUTION INTRAVENOUS CONTINUOUS
Status: CANCELLED | OUTPATIENT
Start: 2022-12-27

## 2022-12-27 RX ORDER — FAMOTIDINE 10 MG/ML
20 INJECTION, SOLUTION INTRAVENOUS
Status: CANCELLED | OUTPATIENT
Start: 2022-12-27

## 2022-12-27 RX ORDER — DIPHENHYDRAMINE HYDROCHLORIDE 50 MG/ML
50 INJECTION INTRAMUSCULAR; INTRAVENOUS
Status: CANCELLED | OUTPATIENT
Start: 2022-12-27

## 2022-12-27 RX ORDER — SODIUM CHLORIDE 9 MG/ML
5-40 INJECTION INTRAVENOUS PRN
Status: CANCELLED | OUTPATIENT
Start: 2022-12-27

## 2022-12-27 RX ORDER — SODIUM CHLORIDE 0.9 % (FLUSH) 0.9 %
5-40 SYRINGE (ML) INJECTION PRN
Status: CANCELLED | OUTPATIENT
Start: 2022-12-27

## 2022-12-27 RX ORDER — DOXORUBICIN HYDROCHLORIDE 2 MG/ML
60 INJECTION, SOLUTION INTRAVENOUS ONCE
Status: CANCELLED | OUTPATIENT
Start: 2022-12-27 | End: 2022-12-27

## 2022-12-27 RX ADMIN — SODIUM CHLORIDE 125 ML/HR: 9 INJECTION, SOLUTION INTRAVENOUS at 14:46

## 2022-12-27 RX ADMIN — CYCLOPHOSPHAMIDE 1080 MG: 1 INJECTION, POWDER, FOR SOLUTION INTRAVENOUS; ORAL at 15:12

## 2022-12-27 RX ADMIN — DOXORUBICIN HYDROCHLORIDE 108 MG: 2 INJECTION, SOLUTION INTRAVENOUS at 15:06

## 2022-12-27 RX ADMIN — DEXAMETHASONE SODIUM PHOSPHATE 12 MG: 4 INJECTION, SOLUTION INTRAMUSCULAR; INTRAVENOUS at 14:30

## 2022-12-27 RX ADMIN — SODIUM CHLORIDE, PRESERVATIVE FREE 10 ML: 5 INJECTION INTRAVENOUS at 13:00

## 2022-12-27 RX ADMIN — SODIUM CHLORIDE, PRESERVATIVE FREE 10 ML: 5 INJECTION INTRAVENOUS at 15:50

## 2022-12-27 RX ADMIN — ONDANSETRON 8 MG: 2 INJECTION INTRAMUSCULAR; INTRAVENOUS at 14:28

## 2022-12-27 ASSESSMENT — ENCOUNTER SYMPTOMS
BLOOD IN STOOL: 0
ABDOMINAL PAIN: 0
ABDOMINAL DISTENTION: 0
CONSTIPATION: 1
NAUSEA: 0
SCLERAL ICTERUS: 0
SORE THROAT: 0
CHEST TIGHTNESS: 0
DIARRHEA: 0
SHORTNESS OF BREATH: 0
WHEEZING: 0
VOICE CHANGE: 0
TROUBLE SWALLOWING: 0
VOMITING: 0
HEMOPTYSIS: 0

## 2022-12-27 ASSESSMENT — PATIENT HEALTH QUESTIONNAIRE - PHQ9
1. LITTLE INTEREST OR PLEASURE IN DOING THINGS: 0
SUM OF ALL RESPONSES TO PHQ QUESTIONS 1-9: 0
2. FEELING DOWN, DEPRESSED OR HOPELESS: 0
SUM OF ALL RESPONSES TO PHQ QUESTIONS 1-9: 0
SUM OF ALL RESPONSES TO PHQ QUESTIONS 1-9: 0
SUM OF ALL RESPONSES TO PHQ9 QUESTIONS 1 & 2: 0
SUM OF ALL RESPONSES TO PHQ QUESTIONS 1-9: 0

## 2022-12-27 NOTE — PROGRESS NOTES
Patient arrived to port lab for port access and lab draw. Port accessed and labs drawn per protocol. Port remains accessed. Patient discharged from port lab ambulatory.

## 2022-12-27 NOTE — PATIENT INSTRUCTIONS
- Start Colace and Miralax once or twice a day for constipation.     - Iron labs were checked due to anemia and look [facility] Outpatient Visit on 12/27/2022   Component Date Value Ref Range Status    WBC 12/27/2022 4.0 (A)  4.3 - 11.1 K/uL Final    RBC 12/27/2022 3.33 (A)  4.05 - 5.2 M/uL Final    Hemoglobin 12/27/2022 9.7 (A)  11.7 - 15.4 g/dL Final    Hematocrit 12/27/2022 29.1 (A)  35.8 - 46.3 % Final    MCV 12/27/2022 87.4  82.0 - 102.0 FL Final    MCH 12/27/2022 29.1  26.1 - 32.9 PG Final    MCHC 12/27/2022 33.3  31.4 - 35.0 g/dL Final    RDW 12/27/2022 14.3  11.9 - 14.6 % Final    Platelets 47/61/4622 174  150 - 450 K/uL Final    MPV 12/27/2022 9.3 (A)  9.4 - 12.3 FL Final    nRBC 12/27/2022 0.02  0.0 - 0.2 K/uL Final    **Note: Absolute NRBC parameter is now reported with Hemogram**    Differential Type 12/27/2022 AUTOMATED    Final    Seg Neutrophils 12/27/2022 55  43 - 78 % Final    Lymphocytes 12/27/2022 20  13 - 44 % Final    Monocytes 12/27/2022 18 (A)  4.0 - 12.0 % Final    Eosinophils % 12/27/2022 3  0.5 - 7.8 % Final    Basophils 12/27/2022 1  0.0 - 2.0 % Final    Immature Granulocytes 12/27/2022 4  0.0 - 5.0 % Final    Segs Absolute 12/27/2022 2.2  1.7 - 8.2 K/UL Final    Absolute Lymph # 12/27/2022 0.8  0.5 - 4.6 K/UL Final    Absolute Mono # 12/27/2022 0.7  0.1 - 1.3 K/UL Final    Absolute Eos # 12/27/2022 0.1  0.0 - 0.8 K/UL Final    Basophils Absolute 12/27/2022 0.0  0.0 - 0.2 K/UL Final    Absolute Immature Granulocyte 12/27/2022 0.1  0.0 - 0.5 K/UL Final    Sodium 12/27/2022 142  133 - 143 mmol/L Final    Potassium 12/27/2022 3.6  3.5 - 5.1 mmol/L Final    Chloride 12/27/2022 108  101 - 110 mmol/L Final    CO2 12/27/2022 28  21 - 32 mmol/L Final    Anion Gap 12/27/2022 6  2 - 11 mmol/L Final    Glucose 12/27/2022 130 (A)  65 - 100 mg/dL Final    BUN 12/27/2022 7 (A)  8 - 23 MG/DL Final    Creatinine 12/27/2022 0.80  0.6 - 1.0 MG/DL Final    Est, Glom Filt Rate 12/27/2022 >60  >60 ml/min/1.73m2 Final    Comment:      Pediatric calculator link: [email or web address]       These results are not intended for use in patients <25years of age. eGFR results are calculated without a race factor using  the 2021 CKD-EPI equation. Careful clinical correlation is recommended, particularly when comparing to results calculated using previous equations. The CKD-EPI equation is less accurate in patients with extremes of muscle mass, extra-renal metabolism of creatinine, excessive creatine ingestion, or following therapy that affects renal tubular secretion. Calcium 12/27/2022 9.3  8.3 - 10.4 MG/DL Final    Total Bilirubin 12/27/2022 0.3  0.2 - 1.1 MG/DL Final    ALT 12/27/2022 19  12 - 65 U/L Final    AST 12/27/2022 13 (A)  15 - 37 U/L Final    Alk Phosphatase 12/27/2022 107  50 - 136 U/L Final    Total Protein 12/27/2022 6.9  6.3 - 8.2 g/dL Final    Albumin 12/27/2022 3.6  3.2 - 4.6 g/dL Final    Globulin 12/27/2022 3.3  2.8 - 4.5 g/dL Final    Albumin/Globulin Ratio 12/27/2022 1.1  0.4 - 1.6   Final    Magnesium 12/27/2022 2.3  1.8 - 2.4 mg/dL Final    Bilirubin, Direct 12/27/2022 <0.1  <0.4 MG/DL Final    T4 Free 12/27/2022 0.8  0.78 - 1.4 NG/DL Final    Iron 12/27/2022 63  35 - 150 ug/dL Final    Comment: Known Interfering Substances section:  \"Iron values may be falsely elevated in  serum samples from patients with  anticoagulants (e.g., hemodialysis patients). \"  Limitations of Procedure section:  \"Turbidity resulting from precipitation of  fibrinogen in the serum of patients treated  with anticoagulants (e.g. hemodialysis  patients) may cause spuriously elevated  iron results. \"      TIBC 12/27/2022 261  250 - 450 ug/dL Final    TRANSFERRIN SATURATION 12/27/2022 24  >20 % Final    Ferritin 12/27/2022 388  8 - 388 NG/ML Final

## 2022-12-27 NOTE — PROGRESS NOTES
Patient arrived to [facility] for Donald/Cytoxan. Assessment completed. No needs voiced at this time. Patient tolerated infusion well and is aware of next appointment on 12/28/2022 @0084. Patient discharged ambulatory with spouse.

## 2022-12-27 NOTE — PROGRESS NOTES
[facility] Hematology and Oncology: Established patient - follow up     Chief Complaint   Patient presents with    Follow-up     Reason for Referral: Breast cancer  Referring Provider: Self-referral   Primary Care Provider: Thedore Peabody, APRN - CNP  Family History of Cancer/Hematologic Disorders: Family history is significant for two sisters and a niece with breast cancer. Presenting Symptoms: Abnormal routine screening mammogram     History of Present Illness:  Ms. Gretchen Clemons is a 57 y.o. female who presents today for follow up regarding breast cancer. The past medical history is significant for multiple thyroid nodules, hyperthyroidism, hypercholesterolemia, Grave's disease, GERD, uterine fibroids,  section x 2, cyst removal, and hysterectomy. She initially presented for a routine screening mammogram on 8/10/22 which identified a spiculated equal density mass in the left breast inferior medial quadrant posterior depth. Further evaluation with limited ultrasound of the left breast on 2022 confirmed an 8 x 7 x 8 mm round, hypoechoic mass with spiculated margins at the 9:00 position in the left breast, 6 cm from the nipple, demonstrating mild posterior acoustic enhancement and corresponding to the mass seen mammographically. US- guided biopsy of the 9:00 left breast mass was performed on 22 with pathology revealing ER/MA/HER-2 negative, grade 3, invasive carcinoma with high-grade DCIS focally present and no microcalcifications or lymphovascular space extension identified. Testing so far has been done at [facility]; however, Ms. Gretchen Clemons wishes to transfer care to [facility]. She is self-referred to [facility] for Oncology evaluation and treatment of newly diagnosed breast cancer. She has also been referred to surgery and is scheduled for initial surgical evaluation with Dr. Ronak Delgado, on 22.    Patient's daughter lives in [place], works at a hospital.    At consultation, we discussed the pathophysiology of breast cancer, staging, and the importance of receptor status in terms of treatment options. We then reviewed her medical history as well as oncologic history, recent imaging and pathology in detail. Next steps in management were reviewed. She was here with her . Fu after MRI - Results reviewed in detail and show evidence of disease in the breast but no worrisome findings in terms of lymphadenopathy or other evidence of disease. She was emotional and wished to proceed with surgery upfront. We discussed need for chemotherapy because of triple negative status and she wishes to proceed with surgery upfront accepting risks. Pathology from surgery will determine ultimate staging and treatment plan. She completed surgery and we discussed her pathology in detail. Her tumor was 15 mm and 0 out of 2 lymph nodes. This is pathological stage Ib. We discussed her high risk features triple negative breast cancer and grade 2-3. We discussed role of chemotherapy and recommendations of dose dense ACT versus TC. She chose ACT. Will omit Emend with following cycles as she did have a reaction to this drug. She is here today for follow up and consideration of C4 of ddAC. She has been well overall. She did have increased fatigue after this cycle but still managing okay. UTI/prior yeast infection have resolved. She is taking zofran for constipation and working well. She does have constipation, may be r/t zofran - recommended stool softeners/Miralax. She had x1 tingling in her right hand but feels was r/t her sleeping position - resolved and no other neuropathy noted. She denies any shortness of breath. She denies any known bleeding. She has no pain with G-CSF, taking Claritin. She denies any fevers or other infectious symptoms. Iron labs checked d/t anemia and adequate.             Chronological Events:   BILATERAL DIGITAL SCREENING MAMMOGRAM 3D/2D: 8/10/2022   FINDINGS: There are scattered areas of fibroglandular density (ACR Breast Density Composition Category B). Digital mammography views were performed including tomosynthesis. There is a spiculated equal density mass in the left breast inferior medial quadrant posterior depth. No other significant masses, calcifications, or other findings are seen in either breast.     IMPRESSION: INCOMPLETE NEEDS ADDITIONAL IMAGING EVALUATION   The spiculated mass in the left breast is indeterminate. Ultrasound with possible additional views are recommended. There is no mammographic evidence of malignancy in the right breast. Patient will be notified of the results. LIMITED ULTRASOUND OF LEFT BREAST: 8/17/2022  FINDINGS: There is an 8 x 7 x 8 mm round, hypoechoic mass with spiculated margins at 9:00, 6 cm from the nipple. It demonstrates mild posterior acoustic enhancement. This corresponds to the mass seen mammographically. The left axilla is negative. IMPRESSION: SUSPICIOUS OF MALIGNANCY   The 8 mm mass at 9:00, 6 cm FN is suspicious and ultrasound-guided biopsy is recommended. These findings and recommendations were discussed with the patient at the time of her exam.  Her biopsy is scheduled for 8/25/2022 at 2:00 pm.      Evaristo 8/25/22 9/1/22 heme/onc consultation   9/9/22 FU after MRI - pt elected to p/w sx upfront; MRI results reviewed. 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 10/19/22 post op with Dr Enzo Diaz - drain removed   10/24/22 FU after sx - discussed adjuvant chemotherapy for TNBC.  11/14/22 Cycle 1 ddAC. EF 60-65%. 11/28/22  Surendra 2 ddAC.  Tolerated well.   12/12/22 FU C3 ddAC - tolerating well   12/27/22 FU C4 ddAC        Family History   Problem Relation Age of Onset    Coronary Art Dis Mother     Breast Cancer Sister     Breast Cancer med    Graves' disease     Hypercholesterolemia     Hyperthyroidism     Multiple thyroid nodules     followed by dr Renee Fitzgerald     Past Surgical History:   Procedure Laterality Date    BREAST BIOPSY Left 10/7/2022    LEFT SENTINEL LYMPH NODE BIOPSY PREOP @ 0900, LYMPHO @ 5925, SX @ 4076 performed by Christiano Rowell DO at [facility] [address] Left 2022    bx     SECTION      x2    CYST REMOVAL  2021    subcutaneous cyst from lateral neck (benign)    HYSTERECTOMY (CERVIX STATUS UNKNOWN)      fibroids (ovaries intact)    IR PORT PLACEMENT EQUAL OR GREATER THAN 5 YEARS  2022    IR PORT PLACEMENT EQUAL OR GREATER THAN 5 YEARS 2022 [facility] SPECIALS    MASTECTOMY Left 10/7/2022    LEFT BREAST MASTECTOMY performed by Christiano Rowell DO at [facility] MAIN OR     Current Outpatient Medications   Medication Sig Dispense Refill    Loratadine (CLARITIN PO) Take by mouth      lidocaine-prilocaine (EMLA) 2.5-2.5 % cream Apply topically weekly as needed prior to port access.  30 g 2    ondansetron (ZOFRAN) 8 MG tablet Take 1 tablet by mouth every 8 hours as needed for Nausea or Vomiting 60 tablet 2    prochlorperazine (COMPAZINE) 10 MG tablet Take 1 tablet by mouth every 6 hours as needed (nausea vomiting chemo) 60 tablet 2    Mastectomy Bra MISC by Does not apply route Mastectomy bra + prosthesis 1 each 2    Multiple Vitamin (MULTI-VITAMIN DAILY PO) Take 1 tablet by mouth daily      mirtazapine (REMERON) 15 MG tablet Take 0.5 tablets by mouth nightly (Patient taking differently: Take 7.5 mg by mouth as needed) 30 tablet 3    rosuvastatin (CRESTOR) 20 MG tablet Take 1 tablet by mouth at bedtime 90 tablet 3    methIMAzole (TAPAZOLE) 5 MG tablet Take 0.5 tablets by mouth daily 15 tablet 11    omeprazole (PRILOSEC) 20 MG delayed release capsule Take 20 mg by mouth as needed PRN      Red Yeast Rice Extract 600 MG CAPS Take 600 mg by mouth daily      meloxicam (MOBIC) 7.5 MG tablet Take 7.5 mg by mouth daily (Patient not taking: No sig reported)      cephALEXin (KEFLEX) 250 MG capsule Take 1 capsule by mouth 3 times daily (Patient not taking: No sig reported) 15 capsule 0    SUTAB 6992-912-171 MG TABS TAKE AS DIRECTED (Patient not taking: No sig reported)      Biotin 1000 MCG TABS Take 1 tablet by mouth daily (Patient not taking: No sig reported)      cyanocobalamin 2500 MCG SUBL Take 2,500 mcg by mouth daily (Patient not taking: No sig reported)       No current facility-administered medications for this visit. Facility-Administered Medications Ordered in Other Visits   Medication Dose Route Frequency Provider Last Rate Last Admin    sodium chloride flush 0.9 % injection 5-40 mL  5-40 mL IntraVENous PRN Lilliana Bonilla MD   10 mL at 12/27/22 1300    sodium chloride flush 0.9 % injection 10 mL  10 mL IntraVENous PRN Lilliana Bonilla MD           No flowsheet data found. OBJECTIVE:  /77 (Site: Right Upper Arm, Position: Sitting, Cuff Size: Medium Adult)   Pulse 100   Temp 98 °F (36.7 °C) (Oral)   Resp 21   Ht 5' 3\" (1.6 m)   Wt 161 lb 8 oz (73.3 kg)   SpO2 94%   BMI 28.61 kg/m²       ECOG PERFORMANCE STATUS - 0-Fully active, able to carry on all pre-disease performance without restriction. Pain - 0 - No pain/10. None/Minimal pain - not affecting QOL     Fatigue - No flowsheet data found. Distress - No flowsheet data found. Physical Exam  Vitals reviewed. Exam conducted with a chaperone present. Constitutional:       General: She is not in acute distress. Appearance: Normal appearance. She is not ill-appearing or toxic-appearing. HENT:      Head: Normocephalic and atraumatic. Nose: Nose normal.      Mouth/Throat:      Mouth: Mucous membranes are moist.   Eyes:      General: No scleral icterus. Extraocular Movements: Extraocular movements intact. Conjunctiva/sclera: Conjunctivae normal.      Pupils: Pupils are equal, round, and reactive to light. Cardiovascular:      Rate and Rhythm: Normal rate and regular rhythm. Heart sounds: No murmur heard. Pulmonary:      Effort: No respiratory distress. Breath sounds: No wheezing or rales. Chest:          Comments: S/p mastectomy. Abdominal:      General: There is no distension. Tenderness: There is no abdominal tenderness. There is no right CVA tenderness or guarding. Musculoskeletal:         General: Normal range of motion. Cervical back: Normal range of motion. Right lower leg: No edema. Left lower leg: No edema. Lymphadenopathy:      Cervical: No cervical adenopathy. Upper Body:      Right upper body: No supraclavicular or axillary adenopathy. Left upper body: No supraclavicular or axillary adenopathy. Skin:     General: Skin is warm and dry. Coloration: Skin is not jaundiced or pale. Findings: No rash. Neurological:      General: No focal deficit present. Mental Status: She is alert and oriented to person, place, and time. Gait: Gait normal.   Psychiatric:         Behavior: Behavior normal.         Thought Content:  Thought content normal.        Labs:  Recent Results (from the past 168 hour(s))   CBC with Auto Differential    Collection Time: 12/27/22  1:00 PM   Result Value Ref Range    WBC 4.0 (L) 4.3 - 11.1 K/uL    RBC 3.33 (L) 4.05 - 5.2 M/uL    Hemoglobin 9.7 (L) 11.7 - 15.4 g/dL    Hematocrit 29.1 (L) 35.8 - 46.3 %    MCV 87.4 82.0 - 102.0 FL    MCH 29.1 26.1 - 32.9 PG    MCHC 33.3 31.4 - 35.0 g/dL    RDW 14.3 11.9 - 14.6 %    Platelets 163 505 - 126 K/uL    MPV 9.3 (L) 9.4 - 12.3 FL    nRBC 0.02 0.0 - 0.2 K/uL    Differential Type AUTOMATED      Seg Neutrophils 55 43 - 78 %    Lymphocytes 20 13 - 44 %    Monocytes 18 (H) 4.0 - 12.0 %    Eosinophils % 3 0.5 - 7.8 %    Basophils 1 0.0 - 2.0 %    Immature Granulocytes 4 0.0 - 5.0 %    Segs Absolute 2.2 1.7 - 8.2 K/UL    Absolute Lymph # 0.8 0.5 - 4.6 K/UL    Absolute Mono # 0.7 0.1 - 1.3 K/UL    Absolute Eos # 0.1 0.0 - 0.8 K/UL    Basophils Absolute 0.0 0.0 - 0.2 K/UL    Absolute Immature Granulocyte 0.1 0.0 - 0.5 K/UL   Comprehensive Metabolic Panel    Collection Time: 12/27/22  1:00 PM   Result Value Ref Range    Sodium 142 133 - 143 mmol/L    Potassium 3.6 3.5 - 5.1 mmol/L    Chloride 108 101 - 110 mmol/L    CO2 28 21 - 32 mmol/L    Anion Gap 6 2 - 11 mmol/L    Glucose 130 (H) 65 - 100 mg/dL    BUN 7 (L) 8 - 23 MG/DL    Creatinine 0.80 0.6 - 1.0 MG/DL    Est, Glom Filt Rate >60 >60 ml/min/1.73m2    Calcium 9.3 8.3 - 10.4 MG/DL    Total Bilirubin 0.3 0.2 - 1.1 MG/DL    ALT 19 12 - 65 U/L    AST 13 (L) 15 - 37 U/L    Alk Phosphatase 107 50 - 136 U/L    Total Protein 6.9 6.3 - 8.2 g/dL    Albumin 3.6 3.2 - 4.6 g/dL    Globulin 3.3 2.8 - 4.5 g/dL    Albumin/Globulin Ratio 1.1 0.4 - 1.6     Magnesium    Collection Time: 12/27/22  1:00 PM   Result Value Ref Range    Magnesium 2.3 1.8 - 2.4 mg/dL   Bilirubin, Direct    Collection Time: 12/27/22  1:00 PM   Result Value Ref Range    Bilirubin, Direct <0.1 <0.4 MG/DL   T4, Free    Collection Time: 12/27/22  1:00 PM   Result Value Ref Range    T4 Free 0.8 0.78 - 1.4 NG/DL   Transferrin Saturation    Collection Time: 12/27/22  1:00 PM   Result Value Ref Range    Iron 63 35 - 150 ug/dL    TIBC 261 250 - 450 ug/dL    TRANSFERRIN SATURATION 24 >20 %   Ferritin    Collection Time: 12/27/22  1:00 PM   Result Value Ref Range    Ferritin 388 8 - 388 NG/ML       Imaging: reviewed     PATHOLOGY:   per above     10/2022        ASSESSMENT:     Diagnosis Orders   1. Anemia, unspecified type  Transferrin Saturation    Ferritin      2. Invasive ductal carcinoma of breast, female, left ([address]  CBC With Auto Differential    Comprehensive metabolic panel        Ms. Mohsen Martinez is here for FU of breast cancer.       Left breast IDC, 9:00, s/p core bx/clip, at GABO, poorly diff, 8mm on US, MRI pending, LVI neg, DCIS high grade focally present, ER0/PR0, Her2 sera +1 - negative - MR - Biopsied mass (11mm) in the medial 8:30 left breast at 7 cm from the nipple. No additional pathologic enhancement or adenopathy in either breast.    - s/p left mastectomy and 0/2 SLnc - [id number] (0/2) - 15mm with neg margins, grade 2   - genetics - VUS     - here for consideration of cycle 4 DD AC. She tolerated the first 3 cycles well aside from increasing fatigue.    - UTI - She was on antibiotics for UTI. Diflucan ordered for yeast infection; these are now resolved. - No neuropathy. No mucositis. Labs reviewed. No infectious symptoms. No pain with G-CSF, taking Claritin. - EF adequate prior to starting treatment - EF 60-65%. Plan echo with any ss or CHF and also after tx completed   - Anemia - added on iron labs today and adequate. - on remeron 7.5 mg nightly for sleep/appetite and this is working well.  - nausea/vomiting chemo: discussed Zofran and/or Compazine. No Emend due to prior reaction. - constipation: stool softeners, add Miralax   - continue good oral nutrition and hydration. - encouraged frequent activity throughout the day and rest as needed to combat fatigue.   - call with any fevers, uncontrolled side effects from treatment or any other worrisome/concerning symptoms. 2. Surveillance   - H&P q3-6 mo for years 1-3, q6mo years 4-5; mammogram yearly  - DEXA q2 years postmenopausal   - labs/imaging studies are not recommended without symptoms     Orders placed for: Dose-Dense AC-T (Paclitaxel Weekly) - [Doxorubicin + Cyclophosphamide q14 Days x 4 Cycles, Followed by Paclitaxel 80 mg/m² Weekly x 12 Weeks]  Cycles 1 through 4: A cycle is every 14 days:  Doxorubicin  60 mg/m² IV once on day 1 of cycles 1 through 4  Cyclophosphamide  600 mg/m² IV once on day 1 of cycles 1 through 4  Pegfilgrastim-xxxx  6 mg flat dose subcutaneously once on day 2 of cycles 1 through 4  Cycles 5 through 16:  A cycle is every 7 days:  Paclitaxel  80 mg/m² IV once on day 1 of cycles 5 through [address]: Full Support    RTC per protocol or sooner as needed     MDM      Lab studies and imaging studies were personally reviewed. Pertinent old records were reviewed. Historical:   - she is here for consultation regarding neoadjuvant chemotherapy for newly diagnosed triple negative breast cancer. During today's visit, we had a long conversation about breast cancer pathophysiology and staging in general.  We then reviewed her imaging and pathology in details, including receptor status and it's significance in terms of available treatment options. Neoadjuvant compared to adjuvant chemotherapy has similar long term outcomes when patients are given the same therapy. Preoperative treatment can render large, inoperable tumors operable and improve breast conservation, downstage tumor and decrease risk of postoperative lympedema. It also provides information about tumor chemosensitivity. Patients with pathologic complete response have favorable disease free survival and overall survival and this correlation is strongest in TNBC. Preoperative systemic therapy can lead to possible overtreatment with systemic therapy of clinical stages overestimated. It can also lead to possible under treatment local regionally with radiotherapy if clinical stage is underestimated. There is also possibility of disease progression during preoperative systemic therapy. - MRI pending - she is aware that tx plan may change depending on MR results   - she is here for FU after MRI - she has elected to p/w sx upfront. Images reviewed. Understands final path will be determined by sx specimen/LN status. Dr Naun Lopez notifed by me re pt's decision.    - we discussed options of AC-T +/- carbo/pembro and TC; she is aware that she will need chemotherapy regardless of her final decision (before/after sx), if she does chemo after, she would not be a candidate for pembro/carbo.   Data from [facility] reviewed. - pt met with Dr Enzo Diaz and elected to pw bilateral mastectomies with axillary dissection   - We discussed her path results in detail that showed 15mm TNBC. She is aware that pt's are considered high risk per GEICAM/2003-02 trial if they have node neg disease but were <35, had neg er/pr receptors, histo grade 2-3, and T>2cm. She meets two criteria for consideration of anthracycline therapy. She wishes to think this over. We discussed risks of chemotherapy incuding irriversible heart disease (including acute LV failure - we reviewed this) and also leukemia. Gave options of ddAC-T and TC with growth factor support. - of note, following standard adjuvant chemo for Q0n-3J2 or N+ TNBC, may consider metronomic capecitabine (650mg/m2 BID continuously for 1 year) as extended maintenance - SYSUCC-001 trial estimated 5-year DFS vs observation: 82.8 vs 73% (Velasquez et al 2021). - We discussed the potential side effects including but not limited to hair loss (which could be permanent), nausea, vomiting, diarrhea or constipation, mucositis, rashes, allergic reactions, organ damage including liver and kidney failure, cardiotoxicity, and direct effects on bone marrow which can lead to anemia and thrombocytopenia necessitating transfusion or neutropenia putting patient at risk for infection, sepsis and death if not treated. - echo EF 60-65%; LVI normal size and no WMA  - port placement   - decreased appetite, anxiety and sleep disturbance - on low dose mirtazapine and improved  - Rx mastectomy bra/prosthesis     All questions were asked and answered to the best of my ability. The patient verbalized understanding and agrees with the plan above.           MIR Eugene - JAZMIN  [facility] Hematology [facility] Oncology  [address] [address]  Office : [phone]  Fax : [phone]

## 2022-12-28 ENCOUNTER — PATIENT MESSAGE (OUTPATIENT)
Dept: FAMILY MEDICINE CLINIC | Facility: CLINIC | Age: 62
End: 2022-12-28

## 2022-12-28 ENCOUNTER — HOSPITAL ENCOUNTER (OUTPATIENT)
Dept: INFUSION THERAPY | Age: 62
Discharge: HOME | End: 2022-12-28
Payer: COMMERCIAL

## 2022-12-28 VITALS
SYSTOLIC BLOOD PRESSURE: 128 MMHG | HEART RATE: 84 BPM | DIASTOLIC BLOOD PRESSURE: 59 MMHG | TEMPERATURE: 98.4 F | RESPIRATION RATE: 18 BRPM

## 2022-12-28 DIAGNOSIS — C50.912 INVASIVE DUCTAL CARCINOMA OF BREAST, FEMALE, LEFT (HCC): Primary | ICD-10-CM

## 2022-12-28 PROCEDURE — 6360000002 HC RX W HCPCS: Performed by: NURSE PRACTITIONER

## 2022-12-28 PROCEDURE — 96372 THER/PROPH/DIAG INJ SC/IM: CPT

## 2022-12-28 RX ADMIN — PEGFILGRASTIM-CBQV 6 MG: 6 INJECTION, SOLUTION SUBCUTANEOUS at 15:55

## 2022-12-28 NOTE — PROGRESS NOTES
Arrived to the [facility]. Udenyca injection completed. Provided education on same    Patient instructed to report any side affects to ordering provider. Patient tolerated well. Any issues or concerns during appointment: none. Patient aware of next infusion appointment on 01/10/2023 (date) at 36 (time). Discharged ambulatory.

## 2022-12-29 ENCOUNTER — OFFICE VISIT (OUTPATIENT)
Dept: FAMILY MEDICINE CLINIC | Facility: CLINIC | Age: 62
End: 2022-12-29
Payer: COMMERCIAL

## 2022-12-29 VITALS
HEIGHT: 63 IN | DIASTOLIC BLOOD PRESSURE: 68 MMHG | WEIGHT: 160 LBS | SYSTOLIC BLOOD PRESSURE: 120 MMHG | BODY MASS INDEX: 28.35 KG/M2 | HEART RATE: 101 BPM | RESPIRATION RATE: 16 BRPM | TEMPERATURE: 97.7 F | OXYGEN SATURATION: 99 %

## 2022-12-29 DIAGNOSIS — N30.00 ACUTE CYSTITIS WITHOUT HEMATURIA: Primary | ICD-10-CM

## 2022-12-29 LAB
BACTERIA URINE, POC: NEGATIVE
BILIRUBIN, URINE, POC: NEGATIVE
BLOOD URINE, POC: NEGATIVE
CASTS URINE, POC: NEGATIVE
EPI CELLS URINE, POC: NEGATIVE
GLUCOSE URINE, POC: NEGATIVE
KETONES, URINE, POC: NEGATIVE
LEUKOCYTE ESTERASE, URINE, POC: NEGATIVE
NITRITE, URINE, POC: NEGATIVE
PH, URINE, POC: 6 (ref 4.6–8)
PROTEIN,URINE, POC: NEGATIVE
RBC, URINE, POC: ABNORMAL
SPECIFIC GRAVITY, URINE, POC: 1.01 (ref 1–1.03)
TRICHOMONAS URINE, POC: NEGATIVE
URINALYSIS CLARITY, POC: CLEAR
URINALYSIS COLOR, POC: ABNORMAL
UROBILINOGEN, POC: ABNORMAL
WBC, URINE, POC: ABNORMAL
YEAST, URINE, POC: NEGATIVE

## 2022-12-29 PROCEDURE — 81000 URINALYSIS NONAUTO W/SCOPE: CPT | Performed by: NURSE PRACTITIONER

## 2022-12-29 PROCEDURE — 99213 OFFICE O/P EST LOW 20 MIN: CPT | Performed by: NURSE PRACTITIONER

## 2022-12-29 RX ORDER — NITROFURANTOIN 25; 75 MG/1; MG/1
100 CAPSULE ORAL 2 TIMES DAILY
Qty: 10 CAPSULE | Refills: 0 | Status: SHIPPED | OUTPATIENT
Start: 2022-12-29 | End: 2023-01-03

## 2022-12-29 RX ORDER — FLUCONAZOLE 150 MG/1
150 TABLET ORAL WEEKLY
Qty: 2 TABLET | Refills: 0 | Status: SHIPPED | OUTPATIENT
Start: 2022-12-29 | End: 2023-01-06

## 2022-12-29 ASSESSMENT — PATIENT HEALTH QUESTIONNAIRE - PHQ9
SUM OF ALL RESPONSES TO PHQ QUESTIONS 1-9: 0
2. FEELING DOWN, DEPRESSED OR HOPELESS: 0
SUM OF ALL RESPONSES TO PHQ9 QUESTIONS 1 & 2: 0
1. LITTLE INTEREST OR PLEASURE IN DOING THINGS: 0
SUM OF ALL RESPONSES TO PHQ QUESTIONS 1-9: 0

## 2022-12-29 ASSESSMENT — ENCOUNTER SYMPTOMS
EYE DISCHARGE: 0
NAUSEA: 0
BLOOD IN STOOL: 0
COUGH: 0
DIARRHEA: 0
WHEEZING: 0
SHORTNESS OF BREATH: 0
EYE PAIN: 0
RHINORRHEA: 0
VOMITING: 0
ABDOMINAL PAIN: 0
CONSTIPATION: 0
CHEST TIGHTNESS: 0

## 2022-12-29 NOTE — PROGRESS NOTES
Brynn Chavez (:  1960) is a 57 y.o. female,Established patient, here for evaluation of the following chief complaint(s):  Urinary Tract Infection         ASSESSMENT/PLAN:  1. Acute cystitis without hematuria  -     nitrofurantoin, macrocrystal-monohydrate, (MACROBID) 100 MG capsule; Take 1 capsule by mouth 2 times daily for 5 days, Disp-10 capsule, R-0Normal  -     fluconazole (DIFLUCAN) 150 MG tablet; Take 1 tablet by mouth once a week for 2 doses, Disp-2 tablet, R-0Normal  -     Culture, Urine; Future    Increase fluids to 2 liters daily of mostly water. Monitor temperature. Return if no better in 48 hours. To ER with high fever greater than 103, vomiting, abdominal pain, diarrhea, dehydration, not voiding greater than 6 hours, severe back or flank pain. Return in about 5 days (around 1/3/2023), or if symptoms worsen or fail to improve. Subjective   SUBJECTIVE/OBJECTIVE:  Dysuria. Has been present for 2 days. Was told by oncologist that she may see blood in her urine. Has been drinking a lot of water. Denies fever. Denies flank pr pelvic pain. Has taken her last strong chemo so she is hoping that her symptoms will resolve. Dysuria   This is a new problem. The current episode started yesterday. The problem has been unchanged. The quality of the pain is described as aching and burning. There has been no fever. There is No history of pyelonephritis. Associated symptoms include hematuria. Pertinent negatives include no chills, discharge, flank pain, frequency, nausea, urgency or vomiting. She has tried increased fluids for the symptoms. The treatment provided no relief.      Allergies   Allergen Reactions    Emend [Fosaprepitant Dimeglumine] Anaphylaxis     Flushing, coughing    Hydrocodone Anxiety and Other (See Comments)     Depressive state     Current Outpatient Medications   Medication Sig    nitrofurantoin, macrocrystal-monohydrate, (MACROBID) 100 MG capsule Take 1 capsule by mouth 2 times daily for 5 days    fluconazole (DIFLUCAN) 150 MG tablet Take 1 tablet by mouth once a week for 2 doses    Loratadine (CLARITIN PO) Take by mouth    lidocaine-prilocaine (EMLA) 2.5-2.5 % cream Apply topically weekly as needed prior to port access. ondansetron (ZOFRAN) 8 MG tablet Take 1 tablet by mouth every 8 hours as needed for Nausea or Vomiting    prochlorperazine (COMPAZINE) 10 MG tablet Take 1 tablet by mouth every 6 hours as needed (nausea vomiting chemo)    Mastectomy Bra MISC by Does not apply route Mastectomy bra + prosthesis    Multiple Vitamin (MULTI-VITAMIN DAILY PO) Take 1 tablet by mouth daily    mirtazapine (REMERON) 15 MG tablet Take 0.5 tablets by mouth nightly (Patient taking differently: Take 7.5 mg by mouth as needed)    rosuvastatin (CRESTOR) 20 MG tablet Take 1 tablet by mouth at bedtime    methIMAzole (TAPAZOLE) 5 MG tablet Take 0.5 tablets by mouth daily    omeprazole (PRILOSEC) 20 MG delayed release capsule Take 20 mg by mouth as needed PRN    Red Yeast Rice Extract 600 MG CAPS Take 600 mg by mouth daily     No current facility-administered medications for this visit. Facility-Administered Medications Ordered in Other Visits   Medication Dose Route Frequency    sodium chloride flush 0.9 % injection 10 mL  10 mL IntraVENous PRN           Review of Systems   Constitutional:  Negative for chills, fatigue and fever. HENT:  Negative for congestion, hearing loss, postnasal drip and rhinorrhea. Eyes:  Negative for pain, discharge and visual disturbance. Respiratory:  Negative for cough, chest tightness, shortness of breath and wheezing. Cardiovascular:  Negative for chest pain, palpitations and leg swelling. Gastrointestinal:  Negative for abdominal pain, blood in stool, constipation, diarrhea, nausea and vomiting. Genitourinary:  Positive for dysuria and hematuria.  Negative for difficulty urinating, flank pain, frequency, pelvic pain, urgency and vaginal discharge. Musculoskeletal:  Negative for arthralgias, gait problem and myalgias. Skin:  Negative for rash. Followed by oncology for breast cancer. Actively receiving chemo. Neurological:  Negative for dizziness, tremors, seizures and headaches. Psychiatric/Behavioral:  Negative for decreased concentration and sleep disturbance. The patient is not nervous/anxious. /68   Pulse (!) 101   Temp 97.7 °F (36.5 °C) (Tympanic)   Resp 16   Ht 5' 3\" (1.6 m)   Wt 160 lb (72.6 kg)   SpO2 99%   BMI 28.34 kg/m²       Objective   Physical Exam  Constitutional:       Appearance: Normal appearance. She is normal weight. HENT:      Head: Normocephalic and atraumatic. Right Ear: Tympanic membrane, ear canal and external ear normal.      Left Ear: Tympanic membrane, ear canal and external ear normal.      Nose: Nose normal.      Mouth/Throat:      Mouth: Mucous membranes are moist.   Eyes:      Extraocular Movements: Extraocular movements intact. Conjunctiva/sclera: Conjunctivae normal.      Pupils: Pupils are equal, round, and reactive to light. Cardiovascular:      Rate and Rhythm: Normal rate and regular rhythm. Pulses: Normal pulses. Heart sounds: Normal heart sounds. Pulmonary:      Effort: Pulmonary effort is normal.      Breath sounds: Normal breath sounds. Abdominal:      General: Bowel sounds are normal.      Tenderness: There is no right CVA tenderness or left CVA tenderness. Musculoskeletal:         General: Normal range of motion. Cervical back: Normal range of motion. Skin:     General: Skin is warm and dry. Neurological:      General: No focal deficit present. Mental Status: She is alert and oriented to person, place, and time. Psychiatric:         Mood and Affect: Mood normal.         Behavior: Behavior normal.         Thought Content:  Thought content normal.         Judgment: Judgment normal.      PHQ-9 Total Score: 0 (12/29/2022 2:04 PM)  Body mass index is 28.34 kg/m². On this date 12/29/2022 I have spent 25 minutes reviewing previous notes, test results and face to face with the patient discussing the diagnosis and importance of compliance with the treatment plan as well as documenting on the day of the visit. An electronic signature was used to authenticate this note.     --Gwendolyn Castleman, APRN - CNP

## 2022-12-29 NOTE — TELEPHONE ENCOUNTER
From: Jaime Chavez  To: Germania Marie  Sent: 12/28/2022 9:58 AM EST  Subject: Urinary    I had my chemo yesterday and today when I urinate it burns. Is the UTI back?

## 2023-01-01 LAB
BACTERIA SPEC CULT: NORMAL
SERVICE CMNT-IMP: NORMAL

## 2023-01-09 DIAGNOSIS — C50.912 INVASIVE DUCTAL CARCINOMA OF BREAST, FEMALE, LEFT (HCC): Primary | ICD-10-CM

## 2023-01-10 ENCOUNTER — OFFICE VISIT (OUTPATIENT)
Dept: ONCOLOGY | Age: 63
End: 2023-01-10
Payer: COMMERCIAL

## 2023-01-10 ENCOUNTER — HOSPITAL ENCOUNTER (OUTPATIENT)
Dept: INFUSION THERAPY | Age: 63
Discharge: HOME | End: 2023-01-10
Payer: COMMERCIAL

## 2023-01-10 VITALS
RESPIRATION RATE: 14 BRPM | HEIGHT: 63 IN | BODY MASS INDEX: 28.05 KG/M2 | WEIGHT: 158.3 LBS | OXYGEN SATURATION: 100 % | HEART RATE: 103 BPM | DIASTOLIC BLOOD PRESSURE: 79 MMHG | SYSTOLIC BLOOD PRESSURE: 136 MMHG | TEMPERATURE: 98.4 F

## 2023-01-10 VITALS — SYSTOLIC BLOOD PRESSURE: 122 MMHG | OXYGEN SATURATION: 100 % | DIASTOLIC BLOOD PRESSURE: 72 MMHG | HEART RATE: 79 BPM

## 2023-01-10 DIAGNOSIS — C50.912 INVASIVE DUCTAL CARCINOMA OF BREAST, FEMALE, LEFT (HCC): Primary | ICD-10-CM

## 2023-01-10 DIAGNOSIS — C50.912 INVASIVE DUCTAL CARCINOMA OF BREAST, FEMALE, LEFT (HCC): ICD-10-CM

## 2023-01-10 DIAGNOSIS — D64.9 ANEMIA, UNSPECIFIED TYPE: ICD-10-CM

## 2023-01-10 LAB
ALBUMIN SERPL-MCNC: 3.4 G/DL (ref 3.2–4.6)
ALBUMIN/GLOB SERPL: 1 (ref 0.4–1.6)
ALP SERPL-CCNC: 98 U/L (ref 50–136)
ALT SERPL-CCNC: 18 U/L (ref 12–65)
ANION GAP SERPL CALC-SCNC: 6 MMOL/L (ref 2–11)
AST SERPL-CCNC: 15 U/L (ref 15–37)
BASOPHILS # BLD: 0 K/UL (ref 0–0.2)
BASOPHILS NFR BLD: 1 % (ref 0–2)
BILIRUB SERPL-MCNC: 0.3 MG/DL (ref 0.2–1.1)
BUN SERPL-MCNC: 5 MG/DL (ref 8–23)
CALCIUM SERPL-MCNC: 8.8 MG/DL (ref 8.3–10.4)
CHLORIDE SERPL-SCNC: 108 MMOL/L (ref 101–110)
CO2 SERPL-SCNC: 29 MMOL/L (ref 21–32)
CREAT SERPL-MCNC: 0.8 MG/DL (ref 0.6–1)
DIFFERENTIAL METHOD BLD: ABNORMAL
EOSINOPHIL # BLD: 0 K/UL (ref 0–0.8)
EOSINOPHIL NFR BLD: 1 % (ref 0.5–7.8)
ERYTHROCYTE [DISTWIDTH] IN BLOOD BY AUTOMATED COUNT: 15.6 % (ref 11.9–14.6)
GLOBULIN SER CALC-MCNC: 3.3 G/DL (ref 2.8–4.5)
GLUCOSE SERPL-MCNC: 140 MG/DL (ref 65–100)
HCT VFR BLD AUTO: 26.5 % (ref 35.8–46.3)
HGB BLD-MCNC: 8.6 G/DL (ref 11.7–15.4)
IMM GRANULOCYTES # BLD AUTO: 0.1 K/UL (ref 0–0.5)
IMM GRANULOCYTES NFR BLD AUTO: 4 % (ref 0–5)
LYMPHOCYTES # BLD: 0.6 K/UL (ref 0.5–4.6)
LYMPHOCYTES NFR BLD: 20 % (ref 13–44)
MAGNESIUM SERPL-MCNC: 2.4 MG/DL (ref 1.8–2.4)
MCH RBC QN AUTO: 28.5 PG (ref 26.1–32.9)
MCHC RBC AUTO-ENTMCNC: 32.5 G/DL (ref 31.4–35)
MCV RBC AUTO: 87.7 FL (ref 82–102)
MONOCYTES # BLD: 0.6 K/UL (ref 0.1–1.3)
MONOCYTES NFR BLD: 17 % (ref 4–12)
NEUTS SEG # BLD: 1.9 K/UL (ref 1.7–8.2)
NEUTS SEG NFR BLD: 57 % (ref 43–78)
NRBC # BLD: 0.02 K/UL (ref 0–0.2)
PLATELET # BLD AUTO: 191 K/UL (ref 150–450)
PMV BLD AUTO: 9.7 FL (ref 9.4–12.3)
POTASSIUM SERPL-SCNC: 3.5 MMOL/L (ref 3.5–5.1)
PROT SERPL-MCNC: 6.7 G/DL (ref 6.3–8.2)
RBC # BLD AUTO: 3.02 M/UL (ref 4.05–5.2)
SODIUM SERPL-SCNC: 143 MMOL/L (ref 133–143)
WBC # BLD AUTO: 3.3 K/UL (ref 4.3–11.1)

## 2023-01-10 PROCEDURE — 6360000002 HC RX W HCPCS: Performed by: NURSE PRACTITIONER

## 2023-01-10 PROCEDURE — 2580000003 HC RX 258: Performed by: NURSE PRACTITIONER

## 2023-01-10 PROCEDURE — 2500000003 HC RX 250 WO HCPCS: Performed by: NURSE PRACTITIONER

## 2023-01-10 PROCEDURE — 2580000003 HC RX 258: Performed by: INTERNAL MEDICINE

## 2023-01-10 PROCEDURE — 36591 DRAW BLOOD OFF VENOUS DEVICE: CPT

## 2023-01-10 PROCEDURE — 83735 ASSAY OF MAGNESIUM: CPT

## 2023-01-10 PROCEDURE — 80053 COMPREHEN METABOLIC PANEL: CPT

## 2023-01-10 PROCEDURE — 96375 TX/PRO/DX INJ NEW DRUG ADDON: CPT

## 2023-01-10 PROCEDURE — 96413 CHEMO IV INFUSION 1 HR: CPT

## 2023-01-10 PROCEDURE — 85025 COMPLETE CBC W/AUTO DIFF WBC: CPT

## 2023-01-10 PROCEDURE — 99214 OFFICE O/P EST MOD 30 MIN: CPT | Performed by: NURSE PRACTITIONER

## 2023-01-10 PROCEDURE — A4216 STERILE WATER/SALINE, 10 ML: HCPCS | Performed by: NURSE PRACTITIONER

## 2023-01-10 RX ORDER — SODIUM CHLORIDE 9 MG/ML
5-250 INJECTION, SOLUTION INTRAVENOUS PRN
Status: CANCELLED | OUTPATIENT
Start: 2023-01-10

## 2023-01-10 RX ORDER — FAMOTIDINE 10 MG/ML
20 INJECTION, SOLUTION INTRAVENOUS
Status: CANCELLED | OUTPATIENT
Start: 2023-01-10

## 2023-01-10 RX ORDER — ONDANSETRON 2 MG/ML
8 INJECTION INTRAMUSCULAR; INTRAVENOUS
Status: CANCELLED | OUTPATIENT
Start: 2023-01-10

## 2023-01-10 RX ORDER — ALBUTEROL SULFATE 90 UG/1
4 AEROSOL, METERED RESPIRATORY (INHALATION) PRN
Status: CANCELLED | OUTPATIENT
Start: 2023-01-10

## 2023-01-10 RX ORDER — HEPARIN SODIUM (PORCINE) LOCK FLUSH IV SOLN 100 UNIT/ML 100 UNIT/ML
500 SOLUTION INTRAVENOUS PRN
Status: CANCELLED | OUTPATIENT
Start: 2023-01-10

## 2023-01-10 RX ORDER — SODIUM CHLORIDE 0.9 % (FLUSH) 0.9 %
10 SYRINGE (ML) INJECTION PRN
Status: DISCONTINUED | OUTPATIENT
Start: 2023-01-10 | End: 2023-01-11 | Stop reason: HOSPADM

## 2023-01-10 RX ORDER — SODIUM CHLORIDE 9 MG/ML
5-40 INJECTION INTRAVENOUS PRN
Status: CANCELLED | OUTPATIENT
Start: 2023-01-10

## 2023-01-10 RX ORDER — SODIUM CHLORIDE 9 MG/ML
INJECTION, SOLUTION INTRAVENOUS CONTINUOUS
Status: CANCELLED | OUTPATIENT
Start: 2023-01-10

## 2023-01-10 RX ORDER — DIPHENHYDRAMINE HYDROCHLORIDE 50 MG/ML
50 INJECTION INTRAMUSCULAR; INTRAVENOUS
Status: CANCELLED | OUTPATIENT
Start: 2023-01-10

## 2023-01-10 RX ORDER — SODIUM CHLORIDE 9 MG/ML
5-250 INJECTION, SOLUTION INTRAVENOUS PRN
Status: DISCONTINUED | OUTPATIENT
Start: 2023-01-10 | End: 2023-01-11 | Stop reason: HOSPADM

## 2023-01-10 RX ORDER — EPINEPHRINE 1 MG/ML
0.3 INJECTION, SOLUTION, CONCENTRATE INTRAVENOUS PRN
Status: CANCELLED | OUTPATIENT
Start: 2023-01-10

## 2023-01-10 RX ORDER — DIPHENHYDRAMINE HYDROCHLORIDE 50 MG/ML
50 INJECTION INTRAMUSCULAR; INTRAVENOUS ONCE
Status: COMPLETED | OUTPATIENT
Start: 2023-01-10 | End: 2023-01-10

## 2023-01-10 RX ORDER — DIPHENHYDRAMINE HYDROCHLORIDE 50 MG/ML
50 INJECTION INTRAMUSCULAR; INTRAVENOUS ONCE
Status: CANCELLED | OUTPATIENT
Start: 2023-01-10 | End: 2023-01-10

## 2023-01-10 RX ORDER — SODIUM CHLORIDE 9 MG/ML
INJECTION, SOLUTION INTRAVENOUS CONTINUOUS
Status: DISCONTINUED | OUTPATIENT
Start: 2023-01-10 | End: 2023-01-11 | Stop reason: HOSPADM

## 2023-01-10 RX ORDER — ONDANSETRON 2 MG/ML
8 INJECTION INTRAMUSCULAR; INTRAVENOUS
Status: COMPLETED | OUTPATIENT
Start: 2023-01-10 | End: 2023-01-10

## 2023-01-10 RX ORDER — ACETAMINOPHEN 325 MG/1
650 TABLET ORAL
Status: CANCELLED | OUTPATIENT
Start: 2023-01-10

## 2023-01-10 RX ORDER — FAMOTIDINE 10 MG/ML
20 INJECTION, SOLUTION INTRAVENOUS ONCE
Status: CANCELLED | OUTPATIENT
Start: 2023-01-10 | End: 2023-01-10

## 2023-01-10 RX ORDER — MEPERIDINE HYDROCHLORIDE 50 MG/ML
12.5 INJECTION INTRAMUSCULAR; INTRAVENOUS; SUBCUTANEOUS PRN
Status: CANCELLED | OUTPATIENT
Start: 2023-01-10

## 2023-01-10 RX ORDER — DIPHENHYDRAMINE HYDROCHLORIDE 50 MG/ML
50 INJECTION INTRAMUSCULAR; INTRAVENOUS
Status: DISCONTINUED | OUTPATIENT
Start: 2023-01-10 | End: 2023-01-11 | Stop reason: HOSPADM

## 2023-01-10 RX ORDER — SODIUM CHLORIDE 0.9 % (FLUSH) 0.9 %
5-40 SYRINGE (ML) INJECTION PRN
Status: CANCELLED | OUTPATIENT
Start: 2023-01-10

## 2023-01-10 RX ORDER — DEXAMETHASONE SODIUM PHOSPHATE 10 MG/ML
10 INJECTION INTRAMUSCULAR; INTRAVENOUS ONCE
Status: COMPLETED | OUTPATIENT
Start: 2023-01-10 | End: 2023-01-10

## 2023-01-10 RX ORDER — SODIUM CHLORIDE 0.9 % (FLUSH) 0.9 %
5-40 SYRINGE (ML) INJECTION PRN
Status: DISCONTINUED | OUTPATIENT
Start: 2023-01-10 | End: 2023-01-11 | Stop reason: HOSPADM

## 2023-01-10 RX ADMIN — SODIUM CHLORIDE, PRESERVATIVE FREE 10 ML: 5 INJECTION INTRAVENOUS at 14:00

## 2023-01-10 RX ADMIN — ONDANSETRON 8 MG: 2 INJECTION INTRAMUSCULAR; INTRAVENOUS at 13:52

## 2023-01-10 RX ADMIN — PACLITAXEL 144 MG: 6 INJECTION, SOLUTION INTRAVENOUS at 12:43

## 2023-01-10 RX ADMIN — FAMOTIDINE 20 MG: 10 INJECTION, SOLUTION INTRAVENOUS at 11:52

## 2023-01-10 RX ADMIN — DEXAMETHASONE SODIUM PHOSPHATE 10 MG: 10 INJECTION INTRAMUSCULAR; INTRAVENOUS at 11:57

## 2023-01-10 RX ADMIN — DIPHENHYDRAMINE HYDROCHLORIDE 50 MG: 50 INJECTION, SOLUTION INTRAMUSCULAR; INTRAVENOUS at 11:55

## 2023-01-10 RX ADMIN — SODIUM CHLORIDE, PRESERVATIVE FREE 10 ML: 5 INJECTION INTRAVENOUS at 10:00

## 2023-01-10 RX ADMIN — SODIUM CHLORIDE: 9 INJECTION, SOLUTION INTRAVENOUS at 11:25

## 2023-01-10 ASSESSMENT — ENCOUNTER SYMPTOMS
TROUBLE SWALLOWING: 0
SCLERAL ICTERUS: 0
CHEST TIGHTNESS: 0
BLOOD IN STOOL: 0
ABDOMINAL DISTENTION: 0
VOICE CHANGE: 0
HEMOPTYSIS: 0
DIARRHEA: 0
VOMITING: 0
NAUSEA: 0
WHEEZING: 0
SORE THROAT: 0
SHORTNESS OF BREATH: 0
ABDOMINAL PAIN: 0
CONSTIPATION: 1

## 2023-01-10 ASSESSMENT — PATIENT HEALTH QUESTIONNAIRE - PHQ9
2. FEELING DOWN, DEPRESSED OR HOPELESS: 0
SUM OF ALL RESPONSES TO PHQ QUESTIONS 1-9: 0

## 2023-01-10 NOTE — PROGRESS NOTES
[facility] Hematology and Oncology: Established patient - follow up     Chief Complaint   Patient presents with    Follow-up     Reason for Referral: Breast cancer  Referring Provider: Self-referral   Primary Care Provider: MIR Borja CNP  Family History of Cancer/Hematologic Disorders: Family history is significant for two sisters and a niece with breast cancer.   Presenting Symptoms: Abnormal routine screening mammogram     History of Present Illness:  Ms. Chavez is a 62 y.o. female who presents today for follow up regarding breast cancer.  The past medical history is significant for multiple thyroid nodules, hyperthyroidism, hypercholesterolemia, Grave's disease, GERD, uterine fibroids,  section x 2, cyst removal, and hysterectomy.  She initially presented for a routine screening mammogram on 8/10/22 which identified a spiculated equal density mass in the left breast inferior medial quadrant posterior depth.  Further evaluation with limited ultrasound of the left breast on 2022 confirmed an 8 x 7 x 8 mm round, hypoechoic mass with spiculated margins at the 9:00 position in the left breast, 6 cm from the nipple, demonstrating mild posterior acoustic enhancement and corresponding to the mass seen mammographically.  US- guided biopsy of the 9:00 left breast mass was performed on 22 with pathology revealing ER/DE/HER-2 negative, grade 3, invasive carcinoma with high-grade DCIS focally present and no microcalcifications or lymphovascular space extension identified.  Testing so far has been done at [facility]; however, Ms. Chavez wishes to transfer care to [facility]. She is self-referred to [facility] for Oncology evaluation and treatment of newly diagnosed breast cancer. She has also been referred to surgery and is scheduled for initial surgical evaluation with Dr. Chitra Araujo, on 22.   Patient's daughter lives in [place], works at a hospital.    At consultation, we discussed the  pathophysiology of breast cancer, staging, and the importance of receptor status in terms of treatment options. We then reviewed her medical history as well as oncologic history, recent imaging and pathology in detail. Next steps in management were reviewed. She was here with her . Fu after MRI - Results reviewed in detail and show evidence of disease in the breast but no worrisome findings in terms of lymphadenopathy or other evidence of disease. She was emotional and wished to proceed with surgery upfront. We discussed need for chemotherapy because of triple negative status and she wishes to proceed with surgery upfront accepting risks. Pathology from surgery will determine ultimate staging and treatment plan. She completed surgery and we discussed her pathology in detail. Her tumor was 15 mm and 0 out of 2 lymph nodes. This is pathological stage Ib. We discussed her high risk features triple negative breast cancer and grade 2-3. We discussed role of chemotherapy and recommendations of dose dense ACT versus TC. She chose ACT. Will omit Emend with following cycles as she did have a reaction to this drug. She is here today for follow up and consideration of week 1 of Taxol (s/p completion of four cycles of ddAC). She has been okay overall, C4 was the longest to recover from thus far. She had fatigue for about 3-4 days and her appetite also decreased. Her weight was down 2# from last visit. She did not have any nausea or vomiting. She did have some constipation, was taking Miralax and then decreased this d/t loose stools. Her bowels have normalized now. She had x1 tingling in her right hand but feels was r/t her sleeping position - resolved and no other neuropathy noted. She denies any shortness of breath. She denies any current pain. She had some rhinorrhea and was blood tinged on kleenex a couple of times.   She was given Macrobid for UTI on 12/29 by PCP and urinary symptoms are resolved.  She had no fevers associated.  Iron labs were checked at last visit d/t anemia and were adequate.                      Chronological Events:   BILATERAL DIGITAL SCREENING MAMMOGRAM 3D/2D: 8/10/2022   FINDINGS: There are scattered areas of fibroglandular density (ACR Breast Density Composition Category B).  Digital mammography views were performed including tomosynthesis. There is a spiculated equal density mass in the left breast inferior medial quadrant posterior depth.  No other significant masses, calcifications, or other findings are seen in either breast.     IMPRESSION: INCOMPLETE NEEDS ADDITIONAL IMAGING EVALUATION   The spiculated mass in the left breast is indeterminate.  Ultrasound with possible additional views are recommended. There is no mammographic evidence of malignancy in the right breast. Patient will be notified of the results.        LIMITED ULTRASOUND OF LEFT BREAST: 8/17/2022  FINDINGS: There is an 8 x 7 x 8 mm round, hypoechoic mass with spiculated margins at 9:00, 6 cm from the nipple. It demonstrates mild posterior acoustic enhancement. This corresponds to the mass seen mammographically. The left axilla is negative.     IMPRESSION: SUSPICIOUS OF MALIGNANCY   The 8 mm mass at 9:00, 6 cm FN is suspicious and ultrasound-guided biopsy is recommended.  These findings and recommendations were discussed with the patient at the time of her exam.  Her biopsy is scheduled for 8/25/2022 at 2:00 pm.      SURGICAL PATHOLOGY EXAMINATION 8/25/22 9/1/22 heme/onc consultation   9/9/22 FU after MRI - pt elected to p/w sx upfront; MRI results reviewed.    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  10/19/22 post op with Dr Araujo - drain removed   10/24/22 FU after sx - discussed adjuvant chemotherapy for TNBC.  11/14/22 Cycle 1 ddAC. EF  60-65%. 11/28/22  Surendra 2 ddAC. Tolerated well.   12/12/22 FU C3 ddAC - tolerating well   12/27/22 FU C4 ddAC   1/10/23 FU - week 1 Taxol. Doing well overall. Discussed side effects r/t Taxol. Family History   Problem Relation Age of Onset    Coronary Art Dis Mother     Breast Cancer Sister     Breast Cancer Sister     Thyroid Disease Sister     Thyroid Cancer Neg Hx     Diabetes Neg Hx       Social History     Socioeconomic History    Marital status:      Spouse name: None    Number of children: None    Years of education: None    Highest education level: None   Tobacco Use    Smoking status: Never    Smokeless tobacco: Never   Vaping Use    Vaping Use: Never used   Substance and Sexual Activity    Alcohol use: No    Drug use: No        Review of Systems   Constitutional:  Positive for appetite change (improved on remeron) and fatigue. Negative for chills, diaphoresis, fever and unexpected weight change. HENT:   Negative for hearing loss, mouth sores, nosebleeds, sore throat, trouble swallowing and voice change. Eyes:  Negative for icterus. Respiratory:  Negative for chest tightness, hemoptysis, shortness of breath and wheezing. Cardiovascular:  Negative for chest pain, leg swelling and palpitations. Gastrointestinal:  Positive for constipation. Negative for abdominal distention, abdominal pain, blood in stool, diarrhea, nausea and vomiting. Endocrine: Negative for hot flashes. Genitourinary:  Negative for difficulty urinating, frequency, vaginal bleeding and vaginal discharge. Musculoskeletal:  Negative for arthralgias, flank pain, gait problem and myalgias. Skin:  Negative for itching, rash and wound. Neurological:  Negative for dizziness, extremity weakness, gait problem, headaches and numbness. Psychiatric/Behavioral:  Positive for sleep disturbance. Negative for confusion and depression. The patient is nervous/anxious (much better).        Allergies   Allergen Reactions    Emend [Fosaprepitant Dimeglumine] Anaphylaxis     Flushing, coughing    Hydrocodone Anxiety and Other (See Comments)     Depressive state     Past Medical History:   Diagnosis Date    Allergic rhinitis     Cancer ([address] 2022    left breast cancer---    GERD (gastroesophageal reflux disease)     controlled with med    Graves' disease     Hypercholesterolemia     Hyperthyroidism     Multiple thyroid nodules     followed by dr Jaylon Blanchard     Past Surgical History:   Procedure Laterality Date    BREAST BIOPSY Left 10/7/2022    LEFT SENTINEL LYMPH NODE BIOPSY PREOP @ 0900, LYMPHO @ 1030, SX @ 4282 performed by Nino Sandhu DO at [facility] [address] Left 2022    bx     SECTION      x2    CYST REMOVAL  2021    subcutaneous cyst from lateral neck (benign)    HYSTERECTOMY (CERVIX STATUS UNKNOWN)      fibroids (ovaries intact)    IR PORT PLACEMENT EQUAL OR GREATER THAN 5 YEARS  2022    IR PORT PLACEMENT EQUAL OR GREATER THAN 5 YEARS 2022 [facility] SPECIALS    MASTECTOMY Left 10/7/2022    LEFT BREAST MASTECTOMY performed by Nino Sandhu DO at [facility] MAIN OR     Current Outpatient Medications   Medication Sig Dispense Refill    lidocaine-prilocaine (EMLA) 2.5-2.5 % cream Apply topically weekly as needed prior to port access.  30 g 2    ondansetron (ZOFRAN) 8 MG tablet Take 1 tablet by mouth every 8 hours as needed for Nausea or Vomiting 60 tablet 2    prochlorperazine (COMPAZINE) 10 MG tablet Take 1 tablet by mouth every 6 hours as needed (nausea vomiting chemo) 60 tablet 2    Mastectomy Bra MISC by Does not apply route Mastectomy bra + prosthesis 1 each 2    Multiple Vitamin (MULTI-VITAMIN DAILY PO) Take 1 tablet by mouth daily As needed      mirtazapine (REMERON) 15 MG tablet Take 0.5 tablets by mouth nightly (Patient taking differently: Take 7.5 mg by mouth as needed) 30 tablet 3    rosuvastatin (CRESTOR) 20 MG tablet Take 1 tablet by mouth at bedtime 90 tablet 3    methIMAzole (TAPAZOLE) 5 MG tablet Take 0.5 tablets by mouth daily 15 tablet 11    omeprazole (PRILOSEC) 20 MG delayed release capsule Take 20 mg by mouth as needed PRN      Red Yeast Rice Extract 600 MG CAPS Take 600 mg by mouth daily      Loratadine (CLARITIN PO) Take by mouth (Patient not taking: Reported on 1/10/2023)       No current facility-administered medications for this visit. Facility-Administered Medications Ordered in Other Visits   Medication Dose Route Frequency Provider Last Rate Last Admin    sodium chloride flush 0.9 % injection 10 mL  10 mL IntraVENous PRN Telly Boucher MD           No flowsheet data found. OBJECTIVE:  /79 (Site: Right Upper Arm, Position: Standing)   Pulse (!) 103   Temp 98.4 °F (36.9 °C)   Resp 14   Ht 5' 3\" (1.6 m)   Wt 158 lb 4.8 oz (71.8 kg)   SpO2 100%   BMI 28.04 kg/m²       ECOG PERFORMANCE STATUS - 0-Fully active, able to carry on all pre-disease performance without restriction. Pain - 0 - No pain/10. None/Minimal pain - not affecting QOL     Fatigue - No flowsheet data found. Distress - No flowsheet data found. Physical Exam  Vitals reviewed. Exam conducted with a chaperone present. Constitutional:       General: She is not in acute distress. Appearance: Normal appearance. She is not ill-appearing or toxic-appearing. HENT:      Head: Normocephalic and atraumatic. Nose: Nose normal.      Mouth/Throat:      Mouth: Mucous membranes are moist.   Eyes:      General: No scleral icterus. Extraocular Movements: Extraocular movements intact. Conjunctiva/sclera: Conjunctivae normal.      Pupils: Pupils are equal, round, and reactive to light. Cardiovascular:      Rate and Rhythm: Normal rate and regular rhythm. Heart sounds: No murmur heard. Pulmonary:      Effort: No respiratory distress. Breath sounds: No wheezing or rales. Chest:          Comments: S/p mastectomy.   Abdominal:      General: There is no distension. Tenderness: There is no abdominal tenderness. There is no right CVA tenderness or guarding. Musculoskeletal:         General: Normal range of motion. Cervical back: Normal range of motion. Right lower leg: No edema. Left lower leg: No edema. Lymphadenopathy:      Cervical: No cervical adenopathy. Upper Body:      Right upper body: No supraclavicular or axillary adenopathy. Left upper body: No supraclavicular or axillary adenopathy. Skin:     General: Skin is warm and dry. Coloration: Skin is not jaundiced or pale. Findings: No rash. Neurological:      General: No focal deficit present. Mental Status: She is alert and oriented to person, place, and time. Gait: Gait normal.   Psychiatric:         Behavior: Behavior normal.         Thought Content:  Thought content normal.        Labs:  Recent Results (from the past 168 hour(s))   CBC with Auto Differential    Collection Time: 01/10/23 10:03 AM   Result Value Ref Range    WBC 3.3 (L) 4.3 - 11.1 K/uL    RBC 3.02 (L) 4.05 - 5.2 M/uL    Hemoglobin 8.6 (L) 11.7 - 15.4 g/dL    Hematocrit 26.5 (L) 35.8 - 46.3 %    MCV 87.7 82.0 - 102.0 FL    MCH 28.5 26.1 - 32.9 PG    MCHC 32.5 31.4 - 35.0 g/dL    RDW 15.6 (H) 11.9 - 14.6 %    Platelets 478 712 - 732 K/uL    MPV 9.7 9.4 - 12.3 FL    nRBC 0.02 0.0 - 0.2 K/uL    Seg Neutrophils 57 43 - 78 %    Lymphocytes 20 13 - 44 %    Monocytes 17 (H) 4.0 - 12.0 %    Eosinophils % 1 0.5 - 7.8 %    Basophils 1 0.0 - 2.0 %    Immature Granulocytes 4 0.0 - 5.0 %    Segs Absolute 1.9 1.7 - 8.2 K/UL    Absolute Lymph # 0.6 0.5 - 4.6 K/UL    Absolute Mono # 0.6 0.1 - 1.3 K/UL    Absolute Eos # 0.0 0.0 - 0.8 K/UL    Basophils Absolute 0.0 0.0 - 0.2 K/UL    Absolute Immature Granulocyte 0.1 0.0 - 0.5 K/UL    Differential Type AUTOMATED     Comprehensive Metabolic Panel    Collection Time: 01/10/23 10:03 AM   Result Value Ref Range    Sodium 143 133 - 143 mmol/L    Potassium 3.5 3.5 - 5.1 mmol/L    Chloride 108 101 - 110 mmol/L    CO2 29 21 - 32 mmol/L    Anion Gap 6 2 - 11 mmol/L    Glucose 140 (H) 65 - 100 mg/dL    BUN 5 (L) 8 - 23 MG/DL    Creatinine 0.80 0.6 - 1.0 MG/DL    Est, Glom Filt Rate >60 >60 ml/min/1.73m2    Calcium 8.8 8.3 - 10.4 MG/DL    Total Bilirubin 0.3 0.2 - 1.1 MG/DL    ALT 18 12 - 65 U/L    AST 15 15 - 37 U/L    Alk Phosphatase 98 50 - 136 U/L    Total Protein 6.7 6.3 - 8.2 g/dL    Albumin 3.4 3.2 - 4.6 g/dL    Globulin 3.3 2.8 - 4.5 g/dL    Albumin/Globulin Ratio 1.0 0.4 - 1.6         Imaging: reviewed     PATHOLOGY:   per above     10/2022        ASSESSMENT:     Diagnosis Orders   1. Invasive ductal carcinoma of breast, female, left ([facility] [address]  Magnesium    CBC With Auto Differential    Comprehensive metabolic panel      2. Anemia, unspecified type  Magnesium          Ms. Cinthya Clay is here for FU of breast cancer. Left breast IDC, 9:00, s/p core bx/clip, at [facility], poorly diff, 8mm on US, MRI pending, LVI neg, DCIS high grade focally present, ER0/PR0, Her2 sera +1 - negative   - MR - Biopsied mass (11mm) in the medial 8:30 left breast at 7 cm from the nipple. No additional pathologic enhancement or adenopathy in either breast.    - s/p left mastectomy and 0/2 SLnc - [id number] (0/2) - 15mm with neg margins, grade 2   - genetics - VUS     - here for consideration of week one of Taxol s/p 4 cycles DD AC. The 4th cycle was the toughest yet with fatigue and decreased appetite - these are better now. No other symptoms/concerns.    - UTI - Recently on ABX for UTI end of Dec and resolved now. - No neuropathy. No mucositis. Labs reviewed. No infectious symptoms. No pain with G-CSF, taking Claritin. - EF adequate prior to starting treatment - EF 60-65%. Plan echo with any ss or CHF and also after tx completed   - Anemia - added on iron labs last vistand adequate.     - on remeron 7.5 mg nightly for sleep/appetite and this is working well.  - nausea/vomiting chemo: discussed Zofran and/or Compazine. No Emend due to prior reaction. - constipation: stool softeners, add Miralax   - continue good oral nutrition and hydration. - encouraged frequent activity throughout the day and rest as needed to combat fatigue.   - call with any fevers, uncontrolled side effects from treatment or any other worrisome/concerning symptoms. 2. Surveillance   - H&P q3-6 mo for years 1-3, q6mo years 4-5; mammogram yearly  - DEXA q2 years postmenopausal   - labs/imaging studies are not recommended without symptoms     Orders placed for: Dose-Dense AC-T (Paclitaxel Weekly) - [Doxorubicin + Cyclophosphamide q14 Days x 4 Cycles, Followed by Paclitaxel 80 mg/m² Weekly x 12 Weeks]  Cycles 1 through 4: A cycle is every 14 days:  Doxorubicin  60 mg/m² IV once on day 1 of cycles 1 through 4  Cyclophosphamide  600 mg/m² IV once on day 1 of cycles 1 through 4  Pegfilgrastim-xxxx  6 mg flat dose subcutaneously once on day 2 of cycles 1 through 4  Cycles 5 through 16: A cycle is every 7 days:  Paclitaxel  80 mg/m² IV once on day 1 of cycles 5 through [address]: Full Support    RTC per protocol or sooner as needed     MDM      Lab studies and imaging studies were personally reviewed. Pertinent old records were reviewed. Historical:   - she is here for consultation regarding neoadjuvant chemotherapy for newly diagnosed triple negative breast cancer. During today's visit, we had a long conversation about breast cancer pathophysiology and staging in general.  We then reviewed her imaging and pathology in details, including receptor status and it's significance in terms of available treatment options. Neoadjuvant compared to adjuvant chemotherapy has similar long term outcomes when patients are given the same therapy.   Preoperative treatment can render large, inoperable tumors operable and improve breast conservation, downstage tumor and decrease risk of postoperative lympedema. It also provides information about tumor chemosensitivity. Patients with pathologic complete response have favorable disease free survival and overall survival and this correlation is strongest in TNBC. Preoperative systemic therapy can lead to possible overtreatment with systemic therapy of clinical stages overestimated. It can also lead to possible under treatment local regionally with radiotherapy if clinical stage is underestimated. There is also possibility of disease progression during preoperative systemic therapy. - MRI pending - she is aware that tx plan may change depending on MR results   - she is here for FU after MRI - she has elected to p/w sx upfront. Images reviewed. Understands final path will be determined by sx specimen/LN status. Dr Chris Scott notifed by me re pt's decision.    - we discussed options of AC-T +/- carbo/pembro and TC; she is aware that she will need chemotherapy regardless of her final decision (before/after sx), if she does chemo after, she would not be a candidate for pembro/carbo. Data from [facility] reviewed. - pt met with Dr Chris Scott and elected to pw bilateral mastectomies with axillary dissection   - We discussed her path results in detail that showed 15mm TNBC. She is aware that pt's are considered high risk per GEICAM/2003-02 trial if they have node neg disease but were <35, had neg er/pr receptors, histo grade 2-3, and T>2cm. She meets two criteria for consideration of anthracycline therapy. She wishes to think this over. We discussed risks of chemotherapy incuding irriversible heart disease (including acute LV failure - we reviewed this) and also leukemia. Gave options of ddAC-T and TC with growth factor support.     - of note, following standard adjuvant chemo for G5v-5Y0 or N+ TNBC, may consider metronomic capecitabine (650mg/m2 BID continuously for 1 year) as extended maintenance - SYSUCC-001 trial estimated 5-year DFS vs observation: 82.8 vs 73% Leonardo Corea 2021). - We discussed the potential side effects including but not limited to hair loss (which could be permanent), nausea, vomiting, diarrhea or constipation, mucositis, rashes, allergic reactions, organ damage including liver and kidney failure, cardiotoxicity, and direct effects on bone marrow which can lead to anemia and thrombocytopenia necessitating transfusion or neutropenia putting patient at risk for infection, sepsis and death if not treated. - echo EF 60-65%; LVI normal size and no WMA  - port placement   - decreased appetite, anxiety and sleep disturbance - on low dose mirtazapine and improved  - Rx mastectomy bra/prosthesis     All questions were asked and answered to the best of my ability. The patient verbalized understanding and agrees with the plan above.           MIR Hand NP  [facility] Hematology and Oncology  [id number] [place] [address], [address] [place] [address]  Office : [phone]  Fax : [phone]

## 2023-01-10 NOTE — PROGRESS NOTES
Arrived to the infusion center. Taxol completed without signs of adverse reaction c/o feeling \"not right\" after benadryl. No specific complaints. /69 P 92 pulse ox 100%. Feling resilved somewhat but some nausea. Zofran given. Aware of her next appointment on 1/17 at 1030. Discharged ambulatory in satisfactory condition.  /72 P 78 pulse ox 100%

## 2023-01-17 ENCOUNTER — HOSPITAL ENCOUNTER (OUTPATIENT)
Dept: INFUSION THERAPY | Age: 63
Discharge: HOME | End: 2023-01-17
Payer: COMMERCIAL

## 2023-01-17 ENCOUNTER — OFFICE VISIT (OUTPATIENT)
Dept: ONCOLOGY | Age: 63
End: 2023-01-17
Payer: COMMERCIAL

## 2023-01-17 VITALS
TEMPERATURE: 98.1 F | BODY MASS INDEX: 28 KG/M2 | HEART RATE: 107 BPM | OXYGEN SATURATION: 99 % | RESPIRATION RATE: 14 BRPM | DIASTOLIC BLOOD PRESSURE: 74 MMHG | WEIGHT: 158 LBS | SYSTOLIC BLOOD PRESSURE: 121 MMHG | HEIGHT: 63 IN

## 2023-01-17 VITALS
OXYGEN SATURATION: 99 % | SYSTOLIC BLOOD PRESSURE: 129 MMHG | DIASTOLIC BLOOD PRESSURE: 74 MMHG | RESPIRATION RATE: 16 BRPM | HEART RATE: 77 BPM

## 2023-01-17 DIAGNOSIS — R53.83 FATIGUE DUE TO TREATMENT: ICD-10-CM

## 2023-01-17 DIAGNOSIS — C50.912 INVASIVE DUCTAL CARCINOMA OF BREAST, FEMALE, LEFT (HCC): ICD-10-CM

## 2023-01-17 DIAGNOSIS — C50.912 INVASIVE DUCTAL CARCINOMA OF BREAST, FEMALE, LEFT (HCC): Primary | ICD-10-CM

## 2023-01-17 DIAGNOSIS — Z09 CHEMOTHERAPY FOLLOW-UP EXAMINATION: ICD-10-CM

## 2023-01-17 LAB
ALBUMIN SERPL-MCNC: 3.6 G/DL (ref 3.2–4.6)
ALBUMIN/GLOB SERPL: 1.1 (ref 0.4–1.6)
ALP SERPL-CCNC: 81 U/L (ref 50–136)
ALT SERPL-CCNC: 17 U/L (ref 12–65)
ANION GAP SERPL CALC-SCNC: 7 MMOL/L (ref 2–11)
AST SERPL-CCNC: 14 U/L (ref 15–37)
BASOPHILS # BLD: 0 K/UL (ref 0–0.2)
BASOPHILS NFR BLD: 1 % (ref 0–2)
BILIRUB SERPL-MCNC: 0.4 MG/DL (ref 0.2–1.1)
BUN SERPL-MCNC: 9 MG/DL (ref 8–23)
CALCIUM SERPL-MCNC: 8.9 MG/DL (ref 8.3–10.4)
CHLORIDE SERPL-SCNC: 109 MMOL/L (ref 101–110)
CO2 SERPL-SCNC: 25 MMOL/L (ref 21–32)
CREAT SERPL-MCNC: 0.8 MG/DL (ref 0.6–1)
DIFFERENTIAL METHOD BLD: ABNORMAL
EOSINOPHIL # BLD: 0 K/UL (ref 0–0.8)
EOSINOPHIL NFR BLD: 1 % (ref 0.5–7.8)
ERYTHROCYTE [DISTWIDTH] IN BLOOD BY AUTOMATED COUNT: 16.4 % (ref 11.9–14.6)
GLOBULIN SER CALC-MCNC: 3.2 G/DL (ref 2.8–4.5)
GLUCOSE SERPL-MCNC: 149 MG/DL (ref 65–100)
HCT VFR BLD AUTO: 24.8 % (ref 35.8–46.3)
HGB BLD-MCNC: 8.2 G/DL (ref 11.7–15.4)
IMM GRANULOCYTES # BLD AUTO: 0.1 K/UL (ref 0–0.5)
IMM GRANULOCYTES NFR BLD AUTO: 2 % (ref 0–5)
LYMPHOCYTES # BLD: 0.5 K/UL (ref 0.5–4.6)
LYMPHOCYTES NFR BLD: 16 % (ref 13–44)
MCH RBC QN AUTO: 29.4 PG (ref 26.1–32.9)
MCHC RBC AUTO-ENTMCNC: 33.1 G/DL (ref 31.4–35)
MCV RBC AUTO: 88.9 FL (ref 82–102)
MONOCYTES # BLD: 0.3 K/UL (ref 0.1–1.3)
MONOCYTES NFR BLD: 9 % (ref 4–12)
NEUTS SEG # BLD: 2.3 K/UL (ref 1.7–8.2)
NEUTS SEG NFR BLD: 71 % (ref 43–78)
NRBC # BLD: 0.05 K/UL (ref 0–0.2)
PLATELET # BLD AUTO: 248 K/UL (ref 150–450)
PMV BLD AUTO: 9.4 FL (ref 9.4–12.3)
POTASSIUM SERPL-SCNC: 3.6 MMOL/L (ref 3.5–5.1)
PROT SERPL-MCNC: 6.8 G/DL (ref 6.3–8.2)
RBC # BLD AUTO: 2.79 M/UL (ref 4.05–5.2)
SODIUM SERPL-SCNC: 141 MMOL/L (ref 133–143)
WBC # BLD AUTO: 3.2 K/UL (ref 4.3–11.1)

## 2023-01-17 PROCEDURE — 6360000002 HC RX W HCPCS: Performed by: NURSE PRACTITIONER

## 2023-01-17 PROCEDURE — 36591 DRAW BLOOD OFF VENOUS DEVICE: CPT

## 2023-01-17 PROCEDURE — 96409 CHEMO IV PUSH SNGL DRUG: CPT

## 2023-01-17 PROCEDURE — 96375 TX/PRO/DX INJ NEW DRUG ADDON: CPT

## 2023-01-17 PROCEDURE — 99214 OFFICE O/P EST MOD 30 MIN: CPT | Performed by: NURSE PRACTITIONER

## 2023-01-17 PROCEDURE — 85025 COMPLETE CBC W/AUTO DIFF WBC: CPT

## 2023-01-17 PROCEDURE — 2580000003 HC RX 258: Performed by: INTERNAL MEDICINE

## 2023-01-17 PROCEDURE — A4216 STERILE WATER/SALINE, 10 ML: HCPCS | Performed by: NURSE PRACTITIONER

## 2023-01-17 PROCEDURE — 2580000003 HC RX 258: Performed by: NURSE PRACTITIONER

## 2023-01-17 PROCEDURE — 2500000003 HC RX 250 WO HCPCS: Performed by: NURSE PRACTITIONER

## 2023-01-17 PROCEDURE — 99211 OFF/OP EST MAY X REQ PHY/QHP: CPT

## 2023-01-17 PROCEDURE — 80053 COMPREHEN METABOLIC PANEL: CPT

## 2023-01-17 PROCEDURE — 6370000000 HC RX 637 (ALT 250 FOR IP): Performed by: NURSE PRACTITIONER

## 2023-01-17 RX ORDER — DIPHENHYDRAMINE HYDROCHLORIDE 50 MG/ML
50 INJECTION INTRAMUSCULAR; INTRAVENOUS ONCE
Status: COMPLETED | OUTPATIENT
Start: 2023-01-17 | End: 2023-01-17

## 2023-01-17 RX ORDER — ONDANSETRON 2 MG/ML
8 INJECTION INTRAMUSCULAR; INTRAVENOUS
Status: COMPLETED | OUTPATIENT
Start: 2023-01-17 | End: 2023-01-17

## 2023-01-17 RX ORDER — ALBUTEROL SULFATE 90 UG/1
4 AEROSOL, METERED RESPIRATORY (INHALATION) PRN
Status: CANCELLED | OUTPATIENT
Start: 2023-01-17

## 2023-01-17 RX ORDER — FAMOTIDINE 10 MG/ML
20 INJECTION, SOLUTION INTRAVENOUS
Status: CANCELLED | OUTPATIENT
Start: 2023-01-17

## 2023-01-17 RX ORDER — ACETAMINOPHEN 325 MG/1
650 TABLET ORAL
Status: COMPLETED | OUTPATIENT
Start: 2023-01-17 | End: 2023-01-17

## 2023-01-17 RX ORDER — SODIUM CHLORIDE 9 MG/ML
5-250 INJECTION, SOLUTION INTRAVENOUS PRN
Status: DISCONTINUED | OUTPATIENT
Start: 2023-01-17 | End: 2023-01-17

## 2023-01-17 RX ORDER — ONDANSETRON 2 MG/ML
8 INJECTION INTRAMUSCULAR; INTRAVENOUS
Status: CANCELLED | OUTPATIENT
Start: 2023-01-17

## 2023-01-17 RX ORDER — DIPHENHYDRAMINE HYDROCHLORIDE 50 MG/ML
50 INJECTION INTRAMUSCULAR; INTRAVENOUS
Status: CANCELLED | OUTPATIENT
Start: 2023-01-17

## 2023-01-17 RX ORDER — SODIUM CHLORIDE 9 MG/ML
5-250 INJECTION, SOLUTION INTRAVENOUS PRN
Status: CANCELLED | OUTPATIENT
Start: 2023-01-17

## 2023-01-17 RX ORDER — SODIUM CHLORIDE 9 MG/ML
INJECTION, SOLUTION INTRAVENOUS CONTINUOUS
Status: CANCELLED | OUTPATIENT
Start: 2023-01-17

## 2023-01-17 RX ORDER — DIPHENHYDRAMINE HYDROCHLORIDE 50 MG/ML
50 INJECTION INTRAMUSCULAR; INTRAVENOUS ONCE
Status: CANCELLED | OUTPATIENT
Start: 2023-01-17 | End: 2023-01-17

## 2023-01-17 RX ORDER — FAMOTIDINE 10 MG/ML
20 INJECTION, SOLUTION INTRAVENOUS ONCE
Status: CANCELLED | OUTPATIENT
Start: 2023-01-17 | End: 2023-01-17

## 2023-01-17 RX ORDER — DEXAMETHASONE SODIUM PHOSPHATE 10 MG/ML
10 INJECTION INTRAMUSCULAR; INTRAVENOUS ONCE
Status: COMPLETED | OUTPATIENT
Start: 2023-01-17 | End: 2023-01-17

## 2023-01-17 RX ORDER — ACETAMINOPHEN 325 MG/1
650 TABLET ORAL
Status: CANCELLED | OUTPATIENT
Start: 2023-01-17

## 2023-01-17 RX ORDER — EPINEPHRINE 1 MG/ML
0.3 INJECTION, SOLUTION, CONCENTRATE INTRAVENOUS PRN
Status: CANCELLED | OUTPATIENT
Start: 2023-01-17

## 2023-01-17 RX ORDER — SODIUM CHLORIDE 0.9 % (FLUSH) 0.9 %
5-40 SYRINGE (ML) INJECTION PRN
Status: DISCONTINUED | OUTPATIENT
Start: 2023-01-17 | End: 2023-01-17

## 2023-01-17 RX ORDER — DIPHENHYDRAMINE HYDROCHLORIDE 50 MG/ML
50 INJECTION INTRAMUSCULAR; INTRAVENOUS
Status: DISCONTINUED | OUTPATIENT
Start: 2023-01-17 | End: 2023-01-17

## 2023-01-17 RX ORDER — SODIUM CHLORIDE 9 MG/ML
5-40 INJECTION INTRAVENOUS PRN
Status: CANCELLED | OUTPATIENT
Start: 2023-01-17

## 2023-01-17 RX ORDER — MEPERIDINE HYDROCHLORIDE 50 MG/ML
12.5 INJECTION INTRAMUSCULAR; INTRAVENOUS; SUBCUTANEOUS PRN
Status: CANCELLED | OUTPATIENT
Start: 2023-01-17

## 2023-01-17 RX ORDER — SODIUM CHLORIDE 0.9 % (FLUSH) 0.9 %
10 SYRINGE (ML) INJECTION PRN
Status: DISCONTINUED | OUTPATIENT
Start: 2023-01-17 | End: 2023-01-18 | Stop reason: HOSPADM

## 2023-01-17 RX ORDER — HEPARIN SODIUM (PORCINE) LOCK FLUSH IV SOLN 100 UNIT/ML 100 UNIT/ML
500 SOLUTION INTRAVENOUS PRN
Status: CANCELLED | OUTPATIENT
Start: 2023-01-17

## 2023-01-17 RX ORDER — SODIUM CHLORIDE 0.9 % (FLUSH) 0.9 %
5-40 SYRINGE (ML) INJECTION PRN
Status: CANCELLED | OUTPATIENT
Start: 2023-01-17

## 2023-01-17 RX ADMIN — SODIUM CHLORIDE 125 ML/HR: 9 INJECTION, SOLUTION INTRAVENOUS at 11:37

## 2023-01-17 RX ADMIN — DIPHENHYDRAMINE HYDROCHLORIDE 50 MG: 50 INJECTION, SOLUTION INTRAMUSCULAR; INTRAVENOUS at 11:25

## 2023-01-17 RX ADMIN — FAMOTIDINE 20 MG: 10 INJECTION, SOLUTION INTRAVENOUS at 11:23

## 2023-01-17 RX ADMIN — SODIUM CHLORIDE, PRESERVATIVE FREE 10 ML: 5 INJECTION INTRAVENOUS at 10:00

## 2023-01-17 RX ADMIN — PACLITAXEL 144 MG: 6 INJECTION, SOLUTION INTRAVENOUS at 11:48

## 2023-01-17 RX ADMIN — SODIUM CHLORIDE, PRESERVATIVE FREE 10 ML: 5 INJECTION INTRAVENOUS at 11:36

## 2023-01-17 RX ADMIN — ACETAMINOPHEN 650 MG: 325 TABLET ORAL at 12:06

## 2023-01-17 RX ADMIN — DEXAMETHASONE SODIUM PHOSPHATE 10 MG: 10 INJECTION INTRAMUSCULAR; INTRAVENOUS at 11:27

## 2023-01-17 RX ADMIN — HYDROCORTISONE SODIUM SUCCINATE 100 MG: 100 INJECTION, POWDER, FOR SOLUTION INTRAMUSCULAR; INTRAVENOUS at 12:06

## 2023-01-17 RX ADMIN — ONDANSETRON 8 MG: 2 INJECTION INTRAMUSCULAR; INTRAVENOUS at 12:11

## 2023-01-17 ASSESSMENT — ENCOUNTER SYMPTOMS
ABDOMINAL PAIN: 0
VOMITING: 0
BLOOD IN STOOL: 0
VOICE CHANGE: 0
DIARRHEA: 0
NAUSEA: 0
SORE THROAT: 0
CHEST TIGHTNESS: 0
WHEEZING: 0
CONSTIPATION: 1
SHORTNESS OF BREATH: 0
HEMOPTYSIS: 0
SCLERAL ICTERUS: 0
TROUBLE SWALLOWING: 0
ABDOMINAL DISTENTION: 0

## 2023-01-17 ASSESSMENT — PATIENT HEALTH QUESTIONNAIRE - PHQ9
SUM OF ALL RESPONSES TO PHQ QUESTIONS 1-9: 0
SUM OF ALL RESPONSES TO PHQ QUESTIONS 1-9: 0
2. FEELING DOWN, DEPRESSED OR HOPELESS: 0
SUM OF ALL RESPONSES TO PHQ QUESTIONS 1-9: 0
SUM OF ALL RESPONSES TO PHQ QUESTIONS 1-9: 0

## 2023-01-17 NOTE — PROGRESS NOTES
[facility] Hematology and Oncology: Established patient - follow up     Chief Complaint   Patient presents with    Follow-up     Reason for Referral: Breast cancer  Referring Provider: Self-referral   Primary Care Provider: MIR Park CNP  Family History of Cancer/Hematologic Disorders: Family history is significant for two sisters and a niece with breast cancer. Presenting Symptoms: Abnormal routine screening mammogram     History of Present Illness:  Ms. Daniel Allan is a 57 y.o. female who presents today for follow up regarding breast cancer. The past medical history is significant for multiple thyroid nodules, hyperthyroidism, hypercholesterolemia, Grave's disease, GERD, uterine fibroids,  section x 2, cyst removal, and hysterectomy. She initially presented for a routine screening mammogram on 8/10/22 which identified a spiculated equal density mass in the left breast inferior medial quadrant posterior depth. Further evaluation with limited ultrasound of the left breast on 2022 confirmed an 8 x 7 x 8 mm round, hypoechoic mass with spiculated margins at the 9:00 position in the left breast, 6 cm from the nipple, demonstrating mild posterior acoustic enhancement and corresponding to the mass seen mammographically. US- guided biopsy of the 9:00 left breast mass was performed on 22 with pathology revealing ER/AK/HER-2 negative, grade 3, invasive carcinoma with high-grade DCIS focally present and no microcalcifications or lymphovascular space extension identified. Testing so far has been done at [facility]; however, Ms. Daniel Allan wishes to transfer care to [facility]. She is self-referred to [facility] for Oncology evaluation and treatment of newly diagnosed breast cancer. She has also been referred to surgery and is scheduled for initial surgical evaluation with Dr. Ketan Ochoa, on 22.    Patient's daughter lives in [place], works at a hospital.    At consultation, we discussed the pathophysiology of breast cancer, staging, and the importance of receptor status in terms of treatment options. We then reviewed her medical history as well as oncologic history, recent imaging and pathology in detail. Next steps in management were reviewed. She was here with her . Fu after MRI - Results reviewed in detail and show evidence of disease in the breast but no worrisome findings in terms of lymphadenopathy or other evidence of disease. She was emotional and wished to proceed with surgery upfront. We discussed need for chemotherapy because of triple negative status and she wishes to proceed with surgery upfront accepting risks. Pathology from surgery will determine ultimate staging and treatment plan. She completed surgery and we discussed her pathology in detail. Her tumor was 15 mm and 0 out of 2 lymph nodes. This is pathological stage Ib. We discussed her high risk features triple negative breast cancer and grade 2-3. We discussed role of chemotherapy and recommendations of dose dense ACT versus TC. She chose ACT. Will omit Emend with following cycles as she did have a reaction to this drug. She is here today for follow up and consideration of week 2 of Taxol (s/p completion of four cycles of ddAC). She tolerated week 1 Taxol okay overall. She has ongoing fatigue but better than ddAC. Appetite is still low but she makes herself eat. She feels like she is getting her taste back. She did not have any nausea or vomiting. She takes Miralax when needed to avoid constipation and this works well. She denies having any numbness in her fingers or toes. She denies any current pain. Chronological Events:   BILATERAL DIGITAL SCREENING MAMMOGRAM 3D/2D: 8/10/2022   FINDINGS: There are scattered areas of fibroglandular density (ACR Breast Density Composition Category B). Digital mammography views were performed including tomosynthesis.  There is a spiculated equal density mass in the left breast inferior medial quadrant posterior depth. No other significant masses, calcifications, or other findings are seen in either breast.     IMPRESSION: INCOMPLETE NEEDS ADDITIONAL IMAGING EVALUATION   The spiculated mass in the left breast is indeterminate. Ultrasound with possible additional views are recommended. There is no mammographic evidence of malignancy in the right breast. Patient will be notified of the results. LIMITED ULTRASOUND OF LEFT BREAST: 8/17/2022  FINDINGS: There is an 8 x 7 x 8 mm round, hypoechoic mass with spiculated margins at 9:00, 6 cm from the nipple. It demonstrates mild posterior acoustic enhancement. This corresponds to the mass seen mammographically. The left axilla is negative. IMPRESSION: SUSPICIOUS OF MALIGNANCY   The 8 mm mass at 9:00, 6 cm FN is suspicious and ultrasound-guided biopsy is recommended. These findings and recommendations were discussed with the patient at the time of her exam.  Her biopsy is scheduled for 8/25/2022 at 2:00 pm.      [place] 8/25/22 9/1/22 heme/onc consultation   9/9/22 FU after MRI - pt elected to p/w sx upfront; MRI results reviewed. 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 10/19/22 post op with Dr Richa Brower - drain removed   10/24/22 FU after sx - discussed adjuvant chemotherapy for TNBC.  11/14/22 Cycle 1 ddAC. EF 60-65%. 11/28/22  Surendra 2 ddAC. Tolerated well.   12/12/22 FU C3 ddAC - tolerating well   12/27/22 FU C4 ddAC   1/10/23 FU - week 1 Taxol. Doing well overall. Discussed side effects r/t Taxol. 1/17/23 Week 2 Taxol. Doing well.           Family History   Problem Relation Age of Onset    Coronary Art Dis Mother     Breast Cancer Sister     Breast Cancer Sister     Thyroid Disease Sister     Thyroid Cancer Neg Hx     Diabetes Neg Hx       Social History     Socioeconomic History    Marital status:      Spouse name: None    Number of children: None    Years of education: None    Highest education level: None   Tobacco Use    Smoking status: Never    Smokeless tobacco: Never   Vaping Use    Vaping Use: Never used   Substance and Sexual Activity    Alcohol use: No    Drug use: No        Review of Systems   Constitutional:  Positive for appetite change (improved on remeron) and fatigue. Negative for chills, diaphoresis, fever and unexpected weight change. HENT:   Negative for hearing loss, mouth sores, nosebleeds, sore throat, trouble swallowing and voice change. Eyes:  Negative for icterus. Respiratory:  Negative for chest tightness, hemoptysis, shortness of breath and wheezing. Cardiovascular:  Negative for chest pain, leg swelling and palpitations. Gastrointestinal:  Positive for constipation. Negative for abdominal distention, abdominal pain, blood in stool, diarrhea, nausea and vomiting. Endocrine: Negative for hot flashes. Genitourinary:  Negative for difficulty urinating, frequency, vaginal bleeding and vaginal discharge. Musculoskeletal:  Negative for arthralgias, flank pain, gait problem and myalgias. Skin:  Negative for itching, rash and wound. Neurological:  Negative for dizziness, extremity weakness, gait problem, headaches and numbness. Psychiatric/Behavioral:  Positive for sleep disturbance. Negative for confusion and depression. The patient is nervous/anxious (much better).        Allergies   Allergen Reactions    Emend [Fosaprepitant Dimeglumine] Anaphylaxis     Flushing, coughing    Hydrocodone Anxiety and Other (See Comments)     Depressive state     Past Medical History:   Diagnosis Date    Allergic rhinitis     [facility]    left breast cancer---    GERD (gastroesophageal reflux disease)     controlled with med    Graves' disease     Hypercholesterolemia     Hyperthyroidism Multiple thyroid nodules     followed by dr Jo Quan     Past Surgical History:   Procedure Laterality Date    BREAST BIOPSY Left 10/7/2022    LEFT SENTINEL LYMPH NODE BIOPSY PREOP @ 0900, LYMPHO @ 7708, SX @ 8211 performed by Yamilka Ahuja DO at [facility] Left 2022    bx     SECTION      x2    CYST REMOVAL  2021    subcutaneous cyst from lateral neck (benign)    HYSTERECTOMY (CERVIX STATUS UNKNOWN)      fibroids (ovaries intact)    IR PORT PLACEMENT EQUAL OR GREATER THAN 5 YEARS  2022    IR PORT PLACEMENT EQUAL OR GREATER THAN 5 YEARS 2022 [facility] SPECIALS    MASTECTOMY Left 10/7/2022    LEFT BREAST MASTECTOMY performed by Yamilka Ahuja DO at [facility] MAIN OR     Current Outpatient Medications   Medication Sig Dispense Refill    lidocaine-prilocaine (EMLA) 2.5-2.5 % cream Apply topically weekly as needed prior to port access. 30 g 2    ondansetron (ZOFRAN) 8 MG tablet Take 1 tablet by mouth every 8 hours as needed for Nausea or Vomiting 60 tablet 2    prochlorperazine (COMPAZINE) 10 MG tablet Take 1 tablet by mouth every 6 hours as needed (nausea vomiting chemo) 60 tablet 2    Mastectomy Bra MISC by Does not apply route Mastectomy bra + prosthesis 1 each 2    Multiple Vitamin (MULTI-VITAMIN DAILY PO) Take 1 tablet by mouth daily As needed      mirtazapine (REMERON) 15 MG tablet Take 0.5 tablets by mouth nightly (Patient taking differently: Take 7.5 mg by mouth as needed) 30 tablet 3    rosuvastatin (CRESTOR) 20 MG tablet Take 1 tablet by mouth at bedtime 90 tablet 3    methIMAzole (TAPAZOLE) 5 MG tablet Take 0.5 tablets by mouth daily 15 tablet 11    omeprazole (PRILOSEC) 20 MG delayed release capsule Take 20 mg by mouth as needed PRN      Red Yeast Rice Extract 600 MG CAPS Take 600 mg by mouth daily      Loratadine (CLARITIN PO) Take by mouth (Patient not taking: No sig reported)       No current facility-administered medications for this visit. Facility-Administered Medications Ordered in Other Visits   Medication Dose Route Frequency Provider Last Rate Last Admin    sodium chloride flush 0.9 % injection 10 mL  10 mL IntraVENous PRN Dayo Denise MD   10 mL at 01/17/23 1000    0.9 % sodium chloride infusion  5-250 mL/hr IntraVENous PRN LILIA Degroot   Stopped at 01/17/23 1253    sodium chloride flush 0.9 % injection 5-40 mL  5-40 mL IntraVENous PRN Tabby Jaffe NP-C   10 mL at 01/17/23 1136    diphenhydrAMINE (BENADRYL) injection 50 mg  50 mg IntraVENous Once PRN LILIA Degroot        sodium chloride flush 0.9 % injection 10 mL  10 mL IntraVENous PRN Dayo Denise MD           No flowsheet data found. OBJECTIVE:  /74 (Site: Right Upper Arm, Position: Standing)   Pulse (!) 107   Temp 98.1 °F (36.7 °C)   Resp 14   Ht 5' 3\" (1.6 m)   Wt 158 lb (71.7 kg)   SpO2 99%   BMI 27.99 kg/m²       ECOG PERFORMANCE STATUS - 0-Fully active, able to carry on all pre-disease performance without restriction. Pain - 0 - No pain/10. None/Minimal pain - not affecting QOL     Fatigue - No flowsheet data found. Distress - No flowsheet data found. Physical Exam  Vitals reviewed. Exam conducted with a chaperone present. Constitutional:       General: She is not in acute distress. Appearance: Normal appearance. She is not ill-appearing or toxic-appearing. HENT:      Head: Normocephalic and atraumatic. Nose: Nose normal.      Mouth/Throat:      Mouth: Mucous membranes are moist.   Eyes:      General: No scleral icterus. Extraocular Movements: Extraocular movements intact. Conjunctiva/sclera: Conjunctivae normal.      Pupils: Pupils are equal, round, and reactive to light. Cardiovascular:      Rate and Rhythm: Normal rate and regular rhythm. Heart sounds: No murmur heard. Pulmonary:      Effort: No respiratory distress. Breath sounds: No wheezing or rales.    Chest: Comments: S/p mastectomy. Abdominal:      General: There is no distension. Tenderness: There is no abdominal tenderness. There is no right CVA tenderness or guarding. Musculoskeletal:         General: Normal range of motion. Cervical back: Normal range of motion. Right lower leg: No edema. Left lower leg: No edema. Lymphadenopathy:      Cervical: No cervical adenopathy. Upper Body:      Right upper body: No supraclavicular or axillary adenopathy. Left upper body: No supraclavicular or axillary adenopathy. Skin:     General: Skin is warm and dry. Coloration: Skin is not jaundiced or pale. Findings: No rash. Neurological:      General: No focal deficit present. Mental Status: She is alert and oriented to person, place, and time. Gait: Gait normal.   Psychiatric:         Behavior: Behavior normal.         Thought Content:  Thought content normal.        Labs:  Recent Results (from the past 168 hour(s))   CBC With Auto Differential    Collection Time: 01/17/23 10:00 AM   Result Value Ref Range    WBC 3.2 (L) 4.3 - 11.1 K/uL    RBC 2.79 (L) 4.05 - 5.2 M/uL    Hemoglobin 8.2 (L) 11.7 - 15.4 g/dL    Hematocrit 24.8 (L) 35.8 - 46.3 %    MCV 88.9 82.0 - 102.0 FL    MCH 29.4 26.1 - 32.9 PG    MCHC 33.1 31.4 - 35.0 g/dL    RDW 16.4 (H) 11.9 - 14.6 %    Platelets 419 118 - 199 K/uL    MPV 9.4 9.4 - 12.3 FL    nRBC 0.05 0.0 - 0.2 K/uL    Seg Neutrophils 71 43 - 78 %    Lymphocytes 16 13 - 44 %    Monocytes 9 4.0 - 12.0 %    Eosinophils % 1 0.5 - 7.8 %    Basophils 1 0.0 - 2.0 %    Immature Granulocytes 2 0.0 - 5.0 %    Segs Absolute 2.3 1.7 - 8.2 K/UL    Absolute Lymph # 0.5 0.5 - 4.6 K/UL    Absolute Mono # 0.3 0.1 - 1.3 K/UL    Absolute Eos # 0.0 0.0 - 0.8 K/UL    Basophils Absolute 0.0 0.0 - 0.2 K/UL    Absolute Immature Granulocyte 0.1 0.0 - 0.5 K/UL    Differential Type AUTOMATED     Comprehensive metabolic panel    Collection Time: 01/17/23 10:00 AM   Result Value Ref Range    Sodium 141 133 - 143 mmol/L    Potassium 3.6 3.5 - 5.1 mmol/L    Chloride 109 101 - 110 mmol/L    CO2 25 21 - 32 mmol/L    Anion Gap 7 2 - 11 mmol/L    Glucose 149 (H) 65 - 100 mg/dL    BUN 9 8 - 23 MG/DL    Creatinine 0.80 0.6 - 1.0 MG/DL    Est, Glom Filt Rate >60 >60 ml/min/1.73m2    Calcium 8.9 8.3 - 10.4 MG/DL    Total Bilirubin 0.4 0.2 - 1.1 MG/DL    ALT 17 12 - 65 U/L    AST 14 (L) 15 - 37 U/L    Alk Phosphatase 81 50 - 136 U/L    Total Protein 6.8 6.3 - 8.2 g/dL    Albumin 3.6 3.2 - 4.6 g/dL    Globulin 3.2 2.8 - 4.5 g/dL    Albumin/Globulin Ratio 1.1 0.4 - 1.6         Imaging: reviewed     PATHOLOGY:   per above     10/2022        ASSESSMENT:     Diagnosis Orders   1. Invasive ductal carcinoma of breast, female, left ([facility]  CBC With Auto Differential    DISCONTINUED: 0.9 % sodium chloride infusion    DISCONTINUED: famotidine (PEPCID) injection 20 mg    DISCONTINUED: diphenhydrAMINE (BENADRYL) injection 50 mg    DISCONTINUED: dexamethasone (DECADRON) 10 mg in sodium chloride 0.9 % 50 mL IVPB    DISCONTINUED: PACLitaxel (TAXOL) 144 mg in sodium chloride 0.9 % 250 mL chemo infusion    DISCONTINUED: sodium chloride flush 0.9 % injection 5-40 mL    DISCONTINUED: diphenhydrAMINE (BENADRYL) injection 50 mg    DISCONTINUED: hydrocortisone sodium succinate PF (SOLU-CORTEF) injection 100 mg    DISCONTINUED: acetaminophen (TYLENOL) tablet 650 mg    DISCONTINUED: ondansetron (ZOFRAN) injection 8 mg      2. Chemotherapy follow-up examination        3. Fatigue due to treatment            Ms. Stiven Rocha is here for FU of breast cancer. Left breast IDC, 9:00, s/p core bx/clip, at GABO, poorly diff, 8mm on US, MRI pending, LVI neg, DCIS high grade focally present, ER0/PR0, Her2 sera +1 - negative   - MR - Biopsied mass (11mm) in the medial 8:30 left breast at 7 cm from the nipple.  No additional pathologic enhancement or adenopathy in either breast.    - s/p left mastectomy and 0/2 SLnc - [id number] (0/2) - 15mm with neg margins, grade 2   - genetics - VUS     - here for consideration of week two of Taxol s/p 4 cycles DD AC. Labs reviewed and upon assessment of patient, proceed. ADDENDUM: pt reacted to taxol 9 minutes into infusion with tachycardia, flushing, dyspnea and heaviness. Scared her a lot. Discussed with Dr. Rod Nichols, will change treatment plan to weekly abraxane. Pt notified and aware. New treatment plan placed. - EF adequate prior to starting treatment - EF 60-65%. Plan echo with any ss or CHF and also after tx completed   - Anemia - added on iron labs last vistand adequate. - on remeron 7.5 mg nightly for sleep/appetite and this is working well.  - nausea/vomiting chemo: discussed Zofran and/or Compazine. No Emend due to prior reaction. - constipation: stool softeners, add Miralax   - continue good oral nutrition and hydration. - encouraged frequent activity throughout the day and rest as needed to combat fatigue.   - call with any fevers, uncontrolled side effects from treatment or any other worrisome/concerning symptoms. 2. Surveillance   - H&P q3-6 mo for years 1-3, q6mo years 4-5; mammogram yearly  - DEXA q2 years postmenopausal   - labs/imaging studies are not recommended without symptoms     Orders placed for: Dose-Dense AC-T (Paclitaxel Weekly) - [Doxorubicin + Cyclophosphamide q14 Days x 4 Cycles, Followed by Paclitaxel 80 mg/m² Weekly x 12 Weeks]  Cycles 1 through 4: A cycle is every 14 days:  Doxorubicin  60 mg/m² IV once on day 1 of cycles 1 through 4  Cyclophosphamide  600 mg/m² IV once on day 1 of cycles 1 through 4  Pegfilgrastim-xxxx  6 mg flat dose subcutaneously once on day 2 of cycles 1 through 4  Cycles 5 through 16: A cycle is every 7 days:  Paclitaxel  80 mg/m² IV once on day 1 of cycles 5 through [address]: Full Support    RTC per protocol or sooner as needed     MDM      Lab studies and imaging studies were personally reviewed.   Pertinent old records were reviewed. Historical:   - she is here for consultation regarding neoadjuvant chemotherapy for newly diagnosed triple negative breast cancer. During today's visit, we had a long conversation about breast cancer pathophysiology and staging in general.  We then reviewed her imaging and pathology in details, including receptor status and it's significance in terms of available treatment options. Neoadjuvant compared to adjuvant chemotherapy has similar long term outcomes when patients are given the same therapy. Preoperative treatment can render large, inoperable tumors operable and improve breast conservation, downstage tumor and decrease risk of postoperative lympedema. It also provides information about tumor chemosensitivity. Patients with pathologic complete response have favorable disease free survival and overall survival and this correlation is strongest in TNBC. Preoperative systemic therapy can lead to possible overtreatment with systemic therapy of clinical stages overestimated. It can also lead to possible under treatment local regionally with radiotherapy if clinical stage is underestimated. There is also possibility of disease progression during preoperative systemic therapy. - MRI pending - she is aware that tx plan may change depending on MR results   - she is here for FU after MRI - she has elected to p/w sx upfront. Images reviewed. Understands final path will be determined by sx specimen/LN status. Dr Yadira Santos notifed by me re pt's decision.    - we discussed options of AC-T +/- carbo/pembro and TC; she is aware that she will need chemotherapy regardless of her final decision (before/after sx), if she does chemo after, she would not be a candidate for pembro/carbo. Data from [facility] reviewed. - pt met with Dr Yadira Santos and elected to pw bilateral mastectomies with axillary dissection   - We discussed her path results in detail that showed 15mm TNBC.   She is aware that pt's are considered high risk per GEICAM/2003-02 trial if they have node neg disease but were <35, had neg er/pr receptors, histo grade 2-3, and T>2cm. She meets two criteria for consideration of anthracycline therapy. She wishes to think this over. We discussed risks of chemotherapy incuding irriversible heart disease (including acute LV failure - we reviewed this) and also leukemia. Gave options of ddAC-T and TC with growth factor support. - of note, following standard adjuvant chemo for [id number] or N+ TNBC, may consider metronomic capecitabine (650mg/m2 BID continuously for 1 year) as extended maintenance - SYSUCC-001 trial estimated 5-year DFS vs observation: 82.8 vs 73% (Ron et al 2021). - We discussed the potential side effects including but not limited to hair loss (which could be permanent), nausea, vomiting, diarrhea or constipation, mucositis, rashes, allergic reactions, organ damage including liver and kidney failure, cardiotoxicity, and direct effects on bone marrow which can lead to anemia and thrombocytopenia necessitating transfusion or neutropenia putting patient at risk for infection, sepsis and death if not treated. - echo EF 60-65%; LVI normal size and no WMA  - port placement   - decreased appetite, anxiety and sleep disturbance - on low dose mirtazapine and improved  - Rx mastectomy bra/prosthesis     All questions were asked and answered to the best of my ability. The patient verbalized understanding and agrees with the plan above.           LILIA Burr  [facility] Hematology and Oncology  [address] [place] [address]  Office : [phone]  Fax : [phone]

## 2023-01-17 NOTE — PROGRESS NOTES
Patient arrived to [facility] for Taxol. Assessment completed. No needs voiced at this time. Taxol started and within 10 mins, complained of feeling hot, flushed, and out of breath. Taxol stopped. Emergency meds given (see MAR), and normal saline started. VSS. NP called to notify. @[id number] Kari Hopkins NP called. Orders received to not re-challenge chemo today. Office will call to discuss with patient.

## 2023-01-19 PROBLEM — Z09 CHEMOTHERAPY FOLLOW-UP EXAMINATION: Status: RESOLVED | Noted: 2022-12-20 | Resolved: 2023-01-19

## 2023-01-20 DIAGNOSIS — C50.912 INVASIVE DUCTAL CARCINOMA OF BREAST, FEMALE, LEFT (HCC): Primary | ICD-10-CM

## 2023-01-20 ASSESSMENT — ENCOUNTER SYMPTOMS
SCLERAL ICTERUS: 0
WHEEZING: 0
TROUBLE SWALLOWING: 0
VOMITING: 0
SHORTNESS OF BREATH: 0
ABDOMINAL DISTENTION: 0
CONSTIPATION: 1
NAUSEA: 0
HEMOPTYSIS: 0
CHEST TIGHTNESS: 0
DIARRHEA: 0
BLOOD IN STOOL: 0
SORE THROAT: 0
VOICE CHANGE: 0
ABDOMINAL PAIN: 0

## 2023-01-20 NOTE — PROGRESS NOTES
[facility] [place] Hematology and Oncology: Established patient - follow up     Chief Complaint   Patient presents with    Follow-up     Reason for Referral: Breast cancer  Referring Provider: Self-referral   Primary Care Provider: MIR Jeffers CNP  Family History of Cancer/Hematologic Disorders: Family history is significant for two sisters and a niece with breast cancer. Presenting Symptoms: Abnormal routine screening mammogram     History of Present Illness:  Ms. Diana Delaney is a 57 y.o. female who presents today for follow up regarding breast cancer. The past medical history is significant for multiple thyroid nodules, hyperthyroidism, hypercholesterolemia, Grave's disease, GERD, uterine fibroids,  section x 2, cyst removal, and hysterectomy. She initially presented for a routine screening mammogram on 8/10/22 which identified a spiculated equal density mass in the left breast inferior medial quadrant posterior depth. Further evaluation with limited ultrasound of the left breast on 2022 confirmed an 8 x 7 x 8 mm round, hypoechoic mass with spiculated margins at the 9:00 position in the left breast, 6 cm from the nipple, demonstrating mild posterior acoustic enhancement and corresponding to the mass seen mammographically. US- guided biopsy of the 9:00 left breast mass was performed on 22 with pathology revealing ER/KY/HER-2 negative, grade 3, invasive carcinoma with high-grade DCIS focally present and no microcalcifications or lymphovascular space extension identified. Testing so far has been done at [facility]; however, Ms. Diana Delaney wishes to transfer care to [facility] [place]. She is self-referred to [facility] for Oncology evaluation and treatment of newly diagnosed breast cancer. She has also been referred to surgery and is scheduled for initial surgical evaluation with Dr. Teo Lindsey, on 22.    Patient's daughter lives in [place], works at a hospital.    At consultation, we discussed the pathophysiology of breast cancer, staging, and the importance of receptor status in terms of treatment options. We then reviewed her medical history as well as oncologic history, recent imaging and pathology in detail. Next steps in management were reviewed. She was here with her . Fu after MRI - Results reviewed in detail and show evidence of disease in the breast but no worrisome findings in terms of lymphadenopathy or other evidence of disease. She was emotional and wished to proceed with surgery upfront. We discussed need for chemotherapy because of triple negative status and she wishes to proceed with surgery upfront accepting risks. Pathology from surgery will determine ultimate staging and treatment plan. She completed surgery and we discussed her pathology in detail. Her tumor was 15 mm and 0 out of 2 lymph nodes. This is pathological stage Ib. We discussed her high risk features triple negative breast cancer and grade 2-3. We discussed role of chemotherapy and recommendations of dose dense ACT versus TC. She chose ACT. Will omit Emend with following cycles as she did have a reaction to this drug. Today, she is here for FU with her . We are switching over to Abraxane as she had a reaction to paclitaxel last week. Will give it over 2 hrs this time, drop to 1.5hr and then 60min if no reactions as she continues therapy. D/w pharmacy. Labs reviewed. She wishes to go back to [facility] schedule - will adjust accordingly. She understands that next week she'd be 1 day early and may not meet criteria for tx. She/ VU. No new other issues concerns. Has some appetite issues due to altered taste. Will start to eat more green leafy vegetables. Discussed few ways to boost iron. Also plan to increase meat intake. Will add on nutritional labs. No current s/s of infection.        Chronological Events:   BILATERAL DIGITAL SCREENING MAMMOGRAM 3D/2D: 8/10/2022   FINDINGS: There are scattered areas of fibroglandular density (ACR Breast Density Composition Category B). Digital mammography views were performed including tomosynthesis. There is a spiculated equal density mass in the left breast inferior medial quadrant posterior depth. No other significant masses, calcifications, or other findings are seen in either breast.     IMPRESSION: INCOMPLETE NEEDS ADDITIONAL IMAGING EVALUATION   The spiculated mass in the left breast is indeterminate. Ultrasound with possible additional views are recommended. There is no mammographic evidence of malignancy in the right breast. Patient will be notified of the results. LIMITED ULTRASOUND OF LEFT BREAST: 8/17/2022  FINDINGS: There is an 8 x 7 x 8 mm round, hypoechoic mass with spiculated margins at 9:00, 6 cm from the nipple. It demonstrates mild posterior acoustic enhancement. This corresponds to the mass seen mammographically. The left axilla is negative. IMPRESSION: SUSPICIOUS OF MALIGNANCY   The 8 mm mass at 9:00, 6 cm FN is suspicious and ultrasound-guided biopsy is recommended. These findings and recommendations were discussed with the patient at the time of her exam.  Her biopsy is scheduled for 8/25/2022 at 2:00 pm.      Evaristo 8/25/22 9/1/22 heme/onc consultation   9/9/22 FU after MRI - pt elected to p/w sx upfront; MRI results reviewed. 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 10/19/22 post op with Dr Christ Orr - drain removed   10/24/22 FU after sx - discussed adjuvant chemotherapy for TNBC.  11/14/22 Cycle 1 ddAC. EF 60-65%. 11/28/22  Surendra 2 ddAC. Tolerated well.   12/12/22 FU C3 ddAC - tolerating well   12/27/22 FU C4 ddAC   1/10/23 FU - week 1 Taxol. Doing well overall. Discussed side effects r/t Taxol. 1/17/23 Week 2 Taxol. Doing well. ADDENDUM\" paclitaxel rxn - > will order nab-paclitaxel for next apt   1/23/23 FU week 1 nab-paclitaxel tomorrow; ANC 1.3.  add on nutritional labs        Family History   Problem Relation Age of Onset    Coronary Art Dis Mother     Breast Cancer Sister     Breast Cancer Sister     Thyroid Disease Sister     Thyroid Cancer Neg Hx     Diabetes Neg Hx       Social History     Socioeconomic History    Marital status:      Spouse name: None    Number of children: None    Years of education: None    Highest education level: None   Tobacco Use    Smoking status: Never    Smokeless tobacco: Never   Vaping Use    Vaping Use: Never used   Substance and Sexual Activity    Alcohol use: No    Drug use: No        Review of Systems   Constitutional:  Positive for appetite change (improved on remeron) and fatigue. Negative for chills, diaphoresis, fever and unexpected weight change. HENT:   Negative for hearing loss, mouth sores, nosebleeds, sore throat, trouble swallowing and voice change. Eyes:  Negative for icterus. Respiratory:  Negative for chest tightness, hemoptysis, shortness of breath and wheezing. Cardiovascular:  Negative for chest pain, leg swelling and palpitations. Gastrointestinal:  Positive for constipation. Negative for abdominal distention, abdominal pain, blood in stool, diarrhea, nausea and vomiting. Endocrine: Negative for hot flashes. Genitourinary:  Negative for difficulty urinating, frequency, vaginal bleeding and vaginal discharge. Musculoskeletal:  Negative for arthralgias, flank pain, gait problem and myalgias. Skin:  Negative for itching, rash and wound. Neurological:  Negative for dizziness, extremity weakness, gait problem, headaches and numbness. Psychiatric/Behavioral:  Positive for sleep disturbance. Negative for confusion and depression. The patient is nervous/anxious (much better).        Allergies   Allergen Reactions    Emend [Fosaprepitant Dimeglumine] Anaphylaxis     Flushing, coughing    Hydrocodone Anxiety and Other (See Comments)     Depressive state     Past Medical History:   Diagnosis Date    Allergic rhinitis     Cancer ([address] 2022    left breast cancer---    GERD (gastroesophageal reflux disease)     controlled with med    Graves' disease     Hypercholesterolemia     Hyperthyroidism     Multiple thyroid nodules     followed by dr Mirian Flores     Past Surgical History:   Procedure Laterality Date    BREAST BIOPSY Left 10/7/2022    LEFT SENTINEL LYMPH NODE BIOPSY PREOP @ 0900, LYMPHO @ 1030, SX @ 8066 performed by Lindsay Scanlon DO at [facility] [address] Left 2022    bx     SECTION      x2    CYST REMOVAL  2021    subcutaneous cyst from lateral neck (benign)    HYSTERECTOMY (CERVIX STATUS UNKNOWN)      fibroids (ovaries intact)    IR PORT PLACEMENT EQUAL OR GREATER THAN 5 YEARS  2022    IR PORT PLACEMENT EQUAL OR GREATER THAN 5 YEARS 2022 [facility]IOLOG[facility] SPECIALS    MASTECTOMY Left 10/7/2022    LEFT BREAST MASTECTOMY performed by Lindsay [facility]lon DO at [facility] MAIN OR     Current Outpatient Medications   Medication Sig Dispense Refill    UNABLE TO FIND daily [place] Tail      lidocaine-prilocaine (EMLA) 2.5-2.5 % cream Apply topically weekly as needed prior to port access.  30 g 2    ondansetron (ZOFRAN) 8 MG tablet Take 1 tablet by mouth every 8 hours as needed for Nausea or Vomiting 60 tablet 2    prochlorperazine (COMPAZINE) 10 MG tablet Take 1 tablet by mouth every 6 hours as needed (nausea vomiting chemo) 60 tablet 2    Mastectomy Bra MISC by Does not apply route Mastectomy bra + prosthesis 1 each 2    Multiple Vitamin (MULTI-VITAMIN DAILY PO) Take 1 tablet by mouth daily As needed      mirtazapine (REMERON) 15 MG tablet Take 0.5 tablets by mouth nightly (Patient taking differently: Take 7.5 mg by mouth as needed) 30 tablet 3    rosuvastatin (CRESTOR) 20 MG tablet Take 1 tablet by mouth at bedtime 90 tablet 3 methIMAzole (TAPAZOLE) 5 MG tablet Take 0.5 tablets by mouth daily 15 tablet 11    omeprazole (PRILOSEC) 20 MG delayed release capsule Take 20 mg by mouth as needed PRN      Red Yeast Rice Extract 600 MG CAPS Take 600 mg by mouth daily      Loratadine (CLARITIN PO) Take by mouth (Patient not taking: No sig reported)       No current facility-administered medications for this visit. Facility-Administered Medications Ordered in Other Visits   Medication Dose Route Frequency Provider Last Rate Last Admin    sodium chloride flush 0.9 % injection 10 mL  10 mL IntraVENous PRN Jude De La Fuente MD           No flowsheet data found. OBJECTIVE:  /79 (Site: Right Upper Arm, Position: Standing)   Pulse 95   Temp 98.2 °F (36.8 °C)   Resp 14   Ht 5' 3\" (1.6 m)   Wt 158 lb (71.7 kg)   SpO2 100%   BMI 27.99 kg/m²       ECOG PERFORMANCE STATUS - 0-Fully active, able to carry on all pre-disease performance without restriction. Pain - 0 - No pain/10. None/Minimal pain - not affecting QOL     Fatigue - No flowsheet data found. Distress - No flowsheet data found. Physical Exam  Vitals reviewed. Exam conducted with a chaperone present. Constitutional:       General: She is not in acute distress. Appearance: Normal appearance. She is not ill-appearing or toxic-appearing. HENT:      Head: Normocephalic and atraumatic. Nose: Nose normal.      Mouth/Throat:      Mouth: Mucous membranes are moist.   Eyes:      General: No scleral icterus. Extraocular Movements: Extraocular movements intact. Conjunctiva/sclera: Conjunctivae normal.      Pupils: Pupils are equal, round, and reactive to light. Cardiovascular:      Rate and Rhythm: Normal rate and regular rhythm. Heart sounds: No murmur heard. Pulmonary:      Effort: No respiratory distress. Breath sounds: No wheezing or rales. Chest:          Comments: S/p mastectomy. Abdominal:      General: There is no distension. Tenderness: There is no abdominal tenderness. There is no right CVA tenderness or guarding. Musculoskeletal:         General: Normal range of motion. Cervical back: Normal range of motion. Right lower leg: No edema. Left lower leg: No edema. Lymphadenopathy:      Cervical: No cervical adenopathy. Upper Body:      Right upper body: No supraclavicular or axillary adenopathy. Left upper body: No supraclavicular or axillary adenopathy. Skin:     General: Skin is warm and dry. Coloration: Skin is not jaundiced or pale. Findings: No rash. Neurological:      General: No focal deficit present. Mental Status: She is alert and oriented to person, place, and time. Gait: Gait normal.   Psychiatric:         Behavior: Behavior normal.         Thought Content:  Thought content normal.        Labs:  Recent Results (from the past 168 hour(s))   CBC With Auto Differential    Collection Time: 01/17/23 10:00 AM   Result Value Ref Range    WBC 3.2 (L) 4.3 - 11.1 K/uL    RBC 2.79 (L) 4.05 - 5.2 M/uL    Hemoglobin 8.2 (L) 11.7 - 15.4 g/dL    Hematocrit 24.8 (L) 35.8 - 46.3 %    MCV 88.9 82.0 - 102.0 FL    MCH 29.4 26.1 - 32.9 PG    MCHC 33.1 31.4 - 35.0 g/dL    RDW 16.4 (H) 11.9 - 14.6 %    Platelets 587 680 - 301 K/uL    MPV 9.4 9.4 - 12.3 FL    nRBC 0.05 0.0 - 0.2 K/uL    Seg Neutrophils 71 43 - 78 %    Lymphocytes 16 13 - 44 %    Monocytes 9 4.0 - 12.0 %    Eosinophils % 1 0.5 - 7.8 %    Basophils 1 0.0 - 2.0 %    Immature Granulocytes 2 0.0 - 5.0 %    Segs Absolute 2.3 1.7 - 8.2 K/UL    Absolute Lymph # 0.5 0.5 - 4.6 K/UL    Absolute Mono # 0.3 0.1 - 1.3 K/UL    Absolute Eos # 0.0 0.0 - 0.8 K/UL    Basophils Absolute 0.0 0.0 - 0.2 K/UL    Absolute Immature Granulocyte 0.1 0.0 - 0.5 K/UL    Differential Type AUTOMATED     Comprehensive metabolic panel    Collection Time: 01/17/23 10:00 AM   Result Value Ref Range    Sodium 141 133 - 143 mmol/L    Potassium 3.6 3.5 - 5.1 mmol/L Chloride 109 101 - 110 mmol/L    CO2 25 21 - 32 mmol/L    Anion Gap 7 2 - 11 mmol/L    Glucose 149 (H) 65 - 100 mg/dL    BUN 9 8 - 23 MG/DL    Creatinine 0.80 0.6 - 1.0 MG/DL    Est, Glom Filt Rate >60 >60 ml/min/1.73m2    Calcium 8.9 8.3 - 10.4 MG/DL    Total Bilirubin 0.4 0.2 - 1.1 MG/DL    ALT 17 12 - 65 U/L    AST 14 (L) 15 - 37 U/L    Alk Phosphatase 81 50 - 136 U/L    Total Protein 6.8 6.3 - 8.2 g/dL    Albumin 3.6 3.2 - 4.6 g/dL    Globulin 3.2 2.8 - 4.5 g/dL    Albumin/Globulin Ratio 1.1 0.4 - 1.6     CBC with Auto Differential    Collection Time: 01/23/23 11:07 AM   Result Value Ref Range    WBC 2.9 (L) 4.3 - 11.1 K/uL    RBC 3.00 (L) 4.05 - 5.2 M/uL    Hemoglobin 8.9 (L) 11.7 - 15.4 g/dL    Hematocrit 27.6 (L) 35.8 - 46.3 %    MCV 92.0 82.0 - 102.0 FL    MCH 29.7 26.1 - 32.9 PG    MCHC 32.2 31.4 - 35.0 g/dL    RDW 18.6 (H) 11.9 - 14.6 %    Platelets 134 522 - 051 K/uL    MPV 8.6 (L) 9.4 - 12.3 FL    nRBC 0.00 0.0 - 0.2 K/uL    Differential Type AUTOMATED      Seg Neutrophils 47 43 - 78 %    Lymphocytes 24 13 - 44 %    Monocytes 25 (H) 4.0 - 12.0 %    Eosinophils % 3 0.5 - 7.8 %    Basophils 1 0.0 - 2.0 %    Immature Granulocytes 0 0.0 - 5.0 %    Segs Absolute 1.3 (L) 1.7 - 8.2 K/UL    Absolute Lymph # 0.7 0.5 - 4.6 K/UL    Absolute Mono # 0.7 0.1 - 1.3 K/UL    Absolute Eos # 0.1 0.0 - 0.8 K/UL    Basophils Absolute 0.0 0.0 - 0.2 K/UL    Absolute Immature Granulocyte 0.0 0.0 - 0.5 K/UL   Comprehensive Metabolic Panel    Collection Time: 01/23/23 11:07 AM   Result Value Ref Range    Sodium 141 133 - 143 mmol/L    Potassium 4.0 3.5 - 5.1 mmol/L    Chloride 109 101 - 110 mmol/L    CO2 28 21 - 32 mmol/L    Anion Gap 4 2 - 11 mmol/L    Glucose 82 65 - 100 mg/dL    BUN 10 8 - 23 MG/DL    Creatinine 0.70 0.6 - 1.0 MG/DL    Est, Glom Filt Rate >60 >60 ml/min/1.73m2    Calcium 9.0 8.3 - 10.4 MG/DL    Total Bilirubin 0.4 0.2 - 1.1 MG/DL    ALT 20 12 - 65 U/L    AST 18 15 - 37 U/L    Alk Phosphatase 81 50 - 136 U/L Total Protein 7.0 6.3 - 8.2 g/dL    Albumin 3.5 3.2 - 4.6 g/dL    Globulin 3.5 2.8 - 4.5 g/dL    Albumin/Globulin Ratio 1.0 0.4 - 1.6     Magnesium    Collection Time: 01/23/23 11:07 AM   Result Value Ref Range    Magnesium 2.3 1.8 - 2.4 mg/dL       Imaging: reviewed     PATHOLOGY:   per above     10/2022        ASSESSMENT:     Diagnosis Orders   1. Invasive ductal carcinoma of breast, female, left ([facility] [address]  CBC With Auto Differential    Comprehensive metabolic panel    HEPATITIS B SURFACE ANTIGEN    Hepatitis B core antibody, total    Hepatitis B surface antibody        Ms. Bartolo Ahumada is here for FU of breast cancer. Left breast IDC, 9:00, s/p core bx/clip, at GABO, poorly diff, 8mm on US, MRI pending, LVI neg, DCIS high grade focally present, ER0/PR0, Her2 sera +1 - negative   - MR - Biopsied mass (11mm) in the medial 8:30 left breast at 7 cm from the nipple. No additional pathologic enhancement or adenopathy in either breast.    - s/p left mastectomy and 0/2 SLnc - [id number] (0/2) - 15mm with neg margins, grade 2   - genetics - VUS   - s/p ddAC --> paclitaxel (rxn week 2 paclitaxel) switched to nab-paclitaxel     - here for FU with her . We are switching over to Abraxane as she had a reaction to paclitaxel last week. Will give it over 2 hrs this time, drop to 1.5hr and then 60min if no reactions as she continues therapy. D/w pharmacy. Labs reviewed. She wishes to go back to [facility] schedule - will adjust accordingly. She understands that next week she'd be 1 day early and may not meet criteria for tx. She/ VU.    - appetite - Has some appetite issues due to altered taste. Will start to eat more green leafy vegetables. Discussed few ways to boost iron. Also plan to increase meat intake. Will see if can add on nutritional labs. - EF adequate prior to starting treatment - EF 60-65%.   Plan echo with any ss or CHF and also after tx completed   - on remeron 7.5 mg nightly for sleep/appetite and this is working well.  - nausea/vomiting chemo: discussed Zofran and/or Compazine.  No Emend due to prior reaction.   - constipation: stool softeners, add Miralax   - continue good oral nutrition and hydration.   - encouraged frequent activity throughout the day and rest as needed to combat fatigue.   - call with any fevers, uncontrolled side effects from treatment or any other worrisome/concerning symptoms.   - advised pt to stop turkey tail supplement at this time    2. Surveillance   - H&P q3-6 mo for years 1-3, q6mo years 4-5; mammogram yearly  - DEXA q2 years postmenopausal   - labs/imaging studies are not recommended without symptoms     Orders placed for: Dose-Dense AC-T (Paclitaxel Weekly) - [Doxorubicin + Cyclophosphamide q14 Days x 4 Cycles, Followed by Paclitaxel 80 mg/m² Weekly x 12 Weeks]  Cycles 1 through 4: A cycle is every 14 days:  Doxorubicin  60 mg/m² IV once on day 1 of cycles 1 through 4  Cyclophosphamide  600 mg/m² IV once on day 1 of cycles 1 through 4  Pegfilgrastim-xxxx  6 mg flat dose subcutaneously once on day 2 of cycles 1 through 4  Cycles 5 through 16: A cycle is every 7 days:  Paclitaxel  80 mg/m² IV once on day 1 of cycles 5 through 16      RESUSCITATION DIRECTIVES/HOSPICE CARE: Full Support    RTC per protocol or sooner as needed   [40min - chart review, review of results and discussion of options, next steps, coordination of care, communication with pharmacy, and charting ]      MDM  Number of Diagnoses or Management Options  Invasive ductal carcinoma of breast, female, left (HCC): established, improving     Amount and/or Complexity of Data Reviewed  Clinical lab tests: ordered and reviewed  Tests in the radiology section of CPT®: ordered and reviewed  Tests in the medicine section of CPT®: ordered  Obtain history from someone other than the patient: yes  Review and summarize past medical records: yes  Discuss the patient with other providers: yes (Pharmacy )  Independent  visualization of images, tracings, or specimens: yes    Risk of Complications, Morbidity, and/or Mortality  Presenting problems: moderate  Diagnostic procedures: moderate  Management options: moderate        Lab studies and imaging studies were personally reviewed. Pertinent old records were reviewed. Historical:   - she is here for consultation regarding neoadjuvant chemotherapy for newly diagnosed triple negative breast cancer. During today's visit, we had a long conversation about breast cancer pathophysiology and staging in general.  We then reviewed her imaging and pathology in details, including receptor status and it's significance in terms of available treatment options. Neoadjuvant compared to adjuvant chemotherapy has similar long term outcomes when patients are given the same therapy. Preoperative treatment can render large, inoperable tumors operable and improve breast conservation, downstage tumor and decrease risk of postoperative lympedema. It also provides information about tumor chemosensitivity. Patients with pathologic complete response have favorable disease free survival and overall survival and this correlation is strongest in TNBC. Preoperative systemic therapy can lead to possible overtreatment with systemic therapy of clinical stages overestimated. It can also lead to possible under treatment local regionally with radiotherapy if clinical stage is underestimated. There is also possibility of disease progression during preoperative systemic therapy. - MRI pending - she is aware that tx plan may change depending on MR results   - she is here for FU after MRI - she has elected to p/w sx upfront. Images reviewed. Understands final path will be determined by sx specimen/LN status.   Dr Grace Raya notifed by me re pt's decision.    - we discussed options of AC-T +/- carbo/pembro and TC; she is aware that she will need chemotherapy regardless of her final decision (before/after sx), if she does chemo after, she would not be a candidate for pembro/carbo. Data from [facility] reviewed. - pt met with Dr Rukhsana Gudino and elected to pw bilateral mastectomies with axillary dissection   - We discussed her path results in detail that showed 15mm TNBC. She is aware that pt's are considered high risk per GEICAM/2003-02 trial if they have node neg disease but were <35, had neg er/pr receptors, histo grade 2-3, and T>2cm. She meets two criteria for consideration of anthracycline therapy. She wishes to think this over. We discussed risks of chemotherapy incuding irriversible heart disease (including acute LV failure - we reviewed this) and also leukemia. Gave options of ddAC-T and TC with growth factor support. - of note, following standard adjuvant chemo for [id number] or N+ TNBC, may consider metronomic capecitabine (650mg/m2 BID continuously for 1 year) as extended maintenance - SYSUCC-001 trial estimated 5-year DFS vs observation: 82.8 vs 73% (Ron et al 2021). - We discussed the potential side effects including but not limited to hair loss (which could be permanent), nausea, vomiting, diarrhea or constipation, mucositis, rashes, allergic reactions, organ damage including liver and kidney failure, cardiotoxicity, and direct effects on bone marrow which can lead to anemia and thrombocytopenia necessitating transfusion or neutropenia putting patient at risk for infection, sepsis and death if not treated. - echo EF 60-65%; LVI normal size and no WMA  - port placement   - decreased appetite, anxiety and sleep disturbance - on low dose mirtazapine and improved  - Rx mastectomy bra/prosthesis   - ADDENDUM: pt reacted to taxol 9 minutes into infusion with tachycardia, flushing, dyspnea and heaviness. Scared her a lot. Discussed with Dr. Dereje Martinez, will change treatment plan to weekly abraxane. Pt notified and aware. New treatment plan placed.      All questions were asked and answered to the best of my ability. The patient verbalized understanding and agrees with the plan above.           Berkley Morgan MD, MD  [facility] Hematology and Oncology  [address] [place] [address]  Office : [phone]  Fax : [phone]

## 2023-01-23 ENCOUNTER — OFFICE VISIT (OUTPATIENT)
Dept: ONCOLOGY | Age: 63
End: 2023-01-23
Payer: COMMERCIAL

## 2023-01-23 ENCOUNTER — HOSPITAL ENCOUNTER (OUTPATIENT)
Dept: LAB | Age: 63
Discharge: HOME | End: 2023-01-26
Payer: COMMERCIAL

## 2023-01-23 VITALS
OXYGEN SATURATION: 100 % | BODY MASS INDEX: 28 KG/M2 | RESPIRATION RATE: 14 BRPM | TEMPERATURE: 98.2 F | HEART RATE: 95 BPM | WEIGHT: 158 LBS | DIASTOLIC BLOOD PRESSURE: 79 MMHG | SYSTOLIC BLOOD PRESSURE: 135 MMHG | HEIGHT: 63 IN

## 2023-01-23 DIAGNOSIS — C50.912 INVASIVE DUCTAL CARCINOMA OF BREAST, FEMALE, LEFT (HCC): Primary | ICD-10-CM

## 2023-01-23 DIAGNOSIS — C50.912 INVASIVE DUCTAL CARCINOMA OF BREAST, FEMALE, LEFT (HCC): ICD-10-CM

## 2023-01-23 LAB
ALBUMIN SERPL-MCNC: 3.5 G/DL (ref 3.2–4.6)
ALBUMIN/GLOB SERPL: 1 (ref 0.4–1.6)
ALP SERPL-CCNC: 81 U/L (ref 50–136)
ALT SERPL-CCNC: 20 U/L (ref 12–65)
ANION GAP SERPL CALC-SCNC: 4 MMOL/L (ref 2–11)
AST SERPL-CCNC: 18 U/L (ref 15–37)
BASOPHILS # BLD: 0 K/UL (ref 0–0.2)
BASOPHILS NFR BLD: 1 % (ref 0–2)
BILIRUB SERPL-MCNC: 0.4 MG/DL (ref 0.2–1.1)
BUN SERPL-MCNC: 10 MG/DL (ref 8–23)
CALCIUM SERPL-MCNC: 9 MG/DL (ref 8.3–10.4)
CHLORIDE SERPL-SCNC: 109 MMOL/L (ref 101–110)
CO2 SERPL-SCNC: 28 MMOL/L (ref 21–32)
CREAT SERPL-MCNC: 0.7 MG/DL (ref 0.6–1)
DIFFERENTIAL METHOD BLD: ABNORMAL
EOSINOPHIL # BLD: 0.1 K/UL (ref 0–0.8)
EOSINOPHIL NFR BLD: 3 % (ref 0.5–7.8)
ERYTHROCYTE [DISTWIDTH] IN BLOOD BY AUTOMATED COUNT: 18.6 % (ref 11.9–14.6)
GLOBULIN SER CALC-MCNC: 3.5 G/DL (ref 2.8–4.5)
GLUCOSE SERPL-MCNC: 82 MG/DL (ref 65–100)
HCT VFR BLD AUTO: 27.6 % (ref 35.8–46.3)
HGB BLD-MCNC: 8.9 G/DL (ref 11.7–15.4)
IMM GRANULOCYTES # BLD AUTO: 0 K/UL (ref 0–0.5)
IMM GRANULOCYTES NFR BLD AUTO: 0 % (ref 0–5)
LYMPHOCYTES # BLD: 0.7 K/UL (ref 0.5–4.6)
LYMPHOCYTES NFR BLD: 24 % (ref 13–44)
MAGNESIUM SERPL-MCNC: 2.3 MG/DL (ref 1.8–2.4)
MCH RBC QN AUTO: 29.7 PG (ref 26.1–32.9)
MCHC RBC AUTO-ENTMCNC: 32.2 G/DL (ref 31.4–35)
MCV RBC AUTO: 92 FL (ref 82–102)
MONOCYTES # BLD: 0.7 K/UL (ref 0.1–1.3)
MONOCYTES NFR BLD: 25 % (ref 4–12)
NEUTS SEG # BLD: 1.3 K/UL (ref 1.7–8.2)
NEUTS SEG NFR BLD: 47 % (ref 43–78)
NRBC # BLD: 0 K/UL (ref 0–0.2)
PLATELET # BLD AUTO: 210 K/UL (ref 150–450)
PMV BLD AUTO: 8.6 FL (ref 9.4–12.3)
POTASSIUM SERPL-SCNC: 4 MMOL/L (ref 3.5–5.1)
PROT SERPL-MCNC: 7 G/DL (ref 6.3–8.2)
RBC # BLD AUTO: 3 M/UL (ref 4.05–5.2)
SODIUM SERPL-SCNC: 141 MMOL/L (ref 133–143)
WBC # BLD AUTO: 2.9 K/UL (ref 4.3–11.1)

## 2023-01-23 PROCEDURE — 80053 COMPREHEN METABOLIC PANEL: CPT

## 2023-01-23 PROCEDURE — 36415 COLL VENOUS BLD VENIPUNCTURE: CPT

## 2023-01-23 PROCEDURE — 83735 ASSAY OF MAGNESIUM: CPT

## 2023-01-23 PROCEDURE — 99215 OFFICE O/P EST HI 40 MIN: CPT | Performed by: INTERNAL MEDICINE

## 2023-01-23 PROCEDURE — 85025 COMPLETE CBC W/AUTO DIFF WBC: CPT

## 2023-01-23 RX ORDER — SODIUM CHLORIDE 9 MG/ML
5-250 INJECTION, SOLUTION INTRAVENOUS PRN
OUTPATIENT
Start: 2023-01-24

## 2023-01-23 RX ORDER — PACLITAXEL 100 MG/20ML
100 INJECTION, POWDER, LYOPHILIZED, FOR SUSPENSION INTRAVENOUS ONCE
Status: CANCELLED | OUTPATIENT
Start: 2023-01-24 | End: 2023-01-24

## 2023-01-23 RX ORDER — DIPHENHYDRAMINE HYDROCHLORIDE 50 MG/ML
50 INJECTION INTRAMUSCULAR; INTRAVENOUS
OUTPATIENT
Start: 2023-01-24

## 2023-01-23 RX ORDER — SODIUM CHLORIDE 9 MG/ML
5-250 INJECTION, SOLUTION INTRAVENOUS PRN
Status: CANCELLED | OUTPATIENT
Start: 2023-01-24

## 2023-01-23 RX ORDER — ONDANSETRON 2 MG/ML
8 INJECTION INTRAMUSCULAR; INTRAVENOUS ONCE
Status: CANCELLED | OUTPATIENT
Start: 2023-01-24 | End: 2023-01-24

## 2023-01-23 RX ORDER — ACETAMINOPHEN 325 MG/1
650 TABLET ORAL
OUTPATIENT
Start: 2023-01-24

## 2023-01-23 RX ORDER — MEPERIDINE HYDROCHLORIDE 50 MG/ML
12.5 INJECTION INTRAMUSCULAR; INTRAVENOUS; SUBCUTANEOUS PRN
OUTPATIENT
Start: 2023-01-24

## 2023-01-23 RX ORDER — EPINEPHRINE 1 MG/ML
0.3 INJECTION, SOLUTION, CONCENTRATE INTRAVENOUS PRN
OUTPATIENT
Start: 2023-01-24

## 2023-01-23 RX ORDER — SODIUM CHLORIDE 9 MG/ML
INJECTION, SOLUTION INTRAVENOUS CONTINUOUS
OUTPATIENT
Start: 2023-01-24

## 2023-01-23 RX ORDER — FAMOTIDINE 10 MG/ML
20 INJECTION, SOLUTION INTRAVENOUS
OUTPATIENT
Start: 2023-01-24

## 2023-01-23 RX ORDER — ONDANSETRON 2 MG/ML
8 INJECTION INTRAMUSCULAR; INTRAVENOUS
OUTPATIENT
Start: 2023-01-24

## 2023-01-23 RX ORDER — ALBUTEROL SULFATE 90 UG/1
4 AEROSOL, METERED RESPIRATORY (INHALATION) PRN
OUTPATIENT
Start: 2023-01-24

## 2023-01-23 RX ORDER — SODIUM CHLORIDE 9 MG/ML
5-40 INJECTION INTRAVENOUS PRN
Status: CANCELLED | OUTPATIENT
Start: 2023-01-24

## 2023-01-23 RX ORDER — HEPARIN SODIUM (PORCINE) LOCK FLUSH IV SOLN 100 UNIT/ML 100 UNIT/ML
500 SOLUTION INTRAVENOUS PRN
OUTPATIENT
Start: 2023-01-24

## 2023-01-23 ASSESSMENT — PATIENT HEALTH QUESTIONNAIRE - PHQ9
SUM OF ALL RESPONSES TO PHQ QUESTIONS 1-9: 0
SUM OF ALL RESPONSES TO PHQ QUESTIONS 1-9: 0
2. FEELING DOWN, DEPRESSED OR HOPELESS: 0
SUM OF ALL RESPONSES TO PHQ QUESTIONS 1-9: 0
2. FEELING DOWN, DEPRESSED OR HOPELESS: 0
SUM OF ALL RESPONSES TO PHQ QUESTIONS 1-9: 0

## 2023-01-23 NOTE — PATIENT INSTRUCTIONS
Patient Instructions from Today's Visit    Reason for Visit:  Follow up. Diagnosis Information:  [email or web address]-[email or web address]-answers-[email or web address]-materials/[email or web address]-[email or web address]      Plan:  Switched taxol to abraxane, starting abraxane today. Follow Up:  Next week (will switch to Mondays) with labs and treatment.       Recent Lab Results:  Hospital Outpatient Visit on 01/23/2023   Component Date Value Ref Range Status    WBC 01/23/2023 2.9 (A)  4.3 - 11.1 K/uL Final    RBC 01/23/2023 3.00 (A)  4.05 - 5.2 M/uL Final    Hemoglobin 01/23/2023 8.9 (A)  11.7 - 15.4 g/dL Final    Hematocrit 01/23/2023 27.6 (A)  35.8 - 46.3 % Final    MCV 01/23/2023 92.0  82.0 - 102.0 FL Final    MCH 01/23/2023 29.7  26.1 - 32.9 PG Final    MCHC 01/23/2023 32.2  31.4 - 35.0 g/dL Final    RDW 01/23/2023 18.6 (A)  11.9 - 14.6 % Final    Platelets 81/71/1216 210  150 - 450 K/uL Final    MPV 01/23/2023 8.6 (A)  9.4 - 12.3 FL Final    nRBC 01/23/2023 0.00  0.0 - 0.2 K/uL Final    **Note: Absolute NRBC parameter is now reported with Hemogram**    Differential Type 01/23/2023 AUTOMATED    Final    Seg Neutrophils 01/23/2023 47  43 - 78 % Final    Lymphocytes 01/23/2023 24  13 - 44 % Final    Monocytes 01/23/2023 25 (A)  4.0 - 12.0 % Final    Eosinophils % 01/23/2023 3  0.5 - 7.8 % Final    Basophils 01/23/2023 1  0.0 - 2.0 % Final    Immature Granulocytes 01/23/2023 0  0.0 - 5.0 % Final    Segs Absolute 01/23/2023 1.3 (A)  1.7 - 8.2 K/UL Final    Absolute Lymph # 01/23/2023 0.7  0.5 - 4.6 K/UL Final    Absolute Mono # 01/23/2023 0.7  0.1 - 1.3 K/UL Final    Absolute Eos # 01/23/2023 0.1  0.0 - 0.8 K/UL Final    Basophils Absolute 01/23/2023 0.0  0.0 - 0.2 K/UL Final    Absolute Immature Granulocyte 01/23/2023 0.0  0.0 - 0.5 K/UL Final    Sodium 01/23/2023 141  133 - 143 mmol/L Final    Potassium 01/23/2023 4.0  3.5 - 5.1 mmol/L Final    Chloride 01/23/2023 109  101 - 110 mmol/L Final    CO2 01/23/2023 28  21 - 32 mmol/L Final    Anion Gap 01/23/2023 4  2 - 11 mmol/L Final    Glucose 01/23/2023 82  65 - 100 mg/dL Final    BUN 01/23/2023 10  8 - 23 MG/DL Final    Creatinine 01/23/2023 0.70  0.6 - 1.0 MG/DL Final    EstIsmael Filt Rate 01/23/2023 >60  >60 ml/min/1.73m2 Final    Comment:      Pediatric calculator link: [email or web address] org/professionals/kdoqi/gfr_[email or web address]       These results are not intended for use in patients <25years of age. eGFR results are calculated without a race factor using  the 2021 CKD-EPI equation. Careful clinical correlation is recommended, particularly when comparing to results calculated using previous equations. The CKD-EPI equation is less accurate in patients with extremes of muscle mass, extra-renal metabolism of creatinine, excessive creatine ingestion, or following therapy that affects renal tubular secretion. Calcium 01/23/2023 9.0  8.3 - 10.4 MG/DL Final    Total Bilirubin 01/23/2023 0.4  0.2 - 1.1 MG/DL Final    ALT 01/23/2023 20  12 - 65 U/L Final    AST 01/23/2023 18  15 - 37 U/L Final    Alk Phosphatase 01/23/2023 81  50 - 136 U/L Final    Total Protein 01/23/2023 7.0  6.3 - 8.2 g/dL Final    Albumin 01/23/2023 3.5  3.2 - 4.6 g/dL Final    Globulin 01/23/2023 3.5  2.8 - 4.5 g/dL Final    Albumin/Globulin Ratio 01/23/2023 1.0  0.4 - 1.6   Final    Magnesium 01/23/2023 2.3  1.8 - 2.4 mg/dL Final         Treatment Summary has been discussed and given to patient: n/a        -------------------------------------------------------------------------------------------------------------------  Please call our office at [phone] if you have any  of the following symptoms:   Fever of 100.5 or greater  Chills  Shortness of breath  Swelling or pain in one leg    After office hours an answering service is available and will contact a provider for emergencies or if you are experiencing any of the above symptoms. Patient did express an interest in My Chart.   My Chart log in information explained on the after visit summary printout at the [facility] desk.     Edmar Mar RN

## 2023-01-24 ENCOUNTER — HOSPITAL ENCOUNTER (OUTPATIENT)
Dept: INFUSION THERAPY | Age: 63
Discharge: HOME | End: 2023-01-24
Payer: COMMERCIAL

## 2023-01-24 VITALS
TEMPERATURE: 98 F | HEART RATE: 88 BPM | DIASTOLIC BLOOD PRESSURE: 75 MMHG | BODY MASS INDEX: 27.99 KG/M2 | OXYGEN SATURATION: 98 % | WEIGHT: 158 LBS | RESPIRATION RATE: 16 BRPM | SYSTOLIC BLOOD PRESSURE: 134 MMHG

## 2023-01-24 DIAGNOSIS — C50.912 INVASIVE DUCTAL CARCINOMA OF BREAST, FEMALE, LEFT (HCC): Primary | ICD-10-CM

## 2023-01-24 PROCEDURE — 6360000002 HC RX W HCPCS: Performed by: INTERNAL MEDICINE

## 2023-01-24 PROCEDURE — 2580000003 HC RX 258: Performed by: INTERNAL MEDICINE

## 2023-01-24 PROCEDURE — 96413 CHEMO IV INFUSION 1 HR: CPT

## 2023-01-24 PROCEDURE — 96375 TX/PRO/DX INJ NEW DRUG ADDON: CPT

## 2023-01-24 PROCEDURE — 96415 CHEMO IV INFUSION ADDL HR: CPT

## 2023-01-24 RX ORDER — SODIUM CHLORIDE 9 MG/ML
5-40 INJECTION INTRAVENOUS PRN
Status: DISCONTINUED | OUTPATIENT
Start: 2023-01-24 | End: 2023-01-25 | Stop reason: HOSPADM

## 2023-01-24 RX ORDER — PACLITAXEL 100 MG/20ML
100 INJECTION, POWDER, LYOPHILIZED, FOR SUSPENSION INTRAVENOUS ONCE
Status: COMPLETED | OUTPATIENT
Start: 2023-01-24 | End: 2023-01-24

## 2023-01-24 RX ORDER — ONDANSETRON 2 MG/ML
8 INJECTION INTRAMUSCULAR; INTRAVENOUS ONCE
Status: COMPLETED | OUTPATIENT
Start: 2023-01-24 | End: 2023-01-24

## 2023-01-24 RX ORDER — SODIUM CHLORIDE 9 MG/ML
5-250 INJECTION, SOLUTION INTRAVENOUS PRN
Status: DISCONTINUED | OUTPATIENT
Start: 2023-01-24 | End: 2023-01-25 | Stop reason: HOSPADM

## 2023-01-24 RX ADMIN — SODIUM CHLORIDE, PRESERVATIVE FREE 10 ML: 5 INJECTION INTRAVENOUS at 15:53

## 2023-01-24 RX ADMIN — SODIUM CHLORIDE 20 ML/HR: 9 INJECTION, SOLUTION INTRAVENOUS at 15:34

## 2023-01-24 RX ADMIN — ONDANSETRON 8 MG: 2 INJECTION INTRAMUSCULAR; INTRAVENOUS at 11:50

## 2023-01-24 RX ADMIN — PACLITAXEL 179 MG: 100 INJECTION, POWDER, LYOPHILIZED, FOR SUSPENSION INTRAVENOUS at 12:45

## 2023-01-27 DIAGNOSIS — C50.912 INVASIVE DUCTAL CARCINOMA OF BREAST, FEMALE, LEFT (HCC): Primary | ICD-10-CM

## 2023-01-27 ASSESSMENT — ENCOUNTER SYMPTOMS
NAUSEA: 0
SORE THROAT: 0
SCLERAL ICTERUS: 0
HEMOPTYSIS: 0
ABDOMINAL PAIN: 0
VOMITING: 0
SHORTNESS OF BREATH: 0
DIARRHEA: 0
TROUBLE SWALLOWING: 0
CHEST TIGHTNESS: 0
BLOOD IN STOOL: 0
CONSTIPATION: 1
WHEEZING: 0
ABDOMINAL DISTENTION: 0
VOICE CHANGE: 0

## 2023-01-27 NOTE — PROGRESS NOTES
[facility] Hematology and Oncology: Established patient - follow up     Chief Complaint   Patient presents with    Follow-up       Reason for Referral: Breast cancer  Referring Provider: Self-referral   Primary Care Provider: MIR Ayon CNP  Family History of Cancer/Hematologic Disorders: Family history is significant for two sisters and a niece with breast cancer. Presenting Symptoms: Abnormal routine screening mammogram     History of Present Illness:  Ms. Preston Bledsoe is a 57 y.o. female who presents today for follow up regarding breast cancer. The past medical history is significant for multiple thyroid nodules, hyperthyroidism, hypercholesterolemia, Grave's disease, GERD, uterine fibroids,  section x 2, cyst removal, and hysterectomy. She initially presented for a routine screening mammogram on 8/10/22 which identified a spiculated equal density mass in the left breast inferior medial quadrant posterior depth. Further evaluation with limited ultrasound of the left breast on 2022 confirmed an 8 x 7 x 8 mm round, hypoechoic mass with spiculated margins at the 9:00 position in the left breast, 6 cm from the nipple, demonstrating mild posterior acoustic enhancement and corresponding to the mass seen mammographically. US- guided biopsy of the 9:00 left breast mass was performed on 22 with pathology revealing ER/ND/HER-2 negative, grade 3, invasive carcinoma with high-grade DCIS focally present and no microcalcifications or lymphovascular space extension identified. Testing so far has been done at [facility]; however, Ms. Preston Bledsoe wishes to transfer care to [facility]. She is self-referred to [facility] for Oncology evaluation and treatment of newly diagnosed breast cancer. She has also been referred to surgery and is scheduled for initial surgical evaluation with Dr. Yomaira Freire, on 22.    Patient's daughter lives in [place], works at a hospital.    At consultation, we discussed the pathophysiology of breast cancer, staging, and the importance of receptor status in terms of treatment options. We then reviewed her medical history as well as oncologic history, recent imaging and pathology in detail. Next steps in management were reviewed. She was here with her . Fu after MRI - Results reviewed in detail and show evidence of disease in the breast but no worrisome findings in terms of lymphadenopathy or other evidence of disease. She was emotional and wished to proceed with surgery upfront. We discussed need for chemotherapy because of triple negative status and she wishes to proceed with surgery upfront accepting risks. Pathology from surgery will determine ultimate staging and treatment plan. She completed surgery and we discussed her pathology in detail. Her tumor was 15 mm and 0 out of 2 lymph nodes. This is pathological stage Ib. We discussed her high risk features triple negative breast cancer and grade 2-3. We discussed role of chemotherapy and recommendations of dose dense ACT versus TC. She chose ACT. Will omit Emend with following cycles as she did have a reaction to this drug. She is here today for follow up and next Abraxane - today will plan for 1.5 hr infusion and then 60 minutes next week if well tolerated. She has been well overall. She has not had any infusion issues with the Abraxane. She denies any significant nausea. She has had some constipation and taking prunes, Miralax and fiber - with improvement. Her taste changes are slowly improving and weight is up 1# to 159#. She denies any shortness of breath. She has had a mild runny nose but no other associated symptoms or fevers. She has had mild tingling (intermittent) of fingers only. Will add on nutritional labs as not added last visit.                      Chronological Events:   BILATERAL DIGITAL SCREENING MAMMOGRAM 3D/2D: 8/10/2022   FINDINGS: There are scattered areas of fibroglandular density (ACR Breast Density Composition Category B). Digital mammography views were performed including tomosynthesis. There is a spiculated equal density mass in the left breast inferior medial quadrant posterior depth. No other significant masses, calcifications, or other findings are seen in either breast.     IMPRESSION: INCOMPLETE NEEDS ADDITIONAL IMAGING EVALUATION   The spiculated mass in the left breast is indeterminate. Ultrasound with possible additional views are recommended. There is no mammographic evidence of malignancy in the right breast. Patient will be notified of the results. LIMITED ULTRASOUND OF LEFT BREAST: 8/17/2022  FINDINGS: There is an 8 x 7 x 8 mm round, hypoechoic mass with spiculated margins at 9:00, 6 cm from the nipple. It demonstrates mild posterior acoustic enhancement. This corresponds to the mass seen mammographically. The left axilla is negative. IMPRESSION: SUSPICIOUS OF MALIGNANCY   The 8 mm mass at 9:00, 6 cm FN is suspicious and ultrasound-guided biopsy is recommended. These findings and recommendations were discussed with the patient at the time of her exam.  Her biopsy is scheduled for 8/25/2022 at 2:00 pm.      Evaristo 8/25/22 9/1/22 heme/onc consultation   9/9/22 FU after MRI - pt elected to p/w sx upfront; MRI results reviewed. 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 10/19/22 post op with Dr Aleksander Yanez - drain removed   10/24/22 FU after sx - discussed adjuvant chemotherapy for TNBC.  11/14/22 Cycle 1 ddAC. EF 60-65%. 11/28/22  Surendra 2 ddAC. Tolerated well.   12/12/22 FU C3 ddAC - tolerating well   12/27/22 FU C4 ddAC   1/10/23 FU - week 1 Taxol. Doing well overall. Discussed side effects r/t Taxol. 1/17/23 Week 2 Taxol. Doing well.    ADDENDUM\" paclitaxel rxn - > will order nab-paclitaxel for next apt   1/23/23 FU week 1 nab-paclitaxel tomorrow; ANC 1.3.  add on nutritional labs   1/30/23 FU week 2 Abraxane. Doing well. ANC adequate. Added on nutritional labs. Family History   Problem Relation Age of Onset    Coronary Art Dis Mother     Breast Cancer Sister     Breast Cancer Sister     Thyroid Disease Sister     Thyroid Cancer Neg Hx     Diabetes Neg Hx       Social History     Socioeconomic History    Marital status:      Spouse name: None    Number of children: None    Years of education: None    Highest education level: None   Tobacco Use    Smoking status: Never    Smokeless tobacco: Never   Vaping Use    Vaping Use: Never used   Substance and Sexual Activity    Alcohol use: No    Drug use: No        Review of Systems   Constitutional:  Positive for appetite change (improved on remeron) and fatigue. Negative for chills, diaphoresis, fever and unexpected weight change. HENT:   Negative for hearing loss, mouth sores, nosebleeds, sore throat, trouble swallowing and voice change. Eyes:  Negative for icterus. Respiratory:  Negative for chest tightness, hemoptysis, shortness of breath and wheezing. Cardiovascular:  Negative for chest pain, leg swelling and palpitations. Gastrointestinal:  Positive for constipation. Negative for abdominal distention, abdominal pain, blood in stool, diarrhea, nausea and vomiting. Endocrine: Negative for hot flashes. Genitourinary:  Negative for difficulty urinating, frequency, vaginal bleeding and vaginal discharge. Musculoskeletal:  Negative for arthralgias, flank pain, gait problem and myalgias. Skin:  Negative for itching, rash and wound. Neurological:  Negative for dizziness, extremity weakness, gait problem, headaches and numbness. Psychiatric/Behavioral:  Positive for sleep disturbance. Negative for confusion and depression. The patient is nervous/anxious (much better).        Allergies   Allergen Reactions    Emend [Fosaprepitant Dimeglumine] Anaphylaxis     Flushing, coughing    Hydrocodone Anxiety and Other (See Comments)     Depressive state     Past Medical History:   Diagnosis Date    Allergic rhinitis     Cancer (HCC) 2022    left breast cancer---    GERD (gastroesophageal reflux disease)     controlled with med    Graves' disease     Hypercholesterolemia     Hyperthyroidism     Multiple thyroid nodules     followed by dr oliver     Past Surgical History:   Procedure Laterality Date    BREAST BIOPSY Left 10/7/2022    LEFT SENTINEL LYMPH NODE BIOPSY PREOP @ 0900, LYMPHO @ 1030, SX @ 1230 performed by Chitra Araujo DO at [facility] MAIN OR    BREAST SURGERY Left 2022    bx     SECTION      x2    CYST REMOVAL  2021    subcutaneous cyst from lateral neck (benign)    HYSTERECTOMY (CERVIX STATUS UNKNOWN)      fibroids (ovaries intact)    IR PORT PLACEMENT EQUAL OR GREATER THAN 5 YEARS  2022    IR PORT PLACEMENT EQUAL OR GREATER THAN 5 YEARS 2022 [facility] RADIOLOGY SPECIALS    MASTECTOMY Left 10/7/2022    LEFT BREAST MASTECTOMY performed by Chitra Araujo DO at [facility] MAIN OR     Current Outpatient Medications   Medication Sig Dispense Refill    lidocaine-prilocaine (EMLA) 2.5-2.5 % cream Apply topically weekly as needed prior to port access. 30 g 2    ondansetron (ZOFRAN) 8 MG tablet Take 1 tablet by mouth every 8 hours as needed for Nausea or Vomiting 60 tablet 2    prochlorperazine (COMPAZINE) 10 MG tablet Take 1 tablet by mouth every 6 hours as needed (nausea vomiting chemo) 60 tablet 2    Mastectomy Bra MISC by Does not apply route Mastectomy bra + prosthesis 1 each 2    Multiple Vitamin (MULTI-VITAMIN DAILY PO) Take 1 tablet by mouth daily As needed      mirtazapine (REMERON) 15 MG tablet Take 0.5 tablets by mouth nightly (Patient taking differently: Take 7.5 mg by mouth as needed) 30 tablet 3    rosuvastatin (CRESTOR) 20 MG tablet Take 1 tablet by mouth at bedtime 90  tablet 3    methIMAzole (TAPAZOLE) 5 MG tablet Take 0.5 tablets by mouth daily 15 tablet 11    omeprazole (PRILOSEC) 20 MG delayed release capsule Take 20 mg by mouth as needed PRN      Red Yeast Rice Extract 600 MG CAPS Take 600 mg by mouth daily      UNABLE TO FIND daily [place] [place] Tail      Loratadine (CLARITIN PO) Take by mouth (Patient not taking: No sig reported)       No current facility-administered medications for this visit. Facility-Administered Medications Ordered in Other Visits   Medication Dose Route Frequency Provider Last Rate Last Admin    sodium chloride flush 0.9 % injection 10 mL  10 mL IntraVENous PRN China Perez MD   10 mL at 01/30/23 0715    sodium chloride flush 0.9 % injection 10 mL  10 mL IntraVENous PRN China Perez MD           No flowsheet data found. OBJECTIVE:  /65 Comment: standing  Pulse (!) 107   Temp 98.3 °F (36.8 °C) (Oral)   Resp 16   Ht 5' 3\" (1.6 m)   Wt 159 lb (72.1 kg)   SpO2 99%   BMI 28.17 kg/m²       ECOG PERFORMANCE STATUS - 0-Fully active, able to carry on all pre-disease performance without restriction. Pain - 0 - No pain/10. None/Minimal pain - not affecting QOL     Fatigue - No flowsheet data found. Distress - No flowsheet data found. Physical Exam  Vitals reviewed. Exam conducted with a chaperone present. Constitutional:       General: She is not in acute distress. Appearance: Normal appearance. She is not ill-appearing or toxic-appearing. HENT:      Head: Normocephalic and atraumatic. Nose: Nose normal.      Mouth/Throat:      Mouth: Mucous membranes are moist.   Eyes:      General: No scleral icterus. Extraocular Movements: Extraocular movements intact. Conjunctiva/sclera: Conjunctivae normal.      Pupils: Pupils are equal, round, and reactive to light. Cardiovascular:      Rate and Rhythm: Normal rate and regular rhythm. Heart sounds: No murmur heard. Pulmonary:      Effort: No respiratory distress. Breath sounds: No wheezing or rales. Chest:          Comments: S/p mastectomy. Abdominal:      General: There is no distension. Tenderness: There is no abdominal tenderness. There is no right CVA tenderness or guarding. Musculoskeletal:         General: Normal range of motion. Cervical back: Normal range of motion. Right lower leg: No edema. Left lower leg: No edema. Lymphadenopathy:      Cervical: No cervical adenopathy. Upper Body:      Right upper body: No supraclavicular or axillary adenopathy. Left upper body: No supraclavicular or axillary adenopathy. Skin:     General: Skin is warm and dry. Coloration: Skin is not jaundiced or pale. Findings: No rash. Neurological:      General: No focal deficit present. Mental Status: She is alert and oriented to person, place, and time. Gait: Gait normal.   Psychiatric:         Behavior: Behavior normal.         Thought Content:  Thought content normal.        Labs:  Recent Results (from the past 168 hour(s))   CBC with Auto Differential    Collection Time: 01/23/23 11:07 AM   Result Value Ref Range    WBC 2.9 (L) 4.3 - 11.1 K/uL    RBC 3.00 (L) 4.05 - 5.2 M/uL    Hemoglobin 8.9 (L) 11.7 - 15.4 g/dL    Hematocrit 27.6 (L) 35.8 - 46.3 %    MCV 92.0 82.0 - 102.0 FL    MCH 29.7 26.1 - 32.9 PG    MCHC 32.2 31.4 - 35.0 g/dL    RDW 18.6 (H) 11.9 - 14.6 %    Platelets 753 499 - 847 K/uL    MPV 8.6 (L) 9.4 - 12.3 FL    nRBC 0.00 0.0 - 0.2 K/uL    Differential Type AUTOMATED      Seg Neutrophils 47 43 - 78 %    Lymphocytes 24 13 - 44 %    Monocytes 25 (H) 4.0 - 12.0 %    Eosinophils % 3 0.5 - 7.8 %    Basophils 1 0.0 - 2.0 %    Immature Granulocytes 0 0.0 - 5.0 %    Segs Absolute 1.3 (L) 1.7 - 8.2 K/UL    Absolute Lymph # 0.7 0.5 - 4.6 K/UL    Absolute Mono # 0.7 0.1 - 1.3 K/UL    Absolute Eos # 0.1 0.0 - 0.8 K/UL    Basophils Absolute 0.0 0.0 - 0.2 K/UL    Absolute Immature Granulocyte 0.0 0.0 - 0.5 K/UL Comprehensive Metabolic Panel    Collection Time: 01/23/23 11:07 AM   Result Value Ref Range    Sodium 141 133 - 143 mmol/L    Potassium 4.0 3.5 - 5.1 mmol/L    Chloride 109 101 - 110 mmol/L    CO2 28 21 - 32 mmol/L    Anion Gap 4 2 - 11 mmol/L    Glucose 82 65 - 100 mg/dL    BUN 10 8 - 23 MG/DL    Creatinine 0.70 0.6 - 1.0 MG/DL    Est, Glom Filt Rate >60 >60 ml/min/1.73m2    Calcium 9.0 8.3 - 10.4 MG/DL    Total Bilirubin 0.4 0.2 - 1.1 MG/DL    ALT 20 12 - 65 U/L    AST 18 15 - 37 U/L    Alk Phosphatase 81 50 - 136 U/L    Total Protein 7.0 6.3 - 8.2 g/dL    Albumin 3.5 3.2 - 4.6 g/dL    Globulin 3.5 2.8 - 4.5 g/dL    Albumin/Globulin Ratio 1.0 0.4 - 1.6     Magnesium    Collection Time: 01/23/23 11:07 AM   Result Value Ref Range    Magnesium 2.3 1.8 - 2.4 mg/dL   CBC with Auto Differential    Collection Time: 01/30/23  7:07 AM   Result Value Ref Range    WBC 2.9 (L) 4.3 - 11.1 K/uL    RBC 2.79 (L) 4.05 - 5.2 M/uL    Hemoglobin 8.2 (L) 11.7 - 15.4 g/dL    Hematocrit 25.6 (L) 35.8 - 46.3 %    MCV 91.8 82.0 - 102.0 FL    MCH 29.4 26.1 - 32.9 PG    MCHC 32.0 31.4 - 35.0 g/dL    RDW 17.2 (H) 11.9 - 14.6 %    Platelets 241 224 - 914 K/uL    MPV 9.5 9.4 - 12.3 FL    nRBC 0.02 0.0 - 0.2 K/uL    Differential Type AUTOMATED      Seg Neutrophils 67 43 - 78 %    Lymphocytes 22 13 - 44 %    Monocytes 6 4.0 - 12.0 %    Eosinophils % 4 0.5 - 7.8 %    Basophils 0 0.0 - 2.0 %    Immature Granulocytes 1 0.0 - 5.0 %    Segs Absolute 2.0 1.7 - 8.2 K/UL    Absolute Lymph # 0.7 0.5 - 4.6 K/UL    Absolute Mono # 0.2 0.1 - 1.3 K/UL    Absolute Eos # 0.1 0.0 - 0.8 K/UL    Basophils Absolute 0.0 0.0 - 0.2 K/UL    Absolute Immature Granulocyte 0.0 0.0 - 0.5 K/UL   Comprehensive Metabolic Panel    Collection Time: 01/30/23  7:07 AM   Result Value Ref Range    Sodium 142 133 - 143 mmol/L    Potassium 3.8 3.5 - 5.1 mmol/L    Chloride 109 101 - 110 mmol/L    CO2 25 21 - 32 mmol/L    Anion Gap 8 2 - 11 mmol/L    Glucose 182 (H) 65 - 100 mg/dL BUN 10 8 - 23 MG/DL    Creatinine 0.80 0.6 - 1.0 MG/DL    Est, Glom Filt Rate >60 >60 ml/min/1.73m2    Calcium 9.1 8.3 - 10.4 MG/DL    Total Bilirubin 0.4 0.2 - 1.1 MG/DL    ALT 20 12 - 65 U/L    AST 23 15 - 37 U/L    Alk Phosphatase 84 50 - 136 U/L    Total Protein 6.9 6.3 - 8.2 g/dL    Albumin 3.5 3.2 - 4.6 g/dL    Globulin 3.4 2.8 - 4.5 g/dL    Albumin/Globulin Ratio 1.0 0.4 - 1.6         Imaging: reviewed     PATHOLOGY:   per above     10/2022        ASSESSMENT:     Diagnosis Orders   1. Invasive ductal carcinoma of breast, female, left ([facility] [address]  CBC With Auto Differential    Comprehensive metabolic panel      2. Chemotherapy follow-up examination        3. Fatigue due to treatment        4. Anemia, unspecified type  Vitamin B12    Folate    Vitamin D 25 Hydroxy        Ms. Alec Soto is here for FU of breast cancer. Left breast IDC, 9:00, s/p core bx/clip, at GABO, poorly diff, 8mm on US, MRI pending, LVI neg, DCIS high grade focally present, ER0/PR0, Her2 sera +1 - negative   - MR - Biopsied mass (11mm) in the medial 8:30 left breast at 7 cm from the nipple. No additional pathologic enhancement or adenopathy in either breast.    - s/p left mastectomy and 0/2 SLnc - [id number] (0/2) - 15mm with neg margins, grade 2   - genetics - VUS   - s/p ddAC --> paclitaxel (rxn week 2 paclitaxel) switched to nab-paclitaxel     - here for FU with her . Tolerating Abraxane well thus far. Will give it over 1.5hr today and then 60min next week if no reactions as she continues therapy. D/w pharmacy. Labs reviewed and adequate for treatment. - appetite - Has some appetite issues due to altered taste. Will start to eat more green leafy vegetables. Discussed few ways to boost iron. Also plan to increase meat intake. Will see if can add on nutritional labs - attempted to add on today. - EF adequate prior to starting treatment - EF 60-65%.   Plan echo with any ss or CHF and also after tx completed   - on remeron 7.5 mg nightly for sleep/appetite and this is working well.  - nausea/vomiting chemo: discussed Zofran and/or Compazine. No Emend due to prior reaction. - constipation: stool softeners, add Miralax   - continue good oral nutrition and hydration. - encouraged frequent activity throughout the day and rest as needed to combat fatigue.   - call with any fevers, uncontrolled side effects from treatment or any other worrisome/concerning symptoms.   - advised pt to stop turkey tail supplement at this time    2. Surveillance   - H&P q3-6 mo for years 1-3, q6mo years 4-5; mammogram yearly  - DEXA q2 years postmenopausal   - labs/imaging studies are not recommended without symptoms     Orders placed for: Dose-Dense AC-T (Paclitaxel Weekly) - [Doxorubicin + Cyclophosphamide q14 Days x 4 Cycles, Followed by Paclitaxel 80 mg/m² Weekly x 12 Weeks]  Cycles 1 through 4: A cycle is every 14 days:  Doxorubicin  60 mg/m² IV once on day 1 of cycles 1 through 4  Cyclophosphamide  600 mg/m² IV once on day 1 of cycles 1 through 4  Pegfilgrastim-xxxx  6 mg flat dose subcutaneously once on day 2 of cycles 1 through 4  Cycles 5 through 16: A cycle is every 7 days:  Paclitaxel  80 mg/m² IV once on day 1 of cycles 5 through [address]: Full Support    RTC per protocol or sooner as needed   [40min - chart review, review of results and discussion of options, next steps, coordination of care, communication with pharmacy, and charting ]      MDM      Lab studies and imaging studies were personally reviewed. Pertinent old records were reviewed. Historical:   - she is here for consultation regarding neoadjuvant chemotherapy for newly diagnosed triple negative breast cancer.   During today's visit, we had a long conversation about breast cancer pathophysiology and staging in general.  We then reviewed her imaging and pathology in details, including receptor status and it's significance in terms of available treatment options. Neoadjuvant compared to adjuvant chemotherapy has similar long term outcomes when patients are given the same therapy. Preoperative treatment can render large, inoperable tumors operable and improve breast conservation, downstage tumor and decrease risk of postoperative lympedema. It also provides information about tumor chemosensitivity. Patients with pathologic complete response have favorable disease free survival and overall survival and this correlation is strongest in TNBC. Preoperative systemic therapy can lead to possible overtreatment with systemic therapy of clinical stages overestimated. It can also lead to possible under treatment local regionally with radiotherapy if clinical stage is underestimated. There is also possibility of disease progression during preoperative systemic therapy. - MRI pending - she is aware that tx plan may change depending on MR results   - she is here for FU after MRI - she has elected to p/w sx upfront. Images reviewed. Understands final path will be determined by sx specimen/LN status. Dr Hank Vivas notifed by me re pt's decision.    - we discussed options of AC-T +/- carbo/pembro and TC; she is aware that she will need chemotherapy regardless of her final decision (before/after sx), if she does chemo after, she would not be a candidate for pembro/carbo. Data from [facility] reviewed. - pt met with Dr Hank Vivas and elected to pw bilateral mastectomies with axillary dissection   - We discussed her path results in detail that showed 15mm TNBC. She is aware that pt's are considered high risk per GEICAM/2003-02 trial if they have node neg disease but were <35, had neg er/pr receptors, histo grade 2-3, and T>2cm. She meets two criteria for consideration of anthracycline therapy. She wishes to think this over. We discussed risks of chemotherapy incuding irriversible heart disease (including acute LV failure - we reviewed this) and also leukemia.   Gave options of ddAC-T and TC with growth factor support. - of note, following standard adjuvant chemo for [id number] or N+ TNBC, may consider metronomic capecitabine (650mg/m2 BID continuously for 1 year) as extended maintenance - SYSUCC-001 trial estimated 5-year DFS vs observation: 82.8 vs 73% (Velasquez et al 2021). - We discussed the potential side effects including but not limited to hair loss (which could be permanent), nausea, vomiting, diarrhea or constipation, mucositis, rashes, allergic reactions, organ damage including liver and kidney failure, cardiotoxicity, and direct effects on bone marrow which can lead to anemia and thrombocytopenia necessitating transfusion or neutropenia putting patient at risk for infection, sepsis and death if not treated. - echo EF 60-65%; LVI normal size and no WMA  - port placement   - decreased appetite, anxiety and sleep disturbance - on low dose mirtazapine and improved  - Rx mastectomy bra/prosthesis   - ADDENDUM: pt reacted to taxol 9 minutes into infusion with tachycardia, flushing, dyspnea and heaviness. Scared her a lot. Discussed with Dr. Therese Perkins, will change treatment plan to weekly abraxane. Pt notified and aware. New treatment plan placed. All questions were asked and answered to the best of my ability. The patient verbalized understanding and agrees with the plan above.         MIR Vasquez - JAZMIN  [facility]  [address] [place] [address]  Office : [phone]  Fax : [phone]

## 2023-01-30 ENCOUNTER — CLINICAL DOCUMENTATION (OUTPATIENT)
Dept: ONCOLOGY | Age: 63
End: 2023-01-30

## 2023-01-30 ENCOUNTER — HOSPITAL ENCOUNTER (OUTPATIENT)
Dept: INFUSION THERAPY | Age: 63
Discharge: HOME | End: 2023-01-30
Payer: COMMERCIAL

## 2023-01-30 ENCOUNTER — OFFICE VISIT (OUTPATIENT)
Dept: ONCOLOGY | Age: 63
End: 2023-01-30
Payer: COMMERCIAL

## 2023-01-30 VITALS
HEIGHT: 63 IN | SYSTOLIC BLOOD PRESSURE: 114 MMHG | RESPIRATION RATE: 16 BRPM | DIASTOLIC BLOOD PRESSURE: 65 MMHG | TEMPERATURE: 98.3 F | WEIGHT: 159 LBS | OXYGEN SATURATION: 99 % | BODY MASS INDEX: 28.17 KG/M2 | HEART RATE: 107 BPM

## 2023-01-30 DIAGNOSIS — R53.83 FATIGUE DUE TO TREATMENT: ICD-10-CM

## 2023-01-30 DIAGNOSIS — D64.9 ANEMIA, UNSPECIFIED TYPE: ICD-10-CM

## 2023-01-30 DIAGNOSIS — Z09 CHEMOTHERAPY FOLLOW-UP EXAMINATION: ICD-10-CM

## 2023-01-30 DIAGNOSIS — C50.912 INVASIVE DUCTAL CARCINOMA OF BREAST, FEMALE, LEFT (HCC): Primary | ICD-10-CM

## 2023-01-30 DIAGNOSIS — C50.912 INVASIVE DUCTAL CARCINOMA OF BREAST, FEMALE, LEFT (HCC): ICD-10-CM

## 2023-01-30 LAB
ALBUMIN SERPL-MCNC: 3.5 G/DL (ref 3.2–4.6)
ALBUMIN/GLOB SERPL: 1 (ref 0.4–1.6)
ALP SERPL-CCNC: 84 U/L (ref 50–136)
ALT SERPL-CCNC: 20 U/L (ref 12–65)
ANION GAP SERPL CALC-SCNC: 8 MMOL/L (ref 2–11)
AST SERPL-CCNC: 23 U/L (ref 15–37)
BASOPHILS # BLD: 0 K/UL (ref 0–0.2)
BASOPHILS NFR BLD: 0 % (ref 0–2)
BILIRUB SERPL-MCNC: 0.4 MG/DL (ref 0.2–1.1)
BUN SERPL-MCNC: 10 MG/DL (ref 8–23)
CALCIUM SERPL-MCNC: 9.1 MG/DL (ref 8.3–10.4)
CHLORIDE SERPL-SCNC: 109 MMOL/L (ref 101–110)
CO2 SERPL-SCNC: 25 MMOL/L (ref 21–32)
CREAT SERPL-MCNC: 0.8 MG/DL (ref 0.6–1)
DIFFERENTIAL METHOD BLD: ABNORMAL
EOSINOPHIL # BLD: 0.1 K/UL (ref 0–0.8)
EOSINOPHIL NFR BLD: 4 % (ref 0.5–7.8)
ERYTHROCYTE [DISTWIDTH] IN BLOOD BY AUTOMATED COUNT: 17.2 % (ref 11.9–14.6)
FOLATE SERPL-MCNC: 30 NG/ML (ref 3.1–17.5)
GLOBULIN SER CALC-MCNC: 3.4 G/DL (ref 2.8–4.5)
GLUCOSE SERPL-MCNC: 182 MG/DL (ref 65–100)
HCT VFR BLD AUTO: 25.6 % (ref 35.8–46.3)
HGB BLD-MCNC: 8.2 G/DL (ref 11.7–15.4)
IMM GRANULOCYTES # BLD AUTO: 0 K/UL (ref 0–0.5)
IMM GRANULOCYTES NFR BLD AUTO: 1 % (ref 0–5)
LYMPHOCYTES # BLD: 0.7 K/UL (ref 0.5–4.6)
LYMPHOCYTES NFR BLD: 22 % (ref 13–44)
MCH RBC QN AUTO: 29.4 PG (ref 26.1–32.9)
MCHC RBC AUTO-ENTMCNC: 32 G/DL (ref 31.4–35)
MCV RBC AUTO: 91.8 FL (ref 82–102)
MONOCYTES # BLD: 0.2 K/UL (ref 0.1–1.3)
MONOCYTES NFR BLD: 6 % (ref 4–12)
NEUTS SEG # BLD: 2 K/UL (ref 1.7–8.2)
NEUTS SEG NFR BLD: 67 % (ref 43–78)
NRBC # BLD: 0.02 K/UL (ref 0–0.2)
PLATELET # BLD AUTO: 208 K/UL (ref 150–450)
PMV BLD AUTO: 9.5 FL (ref 9.4–12.3)
POTASSIUM SERPL-SCNC: 3.8 MMOL/L (ref 3.5–5.1)
PROT SERPL-MCNC: 6.9 G/DL (ref 6.3–8.2)
RBC # BLD AUTO: 2.79 M/UL (ref 4.05–5.2)
SODIUM SERPL-SCNC: 142 MMOL/L (ref 133–143)
VIT B12 SERPL-MCNC: 1600 PG/ML (ref 193–986)
WBC # BLD AUTO: 2.9 K/UL (ref 4.3–11.1)

## 2023-01-30 PROCEDURE — 6360000002 HC RX W HCPCS: Performed by: NURSE PRACTITIONER

## 2023-01-30 PROCEDURE — 82607 VITAMIN B-12: CPT

## 2023-01-30 PROCEDURE — 36591 DRAW BLOOD OFF VENOUS DEVICE: CPT

## 2023-01-30 PROCEDURE — 96413 CHEMO IV INFUSION 1 HR: CPT

## 2023-01-30 PROCEDURE — 2580000003 HC RX 258: Performed by: NURSE PRACTITIONER

## 2023-01-30 PROCEDURE — 80053 COMPREHEN METABOLIC PANEL: CPT

## 2023-01-30 PROCEDURE — 82746 ASSAY OF FOLIC ACID SERUM: CPT

## 2023-01-30 PROCEDURE — 99214 OFFICE O/P EST MOD 30 MIN: CPT | Performed by: NURSE PRACTITIONER

## 2023-01-30 PROCEDURE — 85025 COMPLETE CBC W/AUTO DIFF WBC: CPT

## 2023-01-30 PROCEDURE — 96375 TX/PRO/DX INJ NEW DRUG ADDON: CPT

## 2023-01-30 PROCEDURE — 2580000003 HC RX 258: Performed by: INTERNAL MEDICINE

## 2023-01-30 RX ORDER — HEPARIN SODIUM (PORCINE) LOCK FLUSH IV SOLN 100 UNIT/ML 100 UNIT/ML
500 SOLUTION INTRAVENOUS PRN
Status: DISCONTINUED | OUTPATIENT
Start: 2023-01-30 | End: 2023-01-31 | Stop reason: HOSPADM

## 2023-01-30 RX ORDER — PACLITAXEL 100 MG/20ML
100 INJECTION, POWDER, LYOPHILIZED, FOR SUSPENSION INTRAVENOUS ONCE
Status: COMPLETED | OUTPATIENT
Start: 2023-01-30 | End: 2023-01-30

## 2023-01-30 RX ORDER — DIPHENHYDRAMINE HYDROCHLORIDE 50 MG/ML
50 INJECTION INTRAMUSCULAR; INTRAVENOUS
Status: CANCELLED | OUTPATIENT
Start: 2023-01-30

## 2023-01-30 RX ORDER — EPINEPHRINE 1 MG/ML
0.3 INJECTION, SOLUTION, CONCENTRATE INTRAVENOUS PRN
Status: CANCELLED | OUTPATIENT
Start: 2023-01-30

## 2023-01-30 RX ORDER — ONDANSETRON 2 MG/ML
8 INJECTION INTRAMUSCULAR; INTRAVENOUS ONCE
Status: COMPLETED | OUTPATIENT
Start: 2023-01-30 | End: 2023-01-30

## 2023-01-30 RX ORDER — MEPERIDINE HYDROCHLORIDE 25 MG/ML
12.5 INJECTION INTRAMUSCULAR; INTRAVENOUS; SUBCUTANEOUS PRN
Status: DISCONTINUED | OUTPATIENT
Start: 2023-01-30 | End: 2023-01-31 | Stop reason: HOSPADM

## 2023-01-30 RX ORDER — ONDANSETRON 2 MG/ML
8 INJECTION INTRAMUSCULAR; INTRAVENOUS
Status: CANCELLED | OUTPATIENT
Start: 2023-01-30

## 2023-01-30 RX ORDER — ALBUTEROL SULFATE 90 UG/1
4 AEROSOL, METERED RESPIRATORY (INHALATION) PRN
Status: CANCELLED | OUTPATIENT
Start: 2023-01-30

## 2023-01-30 RX ORDER — DIPHENHYDRAMINE HYDROCHLORIDE 50 MG/ML
50 INJECTION INTRAMUSCULAR; INTRAVENOUS
Status: DISCONTINUED | OUTPATIENT
Start: 2023-01-30 | End: 2023-01-31 | Stop reason: HOSPADM

## 2023-01-30 RX ORDER — MEPERIDINE HYDROCHLORIDE 50 MG/ML
12.5 INJECTION INTRAMUSCULAR; INTRAVENOUS; SUBCUTANEOUS PRN
Status: CANCELLED | OUTPATIENT
Start: 2023-01-30

## 2023-01-30 RX ORDER — SODIUM CHLORIDE 9 MG/ML
5-40 INJECTION INTRAVENOUS PRN
Status: DISCONTINUED | OUTPATIENT
Start: 2023-01-30 | End: 2023-01-31 | Stop reason: HOSPADM

## 2023-01-30 RX ORDER — HEPARIN SODIUM (PORCINE) LOCK FLUSH IV SOLN 100 UNIT/ML 100 UNIT/ML
500 SOLUTION INTRAVENOUS PRN
Status: CANCELLED | OUTPATIENT
Start: 2023-01-30

## 2023-01-30 RX ORDER — PACLITAXEL 100 MG/20ML
100 INJECTION, POWDER, LYOPHILIZED, FOR SUSPENSION INTRAVENOUS ONCE
Status: CANCELLED | OUTPATIENT
Start: 2023-01-30 | End: 2023-01-30

## 2023-01-30 RX ORDER — SODIUM CHLORIDE 0.9 % (FLUSH) 0.9 %
10 SYRINGE (ML) INJECTION PRN
Status: DISCONTINUED | OUTPATIENT
Start: 2023-01-30 | End: 2023-01-31 | Stop reason: HOSPADM

## 2023-01-30 RX ORDER — SODIUM CHLORIDE 9 MG/ML
INJECTION, SOLUTION INTRAVENOUS CONTINUOUS
Status: CANCELLED | OUTPATIENT
Start: 2023-01-30

## 2023-01-30 RX ORDER — SODIUM CHLORIDE 9 MG/ML
5-250 INJECTION, SOLUTION INTRAVENOUS PRN
Status: CANCELLED | OUTPATIENT
Start: 2023-01-30

## 2023-01-30 RX ORDER — FAMOTIDINE 10 MG/ML
20 INJECTION, SOLUTION INTRAVENOUS
Status: CANCELLED | OUTPATIENT
Start: 2023-01-30

## 2023-01-30 RX ORDER — SODIUM CHLORIDE 9 MG/ML
5-40 INJECTION INTRAVENOUS PRN
Status: CANCELLED | OUTPATIENT
Start: 2023-01-30

## 2023-01-30 RX ORDER — ACETAMINOPHEN 325 MG/1
650 TABLET ORAL
Status: CANCELLED | OUTPATIENT
Start: 2023-01-30

## 2023-01-30 RX ORDER — SODIUM CHLORIDE 9 MG/ML
5-250 INJECTION, SOLUTION INTRAVENOUS PRN
Status: DISCONTINUED | OUTPATIENT
Start: 2023-01-30 | End: 2023-01-31 | Stop reason: HOSPADM

## 2023-01-30 RX ORDER — ONDANSETRON 2 MG/ML
8 INJECTION INTRAMUSCULAR; INTRAVENOUS ONCE
Status: CANCELLED | OUTPATIENT
Start: 2023-01-30 | End: 2023-01-30

## 2023-01-30 RX ADMIN — PACLITAXEL 179 MG: 100 INJECTION, POWDER, LYOPHILIZED, FOR SUSPENSION INTRAVENOUS at 08:48

## 2023-01-30 RX ADMIN — ONDANSETRON 8 MG: 2 INJECTION INTRAMUSCULAR; INTRAVENOUS at 08:28

## 2023-01-30 RX ADMIN — SODIUM CHLORIDE, PRESERVATIVE FREE 10 ML: 5 INJECTION INTRAVENOUS at 07:15

## 2023-01-30 RX ADMIN — SODIUM CHLORIDE 200 ML/HR: 9 INJECTION, SOLUTION INTRAVENOUS at 08:30

## 2023-01-30 RX ADMIN — SODIUM CHLORIDE, PRESERVATIVE FREE 10 ML: 5 INJECTION INTRAVENOUS at 10:45

## 2023-01-30 ASSESSMENT — PATIENT HEALTH QUESTIONNAIRE - PHQ9
SUM OF ALL RESPONSES TO PHQ QUESTIONS 1-9: 0
SUM OF ALL RESPONSES TO PHQ9 QUESTIONS 1 & 2: 0
2. FEELING DOWN, DEPRESSED OR HOPELESS: 0
1. LITTLE INTEREST OR PLEASURE IN DOING THINGS: 0
SUM OF ALL RESPONSES TO PHQ QUESTIONS 1-9: 0

## 2023-01-30 NOTE — PROGRESS NOTES
Arrived to the infusion center. Labs reviewed and assessment done. Completed Abraxane completed without signs of adverse reaction. No new issues or concerns voicced during the infusion. Aware of her next appointment on 2/6 at 1330. Discharged ambulatory in satisfactory condition.

## 2023-01-30 NOTE — PROGRESS NOTES
Clinical Social Work Note  Name: Ortiz Lauren  : 1960  MRN: [id number]  Date of Service: 2023  Type of Service: Behavioral Health  Length of Service: 16 minutes      CM Note: Seen patient during Infusion with her . \"We dated for 30 years and we have been  for 7 years\". LISW-CP discussed Self-Care and Relaxation. Listening to beats 8 - 13HZ. This frequency stimulates Alpha waves in the brain. The Alpha waves will encourage relaxation. Diaphragmatic breathing exercises. The up and down of the diaphragm, stimulates the Vagus Nerve which helps the body to relax. Mindfulness meditating. It shrinks the amygdala and increases parasympathetic activities in the brain which help the body to relax. Washing or immersing the face on cold water. The cold contact stimulates and tones up the Vagus Nerve. Humming or making the Om sound. The vibration of the vocal cords stimulates and increases tone of the Vagus Nerve. Half-Smile Technique. Relax the face from the forehead down to the jaw and chin. Then, turn the lips upwards into a tiny half-smile, just like the Nidia smile. The vagus nerve runs through the face. By relaxing the face, we stimulate the Vagal Nerve which slower heartbeat and consequently relaxes the body. Half-Salamander Exercise. The eyes look to the right without turning the head. Second, tilt the head to the right towards the right shoulder and hold it for thirty to sixty seconds. Then, eyes and head move straight back to central, neutral, position. The eyes look to the left without turning the head. Tilt head to the left towards left shoulder and hold it for thirty to sixty seconds. Finally, return to the central, neutral, state. This exercise stimulates the vagal nerve, and you might start yawning. .       Mini Mental Status Exam: Ortiz Lauren was dressed properly. No abnormal psychomotor movements observed. Intellectual functioning appeared to be intact.  Insight was adequate. Judgment was adequate. Patient did not report suicidal ideations, intent or plans. Speech was coherent. Thought process was clear. Patient did not report homicidal ideations, intent or plans. Patient was oriented to self, place, time and situation. Protective Factors: Current care for physical and mental illness, adequate insight and judgment, family support, cultural and Church beliefs and values that support self-care. Next Steps: QUENTIN-BASSEM gave contact information and encouraged pt to call should any needs arise. Pt verbalized understanding. QUENTIN -CP intends to follow up as needed.       Electronically Signed By:  QUENTIN Camacho

## 2023-02-03 DIAGNOSIS — C50.912 INVASIVE DUCTAL CARCINOMA OF BREAST, FEMALE, LEFT (HCC): Primary | ICD-10-CM

## 2023-02-03 ASSESSMENT — ENCOUNTER SYMPTOMS
SHORTNESS OF BREATH: 0
NAUSEA: 0
HEMOPTYSIS: 0
ABDOMINAL DISTENTION: 0
SORE THROAT: 0
WHEEZING: 0
BLOOD IN STOOL: 0
ABDOMINAL PAIN: 0
SCLERAL ICTERUS: 0
DIARRHEA: 0
CHEST TIGHTNESS: 0
VOMITING: 0
CONSTIPATION: 1
VOICE CHANGE: 0
TROUBLE SWALLOWING: 0

## 2023-02-03 NOTE — PROGRESS NOTES
[facility] Hematology and Oncology: Established patient - follow up     Chief Complaint   Patient presents with    Follow-up     Reason for Referral: Breast cancer  Referring Provider: Self-referral   Primary Care Provider: MIR Morales CNP  Family History of Cancer/Hematologic Disorders: Family history is significant for two sisters and a niece with breast cancer. Presenting Symptoms: Abnormal routine screening mammogram     History of Present Illness:  Ms. Anil Bonilla is a 57 y.o. female who presents today for follow up regarding breast cancer. The past medical history is significant for multiple thyroid nodules, hyperthyroidism, hypercholesterolemia, Grave's disease, GERD, uterine fibroids,  section x 2, cyst removal, and hysterectomy. She initially presented for a routine screening mammogram on 8/10/22 which identified a spiculated equal density mass in the left breast inferior medial quadrant posterior depth. Further evaluation with limited ultrasound of the left breast on 2022 confirmed an 8 x 7 x 8 mm round, hypoechoic mass with spiculated margins at the 9:00 position in the left breast, 6 cm from the nipple, demonstrating mild posterior acoustic enhancement and corresponding to the mass seen mammographically. US- guided biopsy of the 9:00 left breast mass was performed on 22 with pathology revealing ER/NJ/HER-2 negative, grade 3, invasive carcinoma with high-grade DCIS focally present and no microcalcifications or lymphovascular space extension identified. Testing so far has been done at [facility]; however, Ms. Anil Bonilla wishes to transfer care to [facility]. She is self-referred to [facility] for Oncology evaluation and treatment of newly diagnosed breast cancer. She has also been referred to surgery and is scheduled for initial surgical evaluation with Dr. Tod Flores, on 22.    Patient's daughter lives in [place], works at a hospital.    At consultation, we discussed the pathophysiology of breast cancer, staging, and the importance of receptor status in terms of treatment options. We then reviewed her medical history as well as oncologic history, recent imaging and pathology in detail. Next steps in management were reviewed. She was here with her . Fu after MRI - Results reviewed in detail and show evidence of disease in the breast but no worrisome findings in terms of lymphadenopathy or other evidence of disease. She was emotional and wished to proceed with surgery upfront. We discussed need for chemotherapy because of triple negative status and she wishes to proceed with surgery upfront accepting risks. Pathology from surgery will determine ultimate staging and treatment plan. She completed surgery and we discussed her pathology in detail. Her tumor was 15 mm and 0 out of 2 lymph nodes. This is pathological stage Ib. We discussed her high risk features triple negative breast cancer and grade 2-3. We discussed role of chemotherapy and recommendations of dose dense ACT versus TC. She chose ACT. Will omit Emend with following cycles as she did have a reaction to this drug. Today, she is here for FU. Doing well overall. She wishes to remain on longer duration infusion as she fears a reaction w nab-paclitaxel. Has tolerated it well last time. She is here with her . Appetite is ok, taste is altered and she has some wt loss from 159 to 156. Labs reviewed - will add on iron studies. No nausea. No SOB/GI concerns. She only reported mild tingling in her fingers - stable. No current s/s of infection. Chronological Events:   BILATERAL DIGITAL SCREENING MAMMOGRAM 3D/2D: 8/10/2022   FINDINGS: There are scattered areas of fibroglandular density (ACR Breast Density Composition Category B). Digital mammography views were performed including tomosynthesis.  There is a spiculated equal density mass in the left breast inferior medial quadrant posterior depth. No other significant masses, calcifications, or other findings are seen in either breast.     IMPRESSION: INCOMPLETE NEEDS ADDITIONAL IMAGING EVALUATION   The spiculated mass in the left breast is indeterminate. Ultrasound with possible additional views are recommended. There is no mammographic evidence of malignancy in the right breast. Patient will be notified of the results. LIMITED ULTRASOUND OF LEFT BREAST: 8/17/2022  FINDINGS: There is an 8 x 7 x 8 mm round, hypoechoic mass with spiculated margins at 9:00, 6 cm from the nipple. It demonstrates mild posterior acoustic enhancement. This corresponds to the mass seen mammographically. The left axilla is negative. IMPRESSION: SUSPICIOUS OF MALIGNANCY   The 8 mm mass at 9:00, 6 cm FN is suspicious and ultrasound-guided biopsy is recommended. These findings and recommendations were discussed with the patient at the time of her exam.  Her biopsy is scheduled for 8/25/2022 at 2:00 pm.      [place] 8/25/22 9/1/22 heme/onc consultation   9/9/22 FU after MRI - pt elected to p/w sx upfront; MRI results reviewed. 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 10/19/22 post op with Dr Georgette De La Fuente - drain removed   10/24/22 FU after sx - discussed adjuvant chemotherapy for TNBC.  11/14/22 Cycle 1 ddAC. EF 60-65%. 11/28/22  Surendra 2 ddAC. Tolerated well.   12/12/22 FU C3 ddAC - tolerating well   12/27/22 FU C4 ddAC   1/10/23 FU - week 1 Taxol. Doing well overall. Discussed side effects r/t Taxol. 1/17/23 Week 2 Taxol. Doing well. ADDENDUM\" paclitaxel rxn - > will order nab-paclitaxel for next apt   1/23/23 FU week 1 nab-paclitaxel tomorrow; ANC 1.3.  add on nutritional labs   1/30/23 FU week 2 Abraxane. Doing well. ANC adequate. Added on nutritional labs. 2/6/23 FU weel 3 nab-paclitaxel - wishes to remain on longer infusion. Family History   Problem Relation Age of Onset    Coronary Art Dis Mother     Breast Cancer Sister     Breast Cancer Sister     Thyroid Disease Sister     Thyroid Cancer Neg Hx     Diabetes Neg Hx       Social History     Socioeconomic History    Marital status:      Spouse name: None    Number of children: None    Years of education: None    Highest education level: None   Tobacco Use    Smoking status: Never    Smokeless tobacco: Never   Vaping Use    Vaping Use: Never used   Substance and Sexual Activity    Alcohol use: No    Drug use: No        Review of Systems   Constitutional:  Positive for appetite change (improved on remeron) and fatigue. Negative for chills, diaphoresis, fever and unexpected weight change. HENT:   Negative for hearing loss, mouth sores, nosebleeds, sore throat, trouble swallowing and voice change. Eyes:  Negative for icterus. Respiratory:  Negative for chest tightness, hemoptysis, shortness of breath and wheezing. Cardiovascular:  Negative for chest pain, leg swelling and palpitations. Gastrointestinal:  Positive for constipation. Negative for abdominal distention, abdominal pain, blood in stool, diarrhea, nausea and vomiting. Endocrine: Negative for hot flashes. Genitourinary:  Negative for difficulty urinating, frequency, vaginal bleeding and vaginal discharge. Musculoskeletal:  Negative for arthralgias, flank pain, gait problem and myalgias. Skin:  Negative for itching, rash and wound. Neurological:  Negative for dizziness, extremity weakness, gait problem, headaches and numbness. Psychiatric/Behavioral:  Positive for sleep disturbance. Negative for confusion and depression. The patient is nervous/anxious (much better).        Allergies   Allergen Reactions    Emend [Fosaprepitant Dimeglumine] Anaphylaxis     Flushing, coughing    Hydrocodone Anxiety and Other (See Comments) Depressive state     Past Medical History:   Diagnosis Date    Allergic rhinitis     Cancer ([address] 2022    left breast cancer---    GERD (gastroesophageal reflux disease)     controlled with med    Graves' disease     Hypercholesterolemia     Hyperthyroidism     Multiple thyroid nodules     followed by dr Saintclair Cellar     Past Surgical History:   Procedure Laterality Date    BREAST BIOPSY Left 10/7/2022    LEFT SENTINEL LYMPH NODE BIOPSY PREOP @ 0900, LYMPHO @ 3159, SX @ 6353 performed by Sherry Miller DO at [facility] [address] Left 2022    bx     SECTION      x2    CYST REMOVAL  2021    subcutaneous cyst from lateral neck (benign)    HYSTERECTOMY (CERVIX STATUS UNKNOWN)      fibroids (ovaries intact)    IR PORT PLACEMENT EQUAL OR GREATER THAN 5 YEARS  2022    IR PORT PLACEMENT EQUAL OR GREATER THAN 5 YEARS 2022 [facility] RADIOLOGY SPECIALS    MASTECTOMY Left 10/7/2022    LEFT BREAST MASTECTOMY performed by Sherry Miller DO at [facility] MAIN OR     Current Outpatient Medications   Medication Sig Dispense Refill    lidocaine-prilocaine (EMLA) 2.5-2.5 % cream Apply topically weekly as needed prior to port access.  30 g 2    ondansetron (ZOFRAN) 8 MG tablet Take 1 tablet by mouth every 8 hours as needed for Nausea or Vomiting 60 tablet 2    prochlorperazine (COMPAZINE) 10 MG tablet Take 1 tablet by mouth every 6 hours as needed (nausea vomiting chemo) 60 tablet 2    Mastectomy Bra MISC by Does not apply route Mastectomy bra + prosthesis 1 each 2    Multiple Vitamin (MULTI-VITAMIN DAILY PO) Take 1 tablet by mouth daily As needed      mirtazapine (REMERON) 15 MG tablet Take 0.5 tablets by mouth nightly (Patient taking differently: Take 7.5 mg by mouth as needed) 30 tablet 3    rosuvastatin (CRESTOR) 20 MG tablet Take 1 tablet by mouth at bedtime 90 tablet 3    methIMAzole (TAPAZOLE) 5 MG tablet Take 0.5 tablets by mouth daily 15 tablet 11    omeprazole (PRILOSEC) 20 MG delayed release capsule Take 20 mg by mouth as needed PRN      Red Yeast Rice Extract 600 MG CAPS Take 600 mg by mouth daily      UNABLE TO FIND daily [place] Tail      Loratadine (CLARITIN PO) Take by mouth (Patient not taking: No sig reported)       No current facility-administered medications for this visit. No flowsheet data found. OBJECTIVE:  /72 Comment: standing  Pulse 95   Temp 99.2 °F (37.3 °C) (Oral)   Resp 14   Ht 5' 3\" (1.6 m)   Wt 156 lb 9.6 oz (71 kg)   SpO2 98%   BMI 27.74 kg/m²       ECOG PERFORMANCE STATUS - 0-Fully active, able to carry on all pre-disease performance without restriction. Pain - 0 - No pain/10. None/Minimal pain - not affecting QOL     Fatigue - No flowsheet data found. Distress - No flowsheet data found. Physical Exam  Vitals reviewed. Exam conducted with a chaperone present. Constitutional:       General: She is not in acute distress. Appearance: Normal appearance. She is not ill-appearing or toxic-appearing. HENT:      Head: Normocephalic and atraumatic. Nose: Nose normal.      Mouth/Throat:      Mouth: Mucous membranes are moist.   Eyes:      General: No scleral icterus. Extraocular Movements: Extraocular movements intact. Conjunctiva/sclera: Conjunctivae normal.      Pupils: Pupils are equal, round, and reactive to light. Cardiovascular:      Rate and Rhythm: Normal rate and regular rhythm. Heart sounds: No murmur heard. Pulmonary:      Effort: No respiratory distress. Breath sounds: No wheezing or rales. Chest:          Comments: S/p mastectomy. Abdominal:      General: There is no distension. Tenderness: There is no abdominal tenderness. There is no right CVA tenderness or guarding. Musculoskeletal:         General: Normal range of motion. Cervical back: Normal range of motion. Right lower leg: No edema. Left lower leg: No edema. Lymphadenopathy:      Cervical: No cervical adenopathy. Upper Body:      Right upper body: No supraclavicular or axillary adenopathy. Left upper body: No supraclavicular or axillary adenopathy. Skin:     General: Skin is warm and dry. Coloration: Skin is not jaundiced or pale. Findings: No rash. Neurological:      General: No focal deficit present. Mental Status: She is alert and oriented to person, place, and time. Gait: Gait normal.   Psychiatric:         Behavior: Behavior normal.         Thought Content: Thought content normal.        Labs:  No results found for this or any previous visit (from the past 168 hour(s)). Imaging: reviewed     PATHOLOGY:   per above     10/2022        ASSESSMENT:     Diagnosis Orders   1. Invasive ductal carcinoma of breast, female, left (HCC)  CBC with Auto Differential    Comprehensive Metabolic Panel    Magnesium    CBC With Auto Differential    Comprehensive metabolic panel    DISCONTINUED: 0.9 % sodium chloride infusion    DISCONTINUED: ondansetron (ZOFRAN) injection 8 mg    DISCONTINUED: sodium chloride (PF) 0.9 % injection 5-40 mL    DISCONTINUED: PACLitaxel-protein bound (ABRAXANE) chemo IVPB 179 mg      2. Anemia, unspecified type  Ferritin    Transferrin Saturation        Ms. Jeremy Catalan is here for FU of breast cancer. Left breast IDC, 9:00, s/p core bx/clip, at GABO, poorly diff, 8mm on US, MRI pending, LVI neg, DCIS high grade focally present, ER0/PR0, Her2 sera +1 - negative   - MR - Biopsied mass (11mm) in the medial 8:30 left breast at 7 cm from the nipple. No additional pathologic enhancement or adenopathy in either breast.    - s/p left mastectomy and 0/2 SLnc - [id number] (0/2) - 15mm with neg margins, grade 2   - genetics - VUS   - s/p ddAC --> paclitaxel (rxn week 2 paclitaxel) switched to nab-paclitaxel     - here for FU. Doing well overall. She wishes to remain on longer duration infusion as she fears a reaction w nab-paclitaxel. Has tolerated it well last time.     -Appetite is ok, taste is altered and she has some wt loss from 159 to 156. - Labs reviewed - will add on iron studies.    - She only reported mild tingling in her fingers - stable. - EF adequate prior to starting treatment - EF 60-65%. Plan echo with any ss or CHF and also after tx completed   - on remeron 7.5 mg nightly for sleep/appetite   - nausea/vomiting chemo: discussed Zofran and/or Compazine. No Emend due to prior reaction. - constipation: stool softeners, add Miralax   - continue good oral nutrition and hydration. - encouraged frequent activity throughout the day and rest as needed to combat fatigue.   - call with any fevers, uncontrolled side effects from treatment or any other worrisome/concerning symptoms.   - advised pt to stop turkey tail supplement at this time    2. Surveillance   - H&P q3-6 mo for years 1-3, q6mo years 4-5; mammogram yearly  - DEXA q2 years postmenopausal   - labs/imaging studies are not recommended without symptoms     Orders placed for: Dose-Dense AC-T (Paclitaxel Weekly) - [Doxorubicin + Cyclophosphamide q14 Days x 4 Cycles, Followed by Paclitaxel 80 mg/m² Weekly x 12 Weeks]  Cycles 1 through 4: A cycle is every 14 days:  Doxorubicin  60 mg/m² IV once on day 1 of cycles 1 through 4  Cyclophosphamide  600 mg/m² IV once on day 1 of cycles 1 through 4  Pegfilgrastim-xxxx  6 mg flat dose subcutaneously once on day 2 of cycles 1 through 4  Cycles 5 through 16:  A cycle is every 7 days:  Paclitaxel  80 mg/m² IV once on day 1 of cycles 5 through 16      RESUSCITATION DIRECTIVES/HOSPICE CARE: Full Support    RTC per protocol or sooner as needed       MDM  Number of Diagnoses or Management Options  Anemia, unspecified type: new, needed workup  Invasive ductal carcinoma of breast, female, left ([facility] [address]): established, improving     Amount and/or Complexity of Data Reviewed  Clinical lab tests: ordered and reviewed  Tests in the radiology section of CPT®: ordered and reviewed  Tests in the medicine section of CPT®: ordered  Obtain history from someone other than the patient: yes  Review and summarize past medical records: yes  Independent visualization of images, tracings, or specimens: yes    Risk of Complications, Morbidity, and/or Mortality  Presenting problems: moderate  Diagnostic procedures: moderate  Management options: moderate        Lab studies and imaging studies were personally reviewed. Pertinent old records were reviewed. Historical:   - she is here for consultation regarding neoadjuvant chemotherapy for newly diagnosed triple negative breast cancer. During today's visit, we had a long conversation about breast cancer pathophysiology and staging in general.  We then reviewed her imaging and pathology in details, including receptor status and it's significance in terms of available treatment options. Neoadjuvant compared to adjuvant chemotherapy has similar long term outcomes when patients are given the same therapy. Preoperative treatment can render large, inoperable tumors operable and improve breast conservation, downstage tumor and decrease risk of postoperative lympedema. It also provides information about tumor chemosensitivity. Patients with pathologic complete response have favorable disease free survival and overall survival and this correlation is strongest in TNBC. Preoperative systemic therapy can lead to possible overtreatment with systemic therapy of clinical stages overestimated. It can also lead to possible under treatment local regionally with radiotherapy if clinical stage is underestimated. There is also possibility of disease progression during preoperative systemic therapy. - MRI pending - she is aware that tx plan may change depending on MR results   - she is here for FU after MRI - she has elected to p/w sx upfront. Images reviewed. Understands final path will be determined by sx specimen/LN status.   Dr Lizet Burnette notifed by me re pt's decision.    - we discussed options of AC-T +/- carbo/pembro and TC; she is aware that she will need chemotherapy regardless of her final decision (before/after sx), if she does chemo after, she would not be a candidate for pembro/carbo. Data from [facility] reviewed. - pt met with Dr Lela Aguilar and elected to pw bilateral mastectomies with axillary dissection   - We discussed her path results in detail that showed 15mm TNBC. She is aware that pt's are considered high risk per GEICAM/2003-02 trial if they have node neg disease but were <35, had neg er/pr receptors, histo grade 2-3, and T>2cm. She meets two criteria for consideration of anthracycline therapy. She wishes to think this over. We discussed risks of chemotherapy incuding irriversible heart disease (including acute LV failure - we reviewed this) and also leukemia. Gave options of ddAC-T and TC with growth factor support. - of note, following standard adjuvant chemo for Q9n-9E4 or N+ TNBC, may consider metronomic capecitabine (650mg/m2 BID continuously for 1 year) as extended maintenance - SYSUCC-001 trial estimated 5-year DFS vs observation: 82.8 vs 73% (Velasquez et al 2021). - We discussed the potential side effects including but not limited to hair loss (which could be permanent), nausea, vomiting, diarrhea or constipation, mucositis, rashes, allergic reactions, organ damage including liver and kidney failure, cardiotoxicity, and direct effects on bone marrow which can lead to anemia and thrombocytopenia necessitating transfusion or neutropenia putting patient at risk for infection, sepsis and death if not treated. - echo EF 60-65%; LVI normal size and no WMA  - port placement   - decreased appetite, anxiety and sleep disturbance - on low dose mirtazapine and improved  - Rx mastectomy bra/prosthesis   - ADDENDUM: pt reacted to taxol 9 minutes into infusion with tachycardia, flushing, dyspnea and heaviness. Scared her a lot.  Discussed with Dr. Husam Carrillo, will change treatment plan to weekly abraxane. Pt notified and aware. New treatment plan placed. - here for FU with her . Tolerating Abraxane well thus far. Will give it over 1.5hr today and then 60min next week if no reactions as she continues therapy. D/w pharmacy. Labs reviewed and adequate for treatment. - appetite - Has some appetite issues due to altered taste. Will start to eat more green leafy vegetables. Discussed few ways to boost iron. Also plan to increase meat intake. Will see if can add on nutritional labs - attempted to add on today. All questions were asked and answered to the best of my ability. The patient verbalized understanding and agrees with the plan above.         Jude De La Fuente MD, MD  [facility]  [address] [place] [address]  Office : [phone]  Fax : [phone]

## 2023-02-06 ENCOUNTER — HOSPITAL ENCOUNTER (OUTPATIENT)
Dept: INFUSION THERAPY | Age: 63
Discharge: HOME | End: 2023-02-06
Payer: COMMERCIAL

## 2023-02-06 ENCOUNTER — OFFICE VISIT (OUTPATIENT)
Dept: ONCOLOGY | Age: 63
End: 2023-02-06
Payer: COMMERCIAL

## 2023-02-06 VITALS
BODY MASS INDEX: 27.75 KG/M2 | HEART RATE: 95 BPM | HEIGHT: 63 IN | TEMPERATURE: 99.2 F | SYSTOLIC BLOOD PRESSURE: 131 MMHG | WEIGHT: 156.6 LBS | DIASTOLIC BLOOD PRESSURE: 72 MMHG | OXYGEN SATURATION: 98 % | RESPIRATION RATE: 14 BRPM

## 2023-02-06 DIAGNOSIS — C50.912 INVASIVE DUCTAL CARCINOMA OF BREAST, FEMALE, LEFT (HCC): Primary | ICD-10-CM

## 2023-02-06 DIAGNOSIS — D64.9 ANEMIA, UNSPECIFIED TYPE: ICD-10-CM

## 2023-02-06 DIAGNOSIS — C50.912 INVASIVE DUCTAL CARCINOMA OF BREAST, FEMALE, LEFT (HCC): ICD-10-CM

## 2023-02-06 LAB
ALBUMIN SERPL-MCNC: 3.3 G/DL (ref 3.2–4.6)
ALBUMIN/GLOB SERPL: 0.9 (ref 0.4–1.6)
ALP SERPL-CCNC: 77 U/L (ref 50–136)
ALT SERPL-CCNC: 22 U/L (ref 12–65)
ANION GAP SERPL CALC-SCNC: 2 MMOL/L (ref 2–11)
AST SERPL-CCNC: 20 U/L (ref 15–37)
BASOPHILS # BLD: 0 K/UL (ref 0–0.2)
BASOPHILS NFR BLD: 0 % (ref 0–2)
BILIRUB SERPL-MCNC: 0.6 MG/DL (ref 0.2–1.1)
BUN SERPL-MCNC: 11 MG/DL (ref 8–23)
CALCIUM SERPL-MCNC: 9.1 MG/DL (ref 8.3–10.4)
CHLORIDE SERPL-SCNC: 112 MMOL/L (ref 101–110)
CO2 SERPL-SCNC: 29 MMOL/L (ref 21–32)
CREAT SERPL-MCNC: 0.7 MG/DL (ref 0.6–1)
DIFFERENTIAL METHOD BLD: ABNORMAL
EOSINOPHIL # BLD: 0.1 K/UL (ref 0–0.8)
EOSINOPHIL NFR BLD: 4 % (ref 0.5–7.8)
ERYTHROCYTE [DISTWIDTH] IN BLOOD BY AUTOMATED COUNT: 18 % (ref 11.9–14.6)
FERRITIN SERPL-MCNC: 216 NG/ML (ref 8–388)
GLOBULIN SER CALC-MCNC: 3.5 G/DL (ref 2.8–4.5)
GLUCOSE SERPL-MCNC: 91 MG/DL (ref 65–100)
HCT VFR BLD AUTO: 24.4 % (ref 35.8–46.3)
HGB BLD-MCNC: 7.9 G/DL (ref 11.7–15.4)
IMM GRANULOCYTES # BLD AUTO: 0 K/UL (ref 0–0.5)
IMM GRANULOCYTES NFR BLD AUTO: 1 % (ref 0–5)
IRON SATN MFR SERPL: 21 %
IRON SERPL-MCNC: 65 UG/DL (ref 35–150)
LYMPHOCYTES # BLD: 0.6 K/UL (ref 0.5–4.6)
LYMPHOCYTES NFR BLD: 25 % (ref 13–44)
MAGNESIUM SERPL-MCNC: 2.1 MG/DL (ref 1.8–2.4)
MCH RBC QN AUTO: 29.9 PG (ref 26.1–32.9)
MCHC RBC AUTO-ENTMCNC: 32.4 G/DL (ref 31.4–35)
MCV RBC AUTO: 92.4 FL (ref 82–102)
MONOCYTES # BLD: 0.3 K/UL (ref 0.1–1.3)
MONOCYTES NFR BLD: 12 % (ref 4–12)
NEUTS SEG # BLD: 1.5 K/UL (ref 1.7–8.2)
NEUTS SEG NFR BLD: 58 % (ref 43–78)
NRBC # BLD: 0.07 K/UL (ref 0–0.2)
PLATELET # BLD AUTO: 221 K/UL (ref 150–450)
PMV BLD AUTO: 9.1 FL (ref 9.4–12.3)
POTASSIUM SERPL-SCNC: 3.9 MMOL/L (ref 3.5–5.1)
PROT SERPL-MCNC: 6.8 G/DL (ref 6.3–8.2)
RBC # BLD AUTO: 2.64 M/UL (ref 4.05–5.2)
SODIUM SERPL-SCNC: 143 MMOL/L (ref 133–143)
TIBC SERPL-MCNC: 312 UG/DL (ref 250–450)
WBC # BLD AUTO: 2.5 K/UL (ref 4.3–11.1)

## 2023-02-06 PROCEDURE — 96375 TX/PRO/DX INJ NEW DRUG ADDON: CPT

## 2023-02-06 PROCEDURE — 82728 ASSAY OF FERRITIN: CPT

## 2023-02-06 PROCEDURE — 36591 DRAW BLOOD OFF VENOUS DEVICE: CPT

## 2023-02-06 PROCEDURE — 85025 COMPLETE CBC W/AUTO DIFF WBC: CPT

## 2023-02-06 PROCEDURE — 99214 OFFICE O/P EST MOD 30 MIN: CPT | Performed by: INTERNAL MEDICINE

## 2023-02-06 PROCEDURE — 83540 ASSAY OF IRON: CPT

## 2023-02-06 PROCEDURE — 2580000003 HC RX 258: Performed by: INTERNAL MEDICINE

## 2023-02-06 PROCEDURE — 96415 CHEMO IV INFUSION ADDL HR: CPT

## 2023-02-06 PROCEDURE — 6360000002 HC RX W HCPCS: Performed by: INTERNAL MEDICINE

## 2023-02-06 PROCEDURE — 96413 CHEMO IV INFUSION 1 HR: CPT

## 2023-02-06 PROCEDURE — 83735 ASSAY OF MAGNESIUM: CPT

## 2023-02-06 PROCEDURE — 80053 COMPREHEN METABOLIC PANEL: CPT

## 2023-02-06 RX ORDER — ACETAMINOPHEN 325 MG/1
650 TABLET ORAL
OUTPATIENT
Start: 2023-02-06

## 2023-02-06 RX ORDER — SODIUM CHLORIDE 9 MG/ML
5-250 INJECTION, SOLUTION INTRAVENOUS PRN
Status: CANCELLED | OUTPATIENT
Start: 2023-02-06

## 2023-02-06 RX ORDER — HEPARIN SODIUM (PORCINE) LOCK FLUSH IV SOLN 100 UNIT/ML 100 UNIT/ML
500 SOLUTION INTRAVENOUS PRN
OUTPATIENT
Start: 2023-02-06

## 2023-02-06 RX ORDER — PACLITAXEL 100 MG/20ML
100 INJECTION, POWDER, LYOPHILIZED, FOR SUSPENSION INTRAVENOUS ONCE
Status: CANCELLED | OUTPATIENT
Start: 2023-02-06 | End: 2023-02-06

## 2023-02-06 RX ORDER — MEPERIDINE HYDROCHLORIDE 50 MG/ML
12.5 INJECTION INTRAMUSCULAR; INTRAVENOUS; SUBCUTANEOUS PRN
OUTPATIENT
Start: 2023-02-06

## 2023-02-06 RX ORDER — PACLITAXEL 100 MG/20ML
100 INJECTION, POWDER, LYOPHILIZED, FOR SUSPENSION INTRAVENOUS ONCE
Status: COMPLETED | OUTPATIENT
Start: 2023-02-06 | End: 2023-02-06

## 2023-02-06 RX ORDER — DIPHENHYDRAMINE HYDROCHLORIDE 50 MG/ML
50 INJECTION INTRAMUSCULAR; INTRAVENOUS
OUTPATIENT
Start: 2023-02-06

## 2023-02-06 RX ORDER — FAMOTIDINE 10 MG/ML
20 INJECTION, SOLUTION INTRAVENOUS
OUTPATIENT
Start: 2023-02-06

## 2023-02-06 RX ORDER — SODIUM CHLORIDE 0.9 % (FLUSH) 0.9 %
10 SYRINGE (ML) INJECTION PRN
Status: DISCONTINUED | OUTPATIENT
Start: 2023-02-06 | End: 2023-02-07 | Stop reason: HOSPADM

## 2023-02-06 RX ORDER — ONDANSETRON 2 MG/ML
8 INJECTION INTRAMUSCULAR; INTRAVENOUS ONCE
Status: CANCELLED | OUTPATIENT
Start: 2023-02-06 | End: 2023-02-06

## 2023-02-06 RX ORDER — SODIUM CHLORIDE 9 MG/ML
5-40 INJECTION INTRAVENOUS PRN
Status: DISCONTINUED | OUTPATIENT
Start: 2023-02-06 | End: 2023-02-07 | Stop reason: HOSPADM

## 2023-02-06 RX ORDER — SODIUM CHLORIDE 9 MG/ML
5-250 INJECTION, SOLUTION INTRAVENOUS PRN
Status: DISCONTINUED | OUTPATIENT
Start: 2023-02-06 | End: 2023-02-07 | Stop reason: HOSPADM

## 2023-02-06 RX ORDER — SODIUM CHLORIDE 9 MG/ML
5-40 INJECTION INTRAVENOUS PRN
Status: CANCELLED | OUTPATIENT
Start: 2023-02-06

## 2023-02-06 RX ORDER — EPINEPHRINE 1 MG/ML
0.3 INJECTION, SOLUTION, CONCENTRATE INTRAVENOUS PRN
OUTPATIENT
Start: 2023-02-06

## 2023-02-06 RX ORDER — ALBUTEROL SULFATE 90 UG/1
4 AEROSOL, METERED RESPIRATORY (INHALATION) PRN
OUTPATIENT
Start: 2023-02-06

## 2023-02-06 RX ORDER — ONDANSETRON 2 MG/ML
8 INJECTION INTRAMUSCULAR; INTRAVENOUS ONCE
Status: COMPLETED | OUTPATIENT
Start: 2023-02-06 | End: 2023-02-06

## 2023-02-06 RX ORDER — SODIUM CHLORIDE 9 MG/ML
INJECTION, SOLUTION INTRAVENOUS CONTINUOUS
OUTPATIENT
Start: 2023-02-06

## 2023-02-06 RX ORDER — ONDANSETRON 2 MG/ML
8 INJECTION INTRAMUSCULAR; INTRAVENOUS
OUTPATIENT
Start: 2023-02-06

## 2023-02-06 RX ORDER — SODIUM CHLORIDE 9 MG/ML
5-250 INJECTION, SOLUTION INTRAVENOUS PRN
OUTPATIENT
Start: 2023-02-06

## 2023-02-06 RX ADMIN — SODIUM CHLORIDE 20 ML/HR: 9 INJECTION, SOLUTION INTRAVENOUS at 13:43

## 2023-02-06 RX ADMIN — PACLITAXEL 179 MG: 100 INJECTION, POWDER, LYOPHILIZED, FOR SUSPENSION INTRAVENOUS at 14:10

## 2023-02-06 RX ADMIN — SODIUM CHLORIDE, PRESERVATIVE FREE 10 ML: 5 INJECTION INTRAVENOUS at 11:44

## 2023-02-06 RX ADMIN — ONDANSETRON 8 MG: 2 INJECTION INTRAMUSCULAR; INTRAVENOUS at 13:43

## 2023-02-06 RX ADMIN — SODIUM CHLORIDE, PRESERVATIVE FREE 10 ML: 5 INJECTION INTRAVENOUS at 13:40

## 2023-02-06 ASSESSMENT — PATIENT HEALTH QUESTIONNAIRE - PHQ9
2. FEELING DOWN, DEPRESSED OR HOPELESS: 0
SUM OF ALL RESPONSES TO PHQ QUESTIONS 1-9: 0
SUM OF ALL RESPONSES TO PHQ QUESTIONS 1-9: 0
SUM OF ALL RESPONSES TO PHQ9 QUESTIONS 1 & 2: 0
SUM OF ALL RESPONSES TO PHQ QUESTIONS 1-9: 0
SUM OF ALL RESPONSES TO PHQ QUESTIONS 1-9: 0
1. LITTLE INTEREST OR PLEASURE IN DOING THINGS: 0

## 2023-02-06 NOTE — PATIENT INSTRUCTIONS
Patient Instructions from Today's Visit    Reason for Visit:  Follow up. Diagnosis Information:  [email or web address]      Plan:  Treatment today. Follow Up:  As scheduled.       Recent Lab Results:  Hospital Outpatient Visit on 02/06/2023   Component Date Value Ref Range Status    WBC 02/06/2023 2.5 (A)  4.3 - 11.1 K/uL Final    RBC 02/06/2023 2.64 (A)  4.05 - 5.2 M/uL Final    Hemoglobin 02/06/2023 7.9 (A)  11.7 - 15.4 g/dL Final    Hematocrit 02/06/2023 24.4 (A)  35.8 - 46.3 % Final    MCV 02/06/2023 92.4  82.0 - 102.0 FL Final    MCH 02/06/2023 29.9  26.1 - 32.9 PG Final    MCHC 02/06/2023 32.4  31.4 - 35.0 g/dL Final    RDW 02/06/2023 18.0 (A)  11.9 - 14.6 % Final    Platelets 04/46/4831 221  150 - 450 K/uL Final    MPV 02/06/2023 9.1 (A)  9.4 - 12.3 FL Final    nRBC 02/06/2023 0.07  0.0 - 0.2 K/uL Final    **Note: Absolute NRBC parameter is now reported with Hemogram**    Seg Neutrophils 02/06/2023 58  43 - 78 % Final    Lymphocytes 02/06/2023 25  13 - 44 % Final    Monocytes 02/06/2023 12  4.0 - 12.0 % Final    Eosinophils % 02/06/2023 4  0.5 - 7.8 % Final    Basophils 02/06/2023 0  0.0 - 2.0 % Final    Immature Granulocytes 02/06/2023 1  0.0 - 5.0 % Final    Segs Absolute 02/06/2023 1.5 (A)  1.7 - 8.2 K/UL Final    Absolute Lymph # 02/06/2023 0.6  0.5 - 4.6 K/UL Final    Absolute Mono # 02/06/2023 0.3  0.1 - 1.3 K/UL Final    Absolute Eos # 02/06/2023 0.1  0.0 - 0.8 K/UL Final    Basophils Absolute 02/06/2023 0.0  0.0 - 0.2 K/UL Final    Absolute Immature Granulocyte 02/06/2023 0.0  0.0 - 0.5 K/UL Final    Differential Type 02/06/2023 AUTOMATED    Final    Sodium 02/06/2023 143  133 - 143 mmol/L Final    Potassium 02/06/2023 3.9  3.5 - 5.1 mmol/L Final    Chloride 02/06/2023 112 (A)  101 - 110 mmol/L Final    CO2 02/06/2023 29  21 - 32 mmol/L Final    Anion Gap 02/06/2023 2  2 - 11 mmol/L Final    Glucose 02/06/2023 91 65 - 100 mg/dL Final    BUN 02/06/2023 11  8 - 23 MG/DL Final    Creatinine 02/06/2023 0.70  0.6 - 1.0 MG/DL Final    EstIsmael Filt Rate 02/06/2023 >60  >60 ml/min/1.73m2 Final    Comment:      Pediatric calculator link: [email or web address]. org/professionals/kdoqi/gfr_[email or web address]       These results are not intended for use in patients <25years of age. eGFR results are calculated without a race factor using  the 2021 CKD-EPI equation. Careful clinical correlation is recommended, particularly when comparing to results calculated using previous equations. The CKD-EPI equation is less accurate in patients with extremes of muscle mass, extra-renal metabolism of creatinine, excessive creatine ingestion, or following therapy that affects renal tubular secretion. Calcium 02/06/2023 9.1  8.3 - 10.4 MG/DL Final    Total Bilirubin 02/06/2023 0.6  0.2 - 1.1 MG/DL Final    ALT 02/06/2023 22  12 - 65 U/L Final    AST 02/06/2023 20  15 - 37 U/L Final    Alk Phosphatase 02/06/2023 77  50 - 136 U/L Final    Total Protein 02/06/2023 6.8  6.3 - 8.2 g/dL Final    Albumin 02/06/2023 3.3  3.2 - 4.6 g/dL Final    Globulin 02/06/2023 3.5  2.8 - 4.5 g/dL Final    Albumin/Globulin Ratio 02/06/2023 0.9  0.4 - 1.6   Final    Magnesium 02/06/2023 2.1  1.8 - 2.4 mg/dL Final         Treatment Summary has been discussed and given to patient: n/a        -------------------------------------------------------------------------------------------------------------------  Please call our office at [phone] if you have any  of the following symptoms:   Fever of 100.5 or greater  Chills  Shortness of breath  Swelling or pain in one leg    After office hours an answering service is available and will contact a provider for emergencies or if you are experiencing any of the above symptoms. Patient did express an interest in My Chart.   My Chart log in information explained on the after visit summary printout at the check-out cinthia.    John Campbell RN

## 2023-02-06 NOTE — PROGRESS NOTES
Arrived to the [facility]. abraxane completed. Patient tolerated well. Any issues or concerns during appointment: none. Patient aware of next infusion appointment on 2/20/2023 (date) at  (time). Patient aware of next lab and [facility] office visit on 2/20/2023 (date) at [phone] (time). Patient instructed to call provider with temperature of 100.4 or greater or nausea/vomiting/ diarrhea or pain not controlled by medications  Discharged ambulatory.

## 2023-02-20 ENCOUNTER — HOSPITAL ENCOUNTER (OUTPATIENT)
Dept: INFUSION THERAPY | Age: 63
Discharge: HOME | End: 2023-02-20
Payer: COMMERCIAL

## 2023-02-20 ENCOUNTER — OFFICE VISIT (OUTPATIENT)
Dept: ONCOLOGY | Age: 63
End: 2023-02-20
Payer: COMMERCIAL

## 2023-02-20 VITALS
TEMPERATURE: 98.2 F | OXYGEN SATURATION: 100 % | HEART RATE: 96 BPM | WEIGHT: 156.4 LBS | DIASTOLIC BLOOD PRESSURE: 71 MMHG | BODY MASS INDEX: 27.71 KG/M2 | SYSTOLIC BLOOD PRESSURE: 126 MMHG | HEIGHT: 63 IN | RESPIRATION RATE: 18 BRPM

## 2023-02-20 DIAGNOSIS — R43.9 SMELL OR TASTE SENSATION DISTURBANCE: ICD-10-CM

## 2023-02-20 DIAGNOSIS — C50.912 INVASIVE DUCTAL CARCINOMA OF BREAST, FEMALE, LEFT (HCC): Primary | ICD-10-CM

## 2023-02-20 DIAGNOSIS — C50.912 INVASIVE DUCTAL CARCINOMA OF BREAST, FEMALE, LEFT (HCC): ICD-10-CM

## 2023-02-20 LAB
ALBUMIN SERPL-MCNC: 3.5 G/DL (ref 3.2–4.6)
ALBUMIN/GLOB SERPL: 1.1 (ref 0.4–1.6)
ALP SERPL-CCNC: 80 U/L (ref 50–136)
ALT SERPL-CCNC: 17 U/L (ref 12–65)
ANION GAP SERPL CALC-SCNC: 5 MMOL/L (ref 2–11)
AST SERPL-CCNC: 21 U/L (ref 15–37)
BASOPHILS # BLD: 0 K/UL (ref 0–0.2)
BASOPHILS NFR BLD: 1 % (ref 0–2)
BILIRUB SERPL-MCNC: 0.5 MG/DL (ref 0.2–1.1)
BUN SERPL-MCNC: 11 MG/DL (ref 8–23)
CALCIUM SERPL-MCNC: 8.8 MG/DL (ref 8.3–10.4)
CHLORIDE SERPL-SCNC: 110 MMOL/L (ref 101–110)
CO2 SERPL-SCNC: 28 MMOL/L (ref 21–32)
CREAT SERPL-MCNC: 0.8 MG/DL (ref 0.6–1)
DIFFERENTIAL METHOD BLD: ABNORMAL
EOSINOPHIL # BLD: 0.1 K/UL (ref 0–0.8)
EOSINOPHIL NFR BLD: 3 % (ref 0.5–7.8)
ERYTHROCYTE [DISTWIDTH] IN BLOOD BY AUTOMATED COUNT: 17.8 % (ref 11.9–14.6)
GLOBULIN SER CALC-MCNC: 3.1 G/DL (ref 2.8–4.5)
GLUCOSE SERPL-MCNC: 120 MG/DL (ref 65–100)
HCT VFR BLD AUTO: 26.4 % (ref 35.8–46.3)
HGB BLD-MCNC: 8.4 G/DL (ref 11.7–15.4)
IMM GRANULOCYTES # BLD AUTO: 0 K/UL (ref 0–0.5)
IMM GRANULOCYTES NFR BLD AUTO: 0 % (ref 0–5)
LYMPHOCYTES # BLD: 0.8 K/UL (ref 0.5–4.6)
LYMPHOCYTES NFR BLD: 30 % (ref 13–44)
MAGNESIUM SERPL-MCNC: 2.4 MG/DL (ref 1.8–2.4)
MCH RBC QN AUTO: 29.8 PG (ref 26.1–32.9)
MCHC RBC AUTO-ENTMCNC: 31.8 G/DL (ref 31.4–35)
MCV RBC AUTO: 93.6 FL (ref 82–102)
MONOCYTES # BLD: 0.5 K/UL (ref 0.1–1.3)
MONOCYTES NFR BLD: 18 % (ref 4–12)
NEUTS SEG # BLD: 1.3 K/UL (ref 1.7–8.2)
NEUTS SEG NFR BLD: 48 % (ref 43–78)
NRBC # BLD: 0 K/UL (ref 0–0.2)
PLATELET # BLD AUTO: 205 K/UL (ref 150–450)
PMV BLD AUTO: 9 FL (ref 9.4–12.3)
POTASSIUM SERPL-SCNC: 3.6 MMOL/L (ref 3.5–5.1)
PROT SERPL-MCNC: 6.6 G/DL (ref 6.3–8.2)
RBC # BLD AUTO: 2.82 M/UL (ref 4.05–5.2)
SODIUM SERPL-SCNC: 143 MMOL/L (ref 133–143)
WBC # BLD AUTO: 2.7 K/UL (ref 4.3–11.1)

## 2023-02-20 PROCEDURE — 2580000003 HC RX 258: Performed by: NURSE PRACTITIONER

## 2023-02-20 PROCEDURE — 96413 CHEMO IV INFUSION 1 HR: CPT

## 2023-02-20 PROCEDURE — 6360000002 HC RX W HCPCS: Performed by: NURSE PRACTITIONER

## 2023-02-20 PROCEDURE — 85025 COMPLETE CBC W/AUTO DIFF WBC: CPT

## 2023-02-20 PROCEDURE — 83735 ASSAY OF MAGNESIUM: CPT

## 2023-02-20 PROCEDURE — 80053 COMPREHEN METABOLIC PANEL: CPT

## 2023-02-20 PROCEDURE — 2580000003 HC RX 258: Performed by: INTERNAL MEDICINE

## 2023-02-20 PROCEDURE — 36591 DRAW BLOOD OFF VENOUS DEVICE: CPT

## 2023-02-20 PROCEDURE — 96375 TX/PRO/DX INJ NEW DRUG ADDON: CPT

## 2023-02-20 PROCEDURE — 99214 OFFICE O/P EST MOD 30 MIN: CPT | Performed by: NURSE PRACTITIONER

## 2023-02-20 PROCEDURE — 96415 CHEMO IV INFUSION ADDL HR: CPT

## 2023-02-20 RX ORDER — SODIUM CHLORIDE 9 MG/ML
INJECTION, SOLUTION INTRAVENOUS CONTINUOUS
OUTPATIENT
Start: 2023-02-20

## 2023-02-20 RX ORDER — DIPHENHYDRAMINE HYDROCHLORIDE 50 MG/ML
50 INJECTION INTRAMUSCULAR; INTRAVENOUS
OUTPATIENT
Start: 2023-02-27

## 2023-02-20 RX ORDER — FAMOTIDINE 10 MG/ML
20 INJECTION, SOLUTION INTRAVENOUS
OUTPATIENT
Start: 2023-02-27

## 2023-02-20 RX ORDER — SODIUM CHLORIDE 9 MG/ML
5-250 INJECTION, SOLUTION INTRAVENOUS PRN
Status: CANCELLED | OUTPATIENT
Start: 2023-02-20

## 2023-02-20 RX ORDER — PACLITAXEL 100 MG/20ML
100 INJECTION, POWDER, LYOPHILIZED, FOR SUSPENSION INTRAVENOUS ONCE
Status: COMPLETED | OUTPATIENT
Start: 2023-02-20 | End: 2023-02-20

## 2023-02-20 RX ORDER — SODIUM CHLORIDE 9 MG/ML
INJECTION, SOLUTION INTRAVENOUS CONTINUOUS
OUTPATIENT
Start: 2023-02-27

## 2023-02-20 RX ORDER — EPINEPHRINE 1 MG/ML
0.3 INJECTION, SOLUTION, CONCENTRATE INTRAVENOUS PRN
OUTPATIENT
Start: 2023-02-27

## 2023-02-20 RX ORDER — FAMOTIDINE 10 MG/ML
20 INJECTION, SOLUTION INTRAVENOUS
OUTPATIENT
Start: 2023-03-06

## 2023-02-20 RX ORDER — ONDANSETRON 2 MG/ML
8 INJECTION INTRAMUSCULAR; INTRAVENOUS
OUTPATIENT
Start: 2023-02-20

## 2023-02-20 RX ORDER — SODIUM CHLORIDE 0.9 % (FLUSH) 0.9 %
10 SYRINGE (ML) INJECTION PRN
Status: DISCONTINUED | OUTPATIENT
Start: 2023-02-20 | End: 2023-02-21 | Stop reason: HOSPADM

## 2023-02-20 RX ORDER — ONDANSETRON 2 MG/ML
8 INJECTION INTRAMUSCULAR; INTRAVENOUS ONCE
Status: COMPLETED | OUTPATIENT
Start: 2023-02-20 | End: 2023-02-20

## 2023-02-20 RX ORDER — HEPARIN SODIUM (PORCINE) LOCK FLUSH IV SOLN 100 UNIT/ML 100 UNIT/ML
500 SOLUTION INTRAVENOUS PRN
Status: CANCELLED | OUTPATIENT
Start: 2023-02-20

## 2023-02-20 RX ORDER — SODIUM CHLORIDE 9 MG/ML
5-40 INJECTION INTRAVENOUS PRN
OUTPATIENT
Start: 2023-03-06

## 2023-02-20 RX ORDER — HEPARIN SODIUM (PORCINE) LOCK FLUSH IV SOLN 100 UNIT/ML 100 UNIT/ML
500 SOLUTION INTRAVENOUS PRN
OUTPATIENT
Start: 2023-02-27

## 2023-02-20 RX ORDER — SODIUM CHLORIDE 9 MG/ML
5-250 INJECTION, SOLUTION INTRAVENOUS PRN
OUTPATIENT
Start: 2023-03-06

## 2023-02-20 RX ORDER — SODIUM CHLORIDE 9 MG/ML
5-40 INJECTION INTRAVENOUS PRN
Status: DISCONTINUED | OUTPATIENT
Start: 2023-02-20 | End: 2023-02-21 | Stop reason: HOSPADM

## 2023-02-20 RX ORDER — ALBUTEROL SULFATE 90 UG/1
4 AEROSOL, METERED RESPIRATORY (INHALATION) PRN
OUTPATIENT
Start: 2023-03-06

## 2023-02-20 RX ORDER — ONDANSETRON 2 MG/ML
8 INJECTION INTRAMUSCULAR; INTRAVENOUS
OUTPATIENT
Start: 2023-03-06

## 2023-02-20 RX ORDER — ACETAMINOPHEN 325 MG/1
650 TABLET ORAL
OUTPATIENT
Start: 2023-02-27

## 2023-02-20 RX ORDER — ONDANSETRON 2 MG/ML
8 INJECTION INTRAMUSCULAR; INTRAVENOUS ONCE
Status: CANCELLED | OUTPATIENT
Start: 2023-02-20 | End: 2023-02-20

## 2023-02-20 RX ORDER — ALBUTEROL SULFATE 90 UG/1
4 AEROSOL, METERED RESPIRATORY (INHALATION) PRN
OUTPATIENT
Start: 2023-02-27

## 2023-02-20 RX ORDER — FAMOTIDINE 10 MG/ML
20 INJECTION, SOLUTION INTRAVENOUS
OUTPATIENT
Start: 2023-02-20

## 2023-02-20 RX ORDER — ALBUTEROL SULFATE 90 UG/1
4 AEROSOL, METERED RESPIRATORY (INHALATION) PRN
OUTPATIENT
Start: 2023-02-20

## 2023-02-20 RX ORDER — SODIUM CHLORIDE 9 MG/ML
5-40 INJECTION INTRAVENOUS PRN
OUTPATIENT
Start: 2023-02-27

## 2023-02-20 RX ORDER — SODIUM CHLORIDE 9 MG/ML
5-250 INJECTION, SOLUTION INTRAVENOUS PRN
OUTPATIENT
Start: 2023-02-27

## 2023-02-20 RX ORDER — MEPERIDINE HYDROCHLORIDE 50 MG/ML
12.5 INJECTION INTRAMUSCULAR; INTRAVENOUS; SUBCUTANEOUS PRN
OUTPATIENT
Start: 2023-02-27

## 2023-02-20 RX ORDER — MEPERIDINE HYDROCHLORIDE 50 MG/ML
12.5 INJECTION INTRAMUSCULAR; INTRAVENOUS; SUBCUTANEOUS PRN
OUTPATIENT
Start: 2023-02-20

## 2023-02-20 RX ORDER — EPINEPHRINE 1 MG/ML
0.3 INJECTION, SOLUTION, CONCENTRATE INTRAVENOUS PRN
OUTPATIENT
Start: 2023-02-20

## 2023-02-20 RX ORDER — PACLITAXEL 100 MG/20ML
100 INJECTION, POWDER, LYOPHILIZED, FOR SUSPENSION INTRAVENOUS ONCE
Status: CANCELLED | OUTPATIENT
Start: 2023-02-20 | End: 2023-02-20

## 2023-02-20 RX ORDER — MEPERIDINE HYDROCHLORIDE 50 MG/ML
12.5 INJECTION INTRAMUSCULAR; INTRAVENOUS; SUBCUTANEOUS PRN
OUTPATIENT
Start: 2023-03-06

## 2023-02-20 RX ORDER — ACETAMINOPHEN 325 MG/1
650 TABLET ORAL
OUTPATIENT
Start: 2023-02-20

## 2023-02-20 RX ORDER — HEPARIN SODIUM (PORCINE) LOCK FLUSH IV SOLN 100 UNIT/ML 100 UNIT/ML
500 SOLUTION INTRAVENOUS PRN
OUTPATIENT
Start: 2023-03-06

## 2023-02-20 RX ORDER — SODIUM CHLORIDE 9 MG/ML
INJECTION, SOLUTION INTRAVENOUS CONTINUOUS
OUTPATIENT
Start: 2023-03-06

## 2023-02-20 RX ORDER — PACLITAXEL 100 MG/20ML
100 INJECTION, POWDER, LYOPHILIZED, FOR SUSPENSION INTRAVENOUS ONCE
OUTPATIENT
Start: 2023-03-06 | End: 2023-03-06

## 2023-02-20 RX ORDER — HEPARIN SODIUM (PORCINE) LOCK FLUSH IV SOLN 100 UNIT/ML 100 UNIT/ML
500 SOLUTION INTRAVENOUS PRN
Status: DISCONTINUED | OUTPATIENT
Start: 2023-02-20 | End: 2023-02-21 | Stop reason: HOSPADM

## 2023-02-20 RX ORDER — DIPHENHYDRAMINE HYDROCHLORIDE 50 MG/ML
50 INJECTION INTRAMUSCULAR; INTRAVENOUS
OUTPATIENT
Start: 2023-02-20

## 2023-02-20 RX ORDER — DIPHENHYDRAMINE HYDROCHLORIDE 50 MG/ML
50 INJECTION INTRAMUSCULAR; INTRAVENOUS
OUTPATIENT
Start: 2023-03-06

## 2023-02-20 RX ORDER — SODIUM CHLORIDE 9 MG/ML
5-40 INJECTION INTRAVENOUS PRN
Status: CANCELLED | OUTPATIENT
Start: 2023-02-20

## 2023-02-20 RX ORDER — SODIUM CHLORIDE 9 MG/ML
5-250 INJECTION, SOLUTION INTRAVENOUS PRN
Status: DISCONTINUED | OUTPATIENT
Start: 2023-02-20 | End: 2023-02-21 | Stop reason: HOSPADM

## 2023-02-20 RX ORDER — ACETAMINOPHEN 325 MG/1
650 TABLET ORAL
OUTPATIENT
Start: 2023-03-06

## 2023-02-20 RX ORDER — ONDANSETRON 2 MG/ML
8 INJECTION INTRAMUSCULAR; INTRAVENOUS ONCE
OUTPATIENT
Start: 2023-03-06 | End: 2023-03-06

## 2023-02-20 RX ORDER — ONDANSETRON 2 MG/ML
8 INJECTION INTRAMUSCULAR; INTRAVENOUS
OUTPATIENT
Start: 2023-02-27

## 2023-02-20 RX ORDER — ONDANSETRON 2 MG/ML
8 INJECTION INTRAMUSCULAR; INTRAVENOUS ONCE
OUTPATIENT
Start: 2023-02-27 | End: 2023-02-27

## 2023-02-20 RX ORDER — PACLITAXEL 100 MG/20ML
100 INJECTION, POWDER, LYOPHILIZED, FOR SUSPENSION INTRAVENOUS ONCE
OUTPATIENT
Start: 2023-02-27 | End: 2023-02-27

## 2023-02-20 RX ORDER — EPINEPHRINE 1 MG/ML
0.3 INJECTION, SOLUTION, CONCENTRATE INTRAVENOUS PRN
OUTPATIENT
Start: 2023-03-06

## 2023-02-20 RX ADMIN — SODIUM CHLORIDE 100 ML/HR: 9 INJECTION, SOLUTION INTRAVENOUS at 15:35

## 2023-02-20 RX ADMIN — ONDANSETRON 8 MG: 2 INJECTION INTRAMUSCULAR; INTRAVENOUS at 15:45

## 2023-02-20 RX ADMIN — SODIUM CHLORIDE, PRESERVATIVE FREE 10 ML: 5 INJECTION INTRAVENOUS at 14:34

## 2023-02-20 RX ADMIN — PACLITAXEL 179 MG: 100 INJECTION, POWDER, LYOPHILIZED, FOR SUSPENSION INTRAVENOUS at 16:27

## 2023-02-20 ASSESSMENT — PATIENT HEALTH QUESTIONNAIRE - PHQ9
SUM OF ALL RESPONSES TO PHQ QUESTIONS 1-9: 0
2. FEELING DOWN, DEPRESSED OR HOPELESS: 0
SUM OF ALL RESPONSES TO PHQ9 QUESTIONS 1 & 2: 0
SUM OF ALL RESPONSES TO PHQ QUESTIONS 1-9: 0
1. LITTLE INTEREST OR PLEASURE IN DOING THINGS: 0

## 2023-02-20 ASSESSMENT — ENCOUNTER SYMPTOMS
BLOOD IN STOOL: 0
SCLERAL ICTERUS: 0
CHEST TIGHTNESS: 0
SORE THROAT: 0
DIARRHEA: 0
WHEEZING: 0
VOICE CHANGE: 0
TROUBLE SWALLOWING: 0
VOMITING: 0
CONSTIPATION: 0
ABDOMINAL DISTENTION: 0
ABDOMINAL PAIN: 0
HEMOPTYSIS: 0
SHORTNESS OF BREATH: 0
NAUSEA: 0

## 2023-02-20 NOTE — PROGRESS NOTES
Arrived to the infusion center. Abraxane infusing without side effects. No new issues or complaints voiced.  Report given to [facility] RN

## 2023-02-20 NOTE — PROGRESS NOTES
[address] [place] [address] Hematology and Oncology: Established patient - follow up     Chief Complaint   Patient presents with    Follow-up     Reason for Referral: Breast cancer  Referring Provider: Self-referral   Primary Care Provider: Dawn Seip, APRN - CNP  Family History of Cancer/Hematologic Disorders: Family history is significant for two sisters and a niece with breast cancer. Presenting Symptoms: Abnormal routine screening mammogram     History of Present Illness:  Ms. Lolis Ruffin is a 57 y.o. female who presents today for follow up regarding breast cancer. The past medical history is significant for multiple thyroid nodules, hyperthyroidism, hypercholesterolemia, Grave's disease, GERD, uterine fibroids,  section x 2, cyst removal, and hysterectomy. She initially presented for a routine screening mammogram on 8/10/22 which identified a spiculated equal density mass in the left breast inferior medial quadrant posterior depth. Further evaluation with limited ultrasound of the left breast on 2022 confirmed an 8 x 7 x 8 mm round, hypoechoic mass with spiculated margins at the 9:00 position in the left breast, 6 cm from the nipple, demonstrating mild posterior acoustic enhancement and corresponding to the mass seen mammographically. US- guided biopsy of the 9:00 left breast mass was performed on 22 with pathology revealing ER/CO/HER-2 negative, grade 3, invasive carcinoma with high-grade DCIS focally present and no microcalcifications or lymphovascular space extension identified. Testing so far has been done at [facility]; however, Ms. Lolis Ruffin wishes to transfer care to [address] [place] [address]. She is self-referred to [facility] for Oncology evaluation and treatment of newly diagnosed breast cancer. She has also been referred to surgery and is scheduled for initial surgical evaluation with Dr. Kasey Sears, on 22.    Patient's daughter lives in [place], works at a hospital.    At consultation, we discussed the pathophysiology of breast cancer, staging, and the importance of receptor status in terms of treatment options. We then reviewed her medical history as well as oncologic history, recent imaging and pathology in detail. Next steps in management were reviewed. She was here with her . Fu after MRI - Results reviewed in detail and show evidence of disease in the breast but no worrisome findings in terms of lymphadenopathy or other evidence of disease. She was emotional and wished to proceed with surgery upfront. We discussed need for chemotherapy because of triple negative status and she wishes to proceed with surgery upfront accepting risks. Pathology from surgery will determine ultimate staging and treatment plan. She completed surgery and we discussed her pathology in detail. Her tumor was 15 mm and 0 out of 2 lymph nodes. This is pathological stage Ib. We discussed her high risk features triple negative breast cancer and grade 2-3. We discussed role of chemotherapy and recommendations of dose dense ACT versus TC. She chose ACT. Will omit Emend with following cycles as she did have a reaction to this drug. She is here for follow up and C2D1 Abraxane. Overall, she is tolerating treatment well. There is no nausea and stools are soft. Appetite is affected by taste changes (salty) will try sprinkling sugar on food. Energy level is Ok. There is no cough, shortness of breath, or edema. Denies any pain or neuropathy. No fevers or infectious symptoms. Chronological Events:   BILATERAL DIGITAL SCREENING MAMMOGRAM 3D/2D: 8/10/2022   FINDINGS: There are scattered areas of fibroglandular density (ACR Breast Density Composition Category B). Digital mammography views were performed including tomosynthesis. There is a spiculated equal density mass in the left breast inferior medial quadrant posterior depth.   No other significant masses, calcifications, or other findings are seen in either breast.     IMPRESSION: INCOMPLETE NEEDS ADDITIONAL IMAGING EVALUATION   The spiculated mass in the left breast is indeterminate. Ultrasound with possible additional views are recommended. There is no mammographic evidence of malignancy in the right breast. Patient will be notified of the results. LIMITED ULTRASOUND OF LEFT BREAST: 8/17/2022  FINDINGS: There is an 8 x 7 x 8 mm round, hypoechoic mass with spiculated margins at 9:00, 6 cm from the nipple. It demonstrates mild posterior acoustic enhancement. This corresponds to the mass seen mammographically. The left axilla is negative. IMPRESSION: SUSPICIOUS OF MALIGNANCY   The 8 mm mass at 9:00, 6 cm FN is suspicious and ultrasound-guided biopsy is recommended. These findings and recommendations were discussed with the patient at the time of her exam.  Her biopsy is scheduled for 8/25/2022 at 2:00 pm.      [place] 8/25/22 9/1/22 heme/onc consultation   9/9/22 FU after MRI - pt elected to p/w sx upfront; MRI results reviewed. 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 10/19/22 post op with Dr Salud Londono - drain removed   10/24/22 FU after sx - discussed adjuvant chemotherapy for TNBC.  11/14/22 Cycle 1 ddAC. EF 60-65%. 11/28/22  Surendra 2 ddAC. Tolerated well.   12/12/22 FU C3 ddAC - tolerating well   12/27/22 FU C4 ddAC   1/10/23 FU - week 1 Taxol. Doing well overall. Discussed side effects r/t Taxol. 1/17/23 Week 2 Taxol. Doing well. ADDENDUM\" paclitaxel rxn - > will order nab-paclitaxel for next apt   1/23/23 FU week 1 nab-paclitaxel tomorrow; ANC 1.3.  add on nutritional labs   1/30/23 FU week 2 Abraxane. Doing well. ANC adequate. Added on nutritional labs. 2/6/23 FU weel 3 nab-paclitaxel - wishes to remain on longer infusion.     2/20/2023 FU and next Abraxane, doing well       Family History   Problem Relation Age of Onset    Coronary Art Dis Mother     Breast Cancer Sister     Breast Cancer Sister     Thyroid Disease Sister     Thyroid Cancer Neg Hx     Diabetes Neg Hx       Social History     Socioeconomic History    Marital status:      Spouse name: None    Number of children: None    Years of education: None    Highest education level: None   Tobacco Use    Smoking status: Never    Smokeless tobacco: Never   Vaping Use    Vaping Use: Never used   Substance and Sexual Activity    Alcohol use: No    Drug use: No        Review of Systems   Constitutional:  Positive for appetite change (improved on remeron, taste changes) and fatigue. Negative for chills, diaphoresis, fever and unexpected weight change. HENT:   Negative for hearing loss, mouth sores, nosebleeds, sore throat, trouble swallowing and voice change. Eyes:  Negative for icterus. Respiratory:  Negative for chest tightness, hemoptysis, shortness of breath and wheezing. Cardiovascular:  Negative for chest pain, leg swelling and palpitations. Gastrointestinal:  Negative for abdominal distention, abdominal pain, blood in stool, constipation, diarrhea, nausea and vomiting. Endocrine: Negative for hot flashes. Genitourinary:  Negative for difficulty urinating, frequency, vaginal bleeding and vaginal discharge. Musculoskeletal:  Negative for arthralgias, flank pain, gait problem and myalgias. Skin:  Negative for itching, rash and wound. Neurological:  Negative for dizziness, extremity weakness, gait problem, headaches and numbness. Psychiatric/Behavioral:  Positive for sleep disturbance. Negative for confusion and depression. The patient is nervous/anxious (much better).        Allergies   Allergen Reactions    Emend [Fosaprepitant Dimeglumine] Anaphylaxis     Flushing, coughing    Hydrocodone Anxiety and Other (See Comments)     Depressive state     Past Medical History: Diagnosis Date    Allergic rhinitis     Cancer ([facility] [address] 2022    left breast cancer---    GERD (gastroesophageal reflux disease)     controlled with med    Graves' disease     Hypercholesterolemia     Hyperthyroidism     Multiple thyroid nodules     followed by dr Torrey Moy     Past Surgical History:   Procedure Laterality Date    BREAST BIOPSY Left 10/7/2022    LEFT SENTINEL LYMPH NODE BIOPSY PREOP @ 0900, LYMPHO @ 1150, SX @ 9080 performed by Arlene Hernandez DO at [place] [id number] Left 2022    bx     SECTION      x2    CYST REMOVAL  2021    subcutaneous cyst from lateral neck (benign)    HYSTERECTOMY (CERVIX STATUS UNKNOWN)      fibroids (ovaries intact)    IR PORT PLACEMENT EQUAL OR GREATER THAN 5 YEARS  2022    IR PORT PLACEMENT EQUAL OR GREATER THAN 5 YEARS 2022 [facility] SPECIALS    MASTECTOMY Left 10/7/2022    LEFT BREAST MASTECTOMY performed by Arlene Hernandez DO at [facility] MAIN OR     Current Outpatient Medications   Medication Sig Dispense Refill    lidocaine-prilocaine (EMLA) 2.5-2.5 % cream Apply topically weekly as needed prior to port access.  30 g 2    ondansetron (ZOFRAN) 8 MG tablet Take 1 tablet by mouth every 8 hours as needed for Nausea or Vomiting 60 tablet 2    prochlorperazine (COMPAZINE) 10 MG tablet Take 1 tablet by mouth every 6 hours as needed (nausea vomiting chemo) 60 tablet 2    Multiple Vitamin (MULTI-VITAMIN DAILY PO) Take 1 tablet by mouth daily As needed      mirtazapine (REMERON) 15 MG tablet Take 0.5 tablets by mouth nightly (Patient taking differently: Take 7.5 mg by mouth as needed) 30 tablet 3    rosuvastatin (CRESTOR) 20 MG tablet Take 1 tablet by mouth at bedtime 90 tablet 3    methIMAzole (TAPAZOLE) 5 MG tablet Take 0.5 tablets by mouth daily 15 tablet 11    omeprazole (PRILOSEC) 20 MG delayed release capsule Take 20 mg by mouth as needed PRN      Red Yeast Rice Extract 600 MG CAPS Take 600 mg by mouth daily      Mastectomy Bra MISC by Does not apply route Mastectomy bra + prosthesis 1 each 2     No current facility-administered medications for this visit. Facility-Administered Medications Ordered in Other Visits   Medication Dose Route Frequency Provider Last Rate Last Admin    sodium chloride flush 0.9 % injection 10 mL  10 mL IntraVENous PRN Carolyn Lopez MD   10 mL at 02/20/23 1434    0.9 % sodium chloride infusion  5-250 mL/hr IntraVENous PRN Marina Rakes, APRN -  mL/hr at 02/20/23 1535 100 mL/hr at 02/20/23 1535    PACLitaxel-protein bound (ABRAXANE) chemo IVPB 179 mg  100 mg/m2 (Treatment Plan Recorded) IntraVENous Once Marina Rakes, APRN - CNP        sodium chloride (PF) 0.9 % injection 5-40 mL  5-40 mL IntraVENous PRN Marina Rakes, APRN - CNP        0.9 % sodium chloride infusion  5-250 mL/hr IntraVENous PRN Marina Rakes, APRN - CNP        heparin flush 100 UNIT/ML injection 500 Units  500 Units IntraCATHeter PRN Marina Rakes, APRN - CNP        alteplase (CATHFLO) 2 mg in sterile water 2 mL injection  2 mg IntraCATHeter PRN Marina Rakes, APRN - CNP           No flowsheet data found. OBJECTIVE:  /71 (Site: Right Upper Arm, Position: Standing)   Pulse 96   Temp 98.2 °F (36.8 °C)   Resp 18   Ht 5' 3\" (1.6 m)   Wt 156 lb 6.4 oz (70.9 kg)   SpO2 100%   BMI 27.71 kg/m²       ECOG PERFORMANCE STATUS - 0-Fully active, able to carry on all pre-disease performance without restriction. Pain - 0 - No pain/10. None/Minimal pain - not affecting QOL     Fatigue - No flowsheet data found. Distress - No flowsheet data found. Physical Exam  Vitals reviewed. Exam conducted with a chaperone present. Constitutional:       General: She is not in acute distress. Appearance: Normal appearance. She is not ill-appearing or toxic-appearing. HENT:      Head: Normocephalic and atraumatic.       Nose: Nose normal.      Mouth/Throat:      Mouth: Mucous membranes are moist.   Eyes: General: No scleral icterus. Conjunctiva/sclera: Conjunctivae normal.   Cardiovascular:      Rate and Rhythm: Normal rate and regular rhythm. Heart sounds: No murmur heard. Pulmonary:      Effort: No respiratory distress. Breath sounds: No wheezing or rales. Chest:      Comments: S/p mastectomy. Abdominal:      General: There is no distension. Tenderness: There is no abdominal tenderness. There is no right CVA tenderness or guarding. Musculoskeletal:         General: Normal range of motion. Cervical back: Normal range of motion. Right lower leg: No edema. Left lower leg: No edema. Lymphadenopathy:      Cervical: No cervical adenopathy. Upper Body:      Right upper body: No supraclavicular or axillary adenopathy. Left upper body: No supraclavicular or axillary adenopathy. Skin:     General: Skin is warm and dry. Coloration: Skin is not jaundiced or pale. Findings: No rash. Neurological:      General: No focal deficit present. Mental Status: She is alert and oriented to person, place, and time. Gait: Gait normal.   Psychiatric:         Behavior: Behavior normal.         Thought Content:  Thought content normal.        Labs:  Recent Results (from the past 168 hour(s))   CBC with Auto Differential    Collection Time: 02/20/23  2:28 PM   Result Value Ref Range    WBC 2.7 (L) 4.3 - 11.1 K/uL    RBC 2.82 (L) 4.05 - 5.2 M/uL    Hemoglobin 8.4 (L) 11.7 - 15.4 g/dL    Hematocrit 26.4 (L) 35.8 - 46.3 %    MCV 93.6 82.0 - 102.0 FL    MCH 29.8 26.1 - 32.9 PG    MCHC 31.8 31.4 - 35.0 g/dL    RDW 17.8 (H) 11.9 - 14.6 %    Platelets 126 574 - 871 K/uL    MPV 9.0 (L) 9.4 - 12.3 FL    nRBC 0.00 0.0 - 0.2 K/uL    Seg Neutrophils 48 43 - 78 %    Lymphocytes 30 13 - 44 %    Monocytes 18 (H) 4.0 - 12.0 %    Eosinophils % 3 0.5 - 7.8 %    Basophils 1 0.0 - 2.0 %    Immature Granulocytes 0 0.0 - 5.0 %    Segs Absolute 1.3 (L) 1.7 - 8.2 K/UL    Absolute Lymph # 0.8 0.5 - 4.6 K/UL    Absolute Mono # 0.5 0.1 - 1.3 K/UL    Absolute Eos # 0.1 0.0 - 0.8 K/UL    Basophils Absolute 0.0 0.0 - 0.2 K/UL    Absolute Immature Granulocyte 0.0 0.0 - 0.5 K/UL    Differential Type AUTOMATED     Comprehensive Metabolic Panel    Collection Time: 02/20/23  2:28 PM   Result Value Ref Range    Sodium 143 133 - 143 mmol/L    Potassium 3.6 3.5 - 5.1 mmol/L    Chloride 110 101 - 110 mmol/L    CO2 28 21 - 32 mmol/L    Anion Gap 5 2 - 11 mmol/L    Glucose 120 (H) 65 - 100 mg/dL    BUN 11 8 - 23 MG/DL    Creatinine 0.80 0.6 - 1.0 MG/DL    Est, Glom Filt Rate >60 >60 ml/min/1.73m2    Calcium 8.8 8.3 - 10.4 MG/DL    Total Bilirubin 0.5 0.2 - 1.1 MG/DL    ALT 17 12 - 65 U/L    AST 21 15 - 37 U/L    Alk Phosphatase 80 50 - 136 U/L    Total Protein 6.6 6.3 - 8.2 g/dL    Albumin 3.5 3.2 - 4.6 g/dL    Globulin 3.1 2.8 - 4.5 g/dL    Albumin/Globulin Ratio 1.1 0.4 - 1.6     Magnesium    Collection Time: 02/20/23  2:28 PM   Result Value Ref Range    Magnesium 2.4 1.8 - 2.4 mg/dL         Imaging: reviewed     PATHOLOGY:   per above     10/2022        ASSESSMENT:     Diagnosis Orders   1. Invasive ductal carcinoma of breast, female, left ([facility] [address]  CBC With Auto Differential    Comprehensive metabolic panel    CBC With Auto Differential    Comprehensive metabolic panel    CBC With Auto Differential    Comprehensive metabolic panel    DISCONTINUED: 0.9 % sodium chloride infusion    DISCONTINUED: ondansetron (ZOFRAN) injection 8 mg    DISCONTINUED: PACLitaxel-protein bound (ABRAXANE) chemo IVPB 179 mg    DISCONTINUED: sodium chloride (PF) 0.9 % injection 5-40 mL    DISCONTINUED: 0.9 % sodium chloride infusion    DISCONTINUED: heparin flush 100 UNIT/ML injection 500 Units    DISCONTINUED: alteplase (CATHFLO) 2 mg in sterile water 2 mL injection      2. Smell or taste sensation disturbance          Ms. Junior Thayer is here for FU of breast cancer.       Left breast IDC, 9:00, s/p core bx/clip, at GABO, poorly diff, 8mm on US, MRI pending, LVI neg, DCIS high grade focally present, ER0/PR0, Her2 sera +1 - negative   - MR - Biopsied mass (11mm) in the medial 8:30 left breast at 7 cm from the nipple. No additional pathologic enhancement or adenopathy in either breast.    - s/p left mastectomy and 0/2 SLnc - [id number] (0/2) - 15mm with neg margins, grade 2   - genetics - VUS   - s/p ddAC --> paclitaxel (rxn week 2 paclitaxel) switched to nab-paclitaxel     - here for FU and next cycle Abraxane. Doing well overall. -Appetite is ok, taste is altered   - Labs reviewed, tolerating oral iron every other day   - EF adequate prior to starting treatment - EF 60-65%. Plan echo with any ss or CHF and also after tx completed   - on remeron 7.5 mg nightly for sleep/appetite   - nausea/vomiting chemo: discussed Zofran and/or Compazine. No Emend due to prior reaction. - constipation: stool softeners, add Miralax   - continue good oral nutrition and hydration. - encouraged frequent activity throughout the day and rest as needed to combat fatigue.   - call with any fevers, uncontrolled side effects from treatment or any other worrisome/concerning symptoms.   - advised pt to stop turkey tail supplement at this time    2. Surveillance   - H&P q3-6 mo for years 1-3, q6mo years 4-5; mammogram yearly  - DEXA q2 years postmenopausal   - labs/imaging studies are not recommended without symptoms     Orders placed for: Dose-Dense AC-T (Paclitaxel Weekly) - [Doxorubicin + Cyclophosphamide q14 Days x 4 Cycles, Followed by Paclitaxel 80 mg/m² Weekly x 12 Weeks]  Cycles 1 through 4: A cycle is every 14 days:  Doxorubicin  60 mg/m² IV once on day 1 of cycles 1 through 4  Cyclophosphamide  600 mg/m² IV once on day 1 of cycles 1 through 4  Pegfilgrastim-xxxx  6 mg flat dose subcutaneously once on day 2 of cycles 1 through 4  Cycles 5 through 16:  A cycle is every 7 days:  Paclitaxel  80 mg/m² IV once on day 1 of cycles 5 through 16      RESUSCITATION DIRECTIVES/HOSPICE CARE: Full Support    RTC per protocol or sooner as needed       MDM      Lab studies and imaging studies were personally reviewed. Pertinent old records were reviewed. Historical:   - she is here for consultation regarding neoadjuvant chemotherapy for newly diagnosed triple negative breast cancer. During today's visit, we had a long conversation about breast cancer pathophysiology and staging in general.  We then reviewed her imaging and pathology in details, including receptor status and it's significance in terms of available treatment options. Neoadjuvant compared to adjuvant chemotherapy has similar long term outcomes when patients are given the same therapy. Preoperative treatment can render large, inoperable tumors operable and improve breast conservation, downstage tumor and decrease risk of postoperative lympedema. It also provides information about tumor chemosensitivity. Patients with pathologic complete response have favorable disease free survival and overall survival and this correlation is strongest in TNBC. Preoperative systemic therapy can lead to possible overtreatment with systemic therapy of clinical stages overestimated. It can also lead to possible under treatment local regionally with radiotherapy if clinical stage is underestimated. There is also possibility of disease progression during preoperative systemic therapy. - MRI pending - she is aware that tx plan may change depending on MR results   - she is here for FU after MRI - she has elected to p/w sx upfront. Images reviewed. Understands final path will be determined by sx specimen/LN status. Dr Norwood Fears notifed by me re pt's decision.    - we discussed options of AC-T +/- carbo/pembro and TC; she is aware that she will need chemotherapy regardless of her final decision (before/after sx), if she does chemo after, she would not be a candidate for pembro/carbo. Data from [facility] reviewed.     - pt met with Dr Lia Giordano and elected to pw bilateral mastectomies with axillary dissection   - We discussed her path results in detail that showed 15mm TNBC. She is aware that pt's are considered high risk per GEICAM/2003-02 trial if they have node neg disease but were <35, had neg er/pr receptors, histo grade 2-3, and T>2cm. She meets two criteria for consideration of anthracycline therapy. She wishes to think this over. We discussed risks of chemotherapy incuding irriversible heart disease (including acute LV failure - we reviewed this) and also leukemia. Gave options of ddAC-T and TC with growth factor support. - of note, following standard adjuvant chemo for [id number] or N+ TNBC, may consider metronomic capecitabine (650mg/m2 BID continuously for 1 year) as extended maintenance - SYSUCC-001 trial estimated 5-year DFS vs observation: 82.8 vs 73% (Velasquez et al 2021). - We discussed the potential side effects including but not limited to hair loss (which could be permanent), nausea, vomiting, diarrhea or constipation, mucositis, rashes, allergic reactions, organ damage including liver and kidney failure, cardiotoxicity, and direct effects on bone marrow which can lead to anemia and thrombocytopenia necessitating transfusion or neutropenia putting patient at risk for infection, sepsis and death if not treated. - echo EF 60-65%; LVI normal size and no WMA  - port placement   - decreased appetite, anxiety and sleep disturbance - on low dose mirtazapine and improved  - Rx mastectomy bra/prosthesis   - ADDENDUM: pt reacted to taxol 9 minutes into infusion with tachycardia, flushing, dyspnea and heaviness. Scared her a lot. Discussed with Dr. Marlon Apodaca, will change treatment plan to weekly abraxane. Pt notified and aware. New treatment plan placed. - here for FU with her . Tolerating Abraxane well thus far. Will give it over 1.5hr today and then 60min next week if no reactions as she continues therapy.   D/w pharmacy. Labs reviewed and adequate for treatment. - appetite - Has some appetite issues due to altered taste. Will start to eat more green leafy vegetables. Discussed few ways to boost iron. Also plan to increase meat intake. Will see if can add on nutritional labs - attempted to add on today. All questions were asked and answered to the best of my ability. The patient verbalized understanding and agrees with the plan above.         [facility] Hematology and Oncology  [address] [place] [address]  Office : [phone]  Fax : [phone]

## 2023-02-27 ENCOUNTER — HOSPITAL ENCOUNTER (OUTPATIENT)
Dept: INFUSION THERAPY | Age: 63
Discharge: HOME | End: 2023-02-27
Payer: COMMERCIAL

## 2023-02-27 VITALS
DIASTOLIC BLOOD PRESSURE: 86 MMHG | OXYGEN SATURATION: 98 % | HEART RATE: 95 BPM | WEIGHT: 156.4 LBS | TEMPERATURE: 98.3 F | BODY MASS INDEX: 27.71 KG/M2 | SYSTOLIC BLOOD PRESSURE: 132 MMHG | RESPIRATION RATE: 18 BRPM

## 2023-02-27 DIAGNOSIS — C50.912 INVASIVE DUCTAL CARCINOMA OF BREAST, FEMALE, LEFT (HCC): Primary | ICD-10-CM

## 2023-02-27 PROCEDURE — 96375 TX/PRO/DX INJ NEW DRUG ADDON: CPT

## 2023-02-27 PROCEDURE — 96415 CHEMO IV INFUSION ADDL HR: CPT

## 2023-02-27 PROCEDURE — 96413 CHEMO IV INFUSION 1 HR: CPT

## 2023-02-27 PROCEDURE — 6360000002 HC RX W HCPCS: Performed by: NURSE PRACTITIONER

## 2023-02-27 PROCEDURE — 2580000003 HC RX 258: Performed by: NURSE PRACTITIONER

## 2023-02-27 RX ORDER — PACLITAXEL 100 MG/20ML
100 INJECTION, POWDER, LYOPHILIZED, FOR SUSPENSION INTRAVENOUS ONCE
Status: COMPLETED | OUTPATIENT
Start: 2023-02-27 | End: 2023-02-27

## 2023-02-27 RX ORDER — SODIUM CHLORIDE 9 MG/ML
5-250 INJECTION, SOLUTION INTRAVENOUS PRN
Status: DISCONTINUED | OUTPATIENT
Start: 2023-02-27 | End: 2023-02-28 | Stop reason: HOSPADM

## 2023-02-27 RX ORDER — ONDANSETRON 2 MG/ML
8 INJECTION INTRAMUSCULAR; INTRAVENOUS ONCE
Status: COMPLETED | OUTPATIENT
Start: 2023-02-27 | End: 2023-02-27

## 2023-02-27 RX ORDER — SODIUM CHLORIDE 9 MG/ML
5-40 INJECTION INTRAVENOUS PRN
Status: DISCONTINUED | OUTPATIENT
Start: 2023-02-27 | End: 2023-02-28 | Stop reason: HOSPADM

## 2023-02-27 RX ADMIN — SODIUM CHLORIDE 50 ML/HR: 9 INJECTION, SOLUTION INTRAVENOUS at 09:55

## 2023-02-27 RX ADMIN — ONDANSETRON 8 MG: 2 INJECTION INTRAMUSCULAR; INTRAVENOUS at 10:00

## 2023-02-27 RX ADMIN — SODIUM CHLORIDE, PRESERVATIVE FREE 10 ML: 5 INJECTION INTRAVENOUS at 09:00

## 2023-02-27 RX ADMIN — PACLITAXEL 179 MG: 100 INJECTION, POWDER, LYOPHILIZED, FOR SUSPENSION INTRAVENOUS at 10:43

## 2023-02-27 NOTE — PROGRESS NOTES
Arrived to the [facility]. Abraxane infusion completed. Patient tolerated well. Any issues or concerns during appointment: no  Patient aware of next infusion appointment on 3/6/2023 (date) at 0800 (time). Patient aware of next lab and [facility] office visit on 3/6/2023 (date) at 0700 (time). Patient instructed to call provider with temperature of 100.4 or greater or nausea/vomiting/ diarrhea or pain not controlled by medications  Discharged ambulatory.

## 2023-03-03 ASSESSMENT — ENCOUNTER SYMPTOMS
VOMITING: 0
CHEST TIGHTNESS: 0
ABDOMINAL PAIN: 0
VOICE CHANGE: 0
SHORTNESS OF BREATH: 0
SCLERAL ICTERUS: 0
NAUSEA: 0
TROUBLE SWALLOWING: 0
ABDOMINAL DISTENTION: 0
BLOOD IN STOOL: 0
HEMOPTYSIS: 0
WHEEZING: 0
DIARRHEA: 0
SORE THROAT: 0
CONSTIPATION: 0

## 2023-03-03 NOTE — PROGRESS NOTES
[facility] Hematology and Oncology: Established patient - follow up     Chief Complaint   Patient presents with    Follow-up     Reason for Referral: Breast cancer  Referring Provider: Self-referral   Primary Care Provider: MIR Lal CNP  Family History of Cancer/Hematologic Disorders: Family history is significant for two sisters and a niece with breast cancer. Presenting Symptoms: Abnormal routine screening mammogram     History of Present Illness:  Ms. Milli Leon is a 57 y.o. female who presents today for follow up regarding breast cancer. The past medical history is significant for multiple thyroid nodules, hyperthyroidism, hypercholesterolemia, Grave's disease, GERD, uterine fibroids,  section x 2, cyst removal, and hysterectomy. She initially presented for a routine screening mammogram on 8/10/22 which identified a spiculated equal density mass in the left breast inferior medial quadrant posterior depth. Further evaluation with limited ultrasound of the left breast on 2022 confirmed an 8 x 7 x 8 mm round, hypoechoic mass with spiculated margins at the 9:00 position in the left breast, 6 cm from the nipple, demonstrating mild posterior acoustic enhancement and corresponding to the mass seen mammographically. US- guided biopsy of the 9:00 left breast mass was performed on 22 with pathology revealing ER/OR/HER-2 negative, grade 3, invasive carcinoma with high-grade DCIS focally present and no microcalcifications or lymphovascular space extension identified. Testing so far has been done at [facility]; however, Ms. Milli Leon wishes to transfer care to [facility]. She is self-referred to [facility] for Oncology evaluation and treatment of newly diagnosed breast cancer. She has also been referred to surgery and is scheduled for initial surgical evaluation with Dr. Billie Mcleod, on 22.    Patient's daughter lives in [place], works at a hospital.    At consultation, we discussed the [de-identified] : The patient is doing well after joint replacement surgery. Written infectious precautions were reviewed. The patient will progress with physical therapy at this time and they will work on transitioning from requiring assistive devices for ambulation. Aspirin therapy will be discontinued at 1 month post surgery for the purpose of orthopedic thromboembolism prophylaxis. Return around the 6 week anniversary from surgery for follow-up evaluation.  pathophysiology of breast cancer, staging, and the importance of receptor status in terms of treatment options. We then reviewed her medical history as well as oncologic history, recent imaging and pathology in detail. Next steps in management were reviewed. She was here with her . Fu after MRI - Results reviewed in detail and show evidence of disease in the breast but no worrisome findings in terms of lymphadenopathy or other evidence of disease. She was emotional and wished to proceed with surgery upfront. We discussed need for chemotherapy because of triple negative status and she wishes to proceed with surgery upfront accepting risks. Pathology from surgery will determine ultimate staging and treatment plan. She completed surgery and we discussed her pathology in detail. Her tumor was 15 mm and 0 out of 2 lymph nodes. This is pathological stage Ib. We discussed her high risk features triple negative breast cancer and grade 2-3. We discussed role of chemotherapy and recommendations of dose dense ACT versus TC. She chose ACT. Will omit Emend with following cycles as she did have a reaction to this drug. She is here today for follow up and C2D15 Abraxane. She continues to feel well overall. She denies any new concerns or changes. She feels her taste has improved, still struggling at times with eating enough. She does take Premier protein shakes but not every day, discussed other options for snacks/meals. She denies any shortness of breath. She denies any neuropathy. She has occasional \"stinging\" pains to prior left surgery site, very brief and self limited when they occur. She denies any fevers or other infectious symptoms. She c/w oral iron. Chronological Events:   BILATERAL DIGITAL SCREENING MAMMOGRAM 3D/2D: 8/10/2022   FINDINGS: There are scattered areas of fibroglandular density (ACR Breast Density Composition Category B).   Digital mammography views were performed including tomosynthesis. There is a spiculated equal density mass in the left breast inferior medial quadrant posterior depth. No other significant masses, calcifications, or other findings are seen in either breast.     IMPRESSION: INCOMPLETE NEEDS ADDITIONAL IMAGING EVALUATION   The spiculated mass in the left breast is indeterminate. Ultrasound with possible additional views are recommended. There is no mammographic evidence of malignancy in the right breast. Patient will be notified of the results. LIMITED ULTRASOUND OF LEFT BREAST: 8/17/2022  FINDINGS: There is an 8 x 7 x 8 mm round, hypoechoic mass with spiculated margins at 9:00, 6 cm from the nipple. It demonstrates mild posterior acoustic enhancement. This corresponds to the mass seen mammographically. The left axilla is negative. IMPRESSION: SUSPICIOUS OF MALIGNANCY   The 8 mm mass at 9:00, 6 cm FN is suspicious and ultrasound-guided biopsy is recommended. These findings and recommendations were discussed with the patient at the time of her exam.  Her biopsy is scheduled for 8/25/2022 at 2:00 pm.      [place] 8/25/22 9/1/22 heme/onc consultation   9/9/22 FU after MRI - pt elected to p/w sx upfront; MRI results reviewed. 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 10/19/22 post op with Dr Georgette De La Fuente - drain removed   10/24/22 FU after sx - discussed adjuvant chemotherapy for TNBC.  11/14/22 Cycle 1 ddAC. EF 60-65%. 11/28/22  Surendra 2 ddAC. Tolerated well.   12/12/22 FU C3 ddAC - tolerating well   12/27/22 FU C4 ddAC   1/10/23 FU - week 1 Taxol. Doing well overall. Discussed side effects r/t Taxol. 1/17/23 Week 2 Taxol. Doing well. ADDENDUM\" paclitaxel rxn - > will order nab-paclitaxel for next apt   1/23/23 FU week 1 nab-paclitaxel tomorrow; ANC 1.3. add on nutritional labs   1/30/23 FU week 2 Abraxane. Doing well. ANC adequate. Added on nutritional labs. 2/6/23 FU weel 3 nab-paclitaxel - wishes to remain on longer infusion. 2/20/2023 FU and next Abraxane, doing well  3/6/23 FU and next Abraxane - C2D15. ANC 1.1, can proceed. Family History   Problem Relation Age of Onset    Coronary Art Dis Mother     Breast Cancer Sister     Breast Cancer Sister     Thyroid Disease Sister     Thyroid Cancer Neg Hx     Diabetes Neg Hx       Social History     Socioeconomic History    Marital status:      Spouse name: None    Number of children: None    Years of education: None    Highest education level: None   Tobacco Use    Smoking status: Never    Smokeless tobacco: Never   Vaping Use    Vaping Use: Never used   Substance and Sexual Activity    Alcohol use: No    Drug use: No        Review of Systems   Constitutional:  Positive for appetite change (improved on remeron, taste changes) and fatigue. Negative for chills, diaphoresis, fever and unexpected weight change. HENT:   Negative for hearing loss, mouth sores, nosebleeds, sore throat, trouble swallowing and voice change. Eyes:  Negative for icterus. Respiratory:  Negative for chest tightness, hemoptysis, shortness of breath and wheezing. Cardiovascular:  Negative for chest pain, leg swelling and palpitations. Gastrointestinal:  Negative for abdominal distention, abdominal pain, blood in stool, constipation, diarrhea, nausea and vomiting. Endocrine: Negative for hot flashes. Genitourinary:  Negative for difficulty urinating, frequency, vaginal bleeding and vaginal discharge. Musculoskeletal:  Negative for arthralgias, flank pain, gait problem and myalgias. Skin:  Negative for itching, rash and wound. Neurological:  Negative for dizziness, extremity weakness, gait problem, headaches and numbness. Psychiatric/Behavioral:  Positive for sleep disturbance.  Negative for confusion and depression. The patient is nervous/anxious (much better). Allergies   Allergen Reactions    Emend [Fosaprepitant Dimeglumine] Anaphylaxis     Flushing, coughing    Hydrocodone Anxiety and Other (See Comments)     Depressive state     Past Medical History:   Diagnosis Date    Allergic rhinitis     Cancer ([address] 2022    left breast cancer---    GERD (gastroesophageal reflux disease)     controlled with med    Graves' disease     Hypercholesterolemia     Hyperthyroidism     Multiple thyroid nodules     followed by dr Hossein Soares     Past Surgical History:   Procedure Laterality Date    BREAST BIOPSY Left 10/7/2022    LEFT SENTINEL LYMPH NODE BIOPSY PREOP @ 0900, LYMPHO @ 1030, SX @ 3342 performed by Kirby Harrison DO at [facility] [address] Left 2022    bx     SECTION      x2    CYST REMOVAL  2021    subcutaneous cyst from lateral neck (benign)    HYSTERECTOMY (CERVIX STATUS UNKNOWN)      fibroids (ovaries intact)    IR PORT PLACEMENT EQUAL OR GREATER THAN 5 YEARS  2022    IR PORT PLACEMENT EQUAL OR GREATER THAN 5 YEARS 2022 [facility]IOLOGY SPECIALS    MASTECTOMY Left 10/7/2022    LEFT BREAST MASTECTOMY performed by Kirby Harrison DO at [facility] MAIN OR     Current Outpatient Medications   Medication Sig Dispense Refill    lidocaine-prilocaine (EMLA) 2.5-2.5 % cream Apply topically weekly as needed prior to port access.  30 g 2    ondansetron (ZOFRAN) 8 MG tablet Take 1 tablet by mouth every 8 hours as needed for Nausea or Vomiting 60 tablet 2    prochlorperazine (COMPAZINE) 10 MG tablet Take 1 tablet by mouth every 6 hours as needed (nausea vomiting chemo) 60 tablet 2    Mastectomy Bra MISC by Does not apply route Mastectomy bra + prosthesis 1 each 2    Multiple Vitamin (MULTI-VITAMIN DAILY PO) Take 1 tablet by mouth daily As needed      mirtazapine (REMERON) 15 MG tablet Take 0.5 tablets by mouth nightly (Patient taking differently: Take 7.5 mg by mouth as needed) 30 tablet 3    rosuvastatin (CRESTOR) 20 MG tablet Take 1 tablet by mouth at bedtime 90 tablet 3    methIMAzole (TAPAZOLE) 5 MG tablet Take 0.5 tablets by mouth daily 15 tablet 11    omeprazole (PRILOSEC) 20 MG delayed release capsule Take 20 mg by mouth as needed PRN      Red Yeast Rice Extract 600 MG CAPS Take 600 mg by mouth daily       No current facility-administered medications for this visit. Facility-Administered Medications Ordered in Other Visits   Medication Dose Route Frequency Provider Last Rate Last Admin    sodium chloride flush 0.9 % injection 10 mL  10 mL IntraVENous PRN Mee Garsia MD   10 mL at 03/06/23 0723       No flowsheet data found. OBJECTIVE:  /63 Comment: standing  Pulse 98   Temp 98.4 °F (36.9 °C) (Oral)   Resp 16   Ht 5' 3\" (1.6 m)   Wt 154 lb 8 oz (70.1 kg)   SpO2 99%   BMI 27.37 kg/m²       ECOG PERFORMANCE STATUS - 0-Fully active, able to carry on all pre-disease performance without restriction. Pain - Pain Score:   0 - No pain (fatigue=0)0 - No pain/10. None/Minimal pain - not affecting QOL     Fatigue - No flowsheet data found. Distress - No flowsheet data found. Physical Exam  Vitals reviewed. Exam conducted with a chaperone present. Constitutional:       General: She is not in acute distress. Appearance: Normal appearance. She is not ill-appearing or toxic-appearing. HENT:      Head: Normocephalic and atraumatic. Nose: Nose normal.      Mouth/Throat:      Mouth: Mucous membranes are moist.   Eyes:      General: No scleral icterus. Conjunctiva/sclera: Conjunctivae normal.   Cardiovascular:      Rate and Rhythm: Normal rate and regular rhythm. Heart sounds: No murmur heard. Pulmonary:      Effort: No respiratory distress. Breath sounds: No wheezing or rales. Chest:      Comments: S/p mastectomy. Abdominal:      General: There is no distension. Tenderness: There is no abdominal tenderness.  There is no right CVA tenderness or guarding. Musculoskeletal:         General: Normal range of motion. Cervical back: Normal range of motion. Right lower leg: No edema. Left lower leg: No edema. Lymphadenopathy:      Cervical: No cervical adenopathy. Upper Body:      Right upper body: No supraclavicular or axillary adenopathy. Left upper body: No supraclavicular or axillary adenopathy. Skin:     General: Skin is warm and dry. Coloration: Skin is not jaundiced or pale. Findings: No rash. Neurological:      General: No focal deficit present. Mental Status: She is alert and oriented to person, place, and time. Gait: Gait normal.   Psychiatric:         Behavior: Behavior normal.         Thought Content:  Thought content normal.        Labs:  Recent Results (from the past 168 hour(s))   CBC With Auto Differential    Collection Time: 02/27/23  8:58 AM   Result Value Ref Range    WBC 3.2 (L) 4.3 - 11.1 K/uL    RBC 2.97 (L) 4.05 - 5.2 M/uL    Hemoglobin 8.6 (L) 11.7 - 15.4 g/dL    Hematocrit 27.3 (L) 35.8 - 46.3 %    MCV 91.9 82.0 - 102.0 FL    MCH 29.0 26.1 - 32.9 PG    MCHC 31.5 31.4 - 35.0 g/dL    RDW 16.2 (H) 11.9 - 14.6 %    Platelets 493 436 - 650 K/uL    MPV 9.7 9.4 - 12.3 FL    nRBC 0.07 0.0 - 0.2 K/uL    Differential Type AUTOMATED      Seg Neutrophils 63 43 - 78 %    Lymphocytes 24 13 - 44 %    Monocytes 8 4.0 - 12.0 %    Eosinophils % 3 0.5 - 7.8 %    Basophils 0 0.0 - 2.0 %    Immature Granulocytes 2 0.0 - 5.0 %    Segs Absolute 2.0 1.7 - 8.2 K/UL    Absolute Lymph # 0.8 0.5 - 4.6 K/UL    Absolute Mono # 0.3 0.1 - 1.3 K/UL    Absolute Eos # 0.1 0.0 - 0.8 K/UL    Basophils Absolute 0.0 0.0 - 0.2 K/UL    Absolute Immature Granulocyte 0.1 0.0 - 0.5 K/UL   Comprehensive metabolic panel    Collection Time: 02/27/23  8:58 AM   Result Value Ref Range    Sodium 141 133 - 143 mmol/L    Potassium 3.8 3.5 - 5.1 mmol/L    Chloride 107 101 - 110 mmol/L    CO2 29 21 - 32 mmol/L Anion Gap 5 2 - 11 mmol/L    Glucose 117 (H) 65 - 100 mg/dL    BUN 14 8 - 23 MG/DL    Creatinine 0.80 0.6 - 1.0 MG/DL    Est, Glom Filt Rate >60 >60 ml/min/1.73m2    Calcium 9.5 8.3 - 10.4 MG/DL    Total Bilirubin 0.6 0.2 - 1.1 MG/DL    ALT 21 12 - 65 U/L    AST 21 15 - 37 U/L    Alk Phosphatase 85 50 - 136 U/L    Total Protein 7.3 6.3 - 8.2 g/dL    Albumin 3.6 3.2 - 4.6 g/dL    Globulin 3.7 2.8 - 4.5 g/dL    Albumin/Globulin Ratio 1.0 0.4 - 1.6     CBC With Auto Differential    Collection Time: 03/06/23  7:15 AM   Result Value Ref Range    WBC 1.9 (LL) 4.3 - 11.1 K/uL    RBC 2.82 (L) 4.05 - 5.2 M/uL    Hemoglobin 8.4 (L) 11.7 - 15.4 g/dL    Hematocrit 25.9 (L) 35.8 - 46.3 %    MCV 91.8 82.0 - 102.0 FL    MCH 29.8 26.1 - 32.9 PG    MCHC 32.4 31.4 - 35.0 g/dL    RDW 15.9 (H) 11.9 - 14.6 %    Platelets 268 240 - 862 K/uL    MPV 9.0 (L) 9.4 - 12.3 FL    nRBC 0.07 0.0 - 0.2 K/uL    Seg Neutrophils 55 43 - 78 %    Lymphocytes 31 13 - 44 %    Monocytes 8 4.0 - 12.0 %    Eosinophils % 4 0.5 - 7.8 %    Basophils 1 0.0 - 2.0 %    Immature Granulocytes 1 0.0 - 5.0 %    Segs Absolute 1.1 (L) 1.7 - 8.2 K/UL    Absolute Lymph # 0.6 0.5 - 4.6 K/UL    Absolute Mono # 0.2 0.1 - 1.3 K/UL    Absolute Eos # 0.1 0.0 - 0.8 K/UL    Basophils Absolute 0.0 0.0 - 0.2 K/UL    Absolute Immature Granulocyte 0.0 0.0 - 0.5 K/UL    Differential Type AUTOMATED     Comprehensive metabolic panel    Collection Time: 03/06/23  7:15 AM   Result Value Ref Range    Sodium 142 133 - 143 mmol/L    Potassium 3.5 3.5 - 5.1 mmol/L    Chloride 111 (H) 101 - 110 mmol/L    CO2 28 21 - 32 mmol/L    Anion Gap 3 2 - 11 mmol/L    Glucose 125 (H) 65 - 100 mg/dL    BUN 14 8 - 23 MG/DL    Creatinine 0.90 0.6 - 1.0 MG/DL    Est, Glom Filt Rate >60 >60 ml/min/1.73m2    Calcium 9.1 8.3 - 10.4 MG/DL    Total Bilirubin 0.5 0.2 - 1.1 MG/DL    ALT 21 12 - 65 U/L    AST 26 15 - 37 U/L    Alk Phosphatase 84 50 - 136 U/L    Total Protein 6.8 6.3 - 8.2 g/dL    Albumin 3.5 3.2 - 4.6 g/dL    Globulin 3.3 2.8 - 4.5 g/dL    Albumin/Globulin Ratio 1.1 0.4 - 1.6           Imaging: reviewed     PATHOLOGY:   per above     10/2022        ASSESSMENT:     Diagnosis Orders   1. Invasive ductal carcinoma of breast, female, left ([facility] [address]          Ms. Aimee Alba is here for FU of breast cancer. Left breast IDC, 9:00, s/p core bx/clip, at GABO, poorly diff, 8mm on US, MRI pending, LVI neg, DCIS high grade focally present, ER0/PR0, Her2 sera +1 - negative   - MR - Biopsied mass (11mm) in the medial 8:30 left breast at 7 cm from the nipple. No additional pathologic enhancement or adenopathy in either breast.    - s/p left mastectomy and 0/2 SLnc - [id number] (0/2) - 15mm with neg margins, grade 2   - genetics - VUS   - s/p ddAC --> paclitaxel (rxn week 2 paclitaxel) switched to nab-paclitaxel     - here for FU and C2D15 Abraxane. Doing well overall. ANC 1.1 and adequate for treatment. - Appetite is ok, taste is altered but seems to be improving.    - Labs reviewed, tolerating oral iron every other day   - EF adequate prior to starting treatment - EF 60-65%. Plan echo with any ss or CHF and also after tx completed   - on remeron 7.5 mg nightly for sleep/appetite   - nausea/vomiting chemo: discussed Zofran and/or Compazine. No Emend due to prior reaction. - constipation: stool softeners, add Miralax   - continue good oral nutrition and hydration. - encouraged frequent activity throughout the day and rest as needed to combat fatigue.   - call with any fevers, uncontrolled side effects from treatment or any other worrisome/concerning symptoms.   - advised pt to stop turkey tail supplement previously     2.  Surveillance   - H&P q3-6 mo for years 1-3, q6mo years 4-5; mammogram yearly  - DEXA q2 years postmenopausal   - labs/imaging studies are not recommended without symptoms     Orders placed for: Dose-Dense AC-T (Paclitaxel Weekly) - [Doxorubicin + Cyclophosphamide q14 Days x 4 Cycles, Followed by Paclitaxel 80 mg/m² Weekly x 12 Weeks]  Cycles 1 through 4: A cycle is every 14 days:  Doxorubicin  60 mg/m² IV once on day 1 of cycles 1 through 4  Cyclophosphamide  600 mg/m² IV once on day 1 of cycles 1 through 4  Pegfilgrastim-xxxx  6 mg flat dose subcutaneously once on day 2 of cycles 1 through 4  Cycles 5 through 16: A cycle is every 7 days:  Paclitaxel  80 mg/m² IV once on day 1 of cycles 5 through [address]: Full Support    RTC per protocol or sooner as needed       MDM      Lab studies and imaging studies were personally reviewed. Pertinent old records were reviewed. Historical:   - she is here for consultation regarding neoadjuvant chemotherapy for newly diagnosed triple negative breast cancer. During today's visit, we had a long conversation about breast cancer pathophysiology and staging in general.  We then reviewed her imaging and pathology in details, including receptor status and it's significance in terms of available treatment options. Neoadjuvant compared to adjuvant chemotherapy has similar long term outcomes when patients are given the same therapy. Preoperative treatment can render large, inoperable tumors operable and improve breast conservation, downstage tumor and decrease risk of postoperative lympedema. It also provides information about tumor chemosensitivity. Patients with pathologic complete response have favorable disease free survival and overall survival and this correlation is strongest in TNBC. Preoperative systemic therapy can lead to possible overtreatment with systemic therapy of clinical stages overestimated. It can also lead to possible under treatment local regionally with radiotherapy if clinical stage is underestimated. There is also possibility of disease progression during preoperative systemic therapy.   - MRI pending - she is aware that tx plan may change depending on MR results   - she is here for FU after MRI - she has elected to p/w sx upfront. Images reviewed. Understands final path will be determined by sx specimen/LN status. Dr Elvia Tijerina notifed by me re pt's decision.    - we discussed options of AC-T +/- carbo/pembro and TC; she is aware that she will need chemotherapy regardless of her final decision (before/after sx), if she does chemo after, she would not be a candidate for pembro/carbo. Data from [address] reviewed. - pt met with Dr Elvia Tijerina and elected to pw bilateral mastectomies with axillary dissection   - We discussed her path results in detail that showed 15mm TNBC. She is aware that pt's are considered high risk per GEICAM/2003-02 trial if they have node neg disease but were <35, had neg er/pr receptors, histo grade 2-3, and T>2cm. She meets two criteria for consideration of anthracycline therapy. She wishes to think this over. We discussed risks of chemotherapy incuding irriversible heart disease (including acute LV failure - we reviewed this) and also leukemia. Gave options of ddAC-T and TC with growth factor support. - of note, following standard adjuvant chemo for [id number] or N+ TNBC, may consider metronomic capecitabine (650mg/m2 BID continuously for 1 year) as extended maintenance - SYSUCC-001 trial estimated 5-year DFS vs observation: 82.8 vs 73% (Velasquez et al 2021). - We discussed the potential side effects including but not limited to hair loss (which could be permanent), nausea, vomiting, diarrhea or constipation, mucositis, rashes, allergic reactions, organ damage including liver and kidney failure, cardiotoxicity, and direct effects on bone marrow which can lead to anemia and thrombocytopenia necessitating transfusion or neutropenia putting patient at risk for infection, sepsis and death if not treated.     - echo EF 60-65%; LVI normal size and no WMA  - port placement   - decreased appetite, anxiety and sleep disturbance - on low dose mirtazapine and improved  - Rx mastectomy bra/prosthesis - ADDENDUM: pt reacted to taxol 9 minutes into infusion with tachycardia, flushing, dyspnea and heaviness. Scared her a lot. Discussed with Dr. Merlinda Ditty, will change treatment plan to weekly abraxane. Pt notified and aware. New treatment plan placed. - here for FU with her . Tolerating Abraxane well thus far. Will give it over 1.5hr today and then 60min next week if no reactions as she continues therapy. D/w pharmacy. Labs reviewed and adequate for treatment. - appetite - Has some appetite issues due to altered taste. Will start to eat more green leafy vegetables. Discussed few ways to boost iron. Also plan to increase meat intake. Will see if can add on nutritional labs - attempted to add on today. All questions were asked and answered to the best of my ability. The patient verbalized understanding and agrees with the plan above.         MIR Whitmore - JAZMIN  [facility] Hematology and Oncology  [address] [address]  Office : [phone]  Fax : [phone]

## 2023-03-06 ENCOUNTER — HOSPITAL ENCOUNTER (OUTPATIENT)
Dept: INFUSION THERAPY | Age: 63
Discharge: HOME | End: 2023-03-06
Payer: COMMERCIAL

## 2023-03-06 ENCOUNTER — OFFICE VISIT (OUTPATIENT)
Dept: ONCOLOGY | Age: 63
End: 2023-03-06
Payer: COMMERCIAL

## 2023-03-06 VITALS
HEIGHT: 63 IN | SYSTOLIC BLOOD PRESSURE: 112 MMHG | TEMPERATURE: 98.4 F | OXYGEN SATURATION: 99 % | WEIGHT: 154.5 LBS | RESPIRATION RATE: 16 BRPM | DIASTOLIC BLOOD PRESSURE: 63 MMHG | HEART RATE: 98 BPM | BODY MASS INDEX: 27.38 KG/M2

## 2023-03-06 DIAGNOSIS — C50.912 INVASIVE DUCTAL CARCINOMA OF BREAST, FEMALE, LEFT (HCC): ICD-10-CM

## 2023-03-06 DIAGNOSIS — C50.912 INVASIVE DUCTAL CARCINOMA OF BREAST, FEMALE, LEFT (HCC): Primary | ICD-10-CM

## 2023-03-06 LAB
ALBUMIN SERPL-MCNC: 3.5 G/DL (ref 3.2–4.6)
ALBUMIN/GLOB SERPL: 1.1 (ref 0.4–1.6)
ALP SERPL-CCNC: 84 U/L (ref 50–136)
ALT SERPL-CCNC: 21 U/L (ref 12–65)
ANION GAP SERPL CALC-SCNC: 3 MMOL/L (ref 2–11)
AST SERPL-CCNC: 26 U/L (ref 15–37)
BASOPHILS # BLD: 0 K/UL (ref 0–0.2)
BASOPHILS NFR BLD: 1 % (ref 0–2)
BILIRUB SERPL-MCNC: 0.5 MG/DL (ref 0.2–1.1)
BUN SERPL-MCNC: 14 MG/DL (ref 8–23)
CALCIUM SERPL-MCNC: 9.1 MG/DL (ref 8.3–10.4)
CHLORIDE SERPL-SCNC: 111 MMOL/L (ref 101–110)
CO2 SERPL-SCNC: 28 MMOL/L (ref 21–32)
CREAT SERPL-MCNC: 0.9 MG/DL (ref 0.6–1)
DIFFERENTIAL METHOD BLD: ABNORMAL
EOSINOPHIL # BLD: 0.1 K/UL (ref 0–0.8)
EOSINOPHIL NFR BLD: 4 % (ref 0.5–7.8)
ERYTHROCYTE [DISTWIDTH] IN BLOOD BY AUTOMATED COUNT: 15.9 % (ref 11.9–14.6)
GLOBULIN SER CALC-MCNC: 3.3 G/DL (ref 2.8–4.5)
GLUCOSE SERPL-MCNC: 125 MG/DL (ref 65–100)
HCT VFR BLD AUTO: 25.9 % (ref 35.8–46.3)
HGB BLD-MCNC: 8.4 G/DL (ref 11.7–15.4)
IMM GRANULOCYTES # BLD AUTO: 0 K/UL (ref 0–0.5)
IMM GRANULOCYTES NFR BLD AUTO: 1 % (ref 0–5)
LYMPHOCYTES # BLD: 0.6 K/UL (ref 0.5–4.6)
LYMPHOCYTES NFR BLD: 31 % (ref 13–44)
MCH RBC QN AUTO: 29.8 PG (ref 26.1–32.9)
MCHC RBC AUTO-ENTMCNC: 32.4 G/DL (ref 31.4–35)
MCV RBC AUTO: 91.8 FL (ref 82–102)
MONOCYTES # BLD: 0.2 K/UL (ref 0.1–1.3)
MONOCYTES NFR BLD: 8 % (ref 4–12)
NEUTS SEG # BLD: 1.1 K/UL (ref 1.7–8.2)
NEUTS SEG NFR BLD: 55 % (ref 43–78)
NRBC # BLD: 0.07 K/UL (ref 0–0.2)
PLATELET # BLD AUTO: 216 K/UL (ref 150–450)
PMV BLD AUTO: 9 FL (ref 9.4–12.3)
POTASSIUM SERPL-SCNC: 3.5 MMOL/L (ref 3.5–5.1)
PROT SERPL-MCNC: 6.8 G/DL (ref 6.3–8.2)
RBC # BLD AUTO: 2.82 M/UL (ref 4.05–5.2)
SODIUM SERPL-SCNC: 142 MMOL/L (ref 133–143)
WBC # BLD AUTO: 1.9 K/UL (ref 4.3–11.1)

## 2023-03-06 PROCEDURE — 80053 COMPREHEN METABOLIC PANEL: CPT

## 2023-03-06 PROCEDURE — 2580000003 HC RX 258: Performed by: INTERNAL MEDICINE

## 2023-03-06 PROCEDURE — 96375 TX/PRO/DX INJ NEW DRUG ADDON: CPT

## 2023-03-06 PROCEDURE — 85025 COMPLETE CBC W/AUTO DIFF WBC: CPT

## 2023-03-06 PROCEDURE — 99214 OFFICE O/P EST MOD 30 MIN: CPT | Performed by: NURSE PRACTITIONER

## 2023-03-06 PROCEDURE — 2580000003 HC RX 258: Performed by: NURSE PRACTITIONER

## 2023-03-06 PROCEDURE — 6360000002 HC RX W HCPCS: Performed by: NURSE PRACTITIONER

## 2023-03-06 PROCEDURE — 96413 CHEMO IV INFUSION 1 HR: CPT

## 2023-03-06 PROCEDURE — 36591 DRAW BLOOD OFF VENOUS DEVICE: CPT

## 2023-03-06 PROCEDURE — 96415 CHEMO IV INFUSION ADDL HR: CPT

## 2023-03-06 RX ORDER — SODIUM CHLORIDE 9 MG/ML
5-250 INJECTION, SOLUTION INTRAVENOUS PRN
Status: DISCONTINUED | OUTPATIENT
Start: 2023-03-06 | End: 2023-03-07 | Stop reason: HOSPADM

## 2023-03-06 RX ORDER — HEPARIN SODIUM (PORCINE) LOCK FLUSH IV SOLN 100 UNIT/ML 100 UNIT/ML
500 SOLUTION INTRAVENOUS PRN
Status: DISCONTINUED | OUTPATIENT
Start: 2023-03-06 | End: 2023-03-07 | Stop reason: HOSPADM

## 2023-03-06 RX ORDER — PACLITAXEL 100 MG/20ML
100 INJECTION, POWDER, LYOPHILIZED, FOR SUSPENSION INTRAVENOUS ONCE
Status: COMPLETED | OUTPATIENT
Start: 2023-03-06 | End: 2023-03-06

## 2023-03-06 RX ORDER — SODIUM CHLORIDE 0.9 % (FLUSH) 0.9 %
10 SYRINGE (ML) INJECTION PRN
Status: DISCONTINUED | OUTPATIENT
Start: 2023-03-06 | End: 2023-03-07 | Stop reason: HOSPADM

## 2023-03-06 RX ORDER — SODIUM CHLORIDE 9 MG/ML
5-40 INJECTION INTRAVENOUS PRN
Status: DISCONTINUED | OUTPATIENT
Start: 2023-03-06 | End: 2023-03-07 | Stop reason: HOSPADM

## 2023-03-06 RX ORDER — ONDANSETRON 2 MG/ML
8 INJECTION INTRAMUSCULAR; INTRAVENOUS ONCE
Status: COMPLETED | OUTPATIENT
Start: 2023-03-06 | End: 2023-03-06

## 2023-03-06 RX ADMIN — SODIUM CHLORIDE 100 ML/HR: 9 INJECTION, SOLUTION INTRAVENOUS at 08:40

## 2023-03-06 RX ADMIN — SODIUM CHLORIDE, PRESERVATIVE FREE 10 ML: 5 INJECTION INTRAVENOUS at 08:15

## 2023-03-06 RX ADMIN — PACLITAXEL 179 MG: 100 INJECTION, POWDER, LYOPHILIZED, FOR SUSPENSION INTRAVENOUS at 09:25

## 2023-03-06 RX ADMIN — SODIUM CHLORIDE, PRESERVATIVE FREE 10 ML: 5 INJECTION INTRAVENOUS at 07:23

## 2023-03-06 RX ADMIN — SODIUM CHLORIDE, PRESERVATIVE FREE 10 ML: 5 INJECTION INTRAVENOUS at 11:35

## 2023-03-06 RX ADMIN — ONDANSETRON 8 MG: 2 INJECTION INTRAMUSCULAR; INTRAVENOUS at 08:34

## 2023-03-06 ASSESSMENT — PATIENT HEALTH QUESTIONNAIRE - PHQ9
SUM OF ALL RESPONSES TO PHQ QUESTIONS 1-9: 0
1. LITTLE INTEREST OR PLEASURE IN DOING THINGS: 0
SUM OF ALL RESPONSES TO PHQ QUESTIONS 1-9: 0
2. FEELING DOWN, DEPRESSED OR HOPELESS: 0
SUM OF ALL RESPONSES TO PHQ9 QUESTIONS 1 & 2: 0
SUM OF ALL RESPONSES TO PHQ QUESTIONS 1-9: 0
SUM OF ALL RESPONSES TO PHQ QUESTIONS 1-9: 0

## 2023-03-06 NOTE — PROGRESS NOTES
Pt. Discharged ambulatory. Tolerated infusion well. No distress noted. To call physician with any problems or concerns. Understanding voiced. To return to Infusions on  3/20/23.

## 2023-03-16 DIAGNOSIS — C50.912 INVASIVE DUCTAL CARCINOMA OF BREAST, FEMALE, LEFT (HCC): Primary | ICD-10-CM

## 2023-03-20 ENCOUNTER — HOSPITAL ENCOUNTER (OUTPATIENT)
Dept: INFUSION THERAPY | Age: 63
Discharge: HOME | End: 2023-03-20
Payer: COMMERCIAL

## 2023-03-20 ENCOUNTER — OFFICE VISIT (OUTPATIENT)
Dept: ONCOLOGY | Age: 63
End: 2023-03-20
Payer: COMMERCIAL

## 2023-03-20 VITALS
OXYGEN SATURATION: 100 % | TEMPERATURE: 98.6 F | HEART RATE: 84 BPM | WEIGHT: 155.3 LBS | BODY MASS INDEX: 27.52 KG/M2 | DIASTOLIC BLOOD PRESSURE: 66 MMHG | HEIGHT: 63 IN | RESPIRATION RATE: 16 BRPM | SYSTOLIC BLOOD PRESSURE: 122 MMHG

## 2023-03-20 DIAGNOSIS — C50.912 INVASIVE DUCTAL CARCINOMA OF BREAST, FEMALE, LEFT (HCC): ICD-10-CM

## 2023-03-20 DIAGNOSIS — C50.912 INVASIVE DUCTAL CARCINOMA OF BREAST, FEMALE, LEFT (HCC): Primary | ICD-10-CM

## 2023-03-20 LAB
ALBUMIN SERPL-MCNC: 3.6 G/DL (ref 3.2–4.6)
ALBUMIN/GLOB SERPL: 1.1 (ref 0.4–1.6)
ALP SERPL-CCNC: 76 U/L (ref 50–136)
ALT SERPL-CCNC: 18 U/L (ref 12–65)
ANION GAP SERPL CALC-SCNC: 3 MMOL/L (ref 2–11)
AST SERPL-CCNC: 19 U/L (ref 15–37)
BASOPHILS # BLD: 0 K/UL (ref 0–0.2)
BASOPHILS NFR BLD: 0 % (ref 0–2)
BILIRUB SERPL-MCNC: 0.5 MG/DL (ref 0.2–1.1)
BUN SERPL-MCNC: 12 MG/DL (ref 8–23)
CALCIUM SERPL-MCNC: 9.1 MG/DL (ref 8.3–10.4)
CHLORIDE SERPL-SCNC: 109 MMOL/L (ref 101–110)
CO2 SERPL-SCNC: 29 MMOL/L (ref 21–32)
CREAT SERPL-MCNC: 0.8 MG/DL (ref 0.6–1)
DIFFERENTIAL METHOD BLD: ABNORMAL
EOSINOPHIL # BLD: 0.1 K/UL (ref 0–0.8)
EOSINOPHIL NFR BLD: 3 % (ref 0.5–7.8)
ERYTHROCYTE [DISTWIDTH] IN BLOOD BY AUTOMATED COUNT: 17 % (ref 11.9–14.6)
GLOBULIN SER CALC-MCNC: 3.3 G/DL (ref 2.8–4.5)
GLUCOSE SERPL-MCNC: 84 MG/DL (ref 65–100)
HCT VFR BLD AUTO: 29.1 % (ref 35.8–46.3)
HGB BLD-MCNC: 9 G/DL (ref 11.7–15.4)
IMM GRANULOCYTES # BLD AUTO: 0 K/UL (ref 0–0.5)
IMM GRANULOCYTES NFR BLD AUTO: 0 % (ref 0–5)
LYMPHOCYTES # BLD: 0.8 K/UL (ref 0.5–4.6)
LYMPHOCYTES NFR BLD: 33 % (ref 13–44)
MCH RBC QN AUTO: 28.8 PG (ref 26.1–32.9)
MCHC RBC AUTO-ENTMCNC: 30.9 G/DL (ref 31.4–35)
MCV RBC AUTO: 93 FL (ref 82–102)
MONOCYTES # BLD: 0.4 K/UL (ref 0.1–1.3)
MONOCYTES NFR BLD: 19 % (ref 4–12)
NEUTS SEG # BLD: 1 K/UL (ref 1.7–8.2)
NEUTS SEG NFR BLD: 45 % (ref 43–78)
NRBC # BLD: 0 K/UL (ref 0–0.2)
PLATELET # BLD AUTO: 214 K/UL (ref 150–450)
PMV BLD AUTO: 9.4 FL (ref 9.4–12.3)
POTASSIUM SERPL-SCNC: 4 MMOL/L (ref 3.5–5.1)
PROT SERPL-MCNC: 6.9 G/DL (ref 6.3–8.2)
RBC # BLD AUTO: 3.13 M/UL (ref 4.05–5.2)
SODIUM SERPL-SCNC: 141 MMOL/L (ref 133–143)
WBC # BLD AUTO: 2.3 K/UL (ref 4.3–11.1)

## 2023-03-20 PROCEDURE — 96415 CHEMO IV INFUSION ADDL HR: CPT

## 2023-03-20 PROCEDURE — 2580000003 HC RX 258: Performed by: NURSE PRACTITIONER

## 2023-03-20 PROCEDURE — 96413 CHEMO IV INFUSION 1 HR: CPT

## 2023-03-20 PROCEDURE — 85025 COMPLETE CBC W/AUTO DIFF WBC: CPT

## 2023-03-20 PROCEDURE — 96375 TX/PRO/DX INJ NEW DRUG ADDON: CPT

## 2023-03-20 PROCEDURE — 6360000002 HC RX W HCPCS: Performed by: NURSE PRACTITIONER

## 2023-03-20 PROCEDURE — 2580000003 HC RX 258: Performed by: INTERNAL MEDICINE

## 2023-03-20 PROCEDURE — 99214 OFFICE O/P EST MOD 30 MIN: CPT | Performed by: NURSE PRACTITIONER

## 2023-03-20 PROCEDURE — 80053 COMPREHEN METABOLIC PANEL: CPT

## 2023-03-20 RX ORDER — PACLITAXEL 100 MG/20ML
100 INJECTION, POWDER, LYOPHILIZED, FOR SUSPENSION INTRAVENOUS ONCE
Status: CANCELLED | OUTPATIENT
Start: 2023-03-20 | End: 2023-03-20

## 2023-03-20 RX ORDER — ACETAMINOPHEN 325 MG/1
650 TABLET ORAL
Status: CANCELLED | OUTPATIENT
Start: 2023-03-20

## 2023-03-20 RX ORDER — SODIUM CHLORIDE 9 MG/ML
5-250 INJECTION, SOLUTION INTRAVENOUS PRN
Status: CANCELLED | OUTPATIENT
Start: 2023-03-20

## 2023-03-20 RX ORDER — FAMOTIDINE 10 MG/ML
20 INJECTION, SOLUTION INTRAVENOUS
Status: CANCELLED | OUTPATIENT
Start: 2023-03-20

## 2023-03-20 RX ORDER — SODIUM CHLORIDE 9 MG/ML
INJECTION, SOLUTION INTRAVENOUS CONTINUOUS
Status: CANCELLED | OUTPATIENT
Start: 2023-03-20

## 2023-03-20 RX ORDER — SODIUM CHLORIDE 9 MG/ML
5-40 INJECTION INTRAVENOUS PRN
Status: DISCONTINUED | OUTPATIENT
Start: 2023-03-20 | End: 2023-03-21 | Stop reason: HOSPADM

## 2023-03-20 RX ORDER — SODIUM CHLORIDE 0.9 % (FLUSH) 0.9 %
10 SYRINGE (ML) INJECTION PRN
Status: DISCONTINUED | OUTPATIENT
Start: 2023-03-20 | End: 2023-03-21 | Stop reason: HOSPADM

## 2023-03-20 RX ORDER — HEPARIN SODIUM (PORCINE) LOCK FLUSH IV SOLN 100 UNIT/ML 100 UNIT/ML
500 SOLUTION INTRAVENOUS PRN
Status: CANCELLED | OUTPATIENT
Start: 2023-03-20

## 2023-03-20 RX ORDER — SODIUM CHLORIDE 9 MG/ML
5-250 INJECTION, SOLUTION INTRAVENOUS PRN
Status: DISCONTINUED | OUTPATIENT
Start: 2023-03-20 | End: 2023-03-21 | Stop reason: HOSPADM

## 2023-03-20 RX ORDER — MEPERIDINE HYDROCHLORIDE 50 MG/ML
12.5 INJECTION INTRAMUSCULAR; INTRAVENOUS; SUBCUTANEOUS PRN
Status: CANCELLED | OUTPATIENT
Start: 2023-03-20

## 2023-03-20 RX ORDER — EPINEPHRINE 1 MG/ML
0.3 INJECTION, SOLUTION, CONCENTRATE INTRAVENOUS PRN
Status: CANCELLED | OUTPATIENT
Start: 2023-03-20

## 2023-03-20 RX ORDER — PACLITAXEL 100 MG/20ML
100 INJECTION, POWDER, LYOPHILIZED, FOR SUSPENSION INTRAVENOUS ONCE
Status: COMPLETED | OUTPATIENT
Start: 2023-03-20 | End: 2023-03-20

## 2023-03-20 RX ORDER — ONDANSETRON 2 MG/ML
8 INJECTION INTRAMUSCULAR; INTRAVENOUS ONCE
Status: CANCELLED | OUTPATIENT
Start: 2023-03-20 | End: 2023-03-20

## 2023-03-20 RX ORDER — ALBUTEROL SULFATE 90 UG/1
4 AEROSOL, METERED RESPIRATORY (INHALATION) PRN
Status: CANCELLED | OUTPATIENT
Start: 2023-03-20

## 2023-03-20 RX ORDER — DIPHENHYDRAMINE HYDROCHLORIDE 50 MG/ML
50 INJECTION INTRAMUSCULAR; INTRAVENOUS
Status: CANCELLED | OUTPATIENT
Start: 2023-03-20

## 2023-03-20 RX ORDER — SODIUM CHLORIDE 9 MG/ML
5-40 INJECTION INTRAVENOUS PRN
Status: CANCELLED | OUTPATIENT
Start: 2023-03-20

## 2023-03-20 RX ORDER — ONDANSETRON 2 MG/ML
8 INJECTION INTRAMUSCULAR; INTRAVENOUS ONCE
Status: COMPLETED | OUTPATIENT
Start: 2023-03-20 | End: 2023-03-20

## 2023-03-20 RX ORDER — ONDANSETRON 2 MG/ML
8 INJECTION INTRAMUSCULAR; INTRAVENOUS
Status: CANCELLED | OUTPATIENT
Start: 2023-03-20

## 2023-03-20 RX ADMIN — SODIUM CHLORIDE, PRESERVATIVE FREE 10 ML: 5 INJECTION INTRAVENOUS at 07:20

## 2023-03-20 RX ADMIN — SODIUM CHLORIDE, PRESERVATIVE FREE 10 ML: 5 INJECTION INTRAVENOUS at 09:00

## 2023-03-20 RX ADMIN — PACLITAXEL 179 MG: 100 INJECTION, POWDER, LYOPHILIZED, FOR SUSPENSION INTRAVENOUS at 10:04

## 2023-03-20 RX ADMIN — ONDANSETRON 8 MG: 2 INJECTION INTRAMUSCULAR; INTRAVENOUS at 09:14

## 2023-03-20 RX ADMIN — SODIUM CHLORIDE 50 ML/HR: 9 INJECTION, SOLUTION INTRAVENOUS at 09:00

## 2023-03-20 ASSESSMENT — ENCOUNTER SYMPTOMS
VOICE CHANGE: 0
ABDOMINAL DISTENTION: 0
SCLERAL ICTERUS: 0
VOMITING: 0
DIARRHEA: 0
CHEST TIGHTNESS: 0
WHEEZING: 0
NAUSEA: 0
SHORTNESS OF BREATH: 0
HEMOPTYSIS: 0
BLOOD IN STOOL: 0
TROUBLE SWALLOWING: 0
ABDOMINAL PAIN: 0
CONSTIPATION: 0
SORE THROAT: 0

## 2023-03-20 ASSESSMENT — PATIENT HEALTH QUESTIONNAIRE - PHQ9
SUM OF ALL RESPONSES TO PHQ9 QUESTIONS 1 & 2: 0
SUM OF ALL RESPONSES TO PHQ QUESTIONS 1-9: 0
1. LITTLE INTEREST OR PLEASURE IN DOING THINGS: 0
SUM OF ALL RESPONSES TO PHQ QUESTIONS 1-9: 0
2. FEELING DOWN, DEPRESSED OR HOPELESS: 0

## 2023-03-20 NOTE — PROGRESS NOTES
Arrived to the [facility]. Abraxane infusion completed. Patient tolerated well. Any issues or concerns during appointment: no  Patient aware of next infusion appointment on 3/27/2023 (date) at 0900 (time). Patient aware of next lab and [facility] office visit on 3/27/2023 (date) at 0730 (time). Patient instructed to call provider with temperature of 100.4 or greater or nausea/vomiting/ diarrhea or pain not controlled by medications  Discharged ambulatory.

## 2023-03-20 NOTE — PROGRESS NOTES
[id number] or N+ TNBC, may consider metronomic capecitabine (650mg/m2 BID continuously for 1 year) as extended maintenance - SYSUCC-001 trial estimated 5-year DFS vs observation: 82.8 vs 73% (Velasquez et al 2021). - We discussed the potential side effects including but not limited to hair loss (which could be permanent), nausea, vomiting, diarrhea or constipation, mucositis, rashes, allergic reactions, organ damage including liver and kidney failure, cardiotoxicity, and direct effects on bone marrow which can lead to anemia and thrombocytopenia necessitating transfusion or neutropenia putting patient at risk for infection, sepsis and death if not treated. - echo EF 60-65%; LVI normal size and no WMA  - port placement   - decreased appetite, anxiety and sleep disturbance - on low dose mirtazapine and improved  - Rx mastectomy bra/prosthesis   - ADDENDUM: pt reacted to taxol 9 minutes into infusion with tachycardia, flushing, dyspnea and heaviness. Scared her a lot. Discussed with Dr. Akil Montague, will change treatment plan to weekly abraxane. Pt notified and aware. New treatment plan placed. - here for FU with her . Tolerating Abraxane well thus far. Will give it over 1.5hr today and then 60min next week if no reactions as she continues therapy. D/w pharmacy. Labs reviewed and adequate for treatment. - appetite - Has some appetite issues due to altered taste. Will start to eat more green leafy vegetables. Discussed few ways to boost iron. Also plan to increase meat intake. Will see if can add on nutritional labs - attempted to add on today. All questions were asked and answered to the best of my ability. The patient verbalized understanding and agrees with the plan above.         MIR Teixeira - NP  [facility] Hematology and Oncology  [address] [address]  Office : [phone]  Fax : [phone]

## 2023-03-20 NOTE — PROGRESS NOTES
Patient arrived to [facility]. Port accessed, labs drawn, and port remains accessed. Patient discharged ambulatory to Infusion.

## 2023-03-21 DIAGNOSIS — C50.912 INVASIVE DUCTAL CARCINOMA OF BREAST, FEMALE, LEFT (HCC): Primary | ICD-10-CM

## 2023-03-21 DIAGNOSIS — Z79.899 HIGH RISK MEDICATION USE: ICD-10-CM

## 2023-03-21 ASSESSMENT — ENCOUNTER SYMPTOMS
SCLERAL ICTERUS: 0
DIARRHEA: 0
TROUBLE SWALLOWING: 0
CHEST TIGHTNESS: 0
ABDOMINAL PAIN: 0
NAUSEA: 0
CONSTIPATION: 0
ABDOMINAL DISTENTION: 0
HEMOPTYSIS: 0
VOMITING: 0
SORE THROAT: 0
SHORTNESS OF BREATH: 0
BLOOD IN STOOL: 0
VOICE CHANGE: 0
WHEEZING: 0

## 2023-03-21 NOTE — PROGRESS NOTES
Negative for confusion and depression. The patient is nervous/anxious (much better). Allergies   Allergen Reactions    Emend [Fosaprepitant Dimeglumine] Anaphylaxis     Flushing, coughing    Hydrocodone Anxiety and Other (See Comments)     Depressive state     Past Medical History:   Diagnosis Date    Allergic rhinitis     Cancer ([address] 2022    left breast cancer---    GERD (gastroesophageal reflux disease)     controlled with med    Graves' disease     Hypercholesterolemia     Hyperthyroidism     Multiple thyroid nodules     followed by dr Santiago Servin     Past Surgical History:   Procedure Laterality Date    BREAST BIOPSY Left 10/7/2022    LEFT SENTINEL LYMPH NODE BIOPSY PREOP @ 0900, LYMPHO @ 1030, SX @ 8968 performed by Evelia Bronson DO at [facility] [address] Left 2022    bx     SECTION      x2    CYST REMOVAL  2021    subcutaneous cyst from lateral neck (benign)    HYSTERECTOMY (CERVIX STATUS UNKNOWN)      fibroids (ovaries intact)    IR PORT PLACEMENT EQUAL OR GREATER THAN 5 YEARS  2022    IR PORT PLACEMENT EQUAL OR GREATER THAN 5 YEARS 2022 [facility] SPECIALS    MASTECTOMY Left 10/7/2022    LEFT BREAST MASTECTOMY performed by Evelia Bronson DO at [facility] MAIN OR     Current Outpatient Medications   Medication Sig Dispense Refill    lidocaine-prilocaine (EMLA) 2.5-2.5 % cream Apply topically weekly as needed prior to port access.  30 g 2    ondansetron (ZOFRAN) 8 MG tablet Take 1 tablet by mouth every 8 hours as needed for Nausea or Vomiting 60 tablet 2    prochlorperazine (COMPAZINE) 10 MG tablet Take 1 tablet by mouth every 6 hours as needed (nausea vomiting chemo) 60 tablet 2    Mastectomy Bra MISC by Does not apply route Mastectomy bra + prosthesis 1 each 2    Multiple Vitamin (MULTI-VITAMIN DAILY PO) Take 1 tablet by mouth daily As needed      mirtazapine (REMERON) 15 MG tablet Take 0.5 tablets by mouth nightly (Patient taking differently: Take 7.5

## 2023-03-24 RX ORDER — SODIUM CHLORIDE 0.9 % (FLUSH) 0.9 %
5-40 SYRINGE (ML) INJECTION PRN
OUTPATIENT
Start: 2023-03-27

## 2023-03-27 ENCOUNTER — HOSPITAL ENCOUNTER (OUTPATIENT)
Dept: INFUSION THERAPY | Age: 63
Discharge: HOME | End: 2023-03-27
Payer: COMMERCIAL

## 2023-03-27 ENCOUNTER — OFFICE VISIT (OUTPATIENT)
Dept: ONCOLOGY | Age: 63
End: 2023-03-27
Payer: COMMERCIAL

## 2023-03-27 VITALS
BODY MASS INDEX: 27.55 KG/M2 | DIASTOLIC BLOOD PRESSURE: 67 MMHG | HEIGHT: 63 IN | RESPIRATION RATE: 14 BRPM | SYSTOLIC BLOOD PRESSURE: 138 MMHG | WEIGHT: 155.5 LBS | OXYGEN SATURATION: 97 % | HEART RATE: 97 BPM | TEMPERATURE: 98 F

## 2023-03-27 DIAGNOSIS — Z79.899 HIGH RISK MEDICATION USE: ICD-10-CM

## 2023-03-27 DIAGNOSIS — C50.912 INVASIVE DUCTAL CARCINOMA OF BREAST, FEMALE, LEFT (HCC): Primary | ICD-10-CM

## 2023-03-27 DIAGNOSIS — C50.912 INVASIVE DUCTAL CARCINOMA OF BREAST, FEMALE, LEFT (HCC): ICD-10-CM

## 2023-03-27 LAB
ALBUMIN SERPL-MCNC: 3.5 G/DL (ref 3.2–4.6)
ALBUMIN/GLOB SERPL: 1.1 (ref 0.4–1.6)
ALP SERPL-CCNC: 84 U/L (ref 50–136)
ALT SERPL-CCNC: 20 U/L (ref 12–65)
ANION GAP SERPL CALC-SCNC: 3 MMOL/L (ref 2–11)
AST SERPL-CCNC: 20 U/L (ref 15–37)
BASOPHILS # BLD: 0 K/UL (ref 0–0.2)
BASOPHILS NFR BLD: 0 % (ref 0–2)
BILIRUB SERPL-MCNC: 0.4 MG/DL (ref 0.2–1.1)
BUN SERPL-MCNC: 11 MG/DL (ref 8–23)
CALCIUM SERPL-MCNC: 9.1 MG/DL (ref 8.3–10.4)
CHLORIDE SERPL-SCNC: 110 MMOL/L (ref 101–110)
CO2 SERPL-SCNC: 28 MMOL/L (ref 21–32)
CREAT SERPL-MCNC: 0.8 MG/DL (ref 0.6–1)
DIFFERENTIAL METHOD BLD: ABNORMAL
EOSINOPHIL # BLD: 0.1 K/UL (ref 0–0.8)
EOSINOPHIL NFR BLD: 4 % (ref 0.5–7.8)
ERYTHROCYTE [DISTWIDTH] IN BLOOD BY AUTOMATED COUNT: 16 % (ref 11.9–14.6)
GLOBULIN SER CALC-MCNC: 3.3 G/DL (ref 2.8–4.5)
GLUCOSE SERPL-MCNC: 141 MG/DL (ref 65–100)
HCT VFR BLD AUTO: 28.2 % (ref 35.8–46.3)
HGB BLD-MCNC: 9.1 G/DL (ref 11.7–15.4)
IMM GRANULOCYTES # BLD AUTO: 0 K/UL (ref 0–0.5)
IMM GRANULOCYTES NFR BLD AUTO: 1 % (ref 0–5)
LYMPHOCYTES # BLD: 0.8 K/UL (ref 0.5–4.6)
LYMPHOCYTES NFR BLD: 31 % (ref 13–44)
MAGNESIUM SERPL-MCNC: 2.3 MG/DL (ref 1.8–2.4)
MCH RBC QN AUTO: 29.3 PG (ref 26.1–32.9)
MCHC RBC AUTO-ENTMCNC: 32.3 G/DL (ref 31.4–35)
MCV RBC AUTO: 90.7 FL (ref 82–102)
MONOCYTES # BLD: 0.2 K/UL (ref 0.1–1.3)
MONOCYTES NFR BLD: 8 % (ref 4–12)
NEUTS SEG # BLD: 1.4 K/UL (ref 1.7–8.2)
NEUTS SEG NFR BLD: 56 % (ref 43–78)
NRBC # BLD: 0.05 K/UL (ref 0–0.2)
PLATELET # BLD AUTO: 215 K/UL (ref 150–450)
PMV BLD AUTO: 9.7 FL (ref 9.4–12.3)
POTASSIUM SERPL-SCNC: 3.9 MMOL/L (ref 3.5–5.1)
PROT SERPL-MCNC: 6.8 G/DL (ref 6.3–8.2)
RBC # BLD AUTO: 3.11 M/UL (ref 4.05–5.2)
SODIUM SERPL-SCNC: 141 MMOL/L (ref 133–143)
WBC # BLD AUTO: 2.6 K/UL (ref 4.3–11.1)

## 2023-03-27 PROCEDURE — 80053 COMPREHEN METABOLIC PANEL: CPT

## 2023-03-27 PROCEDURE — 85025 COMPLETE CBC W/AUTO DIFF WBC: CPT

## 2023-03-27 PROCEDURE — 2580000003 HC RX 258: Performed by: INTERNAL MEDICINE

## 2023-03-27 PROCEDURE — 96415 CHEMO IV INFUSION ADDL HR: CPT

## 2023-03-27 PROCEDURE — 36591 DRAW BLOOD OFF VENOUS DEVICE: CPT

## 2023-03-27 PROCEDURE — 83735 ASSAY OF MAGNESIUM: CPT

## 2023-03-27 PROCEDURE — 96375 TX/PRO/DX INJ NEW DRUG ADDON: CPT

## 2023-03-27 PROCEDURE — 96413 CHEMO IV INFUSION 1 HR: CPT

## 2023-03-27 PROCEDURE — 99214 OFFICE O/P EST MOD 30 MIN: CPT | Performed by: INTERNAL MEDICINE

## 2023-03-27 PROCEDURE — 6360000002 HC RX W HCPCS: Performed by: INTERNAL MEDICINE

## 2023-03-27 RX ORDER — ALBUTEROL SULFATE 90 UG/1
4 AEROSOL, METERED RESPIRATORY (INHALATION) PRN
Status: CANCELLED | OUTPATIENT
Start: 2023-03-27

## 2023-03-27 RX ORDER — MEPERIDINE HYDROCHLORIDE 50 MG/ML
12.5 INJECTION INTRAMUSCULAR; INTRAVENOUS; SUBCUTANEOUS PRN
Status: CANCELLED | OUTPATIENT
Start: 2023-03-27

## 2023-03-27 RX ORDER — FAMOTIDINE 10 MG/ML
20 INJECTION, SOLUTION INTRAVENOUS
Status: CANCELLED | OUTPATIENT
Start: 2023-03-27

## 2023-03-27 RX ORDER — ONDANSETRON 2 MG/ML
8 INJECTION INTRAMUSCULAR; INTRAVENOUS ONCE
Status: COMPLETED | OUTPATIENT
Start: 2023-03-27 | End: 2023-03-27

## 2023-03-27 RX ORDER — SODIUM CHLORIDE 9 MG/ML
INJECTION, SOLUTION INTRAVENOUS CONTINUOUS
Status: CANCELLED | OUTPATIENT
Start: 2023-03-27

## 2023-03-27 RX ORDER — SODIUM CHLORIDE 9 MG/ML
5-40 INJECTION INTRAVENOUS PRN
Status: DISCONTINUED | OUTPATIENT
Start: 2023-03-27 | End: 2023-03-28 | Stop reason: HOSPADM

## 2023-03-27 RX ORDER — ONDANSETRON 2 MG/ML
8 INJECTION INTRAMUSCULAR; INTRAVENOUS
Status: CANCELLED | OUTPATIENT
Start: 2023-03-27

## 2023-03-27 RX ORDER — PACLITAXEL 100 MG/20ML
100 INJECTION, POWDER, LYOPHILIZED, FOR SUSPENSION INTRAVENOUS ONCE
Status: COMPLETED | OUTPATIENT
Start: 2023-03-27 | End: 2023-03-27

## 2023-03-27 RX ORDER — ONDANSETRON 2 MG/ML
8 INJECTION INTRAMUSCULAR; INTRAVENOUS ONCE
Status: CANCELLED | OUTPATIENT
Start: 2023-03-27 | End: 2023-03-27

## 2023-03-27 RX ORDER — PACLITAXEL 100 MG/20ML
100 INJECTION, POWDER, LYOPHILIZED, FOR SUSPENSION INTRAVENOUS ONCE
Status: CANCELLED | OUTPATIENT
Start: 2023-03-27 | End: 2023-03-27

## 2023-03-27 RX ORDER — SODIUM CHLORIDE 0.9 % (FLUSH) 0.9 %
5-40 SYRINGE (ML) INJECTION PRN
Status: DISCONTINUED | OUTPATIENT
Start: 2023-03-27 | End: 2023-03-28 | Stop reason: HOSPADM

## 2023-03-27 RX ORDER — SODIUM CHLORIDE 9 MG/ML
5-40 INJECTION INTRAVENOUS PRN
Status: CANCELLED | OUTPATIENT
Start: 2023-03-27

## 2023-03-27 RX ORDER — HEPARIN SODIUM (PORCINE) LOCK FLUSH IV SOLN 100 UNIT/ML 100 UNIT/ML
500 SOLUTION INTRAVENOUS PRN
Status: CANCELLED | OUTPATIENT
Start: 2023-03-27

## 2023-03-27 RX ORDER — SODIUM CHLORIDE 9 MG/ML
5-250 INJECTION, SOLUTION INTRAVENOUS PRN
Status: DISCONTINUED | OUTPATIENT
Start: 2023-03-27 | End: 2023-03-28 | Stop reason: HOSPADM

## 2023-03-27 RX ORDER — ACETAMINOPHEN 325 MG/1
650 TABLET ORAL
Status: CANCELLED | OUTPATIENT
Start: 2023-03-27

## 2023-03-27 RX ORDER — HEPARIN SODIUM (PORCINE) LOCK FLUSH IV SOLN 100 UNIT/ML 100 UNIT/ML
500 SOLUTION INTRAVENOUS PRN
Status: DISCONTINUED | OUTPATIENT
Start: 2023-03-27 | End: 2023-03-28 | Stop reason: HOSPADM

## 2023-03-27 RX ORDER — SODIUM CHLORIDE 9 MG/ML
5-250 INJECTION, SOLUTION INTRAVENOUS PRN
Status: CANCELLED | OUTPATIENT
Start: 2023-03-27

## 2023-03-27 RX ORDER — DIPHENHYDRAMINE HYDROCHLORIDE 50 MG/ML
50 INJECTION INTRAMUSCULAR; INTRAVENOUS
Status: CANCELLED | OUTPATIENT
Start: 2023-03-27

## 2023-03-27 RX ORDER — EPINEPHRINE 1 MG/ML
0.3 INJECTION, SOLUTION, CONCENTRATE INTRAVENOUS PRN
Status: CANCELLED | OUTPATIENT
Start: 2023-03-27

## 2023-03-27 RX ADMIN — ONDANSETRON 8 MG: 2 INJECTION INTRAMUSCULAR; INTRAVENOUS at 09:13

## 2023-03-27 RX ADMIN — SODIUM CHLORIDE 100 ML/HR: 9 INJECTION, SOLUTION INTRAVENOUS at 09:17

## 2023-03-27 RX ADMIN — SODIUM CHLORIDE, PRESERVATIVE FREE 10 ML: 5 INJECTION INTRAVENOUS at 07:51

## 2023-03-27 RX ADMIN — SODIUM CHLORIDE, PRESERVATIVE FREE 10 ML: 5 INJECTION INTRAVENOUS at 11:40

## 2023-03-27 RX ADMIN — PACLITAXEL 179 MG: 100 INJECTION, POWDER, LYOPHILIZED, FOR SUSPENSION INTRAVENOUS at 09:35

## 2023-03-27 ASSESSMENT — PATIENT HEALTH QUESTIONNAIRE - PHQ9
1. LITTLE INTEREST OR PLEASURE IN DOING THINGS: 0
SUM OF ALL RESPONSES TO PHQ QUESTIONS 1-9: 0
SUM OF ALL RESPONSES TO PHQ QUESTIONS 1-9: 0
2. FEELING DOWN, DEPRESSED OR HOPELESS: 0
SUM OF ALL RESPONSES TO PHQ QUESTIONS 1-9: 0
SUM OF ALL RESPONSES TO PHQ QUESTIONS 1-9: 0
SUM OF ALL RESPONSES TO PHQ9 QUESTIONS 1 & 2: 0

## 2023-03-27 NOTE — PROGRESS NOTES
Pt. Discharged ambulatory. Tolerated infusion well. No distress noted. To call physician with any problems or concerns. Understanding voiced. To return to Infusions on  4/3/23.

## 2023-04-03 ENCOUNTER — HOSPITAL ENCOUNTER (OUTPATIENT)
Dept: INFUSION THERAPY | Age: 63
Discharge: HOME | End: 2023-04-03
Payer: COMMERCIAL

## 2023-04-03 ENCOUNTER — OFFICE VISIT (OUTPATIENT)
Dept: ONCOLOGY | Age: 63
End: 2023-04-03
Payer: COMMERCIAL

## 2023-04-03 VITALS
BODY MASS INDEX: 27.57 KG/M2 | RESPIRATION RATE: 19 BRPM | DIASTOLIC BLOOD PRESSURE: 66 MMHG | HEART RATE: 88 BPM | HEIGHT: 63 IN | TEMPERATURE: 97.9 F | OXYGEN SATURATION: 100 % | WEIGHT: 155.6 LBS | SYSTOLIC BLOOD PRESSURE: 127 MMHG

## 2023-04-03 DIAGNOSIS — C50.912 INVASIVE DUCTAL CARCINOMA OF BREAST, FEMALE, LEFT (HCC): Primary | ICD-10-CM

## 2023-04-03 DIAGNOSIS — Z79.899 HIGH RISK MEDICATION USE: ICD-10-CM

## 2023-04-03 DIAGNOSIS — C50.912 INVASIVE DUCTAL CARCINOMA OF BREAST, FEMALE, LEFT (HCC): ICD-10-CM

## 2023-04-03 LAB
ALBUMIN SERPL-MCNC: 3.5 G/DL (ref 3.2–4.6)
ALBUMIN/GLOB SERPL: 1 (ref 0.4–1.6)
ALP SERPL-CCNC: 82 U/L (ref 50–136)
ALT SERPL-CCNC: 19 U/L (ref 12–65)
ANION GAP SERPL CALC-SCNC: 3 MMOL/L (ref 2–11)
AST SERPL-CCNC: 18 U/L (ref 15–37)
BASOPHILS # BLD: 0 K/UL (ref 0–0.2)
BASOPHILS NFR BLD: 1 % (ref 0–2)
BILIRUB SERPL-MCNC: 0.5 MG/DL (ref 0.2–1.1)
BUN SERPL-MCNC: 10 MG/DL (ref 8–23)
CALCIUM SERPL-MCNC: 9.4 MG/DL (ref 8.3–10.4)
CHLORIDE SERPL-SCNC: 108 MMOL/L (ref 101–110)
CO2 SERPL-SCNC: 29 MMOL/L (ref 21–32)
CREAT SERPL-MCNC: 0.8 MG/DL (ref 0.6–1)
DIFFERENTIAL METHOD BLD: ABNORMAL
EOSINOPHIL # BLD: 0.1 K/UL (ref 0–0.8)
EOSINOPHIL NFR BLD: 5 % (ref 0.5–7.8)
ERYTHROCYTE [DISTWIDTH] IN BLOOD BY AUTOMATED COUNT: 16.3 % (ref 11.9–14.6)
GLOBULIN SER CALC-MCNC: 3.5 G/DL (ref 2.8–4.5)
GLUCOSE SERPL-MCNC: 99 MG/DL (ref 65–100)
HCT VFR BLD AUTO: 28.5 % (ref 35.8–46.3)
HGB BLD-MCNC: 9.2 G/DL (ref 11.7–15.4)
IMM GRANULOCYTES # BLD AUTO: 0 K/UL (ref 0–0.5)
IMM GRANULOCYTES NFR BLD AUTO: 1 % (ref 0–5)
LYMPHOCYTES # BLD: 0.7 K/UL (ref 0.5–4.6)
LYMPHOCYTES NFR BLD: 32 % (ref 13–44)
MAGNESIUM SERPL-MCNC: 2.3 MG/DL (ref 1.8–2.4)
MCH RBC QN AUTO: 29 PG (ref 26.1–32.9)
MCHC RBC AUTO-ENTMCNC: 32.3 G/DL (ref 31.4–35)
MCV RBC AUTO: 89.9 FL (ref 82–102)
MONOCYTES # BLD: 0.2 K/UL (ref 0.1–1.3)
MONOCYTES NFR BLD: 10 % (ref 4–12)
NEUTS SEG # BLD: 1.1 K/UL (ref 1.7–8.2)
NEUTS SEG NFR BLD: 52 % (ref 43–78)
NRBC # BLD: 0.06 K/UL (ref 0–0.2)
PLATELET # BLD AUTO: 213 K/UL (ref 150–450)
PMV BLD AUTO: 9 FL (ref 9.4–12.3)
POTASSIUM SERPL-SCNC: 3.9 MMOL/L (ref 3.5–5.1)
PROT SERPL-MCNC: 7 G/DL (ref 6.3–8.2)
RBC # BLD AUTO: 3.17 M/UL (ref 4.05–5.2)
SODIUM SERPL-SCNC: 140 MMOL/L (ref 133–143)
WBC # BLD AUTO: 2.2 K/UL (ref 4.3–11.1)

## 2023-04-03 PROCEDURE — 80053 COMPREHEN METABOLIC PANEL: CPT

## 2023-04-03 PROCEDURE — 85025 COMPLETE CBC W/AUTO DIFF WBC: CPT

## 2023-04-03 PROCEDURE — 2580000003 HC RX 258: Performed by: NURSE PRACTITIONER

## 2023-04-03 PROCEDURE — 36591 DRAW BLOOD OFF VENOUS DEVICE: CPT

## 2023-04-03 PROCEDURE — 6360000002 HC RX W HCPCS: Performed by: NURSE PRACTITIONER

## 2023-04-03 PROCEDURE — 96413 CHEMO IV INFUSION 1 HR: CPT

## 2023-04-03 PROCEDURE — 2580000003 HC RX 258: Performed by: INTERNAL MEDICINE

## 2023-04-03 PROCEDURE — 83735 ASSAY OF MAGNESIUM: CPT

## 2023-04-03 PROCEDURE — 99214 OFFICE O/P EST MOD 30 MIN: CPT | Performed by: NURSE PRACTITIONER

## 2023-04-03 PROCEDURE — 96375 TX/PRO/DX INJ NEW DRUG ADDON: CPT

## 2023-04-03 RX ORDER — PACLITAXEL 100 MG/20ML
100 INJECTION, POWDER, LYOPHILIZED, FOR SUSPENSION INTRAVENOUS ONCE
Status: CANCELLED | OUTPATIENT
Start: 2023-04-03 | End: 2023-04-03

## 2023-04-03 RX ORDER — SODIUM CHLORIDE 9 MG/ML
INJECTION, SOLUTION INTRAVENOUS CONTINUOUS
OUTPATIENT
Start: 2023-04-03

## 2023-04-03 RX ORDER — SODIUM CHLORIDE 9 MG/ML
5-250 INJECTION, SOLUTION INTRAVENOUS PRN
OUTPATIENT
Start: 2023-04-03

## 2023-04-03 RX ORDER — EPINEPHRINE 1 MG/ML
0.3 INJECTION, SOLUTION, CONCENTRATE INTRAVENOUS PRN
OUTPATIENT
Start: 2023-04-03

## 2023-04-03 RX ORDER — FAMOTIDINE 10 MG/ML
20 INJECTION, SOLUTION INTRAVENOUS
Status: CANCELLED | OUTPATIENT
Start: 2023-04-03

## 2023-04-03 RX ORDER — SODIUM CHLORIDE 0.9 % (FLUSH) 0.9 %
5-40 SYRINGE (ML) INJECTION 2 TIMES DAILY
Status: DISCONTINUED | OUTPATIENT
Start: 2023-04-03 | End: 2023-04-04 | Stop reason: HOSPADM

## 2023-04-03 RX ORDER — ALBUTEROL SULFATE 90 UG/1
4 AEROSOL, METERED RESPIRATORY (INHALATION) PRN
OUTPATIENT
Start: 2023-04-03

## 2023-04-03 RX ORDER — MEPERIDINE HYDROCHLORIDE 50 MG/ML
12.5 INJECTION INTRAMUSCULAR; INTRAVENOUS; SUBCUTANEOUS PRN
OUTPATIENT
Start: 2023-04-03

## 2023-04-03 RX ORDER — ONDANSETRON 2 MG/ML
8 INJECTION INTRAMUSCULAR; INTRAVENOUS
OUTPATIENT
Start: 2023-04-03

## 2023-04-03 RX ORDER — SODIUM CHLORIDE 9 MG/ML
5-250 INJECTION, SOLUTION INTRAVENOUS PRN
Status: CANCELLED | OUTPATIENT
Start: 2023-04-03

## 2023-04-03 RX ORDER — HEPARIN SODIUM (PORCINE) LOCK FLUSH IV SOLN 100 UNIT/ML 100 UNIT/ML
500 SOLUTION INTRAVENOUS PRN
OUTPATIENT
Start: 2023-04-03

## 2023-04-03 RX ORDER — ONDANSETRON 2 MG/ML
8 INJECTION INTRAMUSCULAR; INTRAVENOUS ONCE
Status: CANCELLED | OUTPATIENT
Start: 2023-04-03 | End: 2023-04-03

## 2023-04-03 RX ORDER — ACETAMINOPHEN 325 MG/1
650 TABLET ORAL
OUTPATIENT
Start: 2023-04-03

## 2023-04-03 RX ORDER — SODIUM CHLORIDE 9 MG/ML
5-250 INJECTION, SOLUTION INTRAVENOUS PRN
Status: DISCONTINUED | OUTPATIENT
Start: 2023-04-03 | End: 2023-04-04 | Stop reason: HOSPADM

## 2023-04-03 RX ORDER — PACLITAXEL 100 MG/20ML
100 INJECTION, POWDER, LYOPHILIZED, FOR SUSPENSION INTRAVENOUS ONCE
Status: COMPLETED | OUTPATIENT
Start: 2023-04-03 | End: 2023-04-03

## 2023-04-03 RX ORDER — ONDANSETRON 2 MG/ML
8 INJECTION INTRAMUSCULAR; INTRAVENOUS ONCE
Status: COMPLETED | OUTPATIENT
Start: 2023-04-03 | End: 2023-04-03

## 2023-04-03 RX ORDER — DIPHENHYDRAMINE HYDROCHLORIDE 50 MG/ML
50 INJECTION INTRAMUSCULAR; INTRAVENOUS
Status: CANCELLED | OUTPATIENT
Start: 2023-04-03

## 2023-04-03 RX ORDER — SODIUM CHLORIDE 9 MG/ML
5-40 INJECTION INTRAVENOUS PRN
Status: CANCELLED | OUTPATIENT
Start: 2023-04-03

## 2023-04-03 RX ORDER — SODIUM CHLORIDE 9 MG/ML
5-40 INJECTION INTRAVENOUS PRN
Status: DISCONTINUED | OUTPATIENT
Start: 2023-04-03 | End: 2023-04-04 | Stop reason: HOSPADM

## 2023-04-03 RX ORDER — DIPHENHYDRAMINE HYDROCHLORIDE 50 MG/ML
50 INJECTION INTRAMUSCULAR; INTRAVENOUS
Status: DISCONTINUED | OUTPATIENT
Start: 2023-04-03 | End: 2023-04-04 | Stop reason: HOSPADM

## 2023-04-03 RX ADMIN — SODIUM CHLORIDE 25 ML/HR: 9 INJECTION, SOLUTION INTRAVENOUS at 09:31

## 2023-04-03 RX ADMIN — SODIUM CHLORIDE, PRESERVATIVE FREE 10 ML: 5 INJECTION INTRAVENOUS at 12:37

## 2023-04-03 RX ADMIN — SODIUM CHLORIDE, PRESERVATIVE FREE 10 ML: 5 INJECTION INTRAVENOUS at 08:19

## 2023-04-03 RX ADMIN — PACLITAXEL 179 MG: 100 INJECTION, POWDER, LYOPHILIZED, FOR SUSPENSION INTRAVENOUS at 09:52

## 2023-04-03 RX ADMIN — ONDANSETRON 8 MG: 2 INJECTION INTRAMUSCULAR; INTRAVENOUS at 09:31

## 2023-04-03 ASSESSMENT — ENCOUNTER SYMPTOMS
NAUSEA: 0
SHORTNESS OF BREATH: 0
ABDOMINAL DISTENTION: 0
CHEST TIGHTNESS: 0
ABDOMINAL PAIN: 0
TROUBLE SWALLOWING: 0
VOMITING: 0
BLOOD IN STOOL: 0
SCLERAL ICTERUS: 0
CONSTIPATION: 0
SORE THROAT: 0
WHEEZING: 0
DIARRHEA: 0
VOICE CHANGE: 0
HEMOPTYSIS: 0

## 2023-04-03 ASSESSMENT — PATIENT HEALTH QUESTIONNAIRE - PHQ9
SUM OF ALL RESPONSES TO PHQ QUESTIONS 1-9: 0
SUM OF ALL RESPONSES TO PHQ9 QUESTIONS 1 & 2: 0
SUM OF ALL RESPONSES TO PHQ QUESTIONS 1-9: 0
1. LITTLE INTEREST OR PLEASURE IN DOING THINGS: 0
2. FEELING DOWN, DEPRESSED OR HOPELESS: 0

## 2023-04-03 NOTE — LETTER
April 3, 2023      Stefani Lewis, [address] [address]      Patient: Connie Szymanski   MR Number: [id number]   YOB: 1960   Date of Visit: 4/3/2023       Dear Stefani Lewis: Thank you for referring Leigha Jacob to me for evaluation/treatment. Below are the relevant portions of my assessment and plan of care. If you have questions, please do not hesitate to call me. I look forward to following Tico Payne along with you.     Sincerely,        MIR Eugene NP

## 2023-04-03 NOTE — PROGRESS NOTES
Patient arrived to port lab for port access and lab draw   Jarred Kennedy [address] accessed and labs drawn per protocol   *Port remains accessed   Patient discharged from port lab Ambulatory

## 2023-04-03 NOTE — PROGRESS NOTES
daily As needed      mirtazapine (REMERON) 15 MG tablet Take 0.5 tablets by mouth nightly (Patient taking differently: Take 0.5 tablets by mouth as needed) 30 tablet 3    rosuvastatin (CRESTOR) 20 MG tablet Take 1 tablet by mouth at bedtime 90 tablet 3    methIMAzole (TAPAZOLE) 5 MG tablet Take 0.5 tablets by mouth daily 15 tablet 11    omeprazole (PRILOSEC) 20 MG delayed release capsule Take 1 capsule by mouth as needed PRN      Red Yeast Rice Extract 600 MG CAPS Take 600 mg by mouth daily       No current facility-administered medications for this visit. Facility-Administered Medications Ordered in Other Visits   Medication Dose Route Frequency Provider Last Rate Last Admin    sodium chloride flush 0.9 % injection 5-40 mL  5-40 mL IntraVENous BID Laureen Ma MD   10 mL at 04/03/23 0819       No flowsheet data found. OBJECTIVE:  /66 (Site: Right Upper Arm, Position: Sitting, Cuff Size: Large Adult)   Pulse 88   Temp 97.9 °F (36.6 °C) (Oral)   Resp 19   Ht 5' 3\" (1.6 m)   Wt 155 lb 9.6 oz (70.6 kg)   SpO2 100%   BMI 27.56 kg/m²       ECOG PERFORMANCE STATUS - 0-Fully active, able to carry on all pre-disease performance without restriction. Pain - Pain Score:   0 - No pain (fatigue-0)0 - No pain/10. None/Minimal pain - not affecting QOL     Fatigue - No flowsheet data found. Distress - No flowsheet data found. Physical Exam  Vitals reviewed. Exam conducted with a chaperone present. Constitutional:       General: She is not in acute distress. Appearance: Normal appearance. She is not ill-appearing or toxic-appearing. HENT:      Head: Normocephalic and atraumatic. Nose: Nose normal.      Mouth/Throat:      Mouth: Mucous membranes are moist.   Eyes:      General: No scleral icterus. Conjunctiva/sclera: Conjunctivae normal.   Cardiovascular:      Rate and Rhythm: Normal rate and regular rhythm. Heart sounds: No murmur heard.   Pulmonary:      Effort: No respiratory

## 2023-04-03 NOTE — PROGRESS NOTES
Arrived to the [facility]. Fangaxbryan completed over 90 minutes. Patient tolerated well. Any issues or concerns during appointment: none. Patient aware of next infusion appointment on  4/10/23(date) after OV- pending infusion time with office. Patient instructed to call provider with temperature of 100.4 or greater or nausea/vomiting/ diarrhea or pain not controlled by medications  Discharged with  ambulatory.

## 2023-04-06 DIAGNOSIS — C50.912 INVASIVE DUCTAL CARCINOMA OF BREAST, FEMALE, LEFT (HCC): Primary | ICD-10-CM

## 2023-04-10 ENCOUNTER — HOSPITAL ENCOUNTER (OUTPATIENT)
Dept: INFUSION THERAPY | Age: 63
Discharge: HOME | End: 2023-04-10
Payer: COMMERCIAL

## 2023-04-10 DIAGNOSIS — C50.912 INVASIVE DUCTAL CARCINOMA OF BREAST, FEMALE, LEFT (HCC): ICD-10-CM

## 2023-04-10 DIAGNOSIS — C50.912 INVASIVE DUCTAL CARCINOMA OF BREAST, FEMALE, LEFT (HCC): Primary | ICD-10-CM

## 2023-04-10 LAB
ALBUMIN SERPL-MCNC: 3.2 G/DL (ref 3.2–4.6)
ALBUMIN/GLOB SERPL: 0.9 (ref 0.4–1.6)
ALP SERPL-CCNC: 82 U/L (ref 50–136)
ALT SERPL-CCNC: 16 U/L (ref 12–65)
ANION GAP SERPL CALC-SCNC: 3 MMOL/L (ref 2–11)
AST SERPL-CCNC: 17 U/L (ref 15–37)
BASOPHILS # BLD: 0 K/UL (ref 0–0.2)
BASOPHILS NFR BLD: 1 % (ref 0–2)
BILIRUB SERPL-MCNC: 0.5 MG/DL (ref 0.2–1.1)
BUN SERPL-MCNC: 8 MG/DL (ref 8–23)
CALCIUM SERPL-MCNC: 9.1 MG/DL (ref 8.3–10.4)
CHLORIDE SERPL-SCNC: 109 MMOL/L (ref 101–110)
CO2 SERPL-SCNC: 28 MMOL/L (ref 21–32)
CREAT SERPL-MCNC: 0.7 MG/DL (ref 0.6–1)
DIFFERENTIAL METHOD BLD: ABNORMAL
EOSINOPHIL # BLD: 0.1 K/UL (ref 0–0.8)
EOSINOPHIL NFR BLD: 3 % (ref 0.5–7.8)
ERYTHROCYTE [DISTWIDTH] IN BLOOD BY AUTOMATED COUNT: 16.3 % (ref 11.9–14.6)
GLOBULIN SER CALC-MCNC: 3.7 G/DL (ref 2.8–4.5)
GLUCOSE SERPL-MCNC: 105 MG/DL (ref 65–100)
HCT VFR BLD AUTO: 28.3 % (ref 35.8–46.3)
HGB BLD-MCNC: 8.9 G/DL (ref 11.7–15.4)
IMM GRANULOCYTES # BLD AUTO: 0 K/UL (ref 0–0.5)
IMM GRANULOCYTES NFR BLD AUTO: 1 % (ref 0–5)
LYMPHOCYTES # BLD: 0.6 K/UL (ref 0.5–4.6)
LYMPHOCYTES NFR BLD: 34 % (ref 13–44)
MAGNESIUM SERPL-MCNC: 2.2 MG/DL (ref 1.8–2.4)
MCH RBC QN AUTO: 28.2 PG (ref 26.1–32.9)
MCHC RBC AUTO-ENTMCNC: 31.4 G/DL (ref 31.4–35)
MCV RBC AUTO: 89.6 FL (ref 82–102)
MONOCYTES # BLD: 0.2 K/UL (ref 0.1–1.3)
MONOCYTES NFR BLD: 10 % (ref 4–12)
NEUTS SEG # BLD: 1 K/UL (ref 1.7–8.2)
NEUTS SEG NFR BLD: 51 % (ref 43–78)
NRBC # BLD: 0.07 K/UL (ref 0–0.2)
PLATELET # BLD AUTO: 260 K/UL (ref 150–450)
PMV BLD AUTO: 9.4 FL (ref 9.4–12.3)
POTASSIUM SERPL-SCNC: 3.7 MMOL/L (ref 3.5–5.1)
PROT SERPL-MCNC: 6.9 G/DL (ref 6.3–8.2)
RBC # BLD AUTO: 3.16 M/UL (ref 4.05–5.2)
SODIUM SERPL-SCNC: 140 MMOL/L (ref 133–143)
WBC # BLD AUTO: 1.9 K/UL (ref 4.3–11.1)

## 2023-04-10 PROCEDURE — 96375 TX/PRO/DX INJ NEW DRUG ADDON: CPT

## 2023-04-10 PROCEDURE — 83735 ASSAY OF MAGNESIUM: CPT

## 2023-04-10 PROCEDURE — 36591 DRAW BLOOD OFF VENOUS DEVICE: CPT

## 2023-04-10 PROCEDURE — 6360000002 HC RX W HCPCS: Performed by: NURSE PRACTITIONER

## 2023-04-10 PROCEDURE — 80053 COMPREHEN METABOLIC PANEL: CPT

## 2023-04-10 PROCEDURE — 96413 CHEMO IV INFUSION 1 HR: CPT

## 2023-04-10 PROCEDURE — 2580000003 HC RX 258: Performed by: INTERNAL MEDICINE

## 2023-04-10 PROCEDURE — 2580000003 HC RX 258: Performed by: NURSE PRACTITIONER

## 2023-04-10 PROCEDURE — 85025 COMPLETE CBC W/AUTO DIFF WBC: CPT

## 2023-04-10 RX ORDER — PACLITAXEL 100 MG/20ML
100 INJECTION, POWDER, LYOPHILIZED, FOR SUSPENSION INTRAVENOUS ONCE
Status: COMPLETED | OUTPATIENT
Start: 2023-04-10 | End: 2023-04-10

## 2023-04-10 RX ORDER — SODIUM CHLORIDE 9 MG/ML
5-250 INJECTION, SOLUTION INTRAVENOUS PRN
Status: DISCONTINUED | OUTPATIENT
Start: 2023-04-10 | End: 2023-04-11 | Stop reason: HOSPADM

## 2023-04-10 RX ORDER — SODIUM CHLORIDE 0.9 % (FLUSH) 0.9 %
10 SYRINGE (ML) INJECTION PRN
Status: DISCONTINUED | OUTPATIENT
Start: 2023-04-10 | End: 2023-04-11 | Stop reason: HOSPADM

## 2023-04-10 RX ORDER — ONDANSETRON 2 MG/ML
8 INJECTION INTRAMUSCULAR; INTRAVENOUS ONCE
Status: COMPLETED | OUTPATIENT
Start: 2023-04-10 | End: 2023-04-10

## 2023-04-10 RX ORDER — SODIUM CHLORIDE 0.9 % (FLUSH) 0.9 %
5-40 SYRINGE (ML) INJECTION PRN
Status: DISCONTINUED | OUTPATIENT
Start: 2023-04-10 | End: 2023-04-11 | Stop reason: HOSPADM

## 2023-04-10 RX ADMIN — SODIUM CHLORIDE 25 ML/HR: 9 INJECTION, SOLUTION INTRAVENOUS at 09:40

## 2023-04-10 RX ADMIN — PACLITAXEL 179 MG: 100 INJECTION, POWDER, LYOPHILIZED, FOR SUSPENSION INTRAVENOUS at 10:08

## 2023-04-10 RX ADMIN — SODIUM CHLORIDE, PRESERVATIVE FREE 10 ML: 5 INJECTION INTRAVENOUS at 08:30

## 2023-04-10 RX ADMIN — ONDANSETRON 8 MG: 2 INJECTION INTRAMUSCULAR; INTRAVENOUS at 09:46

## 2023-04-10 RX ADMIN — SODIUM CHLORIDE, PRESERVATIVE FREE 10 ML: 5 INJECTION INTRAVENOUS at 09:35

## 2023-04-10 NOTE — PROGRESS NOTES
Patient arrived to port lab for port access and lab draw   Im Brent 45 accessed and labs drawn per protocol   *Port remains accessed. Patient discharged from port lab ambulatory.

## 2023-04-10 NOTE — PROGRESS NOTES
Arrived to the [facility]. Abraxane completed. Patient tolerated well. Any issues or concerns during appointment: none. Patient aware of next lab and [facility] office visit on 5/5/23 (date) at 80 (time). Patient instructed to call provider with temperature of 100.4 or greater or nausea/vomiting/ diarrhea or pain not controlled by medications  Discharged ambulatory accompanied by family members.

## 2023-04-26 ENCOUNTER — OFFICE VISIT (OUTPATIENT)
Dept: FAMILY MEDICINE CLINIC | Facility: CLINIC | Age: 63
End: 2023-04-26
Payer: COMMERCIAL

## 2023-04-26 VITALS
WEIGHT: 158 LBS | OXYGEN SATURATION: 98 % | HEART RATE: 84 BPM | BODY MASS INDEX: 28 KG/M2 | TEMPERATURE: 97.9 F | RESPIRATION RATE: 16 BRPM | HEIGHT: 63 IN | SYSTOLIC BLOOD PRESSURE: 104 MMHG | DIASTOLIC BLOOD PRESSURE: 72 MMHG

## 2023-04-26 DIAGNOSIS — M25.471 SWELLING OF BOTH ANKLES: Primary | ICD-10-CM

## 2023-04-26 DIAGNOSIS — R63.0 DECREASED APPETITE: ICD-10-CM

## 2023-04-26 DIAGNOSIS — F51.01 PRIMARY INSOMNIA: ICD-10-CM

## 2023-04-26 DIAGNOSIS — K21.9 GASTROESOPHAGEAL REFLUX DISEASE WITHOUT ESOPHAGITIS: ICD-10-CM

## 2023-04-26 DIAGNOSIS — M25.472 SWELLING OF BOTH ANKLES: Primary | ICD-10-CM

## 2023-04-26 DIAGNOSIS — E78.2 MIXED HYPERLIPIDEMIA: ICD-10-CM

## 2023-04-26 PROCEDURE — 99214 OFFICE O/P EST MOD 30 MIN: CPT | Performed by: NURSE PRACTITIONER

## 2023-04-26 RX ORDER — ROSUVASTATIN CALCIUM 20 MG/1
20 TABLET, COATED ORAL NIGHTLY
Qty: 90 TABLET | Refills: 3 | Status: SHIPPED | OUTPATIENT
Start: 2023-04-26

## 2023-04-26 RX ORDER — OMEPRAZOLE 20 MG/1
20 CAPSULE, DELAYED RELEASE ORAL DAILY
Qty: 90 CAPSULE | Refills: 3 | Status: SHIPPED | OUTPATIENT
Start: 2023-04-26

## 2023-04-26 RX ORDER — MIRTAZAPINE 15 MG/1
7.5 TABLET, FILM COATED ORAL NIGHTLY
Qty: 90 TABLET | Refills: 1 | Status: SHIPPED | OUTPATIENT
Start: 2023-04-26

## 2023-04-26 SDOH — ECONOMIC STABILITY: FOOD INSECURITY: WITHIN THE PAST 12 MONTHS, THE FOOD YOU BOUGHT JUST DIDN'T LAST AND YOU DIDN'T HAVE MONEY TO GET MORE.: NEVER TRUE

## 2023-04-26 SDOH — ECONOMIC STABILITY: HOUSING INSECURITY
IN THE LAST 12 MONTHS, WAS THERE A TIME WHEN YOU DID NOT HAVE A STEADY PLACE TO SLEEP OR SLEPT IN A SHELTER (INCLUDING NOW)?: NO

## 2023-04-26 SDOH — ECONOMIC STABILITY: INCOME INSECURITY: HOW HARD IS IT FOR YOU TO PAY FOR THE VERY BASICS LIKE FOOD, HOUSING, MEDICAL CARE, AND HEATING?: NOT HARD AT ALL

## 2023-04-26 SDOH — ECONOMIC STABILITY: FOOD INSECURITY: WITHIN THE PAST 12 MONTHS, YOU WORRIED THAT YOUR FOOD WOULD RUN OUT BEFORE YOU GOT MONEY TO BUY MORE.: NEVER TRUE

## 2023-04-26 ASSESSMENT — ENCOUNTER SYMPTOMS
CHEST TIGHTNESS: 0
COUGH: 0
DIARRHEA: 0
ABDOMINAL PAIN: 0
CONSTIPATION: 0
EYE DISCHARGE: 0
RHINORRHEA: 0
WHEEZING: 0
EYE PAIN: 0
VOMITING: 0
SHORTNESS OF BREATH: 0
BLOOD IN STOOL: 0

## 2023-04-26 ASSESSMENT — PATIENT HEALTH QUESTIONNAIRE - PHQ9
SUM OF ALL RESPONSES TO PHQ QUESTIONS 1-9: 0
2. FEELING DOWN, DEPRESSED OR HOPELESS: 0
1. LITTLE INTEREST OR PLEASURE IN DOING THINGS: 0
SUM OF ALL RESPONSES TO PHQ QUESTIONS 1-9: 0
SUM OF ALL RESPONSES TO PHQ9 QUESTIONS 1 & 2: 0

## 2023-04-26 NOTE — PROGRESS NOTES
thyroid nodules 02/01/2018    Graves' disease     Hyperthyroidism 02/12/2018    HLD (hyperlipidemia) 04/15/2014    Reflux gastritis 07/26/2017    Invasive ductal carcinoma of breast, female, left ([facility] 08/31/2022    Decreased appetite 09/09/2022    High risk medication use 12/20/2022     Resolved Ambulatory Problems     Diagnosis Date Noted    Screening for colon cancer 09/19/2022    Invasive ductal carcinoma of breast, right ([facility] 09/13/2022    Encounter for screening colonoscopy 10/19/2022    Chemotherapy follow-up examination 12/20/2022     Past Medical History:   Diagnosis Date    Allergic rhinitis     Cancer ([facility] 09/2022    GERD (gastroesophageal reflux disease)     Hypercholesterolemia      Patient Active Problem List   Diagnosis    BMI 27.0-27.9,adult    Multiple thyroid nodules    Graves' disease    Hyperthyroidism    HLD (hyperlipidemia)    Reflux gastritis    Invasive ductal carcinoma of breast, female, left (HCC)    Decreased appetite    High risk medication use       Review of Systems   Constitutional:  Negative for fatigue and fever. HENT:  Negative for congestion, hearing loss, postnasal drip and rhinorrhea. Eyes:  Negative for pain, discharge and visual disturbance. Respiratory:  Negative for cough, chest tightness, shortness of breath and wheezing. Cardiovascular:  Negative for chest pain, palpitations and leg swelling. Gastrointestinal:  Negative for abdominal pain, blood in stool, constipation, diarrhea and vomiting. Genitourinary:  Negative for difficulty urinating, frequency and urgency. Musculoskeletal:  Negative for arthralgias, gait problem and myalgias. Ankle swelling bilaterally. No pain. Not swollen this morning. Goes away with elevation. Swelling starts at ankle and goes up. Was on chemo for breast cancer--just finished treatment. Skin:  Negative for rash. Neurological:  Negative for dizziness, tremors, seizures and headaches.    Psychiatric/Behavioral:

## 2023-05-03 DIAGNOSIS — E05.90 HYPERTHYROIDISM: ICD-10-CM

## 2023-05-04 LAB
ALBUMIN SERPL-MCNC: 3.8 G/DL (ref 3.2–4.6)
ALBUMIN/GLOB SERPL: 1.2 (ref 0.4–1.6)
ALP SERPL-CCNC: 82 U/L (ref 50–136)
ALT SERPL-CCNC: 27 U/L (ref 12–65)
AST SERPL-CCNC: 20 U/L (ref 15–37)
BILIRUB DIRECT SERPL-MCNC: 0.1 MG/DL
BILIRUB SERPL-MCNC: 0.5 MG/DL (ref 0.2–1.1)
GLOBULIN SER CALC-MCNC: 3.2 G/DL (ref 2.8–4.5)
PROT SERPL-MCNC: 7 G/DL (ref 6.3–8.2)
T3 SERPL-MCNC: 0.82 NG/ML (ref 0.6–1.81)
T4 FREE SERPL-MCNC: 0.8 NG/DL (ref 0.78–1.46)
TSH, 3RD GENERATION: 3.53 UIU/ML (ref 0.36–3.74)

## 2023-05-05 ENCOUNTER — OFFICE VISIT (OUTPATIENT)
Dept: ENDOCRINOLOGY | Age: 63
End: 2023-05-05

## 2023-05-05 VITALS
SYSTOLIC BLOOD PRESSURE: 119 MMHG | BODY MASS INDEX: 27.81 KG/M2 | HEART RATE: 87 BPM | WEIGHT: 157 LBS | DIASTOLIC BLOOD PRESSURE: 61 MMHG

## 2023-05-05 DIAGNOSIS — E05.90 HYPERTHYROIDISM: Primary | ICD-10-CM

## 2023-05-05 DIAGNOSIS — E05.00 GRAVES' DISEASE: ICD-10-CM

## 2023-05-05 DIAGNOSIS — E04.2 MULTIPLE THYROID NODULES: ICD-10-CM

## 2023-05-05 RX ORDER — METHIMAZOLE 5 MG/1
2.5 TABLET ORAL DAILY
Qty: 45 TABLET | Refills: 3 | Status: SHIPPED | OUTPATIENT
Start: 2023-05-05

## 2023-05-05 ASSESSMENT — ENCOUNTER SYMPTOMS
TROUBLE SWALLOWING: 0
VOICE CHANGE: 0

## 2023-05-05 NOTE — PROGRESS NOTES
Faizan Jamison MD, [address] [place] [address]            Reason for visit: Follow-up of hyperthyroidism and thyroid nodules      ASSESSMENT AND PLAN:    1. Hyperthyroidism  She is biochemically euthyroid on her current methimazole dose. Continue it as prescribed. Return in 6 months with repeat labs. - methIMAzole (TAPAZOLE) 5 MG tablet; Take 0.5 tablets by mouth daily  Dispense: 15 tablet; Refill: 11  - TSH; Future  - T4, Free; Future  - T3; Future  - Hepatic Function Panel; Future    2. Graves' disease    3. Multiple thyroid nodules  Prior biopsies of the dominant nodules in the left lobe in March 2018 were fortunately benign. Ultrasound was repeated today. The nodule at the left lower pole has increased slightly in size. I will repeat ultrasound in 1 to 2 years. PROCEDURES:    HEAD AND NECK ULTRASOUND    Date of study:  5/5/2023    Performing/interpreting physician:  Faizan Jamison MD, FACE    Indication:  multinodular goiter    Technique:  Using a 12 MHz linear transducer, multiple real-time planar images were obtained of the thyroid and surrounding tissues. Findings:  Right lobe 1.81 x 1.12 x 4.08 cm, isthmus 0.47 cm, left lobe 2.68 x 2.68 x 4.19 cm. Mildly heterogeneous echotexture. Normal blood flow. 0.35 x 0.44 x 0.45 cm isoechoic nodule without calcifications for increased blood flow at the right upper pole (TR 3). 1.63 x 1.73 x 1.90 cm isoechoic nodule without calcifications or increased blood flow in the midportion of the left lobe (TR 3). 1.67 x 2.04 x 2.11 cm complex isoechoic nodule without calcifications or increased blood flow at the left lower pole (TR 3). Impression: Multinodular goiter as described.     [employer] [facility] TI-RADS recommendations:   TR1: Benign, no FNA or ultrasound follow-up   TR2: Nonsuspicious, no FNA or ultrasound follow-up   TR3: Mildly suspicious, FNA if greater than or equal to 2.5 cm, follow with

## 2023-05-16 ASSESSMENT — ENCOUNTER SYMPTOMS
WHEEZING: 0
HEMOPTYSIS: 0
SCLERAL ICTERUS: 0
TROUBLE SWALLOWING: 0
SHORTNESS OF BREATH: 0
CONSTIPATION: 0
NAUSEA: 0
SORE THROAT: 0
VOMITING: 0
ABDOMINAL PAIN: 0
DIARRHEA: 0
VOICE CHANGE: 0
ABDOMINAL DISTENTION: 0
CHEST TIGHTNESS: 0
BLOOD IN STOOL: 0

## 2023-05-16 NOTE — PROGRESS NOTES
[facility] Hematology and Oncology: Established patient - follow up     Chief Complaint   Patient presents with    Follow-up     Reason for Referral: Breast cancer  Referring Provider: Self-referral   Primary Care Provider: MIR Haskins CNP  Family History of Cancer/Hematologic Disorders: Family history is significant for two sisters and a niece with breast cancer. Presenting Symptoms: Abnormal routine screening mammogram     History of Present Illness:  Ms. Bean Echevarria is a 57 y.o. female who presents today for follow up regarding breast cancer. The past medical history is significant for multiple thyroid nodules, hyperthyroidism, hypercholesterolemia, Grave's disease, GERD, uterine fibroids,  section x 2, cyst removal, and hysterectomy. She initially presented for a routine screening mammogram on 8/10/22 which identified a spiculated equal density mass in the left breast inferior medial quadrant posterior depth. Further evaluation with limited ultrasound of the left breast on 2022 confirmed an 8 x 7 x 8 mm round, hypoechoic mass with spiculated margins at the 9:00 position in the left breast, 6 cm from the nipple, demonstrating mild posterior acoustic enhancement and corresponding to the mass seen mammographically. US- guided biopsy of the 9:00 left breast mass was performed on 22 with pathology revealing ER/MA/HER-2 negative, grade 3, invasive carcinoma with high-grade DCIS focally present and no microcalcifications or lymphovascular space extension identified. Testing so far has been done at [facility]; however, Ms. Bean Echevarria wishes to transfer care to [facility]. She is self-referred to [facility] for Oncology evaluation and treatment of newly diagnosed breast cancer. She has also been referred to surgery and is scheduled for initial surgical evaluation with Dr. Anne-Marie Jefferson, on 22.    Patient's daughter lives in [place], works at a hospital.    At consultation, we discussed the

## 2023-05-18 ENCOUNTER — OFFICE VISIT (OUTPATIENT)
Dept: ONCOLOGY | Age: 63
End: 2023-05-18
Payer: COMMERCIAL

## 2023-05-18 ENCOUNTER — HOSPITAL ENCOUNTER (OUTPATIENT)
Dept: INFUSION THERAPY | Age: 63
Discharge: HOME | End: 2023-05-18
Payer: COMMERCIAL

## 2023-05-18 VITALS
HEIGHT: 63 IN | TEMPERATURE: 97.7 F | HEART RATE: 88 BPM | OXYGEN SATURATION: 99 % | WEIGHT: 157 LBS | RESPIRATION RATE: 12 BRPM | BODY MASS INDEX: 27.82 KG/M2 | SYSTOLIC BLOOD PRESSURE: 127 MMHG | DIASTOLIC BLOOD PRESSURE: 68 MMHG

## 2023-05-18 DIAGNOSIS — D64.9 ANEMIA, UNSPECIFIED TYPE: ICD-10-CM

## 2023-05-18 DIAGNOSIS — E55.9 VITAMIN D DEFICIENCY: ICD-10-CM

## 2023-05-18 DIAGNOSIS — C50.912 INVASIVE DUCTAL CARCINOMA OF BREAST, FEMALE, LEFT (HCC): Primary | ICD-10-CM

## 2023-05-18 DIAGNOSIS — C50.912 INVASIVE DUCTAL CARCINOMA OF BREAST, FEMALE, LEFT (HCC): ICD-10-CM

## 2023-05-18 LAB
ALBUMIN SERPL-MCNC: 3.5 G/DL (ref 3.2–4.6)
ALBUMIN/GLOB SERPL: 1 (ref 0.4–1.6)
ALP SERPL-CCNC: 84 U/L (ref 50–136)
ALT SERPL-CCNC: 32 U/L (ref 12–65)
ANION GAP SERPL CALC-SCNC: 8 MMOL/L (ref 2–11)
AST SERPL-CCNC: 27 U/L (ref 15–37)
BASOPHILS # BLD: 0 K/UL (ref 0–0.2)
BASOPHILS NFR BLD: 0 % (ref 0–2)
BILIRUB SERPL-MCNC: 0.5 MG/DL (ref 0.2–1.1)
BUN SERPL-MCNC: 8 MG/DL (ref 8–23)
CALCIUM SERPL-MCNC: 8.9 MG/DL (ref 8.3–10.4)
CHLORIDE SERPL-SCNC: 109 MMOL/L (ref 101–110)
CO2 SERPL-SCNC: 28 MMOL/L (ref 21–32)
CREAT SERPL-MCNC: 0.8 MG/DL (ref 0.6–1)
DIFFERENTIAL METHOD BLD: ABNORMAL
EOSINOPHIL # BLD: 0.2 K/UL (ref 0–0.8)
EOSINOPHIL NFR BLD: 9 % (ref 0.5–7.8)
ERYTHROCYTE [DISTWIDTH] IN BLOOD BY AUTOMATED COUNT: 16.7 % (ref 11.9–14.6)
FERRITIN SERPL-MCNC: 124 NG/ML (ref 8–388)
GLOBULIN SER CALC-MCNC: 3.5 G/DL (ref 2.8–4.5)
GLUCOSE SERPL-MCNC: 130 MG/DL (ref 65–100)
HCT VFR BLD AUTO: 34.7 % (ref 35.8–46.3)
HGB BLD-MCNC: 10.9 G/DL (ref 11.7–15.4)
IMM GRANULOCYTES # BLD AUTO: 0 K/UL (ref 0–0.5)
IMM GRANULOCYTES NFR BLD AUTO: 0 % (ref 0–5)
IRON SATN MFR SERPL: 21 %
IRON SERPL-MCNC: 60 UG/DL (ref 35–150)
LYMPHOCYTES # BLD: 1.1 K/UL (ref 0.5–4.6)
LYMPHOCYTES NFR BLD: 37 % (ref 13–44)
MCH RBC QN AUTO: 27.7 PG (ref 26.1–32.9)
MCHC RBC AUTO-ENTMCNC: 31.4 G/DL (ref 31.4–35)
MCV RBC AUTO: 88.1 FL (ref 82–102)
MONOCYTES # BLD: 0.3 K/UL (ref 0.1–1.3)
MONOCYTES NFR BLD: 11 % (ref 4–12)
NEUTS SEG # BLD: 1.2 K/UL (ref 1.7–8.2)
NEUTS SEG NFR BLD: 43 % (ref 43–78)
NRBC # BLD: 0 K/UL (ref 0–0.2)
PLATELET # BLD AUTO: 196 K/UL (ref 150–450)
PMV BLD AUTO: 9.1 FL (ref 9.4–12.3)
POTASSIUM SERPL-SCNC: 3.8 MMOL/L (ref 3.5–5.1)
PROT SERPL-MCNC: 7 G/DL (ref 6.3–8.2)
RBC # BLD AUTO: 3.94 M/UL (ref 4.05–5.2)
SODIUM SERPL-SCNC: 145 MMOL/L (ref 133–143)
TIBC SERPL-MCNC: 282 UG/DL (ref 250–450)
WBC # BLD AUTO: 2.8 K/UL (ref 4.3–11.1)

## 2023-05-18 PROCEDURE — 80053 COMPREHEN METABOLIC PANEL: CPT

## 2023-05-18 PROCEDURE — 83540 ASSAY OF IRON: CPT

## 2023-05-18 PROCEDURE — 83550 IRON BINDING TEST: CPT

## 2023-05-18 PROCEDURE — 82728 ASSAY OF FERRITIN: CPT

## 2023-05-18 PROCEDURE — 36591 DRAW BLOOD OFF VENOUS DEVICE: CPT

## 2023-05-18 PROCEDURE — 99214 OFFICE O/P EST MOD 30 MIN: CPT | Performed by: INTERNAL MEDICINE

## 2023-05-18 PROCEDURE — 2580000003 HC RX 258: Performed by: INTERNAL MEDICINE

## 2023-05-18 PROCEDURE — 85025 COMPLETE CBC W/AUTO DIFF WBC: CPT

## 2023-05-18 RX ORDER — SODIUM CHLORIDE 0.9 % (FLUSH) 0.9 %
10 SYRINGE (ML) INJECTION PRN
Status: DISCONTINUED | OUTPATIENT
Start: 2023-05-18 | End: 2023-05-19 | Stop reason: HOSPADM

## 2023-05-18 RX ADMIN — SODIUM CHLORIDE, PRESERVATIVE FREE 10 ML: 5 INJECTION INTRAVENOUS at 12:46

## 2023-05-18 ASSESSMENT — PATIENT HEALTH QUESTIONNAIRE - PHQ9
SUM OF ALL RESPONSES TO PHQ9 QUESTIONS 1 & 2: 0
1. LITTLE INTEREST OR PLEASURE IN DOING THINGS: 0
2. FEELING DOWN, DEPRESSED OR HOPELESS: 0
SUM OF ALL RESPONSES TO PHQ QUESTIONS 1-9: 0

## 2023-05-18 NOTE — PROGRESS NOTES
Patient arrived to port lab for port access and lab draw   Ida Cuellar accessed and labs drawn per protocol   Port remains accessed. Patient discharged from port lab ambulatory.

## 2023-05-18 NOTE — PATIENT INSTRUCTIONS
Patient Instructions from Today's Visit    Reason for Visit:  Follow Up    Diagnosis Information:  [email or web address] net/about-us/[email or web address]-answers-patient-education-materials/armj-bjqwuws-enbn-sheets    Plan:  Labs reviewed. Take at least 2000 international units (IU) of vitamin D daily.     Follow Up:  2-3 months    Recent Lab Results:  Hospital Outpatient Visit on 05/18/2023   Component Date Value Ref Range Status    WBC 05/18/2023 2.8 (L)  4.3 - 11.1 K/uL Final    RBC 05/18/2023 3.94 (L)  4.05 - 5.2 M/uL Final    Hemoglobin 05/18/2023 10.9 (L)  11.7 - 15.4 g/dL Final    Hematocrit 05/18/2023 34.7 (L)  35.8 - 46.3 % Final    MCV 05/18/2023 88.1  82.0 - 102.0 FL Final    MCH 05/18/2023 27.7  26.1 - 32.9 PG Final    MCHC 05/18/2023 31.4  31.4 - 35.0 g/dL Final    RDW 05/18/2023 16.7 (H)  11.9 - 14.6 % Final    Platelets 71/90/7990 196  150 - 450 K/uL Final    MPV 05/18/2023 9.1 (L)  9.4 - 12.3 FL Final    nRBC 05/18/2023 0.00  0.0 - 0.2 K/uL Final    **Note: Absolute NRBC parameter is now reported with Hemogram**    Neutrophils % 05/18/2023 43  43 - 78 % Final    Lymphocytes % 05/18/2023 37  13 - 44 % Final    Monocytes % 05/18/2023 11  4.0 - 12.0 % Final    Eosinophils % 05/18/2023 9 (H)  0.5 - 7.8 % Final    Basophils % 05/18/2023 0  0.0 - 2.0 % Final    Immature Granulocytes 05/18/2023 0  0.0 - 5.0 % Final    Neutrophils Absolute 05/18/2023 1.2 (L)  1.7 - 8.2 K/UL Final    Lymphocytes Absolute 05/18/2023 1.1  0.5 - 4.6 K/UL Final    Monocytes Absolute 05/18/2023 0.3  0.1 - 1.3 K/UL Final    Eosinophils Absolute 05/18/2023 0.2  0.0 - 0.8 K/UL Final    Basophils Absolute 05/18/2023 0.0  0.0 - 0.2 K/UL Final    Absolute Immature Granulocyte 05/18/2023 0.0  0.0 - 0.5 K/UL Final    Differential Type 05/18/2023 AUTOMATED    Final    Sodium 05/18/2023 145 (H)  133 - 143 mmol/L Final    Potassium 05/18/2023 3.8  3.5 - 5.1 mmol/L Final    Chloride 05/18/2023 109  101 - 110 mmol/L Final    CO2 05/18/2023 28  21 - 32 mmol/L

## 2023-05-19 DIAGNOSIS — Z51.81 ENCOUNTER FOR MONITORING CARDIOTOXIC DRUG THERAPY: ICD-10-CM

## 2023-05-19 DIAGNOSIS — Z12.31 BREAST CANCER SCREENING BY MAMMOGRAM: Primary | ICD-10-CM

## 2023-05-19 DIAGNOSIS — Z79.899 ENCOUNTER FOR MONITORING CARDIOTOXIC DRUG THERAPY: ICD-10-CM

## 2023-08-16 ENCOUNTER — HOSPITAL ENCOUNTER (OUTPATIENT)
Dept: MAMMOGRAPHY | Age: 63
Discharge: HOME | End: 2023-08-19
Attending: INTERNAL MEDICINE
Payer: MEDICARE

## 2023-08-16 DIAGNOSIS — Z12.31 BREAST CANCER SCREENING BY MAMMOGRAM: ICD-10-CM

## 2023-08-16 PROCEDURE — 77063 BREAST TOMOSYNTHESIS BI: CPT

## 2023-08-17 ENCOUNTER — HOSPITAL ENCOUNTER (OUTPATIENT)
Dept: INFUSION THERAPY | Age: 63
Discharge: HOME | End: 2023-08-17
Payer: MEDICARE

## 2023-08-17 ENCOUNTER — OFFICE VISIT (OUTPATIENT)
Dept: ONCOLOGY | Age: 63
End: 2023-08-17
Payer: MEDICARE

## 2023-08-17 VITALS
TEMPERATURE: 97.9 F | SYSTOLIC BLOOD PRESSURE: 122 MMHG | OXYGEN SATURATION: 95 % | DIASTOLIC BLOOD PRESSURE: 73 MMHG | HEIGHT: 63 IN | WEIGHT: 166.2 LBS | HEART RATE: 98 BPM | RESPIRATION RATE: 16 BRPM | BODY MASS INDEX: 29.45 KG/M2

## 2023-08-17 DIAGNOSIS — D64.9 ANEMIA, UNSPECIFIED TYPE: ICD-10-CM

## 2023-08-17 DIAGNOSIS — C50.912 INVASIVE DUCTAL CARCINOMA OF BREAST, FEMALE, LEFT (HCC): ICD-10-CM

## 2023-08-17 DIAGNOSIS — C50.912 INVASIVE DUCTAL CARCINOMA OF BREAST, FEMALE, LEFT (HCC): Primary | ICD-10-CM

## 2023-08-17 DIAGNOSIS — Z95.828 PORT-A-CATH IN PLACE: ICD-10-CM

## 2023-08-17 LAB
ALBUMIN SERPL-MCNC: 3.7 G/DL (ref 3.2–4.6)
ALBUMIN/GLOB SERPL: 1 (ref 0.4–1.6)
ALP SERPL-CCNC: 87 U/L (ref 50–136)
ALT SERPL-CCNC: 22 U/L (ref 12–65)
ANION GAP SERPL CALC-SCNC: 8 MMOL/L (ref 2–11)
AST SERPL-CCNC: 16 U/L (ref 15–37)
BASOPHILS # BLD: 0 K/UL (ref 0–0.2)
BASOPHILS NFR BLD: 0 % (ref 0–2)
BILIRUB SERPL-MCNC: 0.4 MG/DL (ref 0.2–1.1)
BUN SERPL-MCNC: 13 MG/DL (ref 8–23)
CALCIUM SERPL-MCNC: 9.1 MG/DL (ref 8.3–10.4)
CHLORIDE SERPL-SCNC: 106 MMOL/L (ref 101–110)
CO2 SERPL-SCNC: 28 MMOL/L (ref 21–32)
CREAT SERPL-MCNC: 0.8 MG/DL (ref 0.6–1)
DIFFERENTIAL METHOD BLD: ABNORMAL
EOSINOPHIL # BLD: 0.2 K/UL (ref 0–0.8)
EOSINOPHIL NFR BLD: 5 % (ref 0.5–7.8)
ERYTHROCYTE [DISTWIDTH] IN BLOOD BY AUTOMATED COUNT: 14.3 % (ref 11.9–14.6)
FERRITIN SERPL-MCNC: 106 NG/ML (ref 8–388)
GLOBULIN SER CALC-MCNC: 3.7 G/DL (ref 2.8–4.5)
GLUCOSE SERPL-MCNC: 154 MG/DL (ref 65–100)
HCT VFR BLD AUTO: 36.6 % (ref 35.8–46.3)
HGB BLD-MCNC: 12 G/DL (ref 11.7–15.4)
IMM GRANULOCYTES # BLD AUTO: 0 K/UL (ref 0–0.5)
IMM GRANULOCYTES NFR BLD AUTO: 0 % (ref 0–5)
IRON SATN MFR SERPL: 20 %
IRON SERPL-MCNC: 60 UG/DL (ref 35–150)
LYMPHOCYTES # BLD: 1.3 K/UL (ref 0.5–4.6)
LYMPHOCYTES NFR BLD: 37 % (ref 13–44)
MCH RBC QN AUTO: 28 PG (ref 26.1–32.9)
MCHC RBC AUTO-ENTMCNC: 32.8 G/DL (ref 31.4–35)
MCV RBC AUTO: 85.3 FL (ref 82–102)
MONOCYTES # BLD: 0.3 K/UL (ref 0.1–1.3)
MONOCYTES NFR BLD: 9 % (ref 4–12)
NEUTS SEG # BLD: 1.7 K/UL (ref 1.7–8.2)
NEUTS SEG NFR BLD: 49 % (ref 43–78)
NRBC # BLD: 0 K/UL (ref 0–0.2)
PLATELET # BLD AUTO: 206 K/UL (ref 150–450)
PMV BLD AUTO: 9.5 FL (ref 9.4–12.3)
POTASSIUM SERPL-SCNC: 3.5 MMOL/L (ref 3.5–5.1)
PROT SERPL-MCNC: 7.4 G/DL (ref 6.3–8.2)
RBC # BLD AUTO: 4.29 M/UL (ref 4.05–5.2)
SODIUM SERPL-SCNC: 142 MMOL/L (ref 133–143)
TIBC SERPL-MCNC: 303 UG/DL (ref 250–450)
WBC # BLD AUTO: 3.6 K/UL (ref 4.3–11.1)

## 2023-08-17 PROCEDURE — 85025 COMPLETE CBC W/AUTO DIFF WBC: CPT

## 2023-08-17 PROCEDURE — 1123F ACP DISCUSS/DSCN MKR DOCD: CPT

## 2023-08-17 PROCEDURE — 83550 IRON BINDING TEST: CPT

## 2023-08-17 PROCEDURE — 82728 ASSAY OF FERRITIN: CPT

## 2023-08-17 PROCEDURE — 83540 ASSAY OF IRON: CPT

## 2023-08-17 PROCEDURE — 80053 COMPREHEN METABOLIC PANEL: CPT

## 2023-08-17 PROCEDURE — 99214 OFFICE O/P EST MOD 30 MIN: CPT

## 2023-08-17 PROCEDURE — 36591 DRAW BLOOD OFF VENOUS DEVICE: CPT

## 2023-08-17 PROCEDURE — 2580000003 HC RX 258: Performed by: INTERNAL MEDICINE

## 2023-08-17 RX ORDER — SODIUM CHLORIDE 0.9 % (FLUSH) 0.9 %
5-40 SYRINGE (ML) INJECTION PRN
Status: DISCONTINUED | OUTPATIENT
Start: 2023-08-17 | End: 2023-08-18 | Stop reason: HOSPADM

## 2023-08-17 RX ADMIN — SODIUM CHLORIDE, PRESERVATIVE FREE 10 ML: 5 INJECTION INTRAVENOUS at 14:56

## 2023-08-17 ASSESSMENT — PATIENT HEALTH QUESTIONNAIRE - PHQ9
SUM OF ALL RESPONSES TO PHQ QUESTIONS 1-9: 0
SUM OF ALL RESPONSES TO PHQ QUESTIONS 1-9: 0
6. FEELING BAD ABOUT YOURSELF - OR THAT YOU ARE A FAILURE OR HAVE LET YOURSELF OR YOUR FAMILY DOWN: 0
SUM OF ALL RESPONSES TO PHQ QUESTIONS 1-9: 0
8. MOVING OR SPEAKING SO SLOWLY THAT OTHER PEOPLE COULD HAVE NOTICED. OR THE OPPOSITE, BEING SO FIGETY OR RESTLESS THAT YOU HAVE BEEN MOVING AROUND A LOT MORE THAN USUAL: 0
7. TROUBLE CONCENTRATING ON THINGS, SUCH AS READING THE NEWSPAPER OR WATCHING TELEVISION: 0
5. POOR APPETITE OR OVEREATING: 0
2. FEELING DOWN, DEPRESSED OR HOPELESS: 0
SUM OF ALL RESPONSES TO PHQ QUESTIONS 1-9: 0
1. LITTLE INTEREST OR PLEASURE IN DOING THINGS: 0
9. THOUGHTS THAT YOU WOULD BE BETTER OFF DEAD, OR OF HURTING YOURSELF: 0
4. FEELING TIRED OR HAVING LITTLE ENERGY: 0
10. IF YOU CHECKED OFF ANY PROBLEMS, HOW DIFFICULT HAVE THESE PROBLEMS MADE IT FOR YOU TO DO YOUR WORK, TAKE CARE OF THINGS AT HOME, OR GET ALONG WITH OTHER PEOPLE: 0
SUM OF ALL RESPONSES TO PHQ9 QUESTIONS 1 & 2: 0
3. TROUBLE FALLING OR STAYING ASLEEP: 0

## 2023-08-17 ASSESSMENT — ANXIETY QUESTIONNAIRES
IF YOU CHECKED OFF ANY PROBLEMS ON THIS QUESTIONNAIRE, HOW DIFFICULT HAVE THESE PROBLEMS MADE IT FOR YOU TO DO YOUR WORK, TAKE CARE OF THINGS AT HOME, OR GET ALONG WITH OTHER PEOPLE: NOT DIFFICULT AT ALL
2. NOT BEING ABLE TO STOP OR CONTROL WORRYING: 0
GAD7 TOTAL SCORE: 0
6. BECOMING EASILY ANNOYED OR IRRITABLE: 0
3. WORRYING TOO MUCH ABOUT DIFFERENT THINGS: 0
4. TROUBLE RELAXING: 0
1. FEELING NERVOUS, ANXIOUS, OR ON EDGE: 0
7. FEELING AFRAID AS IF SOMETHING AWFUL MIGHT HAPPEN: 0
5. BEING SO RESTLESS THAT IT IS HARD TO SIT STILL: 0

## 2023-08-17 ASSESSMENT — ENCOUNTER SYMPTOMS
SHORTNESS OF BREATH: 0
TROUBLE SWALLOWING: 0
ABDOMINAL PAIN: 0
CHEST TIGHTNESS: 0
SORE THROAT: 0
SCLERAL ICTERUS: 0
DIARRHEA: 0
WHEEZING: 0
NAUSEA: 0
ABDOMINAL DISTENTION: 0
HEMOPTYSIS: 0
BLOOD IN STOOL: 0
VOMITING: 0
CONSTIPATION: 0
VOICE CHANGE: 0

## 2023-08-17 NOTE — PATIENT INSTRUCTIONS
Hospital Outpatient Visit on 08/17/2023   Component Date Value Ref Range Status    WBC 08/17/2023 3.6 (L)  4.3 - 11.1 K/uL Final    RBC 08/17/2023 4.29  4.05 - 5.2 M/uL Final    Hemoglobin 08/17/2023 12.0  11.7 - 15.4 g/dL Final    Hematocrit 08/17/2023 36.6  35.8 - 46.3 % Final    MCV 08/17/2023 85.3  82.0 - 102.0 FL Final    MCH 08/17/2023 28.0  26.1 - 32.9 PG Final    MCHC 08/17/2023 32.8  31.4 - 35.0 g/dL Final    RDW 08/17/2023 14.3  11.9 - 14.6 % Final    Platelets 95/17/0300 206  150 - 450 K/uL Final    MPV 08/17/2023 9.5  9.4 - 12.3 FL Final    nRBC 08/17/2023 0.00  0.0 - 0.2 K/uL Final    **Note: Absolute NRBC parameter is now reported with Hemogram**    Neutrophils % 08/17/2023 49  43 - 78 % Final    Lymphocytes % 08/17/2023 37  13 - 44 % Final    Monocytes % 08/17/2023 9  4.0 - 12.0 % Final    Eosinophils % 08/17/2023 5  0.5 - 7.8 % Final    Basophils % 08/17/2023 0  0.0 - 2.0 % Final    Immature Granulocytes 08/17/2023 0  0.0 - 5.0 % Final    Neutrophils Absolute 08/17/2023 1.7  1.7 - 8.2 K/UL Final    Lymphocytes Absolute 08/17/2023 1.3  0.5 - 4.6 K/UL Final    Monocytes Absolute 08/17/2023 0.3  0.1 - 1.3 K/UL Final    Eosinophils Absolute 08/17/2023 0.2  0.0 - 0.8 K/UL Final    Basophils Absolute 08/17/2023 0.0  0.0 - 0.2 K/UL Final    Absolute Immature Granulocyte 08/17/2023 0.0  0.0 - 0.5 K/UL Final    Differential Type 08/17/2023 AUTOMATED    Final    Sodium 08/17/2023 142  133 - 143 mmol/L Final    Potassium 08/17/2023 3.5  3.5 - 5.1 mmol/L Final    Chloride 08/17/2023 106  101 - 110 mmol/L Final    CO2 08/17/2023 28  21 - 32 mmol/L Final    Anion Gap 08/17/2023 8  2 - 11 mmol/L Final    Glucose 08/17/2023 154 (H)  65 - 100 mg/dL Final    BUN 08/17/2023 13  8 - 23 MG/DL Final    Creatinine 08/17/2023 0.80  0.6 - 1.0 MG/DL Final    Est, Glom Filt Rate 08/17/2023 >60  >60 ml/min/1.73m2 Final    Comment:    Pediatric calculator link: [email or web address] org/professionals/[email or web address]_[email or web address]

## 2023-08-18 NOTE — PROGRESS NOTES
12/27/22 FU C4 ddAC   1/10/23 FU - week 1 Taxol. Doing well overall. Discussed side effects r/t Taxol. 1/17/23 Week 2 Taxol. Doing well. ADDENDUM\" paclitaxel rxn - > will order nab-paclitaxel for next apt   1/23/23 FU week 1 nab-paclitaxel tomorrow; ANC 1.3.  add on nutritional labs   1/30/23 FU week 2 Abraxane. Doing well. ANC adequate. Added on nutritional labs. 2/6/23 FU weel 3 nab-paclitaxel - wishes to remain on longer infusion. 2/20/2023 FU and next Abraxane, doing well  3/6/23 FU and next Abraxane - C2D15. ANC 1.1, can proceed. 3/20/23 FU and next Abraxane - C3D1. ANC 1.0, can proceed. On oral iron. Hgb up to 9.       3/27/23 FU doing great; week 10 taxane; ANC 1.4  4/3/23 FU - week 11 Abraxane. Doing well.   ANC 1.1.   4/10/23 FU-week 12 Abraxane , ANC 1.0-proceed with final tx    5/2023 FU after chemotherapy - she is getting better/stronger; counts recovering slowly; wishes to address port removal next apt   8/2023- FU; doing well; wishes to leave port in until her next FU       Family History   Problem Relation Age of Onset    Coronary Art Dis Mother     Breast Cancer Sister     Breast Cancer Sister     Thyroid Disease Sister     Thyroid Cancer Neg Hx     Diabetes Neg Hx       Social History     Socioeconomic History    Marital status:      Spouse name: None    Number of children: None    Years of education: None    Highest education level: None   Tobacco Use    Smoking status: Never     Passive exposure: Never    Smokeless tobacco: Never   Vaping Use    Vaping Use: Never used   Substance and Sexual Activity    Alcohol use: No    Drug use: No     Social Determinants of Health     Financial Resource Strain: Low Risk     Difficulty of Paying Living Expenses: Not hard at all   Food Insecurity: No Food Insecurity    Worried About Running Out of Food in the Last Year: Never true    Ran Out of Food in the Last Year: Never true   Transportation Needs: Unknown    Lack of

## 2023-08-18 NOTE — TELEPHONE ENCOUNTER
Returned patient's call; patient requesting to speak with Dr. Luis Obando and myself to verify her surgery details. Patient stated that she only discussed the Left Breast. I informed patient that Dr. Luis Obando is not in office tomorrow, but verified that I would inform my . Patient verbalized understanding.
done

## 2023-08-23 ENCOUNTER — OFFICE VISIT (OUTPATIENT)
Dept: FAMILY MEDICINE CLINIC | Facility: CLINIC | Age: 63
End: 2023-08-23

## 2023-08-23 VITALS
DIASTOLIC BLOOD PRESSURE: 70 MMHG | HEIGHT: 61 IN | OXYGEN SATURATION: 95 % | TEMPERATURE: 97.2 F | RESPIRATION RATE: 18 BRPM | HEART RATE: 78 BPM | SYSTOLIC BLOOD PRESSURE: 112 MMHG | WEIGHT: 165 LBS | BODY MASS INDEX: 31.15 KG/M2

## 2023-08-23 DIAGNOSIS — Z00.00 PHYSICAL EXAM, ANNUAL: Primary | ICD-10-CM

## 2023-08-23 DIAGNOSIS — E78.2 MIXED HYPERLIPIDEMIA: ICD-10-CM

## 2023-08-23 DIAGNOSIS — K21.9 GASTROESOPHAGEAL REFLUX DISEASE WITHOUT ESOPHAGITIS: ICD-10-CM

## 2023-08-23 DIAGNOSIS — Z12.11 SCREENING FOR COLON CANCER: ICD-10-CM

## 2023-08-23 LAB
CHOLEST SERPL-MCNC: 160 MG/DL
HDLC SERPL-MCNC: 61 MG/DL (ref 40–60)
HDLC SERPL: 2.6
LDLC SERPL CALC-MCNC: 77.2 MG/DL
TRIGL SERPL-MCNC: 109 MG/DL (ref 35–150)
VLDLC SERPL CALC-MCNC: 21.8 MG/DL (ref 6–23)

## 2023-08-23 RX ORDER — ROSUVASTATIN CALCIUM 20 MG/1
20 TABLET, COATED ORAL NIGHTLY
Qty: 90 TABLET | Refills: 3 | Status: SHIPPED | OUTPATIENT
Start: 2023-08-23

## 2023-08-23 RX ORDER — OMEPRAZOLE 20 MG/1
20 CAPSULE, DELAYED RELEASE ORAL DAILY
Qty: 90 CAPSULE | Refills: 3 | Status: SHIPPED | OUTPATIENT
Start: 2023-08-23

## 2023-08-23 RX ORDER — MIRTAZAPINE 15 MG/1
15 TABLET, FILM COATED ORAL NIGHTLY
COMMUNITY

## 2023-08-23 SDOH — ECONOMIC STABILITY: FOOD INSECURITY: WITHIN THE PAST 12 MONTHS, YOU WORRIED THAT YOUR FOOD WOULD RUN OUT BEFORE YOU GOT MONEY TO BUY MORE.: NEVER TRUE

## 2023-08-23 SDOH — ECONOMIC STABILITY: FOOD INSECURITY: WITHIN THE PAST 12 MONTHS, THE FOOD YOU BOUGHT JUST DIDN'T LAST AND YOU DIDN'T HAVE MONEY TO GET MORE.: NEVER TRUE

## 2023-08-23 SDOH — ECONOMIC STABILITY: INCOME INSECURITY: HOW HARD IS IT FOR YOU TO PAY FOR THE VERY BASICS LIKE FOOD, HOUSING, MEDICAL CARE, AND HEATING?: NOT HARD AT ALL

## 2023-08-23 ASSESSMENT — PATIENT HEALTH QUESTIONNAIRE - PHQ9
1. LITTLE INTEREST OR PLEASURE IN DOING THINGS: 0
2. FEELING DOWN, DEPRESSED OR HOPELESS: 0
SUM OF ALL RESPONSES TO PHQ QUESTIONS 1-9: 0
SUM OF ALL RESPONSES TO PHQ9 QUESTIONS 1 & 2: 0
SUM OF ALL RESPONSES TO PHQ QUESTIONS 1-9: 0

## 2023-08-23 ASSESSMENT — ENCOUNTER SYMPTOMS
DIARRHEA: 0
COUGH: 0
CONSTIPATION: 0
CHEST TIGHTNESS: 0
RHINORRHEA: 0
EYE DISCHARGE: 0
ABDOMINAL PAIN: 0
BLOOD IN STOOL: 0
WHEEZING: 0
VOMITING: 0
EYE PAIN: 0
SHORTNESS OF BREATH: 0

## 2023-08-24 LAB
EST. AVERAGE GLUCOSE BLD GHB EST-MCNC: 126 MG/DL
HBA1C MFR BLD: 6 % (ref 4.8–5.6)

## 2023-08-28 LAB
CYTOLOGIST CVX/VAG CYTO: NORMAL
CYTOLOGY CVX/VAG DOC THIN PREP: NORMAL
HPV APTIMA: NEGATIVE
HPV GENOTYPE REFLEX: NORMAL
Lab: NORMAL
Lab: NORMAL
PATH REPORT.FINAL DX SPEC: NORMAL
STAT OF ADQ CVX/VAG CYTO-IMP: NORMAL

## 2023-09-14 ENCOUNTER — PREP FOR PROCEDURE (OUTPATIENT)
Dept: SURGERY | Age: 63
End: 2023-09-14

## 2023-09-14 DIAGNOSIS — Z12.11 SPECIAL SCREENING FOR MALIGNANT NEOPLASMS, COLON: ICD-10-CM

## 2023-10-14 PROBLEM — Z12.11 SPECIAL SCREENING FOR MALIGNANT NEOPLASMS, COLON: Status: RESOLVED | Noted: 2023-09-14 | Resolved: 2023-10-14

## 2023-10-17 ASSESSMENT — ENCOUNTER SYMPTOMS
DIARRHEA: 0
CHEST TIGHTNESS: 0
ABDOMINAL PAIN: 0
VOICE CHANGE: 0
CONSTIPATION: 0
SHORTNESS OF BREATH: 0
NAUSEA: 0
BLOOD IN STOOL: 0
HEMOPTYSIS: 0
TROUBLE SWALLOWING: 0
ABDOMINAL DISTENTION: 0
SCLERAL ICTERUS: 0
SORE THROAT: 0
VOMITING: 0
WHEEZING: 0

## 2023-10-17 NOTE — PROGRESS NOTES
[facility] Hematology and Oncology: Established patient - follow up     Chief Complaint   Patient presents with    Follow-up     Reason for Referral: Breast cancer  Referring Provider: Self-referral   Primary Care Provider: MIR Lopez CNP  Family History of Cancer/Hematologic Disorders: Family history is significant for two sisters and a niece with breast cancer. Presenting Symptoms: Abnormal routine screening mammogram     History of Present Illness:  Ms. Yoan Segal is a 70 y.o. female who presents today for follow up regarding breast cancer. The past medical history is significant for multiple thyroid nodules, hyperthyroidism, hypercholesterolemia, Grave's disease, GERD, uterine fibroids,  section x 2, cyst removal, and hysterectomy. She initially presented for a routine screening mammogram on 8/10/22 which identified a spiculated equal density mass in the left breast inferior medial quadrant posterior depth. Further evaluation with limited ultrasound of the left breast on 2022 confirmed an 8 x 7 x 8 mm round, hypoechoic mass with spiculated margins at the 9:00 position in the left breast, 6 cm from the nipple, demonstrating mild posterior acoustic enhancement and corresponding to the mass seen mammographically. US- guided biopsy of the 9:00 left breast mass was performed on 22 with pathology revealing ER/IL/HER-2 negative, grade 3, invasive carcinoma with high-grade DCIS focally present and no microcalcifications or lymphovascular space extension identified. Testing so far has been done at [facility]; however, Ms. Yoan Segal wishes to transfer care to [facility]. She is self-referred to [facility] for Oncology evaluation and treatment of newly diagnosed breast cancer. She has also been referred to surgery and is scheduled for initial surgical evaluation with Dr. Gloria Pearson, on 22.    Patient's daughter lives in [place], works at a hospital.    At consultation, we discussed the

## 2023-10-19 ENCOUNTER — HOSPITAL ENCOUNTER (OUTPATIENT)
Dept: LAB | Age: 63
Discharge: HOME | End: 2023-10-19
Payer: MEDICARE

## 2023-10-19 ENCOUNTER — OFFICE VISIT (OUTPATIENT)
Dept: ONCOLOGY | Age: 63
End: 2023-10-19
Payer: MEDICARE

## 2023-10-19 VITALS
OXYGEN SATURATION: 97 % | HEART RATE: 92 BPM | TEMPERATURE: 97.7 F | WEIGHT: 167 LBS | RESPIRATION RATE: 16 BRPM | SYSTOLIC BLOOD PRESSURE: 131 MMHG | BODY MASS INDEX: 31.55 KG/M2 | DIASTOLIC BLOOD PRESSURE: 73 MMHG

## 2023-10-19 DIAGNOSIS — C50.912 INVASIVE DUCTAL CARCINOMA OF BREAST, FEMALE, LEFT (HCC): ICD-10-CM

## 2023-10-19 DIAGNOSIS — C50.912 INVASIVE DUCTAL CARCINOMA OF BREAST, FEMALE, LEFT (HCC): Primary | ICD-10-CM

## 2023-10-19 DIAGNOSIS — E55.9 VITAMIN D DEFICIENCY: ICD-10-CM

## 2023-10-19 DIAGNOSIS — Z95.828 PORT-A-CATH IN PLACE: ICD-10-CM

## 2023-10-19 DIAGNOSIS — D64.9 ANEMIA, UNSPECIFIED TYPE: ICD-10-CM

## 2023-10-19 LAB
25(OH)D3 SERPL-MCNC: 38 NG/ML (ref 30–100)
ALBUMIN SERPL-MCNC: 3.6 G/DL (ref 3.2–4.6)
ALBUMIN/GLOB SERPL: 0.9 (ref 0.4–1.6)
ALP SERPL-CCNC: 95 U/L (ref 50–136)
ALT SERPL-CCNC: 21 U/L (ref 12–65)
ANION GAP SERPL CALC-SCNC: 3 MMOL/L (ref 2–11)
AST SERPL-CCNC: 21 U/L (ref 15–37)
BASOPHILS # BLD: 0 K/UL (ref 0–0.2)
BASOPHILS NFR BLD: 0 % (ref 0–2)
BILIRUB SERPL-MCNC: 0.4 MG/DL (ref 0.2–1.1)
BUN SERPL-MCNC: 11 MG/DL (ref 8–23)
CALCIUM SERPL-MCNC: 9 MG/DL (ref 8.3–10.4)
CHLORIDE SERPL-SCNC: 106 MMOL/L (ref 101–110)
CO2 SERPL-SCNC: 31 MMOL/L (ref 21–32)
CREAT SERPL-MCNC: 0.9 MG/DL (ref 0.6–1)
DIFFERENTIAL METHOD BLD: ABNORMAL
EOSINOPHIL # BLD: 0.2 K/UL (ref 0–0.8)
EOSINOPHIL NFR BLD: 5 % (ref 0.5–7.8)
ERYTHROCYTE [DISTWIDTH] IN BLOOD BY AUTOMATED COUNT: 13.9 % (ref 11.9–14.6)
FERRITIN SERPL-MCNC: 133 NG/ML (ref 8–388)
GLOBULIN SER CALC-MCNC: 3.9 G/DL (ref 2.8–4.5)
GLUCOSE SERPL-MCNC: 170 MG/DL (ref 65–100)
HCT VFR BLD AUTO: 36.7 % (ref 35.8–46.3)
HGB BLD-MCNC: 12.1 G/DL (ref 11.7–15.4)
IMM GRANULOCYTES # BLD AUTO: 0 K/UL (ref 0–0.5)
IMM GRANULOCYTES NFR BLD AUTO: 0 % (ref 0–5)
IRON SATN MFR SERPL: 21 %
IRON SERPL-MCNC: 62 UG/DL (ref 35–150)
LYMPHOCYTES # BLD: 1.5 K/UL (ref 0.5–4.6)
LYMPHOCYTES NFR BLD: 40 % (ref 13–44)
MCH RBC QN AUTO: 28.7 PG (ref 26.1–32.9)
MCHC RBC AUTO-ENTMCNC: 33 G/DL (ref 31.4–35)
MCV RBC AUTO: 87 FL (ref 82–102)
MONOCYTES # BLD: 0.3 K/UL (ref 0.1–1.3)
MONOCYTES NFR BLD: 7 % (ref 4–12)
NEUTS SEG # BLD: 1.8 K/UL (ref 1.7–8.2)
NEUTS SEG NFR BLD: 48 % (ref 43–78)
NRBC # BLD: 0 K/UL (ref 0–0.2)
PLATELET # BLD AUTO: 181 K/UL (ref 150–450)
PMV BLD AUTO: 9.2 FL (ref 9.4–12.3)
POTASSIUM SERPL-SCNC: 3.5 MMOL/L (ref 3.5–5.1)
PROT SERPL-MCNC: 7.5 G/DL (ref 6.3–8.2)
RBC # BLD AUTO: 4.22 M/UL (ref 4.05–5.2)
SODIUM SERPL-SCNC: 140 MMOL/L (ref 133–143)
TIBC SERPL-MCNC: 294 UG/DL (ref 250–450)
WBC # BLD AUTO: 3.7 K/UL (ref 4.3–11.1)

## 2023-10-19 PROCEDURE — 1036F TOBACCO NON-USER: CPT | Performed by: INTERNAL MEDICINE

## 2023-10-19 PROCEDURE — 2580000003 HC RX 258: Performed by: INTERNAL MEDICINE

## 2023-10-19 PROCEDURE — 1123F ACP DISCUSS/DSCN MKR DOCD: CPT | Performed by: INTERNAL MEDICINE

## 2023-10-19 PROCEDURE — 1090F PRES/ABSN URINE INCON ASSESS: CPT | Performed by: INTERNAL MEDICINE

## 2023-10-19 PROCEDURE — 82728 ASSAY OF FERRITIN: CPT

## 2023-10-19 PROCEDURE — G8417 CALC BMI ABV UP PARAM F/U: HCPCS | Performed by: INTERNAL MEDICINE

## 2023-10-19 PROCEDURE — 36591 DRAW BLOOD OFF VENOUS DEVICE: CPT

## 2023-10-19 PROCEDURE — 82306 VITAMIN D 25 HYDROXY: CPT

## 2023-10-19 PROCEDURE — 99215 OFFICE O/P EST HI 40 MIN: CPT | Performed by: INTERNAL MEDICINE

## 2023-10-19 PROCEDURE — 80053 COMPREHEN METABOLIC PANEL: CPT

## 2023-10-19 PROCEDURE — G8400 PT W/DXA NO RESULTS DOC: HCPCS | Performed by: INTERNAL MEDICINE

## 2023-10-19 PROCEDURE — G9899 SCRN MAM PERF RSLTS DOC: HCPCS | Performed by: INTERNAL MEDICINE

## 2023-10-19 PROCEDURE — 85025 COMPLETE CBC W/AUTO DIFF WBC: CPT

## 2023-10-19 PROCEDURE — 83550 IRON BINDING TEST: CPT

## 2023-10-19 PROCEDURE — G8484 FLU IMMUNIZE NO ADMIN: HCPCS | Performed by: INTERNAL MEDICINE

## 2023-10-19 PROCEDURE — 3017F COLORECTAL CA SCREEN DOC REV: CPT | Performed by: INTERNAL MEDICINE

## 2023-10-19 PROCEDURE — G8427 DOCREV CUR MEDS BY ELIG CLIN: HCPCS | Performed by: INTERNAL MEDICINE

## 2023-10-19 PROCEDURE — 83540 ASSAY OF IRON: CPT

## 2023-10-19 RX ORDER — SODIUM CHLORIDE 0.9 % (FLUSH) 0.9 %
5-40 SYRINGE (ML) INJECTION PRN
Status: DISCONTINUED | OUTPATIENT
Start: 2023-10-19 | End: 2023-10-23 | Stop reason: HOSPADM

## 2023-10-19 RX ADMIN — SODIUM CHLORIDE, PRESERVATIVE FREE 10 ML: 5 INJECTION INTRAVENOUS at 14:37

## 2023-10-19 ASSESSMENT — PATIENT HEALTH QUESTIONNAIRE - PHQ9
2. FEELING DOWN, DEPRESSED OR HOPELESS: 0
SUM OF ALL RESPONSES TO PHQ9 QUESTIONS 1 & 2: 0
SUM OF ALL RESPONSES TO PHQ QUESTIONS 1-9: 0
1. LITTLE INTEREST OR PLEASURE IN DOING THINGS: 0
SUM OF ALL RESPONSES TO PHQ QUESTIONS 1-9: 0

## 2023-10-19 NOTE — PATIENT INSTRUCTIONS
Patient Instructions from Today's Visit    Reason for Visit:  Follow Up    Diagnosis Information:  [email or web address]    Plan:  Labs reviewed. Symptoms reviewed. We will order port removal.    Follow Up:  About 3 months.     Recent Lab Results:  Hospital Outpatient Visit on 10/19/2023   Component Date Value Ref Range Status    WBC 10/19/2023 3.7 (L)  4.3 - 11.1 K/uL Final    RBC 10/19/2023 4.22  4.05 - 5.2 M/uL Final    Hemoglobin 10/19/2023 12.1  11.7 - 15.4 g/dL Final    Hematocrit 10/19/2023 36.7  35.8 - 46.3 % Final    MCV 10/19/2023 87.0  82.0 - 102.0 FL Final    MCH 10/19/2023 28.7  26.1 - 32.9 PG Final    MCHC 10/19/2023 33.0  31.4 - 35.0 g/dL Final    RDW 10/19/2023 13.9  11.9 - 14.6 % Final    Platelets 54/09/3575 181  150 - 450 K/uL Final    MPV 10/19/2023 9.2 (L)  9.4 - 12.3 FL Final    nRBC 10/19/2023 0.00  0.0 - 0.2 K/uL Final    **Note: Absolute NRBC parameter is now reported with Hemogram**    Differential Type 10/19/2023 AUTOMATED    Final    Neutrophils % 10/19/2023 48  43 - 78 % Final    Lymphocytes % 10/19/2023 40  13 - 44 % Final    Monocytes % 10/19/2023 7  4.0 - 12.0 % Final    Eosinophils % 10/19/2023 5  0.5 - 7.8 % Final    Basophils % 10/19/2023 0  0.0 - 2.0 % Final    Immature Granulocytes 10/19/2023 0  0.0 - 5.0 % Final    Neutrophils Absolute 10/19/2023 1.8  1.7 - 8.2 K/UL Final    Lymphocytes Absolute 10/19/2023 1.5  0.5 - 4.6 K/UL Final    Monocytes Absolute 10/19/2023 0.3  0.1 - 1.3 K/UL Final    Eosinophils Absolute 10/19/2023 0.2  0.0 - 0.8 K/UL Final    Basophils Absolute 10/19/2023 0.0  0.0 - 0.2 K/UL Final    Absolute Immature Granulocyte 10/19/2023 0.0  0.0 - 0.5 K/UL Final    Sodium 10/19/2023 140  133 - 143 mmol/L Final    Potassium 10/19/2023 3.5  3.5 - 5.1 mmol/L Final    Chloride 10/19/2023 106  101 - 110 mmol/L Final    CO2 10/19/2023 31  21 - 32 mmol/L Final    Anion Gap 10/19/2023 3  2 - 11

## 2023-10-19 NOTE — PROGRESS NOTES
Patient arrived to port lab for port access and lab draw   [address] accessed and labs drawn per protocol   *Port flushed and de-accessed  Patient discharged from port lab ambulatory*

## 2023-10-26 PROBLEM — Z95.828 PORT-A-CATH IN PLACE: Status: ACTIVE | Noted: 2023-10-26

## 2023-10-26 PROBLEM — Z79.899 HIGH RISK MEDICATION USE: Status: RESOLVED | Noted: 2022-12-20 | Resolved: 2023-10-26

## 2023-11-02 ENCOUNTER — HOSPITAL ENCOUNTER (OUTPATIENT)
Dept: INTERVENTIONAL RADIOLOGY/VASCULAR | Age: 63
Discharge: HOME | End: 2023-11-02
Attending: INTERNAL MEDICINE
Payer: MEDICARE

## 2023-11-02 VITALS
HEART RATE: 71 BPM | SYSTOLIC BLOOD PRESSURE: 147 MMHG | WEIGHT: 167 LBS | TEMPERATURE: 98 F | HEIGHT: 61 IN | RESPIRATION RATE: 18 BRPM | BODY MASS INDEX: 31.53 KG/M2 | DIASTOLIC BLOOD PRESSURE: 69 MMHG | OXYGEN SATURATION: 100 %

## 2023-11-02 DIAGNOSIS — Z95.828 PORT-A-CATH IN PLACE: ICD-10-CM

## 2023-11-02 PROBLEM — Z12.11 SPECIAL SCREENING FOR MALIGNANT NEOPLASMS, COLON: Status: ACTIVE | Noted: 2023-09-14

## 2023-11-02 PROCEDURE — 2500000003 HC RX 250 WO HCPCS: Performed by: PHYSICIAN ASSISTANT

## 2023-11-02 PROCEDURE — 6370000000 HC RX 637 (ALT 250 FOR IP): Performed by: PHYSICIAN ASSISTANT

## 2023-11-02 PROCEDURE — 36590 REMOVAL TUNNELED CV CATH: CPT

## 2023-11-02 PROCEDURE — 6360000002 HC RX W HCPCS: Performed by: PHYSICIAN ASSISTANT

## 2023-11-02 RX ORDER — MORPHINE SULFATE 4 MG/ML
4 INJECTION INTRAVENOUS
Status: COMPLETED | OUTPATIENT
Start: 2023-11-02 | End: 2023-11-02

## 2023-11-02 RX ORDER — LIDOCAINE HYDROCHLORIDE AND EPINEPHRINE 10; 10 MG/ML; UG/ML
INJECTION, SOLUTION INFILTRATION; PERINEURAL PRN
Status: COMPLETED | OUTPATIENT
Start: 2023-11-02 | End: 2023-11-02

## 2023-11-02 RX ORDER — DIAZEPAM 5 MG/1
10 TABLET ORAL ONCE
Status: COMPLETED | OUTPATIENT
Start: 2023-11-02 | End: 2023-11-02

## 2023-11-02 RX ADMIN — MORPHINE SULFATE 4 MG: 4 INJECTION INTRAVENOUS at 14:41

## 2023-11-02 RX ADMIN — DIAZEPAM 10 MG: 5 TABLET ORAL at 14:11

## 2023-11-02 RX ADMIN — LIDOCAINE HYDROCHLORIDE,EPINEPHRINE BITARTRATE 5 ML: 10; .01 INJECTION, SOLUTION INFILTRATION; PERINEURAL at 14:51

## 2023-11-02 ASSESSMENT — PAIN - FUNCTIONAL ASSESSMENT: PAIN_FUNCTIONAL_ASSESSMENT: NONE - DENIES PAIN

## 2023-11-02 NOTE — OR NURSING
Recovery period without difficulty. Pt alert and oriented and denies pain. Dressing is clean, dry, and intact. Reviewed discharge instructions with patient and visitor, both verbalized understanding. Declined wheelchair out.

## 2023-11-02 NOTE — DISCHARGE INSTRUCTIONS
[address] [facility]     Department of Interventional Radiology     [facility]     [phone] Office     [phone] Fax         DRAIN/PORT/CATHETER REMOVAL DISCHARGE INSTRUCTIONS           General Information:        Your doctor has ordered for us to remove your drain, port, or catheter. This could be that you do not need it anymore, it is not doing its job, your physician has decided on another plan for your treatment and/or it may need replacing. Home Care Instructions: You can resume your regular diet and medication regimen. Do not drink alcohol, drive, or make any important legal decisions in the next 24 hours. Do not lift anything heavier than a gallon of milk, or do anything strenuous for the next 24 hours. You will notice a dressing over the site of the removal. This dressing should stay in place until the site is healed. The dressing should be changed at least every 48 hours. You should change the dressing sooner if it becomes soiled or wet. The nurse who discharges you to home should review with you any wound care instructions. Resume your normal level of activity slowly. You may shower after 24 hours, but do not take a bath or swim or immerse yourself in water until the site has healed, and keep the dressing dry with plastic wrap while showering. The site may ooze for a couple of days. This drainage should lessen with each passing day. Call If:        You should call your Physician and/or the Radiology Nurse if you have any bleeding other than a small spot on your bandage. Call if you have any signs of infection, fever, or increased pain at the site of the tube. Call if the oozing increases, if it changes color, or begins to have an odor. Follow-Up Instructions: Please see your ordering doctor as he/she has requested. To Reach Us:      If you have any questions about your procedure, please call the notice any of these symptoms; do not wait until your next office visit. Recognize signs and symptoms of STROKE:  F-face looks uneven    A-arms unable to move or move unevenly    S-speech slurred or non-existent    T-time-call 911 as soon as signs and symptoms begin-DO NOT go       Back to bed or wait to see if you get better-TIME IS BRAIN.

## 2023-11-06 DIAGNOSIS — E05.90 HYPERTHYROIDISM: ICD-10-CM

## 2023-11-06 LAB
ALBUMIN SERPL-MCNC: 3.9 G/DL (ref 3.2–4.6)
ALBUMIN/GLOB SERPL: 1.2 (ref 0.4–1.6)
ALP SERPL-CCNC: 94 U/L (ref 50–136)
ALT SERPL-CCNC: 26 U/L (ref 12–65)
AST SERPL-CCNC: 24 U/L (ref 15–37)
BILIRUB DIRECT SERPL-MCNC: 0.1 MG/DL
BILIRUB SERPL-MCNC: 0.5 MG/DL (ref 0.2–1.1)
GLOBULIN SER CALC-MCNC: 3.3 G/DL (ref 2.8–4.5)
PROT SERPL-MCNC: 7.2 G/DL (ref 6.3–8.2)
T3 SERPL-MCNC: 0.72 NG/ML (ref 0.6–1.81)
T4 FREE SERPL-MCNC: 0.9 NG/DL (ref 0.78–1.46)
TSH, 3RD GENERATION: 3.78 UIU/ML (ref 0.36–3.74)

## 2023-11-07 ENCOUNTER — OFFICE VISIT (OUTPATIENT)
Dept: ENDOCRINOLOGY | Age: 63
End: 2023-11-07
Payer: MEDICARE

## 2023-11-07 VITALS
HEART RATE: 72 BPM | BODY MASS INDEX: 32.12 KG/M2 | OXYGEN SATURATION: 98 % | DIASTOLIC BLOOD PRESSURE: 70 MMHG | WEIGHT: 170 LBS | SYSTOLIC BLOOD PRESSURE: 124 MMHG

## 2023-11-07 DIAGNOSIS — E05.90 HYPERTHYROIDISM: Primary | ICD-10-CM

## 2023-11-07 DIAGNOSIS — E04.2 MULTIPLE THYROID NODULES: ICD-10-CM

## 2023-11-07 DIAGNOSIS — E05.00 GRAVES' DISEASE: ICD-10-CM

## 2023-11-07 PROCEDURE — 1036F TOBACCO NON-USER: CPT | Performed by: INTERNAL MEDICINE

## 2023-11-07 PROCEDURE — 1090F PRES/ABSN URINE INCON ASSESS: CPT | Performed by: INTERNAL MEDICINE

## 2023-11-07 PROCEDURE — G9899 SCRN MAM PERF RSLTS DOC: HCPCS | Performed by: INTERNAL MEDICINE

## 2023-11-07 PROCEDURE — G8484 FLU IMMUNIZE NO ADMIN: HCPCS | Performed by: INTERNAL MEDICINE

## 2023-11-07 PROCEDURE — G8400 PT W/DXA NO RESULTS DOC: HCPCS | Performed by: INTERNAL MEDICINE

## 2023-11-07 PROCEDURE — 99214 OFFICE O/P EST MOD 30 MIN: CPT | Performed by: INTERNAL MEDICINE

## 2023-11-07 PROCEDURE — G8427 DOCREV CUR MEDS BY ELIG CLIN: HCPCS | Performed by: INTERNAL MEDICINE

## 2023-11-07 PROCEDURE — G8417 CALC BMI ABV UP PARAM F/U: HCPCS | Performed by: INTERNAL MEDICINE

## 2023-11-07 PROCEDURE — 3017F COLORECTAL CA SCREEN DOC REV: CPT | Performed by: INTERNAL MEDICINE

## 2023-11-07 PROCEDURE — 1123F ACP DISCUSS/DSCN MKR DOCD: CPT | Performed by: INTERNAL MEDICINE

## 2023-11-07 RX ORDER — METHIMAZOLE 5 MG/1
2.5 TABLET ORAL
Qty: 20 TABLET | Refills: 3 | Status: SHIPPED | OUTPATIENT
Start: 2023-11-08

## 2023-11-07 ASSESSMENT — ENCOUNTER SYMPTOMS
TROUBLE SWALLOWING: 0
VOICE CHANGE: 0

## 2023-11-07 NOTE — PROGRESS NOTES
Tico Hathaway MD, [facility]            Reason for visit: Follow-up of hyperthyroidism and thyroid nodules      ASSESSMENT AND PLAN:    1. Hyperthyroidism  She is slightly overtreated. I will reduce her methimazole dose as below. Reassess in 3 months. - methIMAzole (TAPAZOLE) 5 MG tablet; Take 0.5 tablets by mouth three times a week  Dispense: 20 tablet; Refill: 3  - TSH; Future  - T4, Free; Future  - T3; Future    2. Graves' disease    3. Multiple thyroid nodules  Prior biopsies of the dominant nodules in the left lobe in March 2018 were fortunately benign. Ultrasound was repeated most recently in May 2023. The nodule at the left lower pole had increased slightly in size. I will repeat ultrasound at a 1 to 2-year interval.        Follow-up and Dispositions    Return in about 3 months (around 2/7/2024). History of Present Illness:    THYROID DYSFUNCTION  Lencho Schuler (\"Ri-wilson-sid\") is seen for follow-up of hyperthyroidism (now known to be caused by Graves' disease) and thyroid nodules. Hyperthyroidism appears to have been present (off and on) since at least late 2015. Thyroid nodules were noted in February 2018 as part of the evaluation of hyperthyroidism. Current symptoms: See review of systems below     Prior treatment: Methimazole 10 mg daily was started 2/12/2018.   Her dose has been adjusted as follows:  -5 mg daily 3/30/2018  -2.5 mg daily 10/5/2018  -discontinued 11/17/2021  -2.5 mg daily 2/25/2022     Pertinent labs:  12/14/2015: TSH 0.090, free T4 1.41, free T3 3.4.  1/4/2016: TSH 0.083.  6/13/2016: TSH 2.090.  1/20/2017: TSH 0.347.  9/21/2017: TSH 1.720.  1/26/2018: TSH less than 0.006.  2/1/2018: Free T4 2.19, T3 176, thyroid-stimulating immunoglobulins 419% (+).  3/26/2018: TSH less than 0.006, free T4 1.33, T3 112.  5/29/2018: TSH 0.006, free T4 1.21, T3 116.  10/4/2018: TSH 4.480, free T4 0.90, T3 89.  1/4/2019: TSH

## 2023-11-20 DIAGNOSIS — C50.912 INVASIVE DUCTAL CARCINOMA OF BREAST, FEMALE, LEFT (HCC): Primary | ICD-10-CM

## 2023-11-21 ENCOUNTER — HOSPITAL ENCOUNTER (OUTPATIENT)
Dept: LAB | Age: 63
Discharge: HOME | End: 2023-11-24
Payer: MEDICARE

## 2023-11-21 ENCOUNTER — OFFICE VISIT (OUTPATIENT)
Dept: ONCOLOGY | Age: 63
End: 2023-11-21
Payer: MEDICARE

## 2023-11-21 VITALS
WEIGHT: 167 LBS | RESPIRATION RATE: 20 BRPM | HEIGHT: 63 IN | OXYGEN SATURATION: 99 % | TEMPERATURE: 98 F | DIASTOLIC BLOOD PRESSURE: 70 MMHG | SYSTOLIC BLOOD PRESSURE: 144 MMHG | HEART RATE: 86 BPM | BODY MASS INDEX: 29.59 KG/M2

## 2023-11-21 DIAGNOSIS — C50.912 INVASIVE DUCTAL CARCINOMA OF BREAST, FEMALE, LEFT (HCC): ICD-10-CM

## 2023-11-21 DIAGNOSIS — C50.912 INVASIVE DUCTAL CARCINOMA OF BREAST, FEMALE, LEFT (HCC): Primary | ICD-10-CM

## 2023-11-21 DIAGNOSIS — N63.0 BREAST NODULE: ICD-10-CM

## 2023-11-21 LAB
ALBUMIN SERPL-MCNC: 3.9 G/DL (ref 3.2–4.6)
ALBUMIN/GLOB SERPL: 1 (ref 0.4–1.6)
ALP SERPL-CCNC: 100 U/L (ref 50–136)
ALT SERPL-CCNC: 23 U/L (ref 12–65)
ANION GAP SERPL CALC-SCNC: 4 MMOL/L (ref 2–11)
AST SERPL-CCNC: 21 U/L (ref 15–37)
BASOPHILS # BLD: 0 K/UL (ref 0–0.2)
BASOPHILS NFR BLD: 0 % (ref 0–2)
BILIRUB SERPL-MCNC: 0.6 MG/DL (ref 0.2–1.1)
BUN SERPL-MCNC: 13 MG/DL (ref 8–23)
CALCIUM SERPL-MCNC: 8.9 MG/DL (ref 8.3–10.4)
CHLORIDE SERPL-SCNC: 106 MMOL/L (ref 101–110)
CO2 SERPL-SCNC: 30 MMOL/L (ref 21–32)
CREAT SERPL-MCNC: 1 MG/DL (ref 0.6–1)
DIFFERENTIAL METHOD BLD: ABNORMAL
EOSINOPHIL # BLD: 0.1 K/UL (ref 0–0.8)
EOSINOPHIL NFR BLD: 3 % (ref 0.5–7.8)
ERYTHROCYTE [DISTWIDTH] IN BLOOD BY AUTOMATED COUNT: 13.7 % (ref 11.9–14.6)
GLOBULIN SER CALC-MCNC: 3.9 G/DL (ref 2.8–4.5)
GLUCOSE SERPL-MCNC: 130 MG/DL (ref 65–100)
HCT VFR BLD AUTO: 40.9 % (ref 35.8–46.3)
HGB BLD-MCNC: 13.1 G/DL (ref 11.7–15.4)
IMM GRANULOCYTES # BLD AUTO: 0 K/UL (ref 0–0.5)
IMM GRANULOCYTES NFR BLD AUTO: 0 % (ref 0–5)
LYMPHOCYTES # BLD: 1.2 K/UL (ref 0.5–4.6)
LYMPHOCYTES NFR BLD: 33 % (ref 13–44)
MCH RBC QN AUTO: 28.3 PG (ref 26.1–32.9)
MCHC RBC AUTO-ENTMCNC: 32 G/DL (ref 31.4–35)
MCV RBC AUTO: 88.3 FL (ref 82–102)
MONOCYTES # BLD: 0.3 K/UL (ref 0.1–1.3)
MONOCYTES NFR BLD: 9 % (ref 4–12)
NEUTS SEG # BLD: 2 K/UL (ref 1.7–8.2)
NEUTS SEG NFR BLD: 55 % (ref 43–78)
NRBC # BLD: 0 K/UL (ref 0–0.2)
PLATELET # BLD AUTO: 207 K/UL (ref 150–450)
PMV BLD AUTO: 9.1 FL (ref 9.4–12.3)
POTASSIUM SERPL-SCNC: 3.8 MMOL/L (ref 3.5–5.1)
PROT SERPL-MCNC: 7.8 G/DL (ref 6.3–8.2)
RBC # BLD AUTO: 4.63 M/UL (ref 4.05–5.2)
SODIUM SERPL-SCNC: 140 MMOL/L (ref 133–143)
WBC # BLD AUTO: 3.6 K/UL (ref 4.3–11.1)

## 2023-11-21 PROCEDURE — G8417 CALC BMI ABV UP PARAM F/U: HCPCS | Performed by: NURSE PRACTITIONER

## 2023-11-21 PROCEDURE — 36415 COLL VENOUS BLD VENIPUNCTURE: CPT

## 2023-11-21 PROCEDURE — 85025 COMPLETE CBC W/AUTO DIFF WBC: CPT

## 2023-11-21 PROCEDURE — G8427 DOCREV CUR MEDS BY ELIG CLIN: HCPCS | Performed by: NURSE PRACTITIONER

## 2023-11-21 PROCEDURE — 1123F ACP DISCUSS/DSCN MKR DOCD: CPT | Performed by: NURSE PRACTITIONER

## 2023-11-21 PROCEDURE — 3017F COLORECTAL CA SCREEN DOC REV: CPT | Performed by: NURSE PRACTITIONER

## 2023-11-21 PROCEDURE — 80053 COMPREHEN METABOLIC PANEL: CPT

## 2023-11-21 PROCEDURE — 1036F TOBACCO NON-USER: CPT | Performed by: NURSE PRACTITIONER

## 2023-11-21 PROCEDURE — G9899 SCRN MAM PERF RSLTS DOC: HCPCS | Performed by: NURSE PRACTITIONER

## 2023-11-21 PROCEDURE — 99214 OFFICE O/P EST MOD 30 MIN: CPT | Performed by: NURSE PRACTITIONER

## 2023-11-21 PROCEDURE — 1090F PRES/ABSN URINE INCON ASSESS: CPT | Performed by: NURSE PRACTITIONER

## 2023-11-21 PROCEDURE — G8400 PT W/DXA NO RESULTS DOC: HCPCS | Performed by: NURSE PRACTITIONER

## 2023-11-21 PROCEDURE — G8484 FLU IMMUNIZE NO ADMIN: HCPCS | Performed by: NURSE PRACTITIONER

## 2023-11-21 ASSESSMENT — ENCOUNTER SYMPTOMS
RESPIRATORY NEGATIVE: 1
GASTROINTESTINAL NEGATIVE: 1
EYES NEGATIVE: 1
ALLERGIC/IMMUNOLOGIC NEGATIVE: 1

## 2023-11-21 NOTE — PATIENT INSTRUCTIONS
Patient Instructions from Today's Visit    Reason for Visit:  Follow up     Plan: We will order ultrasound for that area at your incision site    Follow Up:   Follow up as scheduled    Recent Lab Results:  Hospital Outpatient Visit on 11/21/2023   Component Date Value Ref Range Status    WBC 11/21/2023 3.6 (L)  4.3 - 11.1 K/uL Final    RBC 11/21/2023 4.63  4.05 - 5.2 M/uL Final    Hemoglobin 11/21/2023 13.1  11.7 - 15.4 g/dL Final    Hematocrit 11/21/2023 40.9  35.8 - 46.3 % Final    MCV 11/21/2023 88.3  82.0 - 102.0 FL Final    MCH 11/21/2023 28.3  26.1 - 32.9 PG Final    MCHC 11/21/2023 32.0  31.4 - 35.0 g/dL Final    RDW 11/21/2023 13.7  11.9 - 14.6 % Final    Platelets 96/77/9007 207  150 - 450 K/uL Final    MPV 11/21/2023 9.1 (L)  9.4 - 12.3 FL Final    nRBC 11/21/2023 0.00  0.0 - 0.2 K/uL Final    **Note: Absolute NRBC parameter is now reported with Hemogram**    Neutrophils % 11/21/2023 55  43 - 78 % Final    Lymphocytes % 11/21/2023 33  13 - 44 % Final    Monocytes % 11/21/2023 9  4.0 - 12.0 % Final    Eosinophils % 11/21/2023 3  0.5 - 7.8 % Final    Basophils % 11/21/2023 0  0.0 - 2.0 % Final    Immature Granulocytes 11/21/2023 0  0.0 - 5.0 % Final    Neutrophils Absolute 11/21/2023 2.0  1.7 - 8.2 K/UL Final    Lymphocytes Absolute 11/21/2023 1.2  0.5 - 4.6 K/UL Final    Monocytes Absolute 11/21/2023 0.3  0.1 - 1.3 K/UL Final    Eosinophils Absolute 11/21/2023 0.1  0.0 - 0.8 K/UL Final    Basophils Absolute 11/21/2023 0.0  0.0 - 0.2 K/UL Final    Absolute Immature Granulocyte 11/21/2023 0.0  0.0 - 0.5 K/UL Final    Differential Type 11/21/2023 AUTOMATED    Final        Treatment Summary has been discussed and given to patient: n/a        -------------------------------------------------------------------------------------------------------------------  Please call our office at [phone] if you have any  of the following symptoms:   Fever of 100.5 or greater  Chills  Shortness of breath  Swelling or

## 2023-11-21 NOTE — PROGRESS NOTES
[facility] Hematology and Oncology: Established patient - follow up     Chief Complaint   Patient presents with    Follow-up     Reason for Referral: Breast cancer  Referring Provider: Self-referral   Primary Care Provider: MIR Camejo CNP  Family History of Cancer/Hematologic Disorders: Family history is significant for two sisters and a niece with breast cancer. Presenting Symptoms: Abnormal routine screening mammogram     History of Present Illness:  Ms. Zachary Khalil is a 70 y.o. female who presents today for follow up regarding breast cancer. The past medical history is significant for multiple thyroid nodules, hyperthyroidism, hypercholesterolemia, Grave's disease, GERD, uterine fibroids,  section x 2, cyst removal, and hysterectomy. She initially presented for a routine screening mammogram on 8/10/22 which identified a spiculated equal density mass in the left breast inferior medial quadrant posterior depth. Further evaluation with limited ultrasound of the left breast on 2022 confirmed an 8 x 7 x 8 mm round, hypoechoic mass with spiculated margins at the 9:00 position in the left breast, 6 cm from the nipple, demonstrating mild posterior acoustic enhancement and corresponding to the mass seen mammographically. US- guided biopsy of the 9:00 left breast mass was performed on 22 with pathology revealing ER/MS/HER-2 negative, grade 3, invasive carcinoma with high-grade DCIS focally present and no microcalcifications or lymphovascular space extension identified. Testing so far has been done at [facility]; however, Ms. Zachary Khalil wishes to transfer care to [facility]. She is self-referred to [facility] for Oncology evaluation and treatment of newly diagnosed breast cancer. She has also been referred to surgery and is scheduled for initial surgical evaluation with Dr. Marty Castillo, on 22.    Patient's daughter lives in [place], works at a hospital.    At consultation, we discussed the

## 2023-12-02 PROBLEM — Z12.11 SPECIAL SCREENING FOR MALIGNANT NEOPLASMS, COLON: Status: RESOLVED | Noted: 2023-09-14 | Resolved: 2023-12-02

## 2023-12-05 ENCOUNTER — PREP FOR PROCEDURE (OUTPATIENT)
Dept: SURGERY | Age: 63
End: 2023-12-05

## 2023-12-05 ENCOUNTER — TELEPHONE (OUTPATIENT)
Dept: ONCOLOGY | Age: 63
End: 2023-12-05

## 2023-12-05 ENCOUNTER — HOSPITAL ENCOUNTER (OUTPATIENT)
Dept: MAMMOGRAPHY | Age: 63
Discharge: HOME | End: 2023-12-08
Payer: MEDICARE

## 2023-12-05 DIAGNOSIS — C50.912 INVASIVE DUCTAL CARCINOMA OF BREAST, FEMALE, LEFT (HCC): ICD-10-CM

## 2023-12-05 DIAGNOSIS — N63.0 BREAST NODULE: ICD-10-CM

## 2023-12-05 PROCEDURE — 76642 ULTRASOUND BREAST LIMITED: CPT

## 2023-12-05 RX ORDER — SODIUM CHLORIDE 9 MG/ML
INJECTION, SOLUTION INTRAVENOUS PRN
Status: CANCELLED | OUTPATIENT
Start: 2023-12-05

## 2023-12-05 RX ORDER — SODIUM CHLORIDE 0.9 % (FLUSH) 0.9 %
5-40 SYRINGE (ML) INJECTION PRN
Status: CANCELLED | OUTPATIENT
Start: 2023-12-05

## 2023-12-05 RX ORDER — SODIUM CHLORIDE 0.9 % (FLUSH) 0.9 %
5-40 SYRINGE (ML) INJECTION EVERY 12 HOURS SCHEDULED
Status: CANCELLED | OUTPATIENT
Start: 2023-12-05

## 2023-12-05 NOTE — TELEPHONE ENCOUNTER
Physician provider: Abhinav Hester MD  Reason for today's call: Rx for nerves  Last office visit: n/a    Patient notified that their information will be routed to the [facility] clinical triage team for review. Patient is advised that they will receive a phone call from the triage department. If symptoms worsen before receiving a call back, the patient has been advised to proceed to the nearest ED. [facility] w/[facility] called stating Pt is scheduled for procedure and is asking to have something sent in to: [facility] [address] [facility] for nerves.

## 2023-12-07 DIAGNOSIS — F41.1 GAD (GENERALIZED ANXIETY DISORDER): ICD-10-CM

## 2023-12-07 DIAGNOSIS — F41.8 ANXIETY ABOUT HEALTH: Primary | ICD-10-CM

## 2023-12-07 RX ORDER — ALPRAZOLAM 0.5 MG/1
TABLET ORAL
Qty: 1 TABLET | Refills: 0 | Status: SHIPPED | OUTPATIENT
Start: 2023-12-07 | End: 2023-12-10

## 2023-12-08 PROBLEM — Z12.11 SPECIAL SCREENING FOR MALIGNANT NEOPLASMS, COLON: Status: ACTIVE | Noted: 2023-09-14

## 2023-12-14 ENCOUNTER — HOSPITAL ENCOUNTER (OUTPATIENT)
Dept: MAMMOGRAPHY | Age: 63
End: 2023-12-14
Payer: MEDICARE

## 2023-12-14 DIAGNOSIS — R92.8 ABNORMAL ULTRASOUND OF BREAST: ICD-10-CM

## 2023-12-14 PROCEDURE — 88305 TISSUE EXAM BY PATHOLOGIST: CPT

## 2023-12-14 PROCEDURE — 19083 BX BREAST 1ST LESION US IMAG: CPT

## 2023-12-14 PROCEDURE — 2500000003 HC RX 250 WO HCPCS: Performed by: NURSE PRACTITIONER

## 2023-12-14 RX ORDER — LIDOCAINE HYDROCHLORIDE 10 MG/ML
8 INJECTION, SOLUTION INFILTRATION; PERINEURAL ONCE
Status: COMPLETED | OUTPATIENT
Start: 2023-12-14 | End: 2023-12-14

## 2023-12-14 RX ADMIN — LIDOCAINE HYDROCHLORIDE 8 ML: 10 INJECTION, SOLUTION INFILTRATION; PERINEURAL at 10:00

## 2023-12-15 ENCOUNTER — TELEPHONE (OUTPATIENT)
Dept: MAMMOGRAPHY | Age: 63
End: 2023-12-15

## 2023-12-15 NOTE — TELEPHONE ENCOUNTER
Called patient to discuss biopsy results. Biopsy results came back benign (normal). Radiologist is recommending clinical follow up with oncologist. Explained recommendation to patient and she understood and agreed.

## 2024-01-07 PROBLEM — Z12.11 SPECIAL SCREENING FOR MALIGNANT NEOPLASMS, COLON: Status: RESOLVED | Noted: 2023-09-14 | Resolved: 2024-01-07

## 2024-01-22 DIAGNOSIS — C50.912 INVASIVE DUCTAL CARCINOMA OF BREAST, FEMALE, LEFT (HCC): Primary | ICD-10-CM

## 2024-01-22 DIAGNOSIS — E55.9 VITAMIN D DEFICIENCY: ICD-10-CM

## 2024-01-22 DIAGNOSIS — D64.9 ANEMIA, UNSPECIFIED TYPE: ICD-10-CM

## 2024-01-22 RX ORDER — SODIUM CHLORIDE 0.9 % (FLUSH) 0.9 %
5-40 SYRINGE (ML) INJECTION PRN
OUTPATIENT
Start: 2024-01-22

## 2024-01-23 ENCOUNTER — HOSPITAL ENCOUNTER (OUTPATIENT)
Dept: LAB | Age: 64
Discharge: HOME | End: 2024-01-26
Payer: MEDICARE

## 2024-01-23 ENCOUNTER — OFFICE VISIT (OUTPATIENT)
Dept: ONCOLOGY | Age: 64
End: 2024-01-23
Payer: MEDICARE

## 2024-01-23 VITALS
BODY MASS INDEX: 29.73 KG/M2 | WEIGHT: 167.8 LBS | OXYGEN SATURATION: 97 % | DIASTOLIC BLOOD PRESSURE: 68 MMHG | RESPIRATION RATE: 16 BRPM | SYSTOLIC BLOOD PRESSURE: 131 MMHG | HEIGHT: 63 IN | TEMPERATURE: 98.7 F | HEART RATE: 98 BPM

## 2024-01-23 DIAGNOSIS — C50.912 INVASIVE DUCTAL CARCINOMA OF BREAST, FEMALE, LEFT (HCC): Primary | ICD-10-CM

## 2024-01-23 DIAGNOSIS — E55.9 VITAMIN D DEFICIENCY: ICD-10-CM

## 2024-01-23 DIAGNOSIS — C50.912 INVASIVE DUCTAL CARCINOMA OF BREAST, FEMALE, LEFT (HCC): ICD-10-CM

## 2024-01-23 DIAGNOSIS — Z12.31 ENCOUNTER FOR SCREENING MAMMOGRAM FOR BREAST CANCER: ICD-10-CM

## 2024-01-23 DIAGNOSIS — D64.9 ANEMIA, UNSPECIFIED TYPE: ICD-10-CM

## 2024-01-23 LAB
25(OH)D3 SERPL-MCNC: 53 NG/ML (ref 30–100)
ALBUMIN SERPL-MCNC: 4 G/DL (ref 3.2–4.6)
ALBUMIN/GLOB SERPL: 1 (ref 0.4–1.6)
ALP SERPL-CCNC: 97 U/L (ref 50–136)
ALT SERPL-CCNC: 28 U/L (ref 12–65)
ANION GAP SERPL CALC-SCNC: 4 MMOL/L (ref 2–11)
AST SERPL-CCNC: 23 U/L (ref 15–37)
BASOPHILS # BLD: 0 K/UL (ref 0–0.2)
BASOPHILS NFR BLD: 0 % (ref 0–2)
BILIRUB SERPL-MCNC: 0.6 MG/DL (ref 0.2–1.1)
BUN SERPL-MCNC: 12 MG/DL (ref 8–23)
CALCIUM SERPL-MCNC: 9.6 MG/DL (ref 8.3–10.4)
CHLORIDE SERPL-SCNC: 106 MMOL/L (ref 103–113)
CO2 SERPL-SCNC: 30 MMOL/L (ref 21–32)
CREAT SERPL-MCNC: 0.9 MG/DL (ref 0.6–1)
DIFFERENTIAL METHOD BLD: ABNORMAL
EOSINOPHIL # BLD: 0.1 K/UL (ref 0–0.8)
EOSINOPHIL NFR BLD: 3 % (ref 0.5–7.8)
ERYTHROCYTE [DISTWIDTH] IN BLOOD BY AUTOMATED COUNT: 13.1 % (ref 11.9–14.6)
FERRITIN SERPL-MCNC: 172 NG/ML (ref 8–388)
GLOBULIN SER CALC-MCNC: 4 G/DL (ref 2.8–4.5)
GLUCOSE SERPL-MCNC: 117 MG/DL (ref 65–100)
HCT VFR BLD AUTO: 41 % (ref 35.8–46.3)
HGB BLD-MCNC: 13.7 G/DL (ref 11.7–15.4)
IMM GRANULOCYTES # BLD AUTO: 0 K/UL (ref 0–0.5)
IMM GRANULOCYTES NFR BLD AUTO: 0 % (ref 0–5)
IRON SATN MFR SERPL: 23 %
IRON SERPL-MCNC: 74 UG/DL (ref 35–150)
LYMPHOCYTES # BLD: 1.8 K/UL (ref 0.5–4.6)
LYMPHOCYTES NFR BLD: 43 % (ref 13–44)
MCH RBC QN AUTO: 29 PG (ref 26.1–32.9)
MCHC RBC AUTO-ENTMCNC: 33.4 G/DL (ref 31.4–35)
MCV RBC AUTO: 86.9 FL (ref 82–102)
MONOCYTES # BLD: 0.4 K/UL (ref 0.1–1.3)
MONOCYTES NFR BLD: 9 % (ref 4–12)
NEUTS SEG # BLD: 1.9 K/UL (ref 1.7–8.2)
NEUTS SEG NFR BLD: 45 % (ref 43–78)
NRBC # BLD: 0 K/UL (ref 0–0.2)
PLATELET # BLD AUTO: 213 K/UL (ref 150–450)
PMV BLD AUTO: 9.2 FL (ref 9.4–12.3)
POTASSIUM SERPL-SCNC: 4.1 MMOL/L (ref 3.5–5.1)
PROT SERPL-MCNC: 8 G/DL (ref 6.3–8.2)
RBC # BLD AUTO: 4.72 M/UL (ref 4.05–5.2)
SODIUM SERPL-SCNC: 140 MMOL/L (ref 136–146)
TIBC SERPL-MCNC: 323 UG/DL (ref 250–450)
WBC # BLD AUTO: 4.2 K/UL (ref 4.3–11.1)

## 2024-01-23 PROCEDURE — G8417 CALC BMI ABV UP PARAM F/U: HCPCS | Performed by: NURSE PRACTITIONER

## 2024-01-23 PROCEDURE — 36415 COLL VENOUS BLD VENIPUNCTURE: CPT

## 2024-01-23 PROCEDURE — 82728 ASSAY OF FERRITIN: CPT

## 2024-01-23 PROCEDURE — G8400 PT W/DXA NO RESULTS DOC: HCPCS | Performed by: NURSE PRACTITIONER

## 2024-01-23 PROCEDURE — 83540 ASSAY OF IRON: CPT

## 2024-01-23 PROCEDURE — G8484 FLU IMMUNIZE NO ADMIN: HCPCS | Performed by: NURSE PRACTITIONER

## 2024-01-23 PROCEDURE — G8427 DOCREV CUR MEDS BY ELIG CLIN: HCPCS | Performed by: NURSE PRACTITIONER

## 2024-01-23 PROCEDURE — 80053 COMPREHEN METABOLIC PANEL: CPT

## 2024-01-23 PROCEDURE — 3017F COLORECTAL CA SCREEN DOC REV: CPT | Performed by: NURSE PRACTITIONER

## 2024-01-23 PROCEDURE — 82306 VITAMIN D 25 HYDROXY: CPT

## 2024-01-23 PROCEDURE — 1036F TOBACCO NON-USER: CPT | Performed by: NURSE PRACTITIONER

## 2024-01-23 PROCEDURE — 1123F ACP DISCUSS/DSCN MKR DOCD: CPT | Performed by: NURSE PRACTITIONER

## 2024-01-23 PROCEDURE — 1090F PRES/ABSN URINE INCON ASSESS: CPT | Performed by: NURSE PRACTITIONER

## 2024-01-23 PROCEDURE — 99214 OFFICE O/P EST MOD 30 MIN: CPT | Performed by: NURSE PRACTITIONER

## 2024-01-23 PROCEDURE — 85025 COMPLETE CBC W/AUTO DIFF WBC: CPT

## 2024-01-23 PROCEDURE — 83550 IRON BINDING TEST: CPT

## 2024-01-23 ASSESSMENT — ENCOUNTER SYMPTOMS
COUGH: 0
NAUSEA: 0
RESPIRATORY NEGATIVE: 1
DIARRHEA: 0
ALLERGIC/IMMUNOLOGIC NEGATIVE: 1
GASTROINTESTINAL NEGATIVE: 1
EYES NEGATIVE: 1
CONSTIPATION: 0
SHORTNESS OF BREATH: 0

## 2024-01-23 ASSESSMENT — PATIENT HEALTH QUESTIONNAIRE - PHQ9
SUM OF ALL RESPONSES TO PHQ QUESTIONS 1-9: 0
SUM OF ALL RESPONSES TO PHQ9 QUESTIONS 1 & 2: 0
SUM OF ALL RESPONSES TO PHQ QUESTIONS 1-9: 0
SUM OF ALL RESPONSES TO PHQ QUESTIONS 1-9: 0
2. FEELING DOWN, DEPRESSED OR HOPELESS: 0
1. LITTLE INTEREST OR PLEASURE IN DOING THINGS: 0
SUM OF ALL RESPONSES TO PHQ QUESTIONS 1-9: 0

## 2024-01-23 NOTE — PROGRESS NOTES
[facility] Hematology and Oncology: Established patient - follow up     Chief Complaint   Patient presents with    Follow-up     Reason for Referral: Breast cancer  Referring Provider: Self-referral   Primary Care Provider: MIR Borja CNP  Family History of Cancer/Hematologic Disorders: Family history is significant for two sisters and a niece with breast cancer.   Presenting Symptoms: Abnormal routine screening mammogram     History of Present Illness:  Ms. Chavez is a 63 y.o. female who presents today for follow up regarding breast cancer.  The past medical history is significant for multiple thyroid nodules, hyperthyroidism, hypercholesterolemia, Grave's disease, GERD, uterine fibroids,  section x 2, cyst removal, and hysterectomy.  She initially presented for a routine screening mammogram on 8/10/22 which identified a spiculated equal density mass in the left breast inferior medial quadrant posterior depth.  Further evaluation with limited ultrasound of the left breast on 2022 confirmed an 8 x 7 x 8 mm round, hypoechoic mass with spiculated margins at the 9:00 position in the left breast, 6 cm from the nipple, demonstrating mild posterior acoustic enhancement and corresponding to the mass seen mammographically.  US- guided biopsy of the 9:00 left breast mass was performed on 22 with pathology revealing ER/ME/HER-2 negative, grade 3, invasive carcinoma with high-grade DCIS focally present and no microcalcifications or lymphovascular space extension identified.  Testing so far has been done at [facility]; however, Ms. Chavez wishes to transfer care to [facility]. She is self-referred to [facility] for Oncology evaluation and treatment of newly diagnosed breast cancer. She has also been referred to surgery and is scheduled for initial surgical evaluation with Dr. Chitra Araujo, on 22.   Patient's daughter lives in [place], works at a hospital.    At consultation, we discussed the

## 2024-02-05 DIAGNOSIS — E05.90 HYPERTHYROIDISM: ICD-10-CM

## 2024-02-05 LAB
T3 SERPL-MCNC: 0.89 NG/ML (ref 0.6–1.81)
T4 FREE SERPL-MCNC: 0.9 NG/DL (ref 0.78–1.46)
TSH, 3RD GENERATION: 1.52 UIU/ML (ref 0.36–3.74)

## 2024-02-07 ENCOUNTER — OFFICE VISIT (OUTPATIENT)
Dept: ENDOCRINOLOGY | Age: 64
End: 2024-02-07

## 2024-02-07 VITALS
RESPIRATION RATE: 16 BRPM | HEIGHT: 63 IN | DIASTOLIC BLOOD PRESSURE: 72 MMHG | OXYGEN SATURATION: 98 % | BODY MASS INDEX: 29.77 KG/M2 | HEART RATE: 76 BPM | WEIGHT: 168 LBS | SYSTOLIC BLOOD PRESSURE: 134 MMHG

## 2024-02-07 DIAGNOSIS — E05.00 GRAVES' DISEASE: ICD-10-CM

## 2024-02-07 DIAGNOSIS — E05.90 HYPERTHYROIDISM: Primary | ICD-10-CM

## 2024-02-07 DIAGNOSIS — E04.2 MULTIPLE THYROID NODULES: ICD-10-CM

## 2024-02-07 RX ORDER — METHIMAZOLE 5 MG/1
2.5 TABLET ORAL
Qty: 20 TABLET | Refills: 3 | Status: SHIPPED | OUTPATIENT
Start: 2024-02-07

## 2024-02-07 ASSESSMENT — ENCOUNTER SYMPTOMS
TROUBLE SWALLOWING: 0
VOICE CHANGE: 0

## 2024-02-07 NOTE — PROGRESS NOTES
Standing Expiration Date:   2/7/2025    Hepatic Function Panel     Standing Status:   Future     Standing Expiration Date:   2/7/2025       Current Outpatient Medications   Medication Sig Dispense Refill    methIMAzole (TAPAZOLE) 5 MG tablet Take 0.5 tablets by mouth three times a week 20 tablet 3    VITAMIN D PO Take by mouth daily      rosuvastatin (CRESTOR) 20 MG tablet Take 1 tablet by mouth at bedtime 90 tablet 3    omeprazole (PRILOSEC) 20 MG delayed release capsule Take 1 capsule by mouth Daily PRN 90 capsule 3    mirtazapine (REMERON) 15 MG tablet Take 1 tablet by mouth nightly as needed      Prenatal MV-Min-Fe Fum-FA-DHA (PRENATAL 1 PO) Take by mouth      Ferrous Sulfate (IRON PO) Take by mouth Three times a week      ondansetron (ZOFRAN) 8 MG tablet Take 1 tablet by mouth every 8 hours as needed for Nausea or Vomiting 60 tablet 2    Mastectomy Bra MISC by Does not apply route Mastectomy bra + prosthesis 1 each 2    Red Yeast Rice Extract 600 MG CAPS Take 600 mg by mouth daily       No current facility-administered medications for this visit.       SARAH Kent MD, FACE      Portions of this note were generated with the assistance of voice recognition software.  As such, some errors in transcription may be present.

## 2024-02-09 DIAGNOSIS — E78.2 MIXED HYPERLIPIDEMIA: ICD-10-CM

## 2024-02-09 RX ORDER — ROSUVASTATIN CALCIUM 20 MG/1
20 TABLET, COATED ORAL NIGHTLY
Qty: 90 TABLET | Refills: 0 | Status: SHIPPED | OUTPATIENT
Start: 2024-02-09

## 2024-04-23 DIAGNOSIS — D64.9 ANEMIA, UNSPECIFIED TYPE: ICD-10-CM

## 2024-04-23 DIAGNOSIS — C50.912 INVASIVE DUCTAL CARCINOMA OF BREAST, FEMALE, LEFT (HCC): Primary | ICD-10-CM

## 2024-04-23 DIAGNOSIS — E55.9 VITAMIN D DEFICIENCY: ICD-10-CM

## 2024-04-23 NOTE — PROGRESS NOTES
[facility] Hematology and Oncology: Established patient - follow up     Chief Complaint   Patient presents with    Follow-up     Reason for Referral: Breast cancer  Referring Provider: Self-referral   Primary Care Provider: MIR Borja CNP  Family History of Cancer/Hematologic Disorders: Family history is significant for two sisters and a niece with breast cancer.   Presenting Symptoms: Abnormal routine screening mammogram     History of Present Illness:  Ms. Chavez is a 63 y.o. female who presents today for follow up regarding breast cancer.  The past medical history is significant for multiple thyroid nodules, hyperthyroidism, hypercholesterolemia, Grave's disease, GERD, uterine fibroids,  section x 2, cyst removal, and hysterectomy.  She initially presented for a routine screening mammogram on 8/10/22 which identified a spiculated equal density mass in the left breast inferior medial quadrant posterior depth.  Further evaluation with limited ultrasound of the left breast on 2022 confirmed an 8 x 7 x 8 mm round, hypoechoic mass with spiculated margins at the 9:00 position in the left breast, 6 cm from the nipple, demonstrating mild posterior acoustic enhancement and corresponding to the mass seen mammographically.  US- guided biopsy of the 9:00 left breast mass was performed on 22 with pathology revealing ER/NV/HER-2 negative, grade 3, invasive carcinoma with high-grade DCIS focally present and no microcalcifications or lymphovascular space extension identified.  Testing so far has been done at [facility]; however, Ms. Chavez wishes to transfer care to [facility]. She is self-referred to [facility] for Oncology evaluation and treatment of newly diagnosed breast cancer. She has also been referred to surgery and is scheduled for initial surgical evaluation with Dr. Chitra Araujo, on 22.   Patient's daughter lives in [place], works at a hospital.    - At consultation, we discussed the

## 2024-04-24 DIAGNOSIS — D64.9 ANEMIA, UNSPECIFIED TYPE: Primary | ICD-10-CM

## 2024-04-25 ENCOUNTER — OFFICE VISIT (OUTPATIENT)
Dept: ONCOLOGY | Age: 64
End: 2024-04-25
Payer: MEDICARE

## 2024-04-25 ENCOUNTER — HOSPITAL ENCOUNTER (OUTPATIENT)
Dept: LAB | Age: 64
Discharge: HOME | End: 2024-04-25
Payer: MEDICARE

## 2024-04-25 VITALS
OXYGEN SATURATION: 96 % | HEIGHT: 63 IN | BODY MASS INDEX: 30.48 KG/M2 | HEART RATE: 91 BPM | DIASTOLIC BLOOD PRESSURE: 63 MMHG | SYSTOLIC BLOOD PRESSURE: 116 MMHG | RESPIRATION RATE: 16 BRPM | TEMPERATURE: 97.9 F | WEIGHT: 172 LBS

## 2024-04-25 DIAGNOSIS — E55.9 VITAMIN D DEFICIENCY: ICD-10-CM

## 2024-04-25 DIAGNOSIS — D64.9 ANEMIA, UNSPECIFIED TYPE: ICD-10-CM

## 2024-04-25 DIAGNOSIS — C50.912 INVASIVE DUCTAL CARCINOMA OF BREAST, FEMALE, LEFT (HCC): Primary | ICD-10-CM

## 2024-04-25 DIAGNOSIS — C50.912 INVASIVE DUCTAL CARCINOMA OF BREAST, FEMALE, LEFT (HCC): ICD-10-CM

## 2024-04-25 LAB
25(OH)D3 SERPL-MCNC: 67.1 NG/ML (ref 30–100)
ALBUMIN SERPL-MCNC: 3.9 G/DL (ref 3.2–4.6)
ALBUMIN/GLOB SERPL: 1.1 (ref 1–1.9)
ALP SERPL-CCNC: 91 U/L (ref 35–104)
ALT SERPL-CCNC: 10 U/L (ref 12–65)
ANION GAP SERPL CALC-SCNC: 11 MMOL/L (ref 9–18)
AST SERPL-CCNC: 24 U/L (ref 15–37)
BASOPHILS # BLD: 0 K/UL (ref 0–0.2)
BASOPHILS NFR BLD: 1 % (ref 0–2)
BILIRUB SERPL-MCNC: 0.4 MG/DL (ref 0–1.2)
BUN SERPL-MCNC: 12 MG/DL (ref 8–23)
CALCIUM SERPL-MCNC: 9.7 MG/DL (ref 8.8–10.2)
CHLORIDE SERPL-SCNC: 102 MMOL/L (ref 98–107)
CO2 SERPL-SCNC: 27 MMOL/L (ref 20–28)
CREAT SERPL-MCNC: 0.82 MG/DL (ref 0.6–1.1)
DIFFERENTIAL METHOD BLD: ABNORMAL
EOSINOPHIL # BLD: 0.2 K/UL (ref 0–0.8)
EOSINOPHIL NFR BLD: 4 % (ref 0.5–7.8)
ERYTHROCYTE [DISTWIDTH] IN BLOOD BY AUTOMATED COUNT: 13.2 % (ref 11.9–14.6)
FERRITIN SERPL-MCNC: 215 NG/ML (ref 8–388)
GLOBULIN SER CALC-MCNC: 3.6 G/DL (ref 2.3–3.5)
GLUCOSE SERPL-MCNC: 137 MG/DL (ref 70–99)
HCT VFR BLD AUTO: 39.5 % (ref 35.8–46.3)
HGB BLD-MCNC: 13.1 G/DL (ref 11.7–15.4)
IMM GRANULOCYTES # BLD AUTO: 0 K/UL (ref 0–0.5)
IMM GRANULOCYTES NFR BLD AUTO: 0 % (ref 0–5)
IRON SATN MFR SERPL: 26 % (ref 20–50)
IRON SERPL-MCNC: 73 UG/DL (ref 35–100)
LYMPHOCYTES # BLD: 1.7 K/UL (ref 0.5–4.6)
LYMPHOCYTES NFR BLD: 41 % (ref 13–44)
MCH RBC QN AUTO: 29 PG (ref 26.1–32.9)
MCHC RBC AUTO-ENTMCNC: 33.2 G/DL (ref 31.4–35)
MCV RBC AUTO: 87.6 FL (ref 82–102)
MONOCYTES # BLD: 0.4 K/UL (ref 0.1–1.3)
MONOCYTES NFR BLD: 9 % (ref 4–12)
NEUTS SEG # BLD: 1.9 K/UL (ref 1.7–8.2)
NEUTS SEG NFR BLD: 45 % (ref 43–78)
NRBC # BLD: 0 K/UL (ref 0–0.2)
PLATELET # BLD AUTO: 215 K/UL (ref 150–450)
PMV BLD AUTO: 9.4 FL (ref 9.4–12.3)
POTASSIUM SERPL-SCNC: 3.8 MMOL/L (ref 3.5–5.1)
PROT SERPL-MCNC: 7.4 G/DL (ref 6.3–8.2)
RBC # BLD AUTO: 4.51 M/UL (ref 4.05–5.2)
SODIUM SERPL-SCNC: 140 MMOL/L (ref 136–145)
TIBC SERPL-MCNC: 283 UG/DL (ref 240–450)
UIBC SERPL-MCNC: 210 UG/DL (ref 112–347)
WBC # BLD AUTO: 4.2 K/UL (ref 4.3–11.1)

## 2024-04-25 PROCEDURE — 85025 COMPLETE CBC W/AUTO DIFF WBC: CPT

## 2024-04-25 PROCEDURE — G8400 PT W/DXA NO RESULTS DOC: HCPCS | Performed by: INTERNAL MEDICINE

## 2024-04-25 PROCEDURE — 1123F ACP DISCUSS/DSCN MKR DOCD: CPT | Performed by: INTERNAL MEDICINE

## 2024-04-25 PROCEDURE — 36415 COLL VENOUS BLD VENIPUNCTURE: CPT

## 2024-04-25 PROCEDURE — 1090F PRES/ABSN URINE INCON ASSESS: CPT | Performed by: INTERNAL MEDICINE

## 2024-04-25 PROCEDURE — G8417 CALC BMI ABV UP PARAM F/U: HCPCS | Performed by: INTERNAL MEDICINE

## 2024-04-25 PROCEDURE — 99214 OFFICE O/P EST MOD 30 MIN: CPT | Performed by: INTERNAL MEDICINE

## 2024-04-25 PROCEDURE — 82728 ASSAY OF FERRITIN: CPT

## 2024-04-25 PROCEDURE — 83550 IRON BINDING TEST: CPT

## 2024-04-25 PROCEDURE — 3017F COLORECTAL CA SCREEN DOC REV: CPT | Performed by: INTERNAL MEDICINE

## 2024-04-25 PROCEDURE — G8427 DOCREV CUR MEDS BY ELIG CLIN: HCPCS | Performed by: INTERNAL MEDICINE

## 2024-04-25 PROCEDURE — 82306 VITAMIN D 25 HYDROXY: CPT

## 2024-04-25 PROCEDURE — 80053 COMPREHEN METABOLIC PANEL: CPT

## 2024-04-25 PROCEDURE — 1036F TOBACCO NON-USER: CPT | Performed by: INTERNAL MEDICINE

## 2024-04-25 PROCEDURE — 83540 ASSAY OF IRON: CPT

## 2024-04-25 ASSESSMENT — PATIENT HEALTH QUESTIONNAIRE - PHQ9
1. LITTLE INTEREST OR PLEASURE IN DOING THINGS: NOT AT ALL
2. FEELING DOWN, DEPRESSED OR HOPELESS: NOT AT ALL
SUM OF ALL RESPONSES TO PHQ QUESTIONS 1-9: 0
SUM OF ALL RESPONSES TO PHQ QUESTIONS 1-9: 0
SUM OF ALL RESPONSES TO PHQ9 QUESTIONS 1 & 2: 0
SUM OF ALL RESPONSES TO PHQ QUESTIONS 1-9: 0
SUM OF ALL RESPONSES TO PHQ QUESTIONS 1-9: 0

## 2024-04-25 NOTE — PATIENT INSTRUCTIONS
Patient Information from Today's Visit    Labs reviewed.  Symptoms reviewed.  Mammogram due in August 2024.  You can call to schedule this at [phone].  Recommend [employer].    Treatment Summary has been discussed and given to patient:N/A    Follow Up: 3-4 months.    Please refer to After Visit Summary or MyChart for upcoming appointment information. If you have any questions regarding your upcoming schedule please reach out to your care team through [employer] or call [phone].      -------------------------------------------------------------------------------------------------------------------  Please call our office at [phone] if you have any  of the following symptoms:   Fever of 100.5 or greater  Chills  Shortness of breath  Swelling or pain in one leg    After office hours an answering service is available and will contact a provider for emergencies or if you are experiencing any of the above symptoms.    Patient did express an interest in My Chart.  My Chart log in information explained on the after visit summary printout at the check-out desk.    GINGER MARTINS RN      Your Oncology Care Team:  Maria Guadalupe Adler MD - Hematologist/Oncologist  Evelina Turk APRN-CNP - Nurse Practitioner  ALPHONSE Urban RN - Registered Nurse  Renata Gaines - Medical Assistant  Sandra Byrne -

## 2024-05-29 ENCOUNTER — OFFICE VISIT (OUTPATIENT)
Dept: FAMILY MEDICINE CLINIC | Facility: CLINIC | Age: 64
End: 2024-05-29
Payer: MEDICARE

## 2024-05-29 VITALS
OXYGEN SATURATION: 98 % | DIASTOLIC BLOOD PRESSURE: 78 MMHG | WEIGHT: 171 LBS | HEIGHT: 63 IN | SYSTOLIC BLOOD PRESSURE: 124 MMHG | HEART RATE: 95 BPM | BODY MASS INDEX: 30.3 KG/M2 | TEMPERATURE: 97.6 F | RESPIRATION RATE: 16 BRPM

## 2024-05-29 DIAGNOSIS — R30.0 DYSURIA: ICD-10-CM

## 2024-05-29 DIAGNOSIS — N30.90 CYSTITIS: Primary | ICD-10-CM

## 2024-05-29 DIAGNOSIS — N30.90 CYSTITIS: ICD-10-CM

## 2024-05-29 LAB
BACTERIA URINE, POC: ABNORMAL
BILIRUBIN, URINE, POC: NEGATIVE
BLOOD URINE, POC: ABNORMAL
CASTS URINE, POC: 0
EPI CELLS URINE, POC: ABNORMAL
GLUCOSE URINE, POC: NEGATIVE
KETONES, URINE, POC: NEGATIVE
LEUKOCYTE ESTERASE, URINE, POC: NEGATIVE
NITRITE, URINE, POC: NEGATIVE
PH, URINE, POC: 5.5 (ref 4.6–8)
PROTEIN,URINE, POC: NEGATIVE
RBC, URINE, POC: ABNORMAL
SPECIFIC GRAVITY, URINE, POC: 1.02 (ref 1–1.03)
TRICHOMONAS URINE, POC: NEGATIVE
URINALYSIS CLARITY, POC: CLEAR
URINALYSIS COLOR, POC: YELLOW
UROBILINOGEN, POC: NORMAL
WBC, URINE, POC: NEGATIVE
YEAST, URINE, POC: NEGATIVE

## 2024-05-29 PROCEDURE — 81000 URINALYSIS NONAUTO W/SCOPE: CPT | Performed by: NURSE PRACTITIONER

## 2024-05-29 PROCEDURE — G8400 PT W/DXA NO RESULTS DOC: HCPCS | Performed by: NURSE PRACTITIONER

## 2024-05-29 PROCEDURE — 1123F ACP DISCUSS/DSCN MKR DOCD: CPT | Performed by: NURSE PRACTITIONER

## 2024-05-29 PROCEDURE — 99214 OFFICE O/P EST MOD 30 MIN: CPT | Performed by: NURSE PRACTITIONER

## 2024-05-29 PROCEDURE — G8428 CUR MEDS NOT DOCUMENT: HCPCS | Performed by: NURSE PRACTITIONER

## 2024-05-29 PROCEDURE — 1090F PRES/ABSN URINE INCON ASSESS: CPT | Performed by: NURSE PRACTITIONER

## 2024-05-29 PROCEDURE — 3017F COLORECTAL CA SCREEN DOC REV: CPT | Performed by: NURSE PRACTITIONER

## 2024-05-29 PROCEDURE — G8417 CALC BMI ABV UP PARAM F/U: HCPCS | Performed by: NURSE PRACTITIONER

## 2024-05-29 PROCEDURE — 1036F TOBACCO NON-USER: CPT | Performed by: NURSE PRACTITIONER

## 2024-05-29 RX ORDER — PHENAZOPYRIDINE HYDROCHLORIDE 100 MG/1
100 TABLET, FILM COATED ORAL 3 TIMES DAILY PRN
Qty: 60 TABLET | Refills: 0 | Status: SHIPPED | OUTPATIENT
Start: 2024-05-29 | End: 2025-05-29

## 2024-05-29 SDOH — ECONOMIC STABILITY: INCOME INSECURITY: HOW HARD IS IT FOR YOU TO PAY FOR THE VERY BASICS LIKE FOOD, HOUSING, MEDICAL CARE, AND HEATING?: NOT HARD AT ALL

## 2024-05-29 SDOH — ECONOMIC STABILITY: FOOD INSECURITY: WITHIN THE PAST 12 MONTHS, YOU WORRIED THAT YOUR FOOD WOULD RUN OUT BEFORE YOU GOT MONEY TO BUY MORE.: NEVER TRUE

## 2024-05-29 SDOH — ECONOMIC STABILITY: FOOD INSECURITY: WITHIN THE PAST 12 MONTHS, THE FOOD YOU BOUGHT JUST DIDN'T LAST AND YOU DIDN'T HAVE MONEY TO GET MORE.: NEVER TRUE

## 2024-05-29 ASSESSMENT — ENCOUNTER SYMPTOMS
NAUSEA: 0
DIARRHEA: 0
RHINORRHEA: 0
CONSTIPATION: 0
ABDOMINAL PAIN: 0
SHORTNESS OF BREATH: 0
WHEEZING: 0
EYE DISCHARGE: 0
BLOOD IN STOOL: 0
EYE PAIN: 0
CHEST TIGHTNESS: 0
VOMITING: 0
COUGH: 0

## 2024-05-29 ASSESSMENT — PATIENT HEALTH QUESTIONNAIRE - PHQ9
SUM OF ALL RESPONSES TO PHQ QUESTIONS 1-9: 0
SUM OF ALL RESPONSES TO PHQ9 QUESTIONS 1 & 2: 0
SUM OF ALL RESPONSES TO PHQ QUESTIONS 1-9: 0
1. LITTLE INTEREST OR PLEASURE IN DOING THINGS: NOT AT ALL
2. FEELING DOWN, DEPRESSED OR HOPELESS: NOT AT ALL

## 2024-05-29 NOTE — PATIENT INSTRUCTIONS
professional. [employer] disclaims any warranty or liability for your use of this information.

## 2024-05-29 NOTE — PROGRESS NOTES
by mouth daily     No current facility-administered medications for this visit.     Patient Active Problem List   Diagnosis    BMI 27.0-27.9,adult    Multiple thyroid nodules    Graves' disease    Hyperthyroidism    HLD (hyperlipidemia)    Reflux gastritis    Invasive ductal carcinoma of breast, female, left (HCC)    Decreased appetite    Vitamin D deficiency    Anemia    Port-A-Cath in place         Review of Systems   Constitutional:  Negative for chills, fatigue and fever.   HENT:  Negative for congestion, hearing loss, postnasal drip and rhinorrhea.    Eyes:  Negative for pain, discharge and visual disturbance.   Respiratory:  Negative for cough, chest tightness, shortness of breath and wheezing.    Cardiovascular:  Negative for chest pain, palpitations and leg swelling.        BP has been good when she has had it checked.    Gastrointestinal:  Negative for abdominal pain, blood in stool, constipation, diarrhea, nausea and vomiting.   Genitourinary:  Positive for dysuria, flank pain, frequency and urgency. Negative for decreased urine volume, difficulty urinating, hematuria, hesitancy and vaginal discharge.   Musculoskeletal:  Negative for arthralgias, gait problem and myalgias.   Skin:  Negative for rash.   Neurological:  Negative for dizziness, tremors, seizures and headaches.   Psychiatric/Behavioral:  Negative for decreased concentration and sleep disturbance. The patient is not nervous/anxious.       /78   Pulse 95   Temp 97.6 °F (36.4 °C) (Temporal)   Resp 16   Ht 1.6 m (5' 3\")   Wt 77.6 kg (171 lb)   SpO2 98%   BMI 30.29 kg/m²       Objective   Physical Exam  Constitutional:       Appearance: Normal appearance.   HENT:      Head: Normocephalic and atraumatic.      Right Ear: Tympanic membrane, ear canal and external ear normal.      Left Ear: Tympanic membrane, ear canal and external ear normal.      Nose: Nose normal.      Mouth/Throat:      Mouth: Mucous membranes are moist.   Eyes:

## 2024-05-31 ENCOUNTER — TELEPHONE (OUTPATIENT)
Dept: ONCOLOGY | Age: 64
End: 2024-05-31

## 2024-05-31 NOTE — TELEPHONE ENCOUNTER
Attempt to call pt x 2.  No answer - left HIPAA complaint VM for pt to call us back.    Calling pt to check in and see if left chest wall lesion at mastectomy site has changed or if it has been stable.  Next attempted outreach planned for next week.

## 2024-06-01 LAB
BACTERIA SPEC CULT: NORMAL
BACTERIA SPEC CULT: NORMAL
SERVICE CMNT-IMP: NORMAL

## 2024-06-11 NOTE — PATIENT INSTRUCTIONS
Chief Complaint   Patient presents with    Annual Exam     \"Have you been to the ER, urgent care clinic since your last visit?  Hospitalized since your last visit?\"    NO    “Have you seen or consulted any other health care providers outside of [facility] since your last visit?”    YES - When: approximately 1 days ago.  Where and Why: Dermatology [facility] .           Patient Instructions from Today's Visit    Reason for Visit:  New patient visit for breast cancer      Plan:    You have newly diagnosed right breast IDC (invasive/infiltrating ductal carcinoma). This means the cancer cells have infiltrated out of the duct into the surrounding tissue. There are 3 receptors on the breast cancer cells - estrogen receptor, progesterone receptor, and [id number] receptor. Yours is negative for all 3 receptors - called triple negative. Met with Dr. Kip Adorno 8/31. Genetics to schedule an appointment to discuss genetic testing. Will schedule echo. MRI breast scheduled for 9/8. Your case will be discussed in tumor board today with surgery, radiation, radiology, pathology, etc. to discuss a plan. In your case, we need to get the MRI results to see if any lymph nodes light up. If these lymph nodes are positive, we may consider doing chemotherapy before surgery. If these are not positive, surgery could be first and then chemotherapy, but generally with triple negative breast cancer, we recommend chemotherapy upfront. The 2 chemo regimens recommend either before or after surgery (will decide when/what to give after MRI):    A(adriamycin) C (cytoxan) T (paclitaxel): AC given once every 2 weeks x 4 treatments. Then you get the T weekly x 12 treatments. Total of 20 weeks. This regimen is preferred pending cardiology evaluation and echo results in order to give the adriamycin.   +/- immunotherapy/carboplatin      2.   T (docetaxel) C (cytoxan): TC given once every 3 weeks x 4 treatments (12 weeks)    Follow Up:  - after MRI    Recent Lab Results:      Treatment Summary has been discussed and given to patient: n/a        -------------------------------------------------------------------------------------------------------------------  Please call our office at [phone] if you have any  of the following symptoms:   Fever of 100.5 or greater  Chills  Shortness of breath  Swelling or pain in one leg    After office hours an answering service is available and will contact a provider for emergencies or if you are experiencing any of the above symptoms. Patient did express an interest in My Chart. My Chart log in information explained on the after visit summary printout at the [facility] [address] desk.     Retia Bosworth, RN      Heart sounds: No murmur heard.     No friction rub. No gallop.   Pulmonary:      Effort: Pulmonary effort is normal.      Breath sounds: Normal breath sounds.   Abdominal:      General: Abdomen is flat. There is no distension.      Palpations: Abdomen is soft. There is no mass.      Hernia: No hernia is present.   Musculoskeletal:         General: Normal range of motion.      Cervical back: Normal range of motion.   Skin:     General: Skin is warm and dry.   Neurological:      Mental Status: He is alert. Mental status is at baseline.   Psychiatric:         Mood and Affect: Mood normal.         Behavior: Behavior normal.         Judgment: Judgment normal.

## 2024-06-14 DIAGNOSIS — E05.90 HYPERTHYROIDISM: ICD-10-CM

## 2024-06-14 RX ORDER — METHIMAZOLE 5 MG/1
2.5 TABLET ORAL
Qty: 20 TABLET | Refills: 3 | Status: SHIPPED | OUTPATIENT
Start: 2024-06-14

## 2024-06-14 NOTE — TELEPHONE ENCOUNTER
Pt called stating she is out of town and forgot her meds. She would like us to send it to the pharmacy pending with medication

## 2024-06-18 ENCOUNTER — TELEPHONE (OUTPATIENT)
Dept: ONCOLOGY | Age: 64
End: 2024-06-18

## 2024-06-18 NOTE — TELEPHONE ENCOUNTER
Call to pt to follow up and see if there have been any changes re chest wall site.  Pt reports no changes and that is has been relatively stable.  Pt confirmed appts and upcoming mammogram.  Encouraged pt to call us or message us if there are any concerns or changes to the site.  Pt VU and appreciated the call.

## 2024-08-05 DIAGNOSIS — E05.90 HYPERTHYROIDISM: ICD-10-CM

## 2024-08-05 LAB
ALBUMIN SERPL-MCNC: 3.9 G/DL (ref 3.2–4.6)
ALBUMIN/GLOB SERPL: 1.2 (ref 1–1.9)
ALP SERPL-CCNC: 92 U/L (ref 35–104)
ALT SERPL-CCNC: 19 U/L (ref 12–65)
AST SERPL-CCNC: 26 U/L (ref 15–37)
BILIRUB DIRECT SERPL-MCNC: <0.2 MG/DL (ref 0–0.4)
BILIRUB SERPL-MCNC: 0.5 MG/DL (ref 0–1.2)
GLOBULIN SER CALC-MCNC: 3.3 G/DL (ref 2.3–3.5)
PROT SERPL-MCNC: 7.1 G/DL (ref 6.3–8.2)
T3 SERPL-MCNC: 0.8 NG/ML (ref 0.6–1.81)
T4 FREE SERPL-MCNC: 1.1 NG/DL (ref 0.9–1.7)
TSH, 3RD GENERATION: 1.64 UIU/ML (ref 0.27–4.2)

## 2024-08-07 ENCOUNTER — OFFICE VISIT (OUTPATIENT)
Dept: ENDOCRINOLOGY | Age: 64
End: 2024-08-07
Payer: MEDICARE

## 2024-08-07 VITALS — SYSTOLIC BLOOD PRESSURE: 120 MMHG | WEIGHT: 170 LBS | BODY MASS INDEX: 30.11 KG/M2 | DIASTOLIC BLOOD PRESSURE: 70 MMHG

## 2024-08-07 DIAGNOSIS — E05.90 HYPERTHYROIDISM: Primary | ICD-10-CM

## 2024-08-07 DIAGNOSIS — E05.00 GRAVES' DISEASE: ICD-10-CM

## 2024-08-07 DIAGNOSIS — E04.2 MULTIPLE THYROID NODULES: ICD-10-CM

## 2024-08-07 PROCEDURE — 1123F ACP DISCUSS/DSCN MKR DOCD: CPT | Performed by: INTERNAL MEDICINE

## 2024-08-07 PROCEDURE — 1036F TOBACCO NON-USER: CPT | Performed by: INTERNAL MEDICINE

## 2024-08-07 PROCEDURE — 3017F COLORECTAL CA SCREEN DOC REV: CPT | Performed by: INTERNAL MEDICINE

## 2024-08-07 PROCEDURE — G8427 DOCREV CUR MEDS BY ELIG CLIN: HCPCS | Performed by: INTERNAL MEDICINE

## 2024-08-07 PROCEDURE — G2211 COMPLEX E/M VISIT ADD ON: HCPCS | Performed by: INTERNAL MEDICINE

## 2024-08-07 PROCEDURE — G8400 PT W/DXA NO RESULTS DOC: HCPCS | Performed by: INTERNAL MEDICINE

## 2024-08-07 PROCEDURE — G8417 CALC BMI ABV UP PARAM F/U: HCPCS | Performed by: INTERNAL MEDICINE

## 2024-08-07 PROCEDURE — 99214 OFFICE O/P EST MOD 30 MIN: CPT | Performed by: INTERNAL MEDICINE

## 2024-08-07 PROCEDURE — 1090F PRES/ABSN URINE INCON ASSESS: CPT | Performed by: INTERNAL MEDICINE

## 2024-08-07 RX ORDER — METHIMAZOLE 5 MG/1
2.5 TABLET ORAL
Qty: 20 TABLET | Refills: 3 | Status: SHIPPED | OUTPATIENT
Start: 2024-08-07

## 2024-08-07 ASSESSMENT — ENCOUNTER SYMPTOMS
TROUBLE SWALLOWING: 0
VOICE CHANGE: 0

## 2024-08-07 NOTE — PROGRESS NOTES
SARAH Kent MD, [facility] ENDOCRINOLOGY   AND   THYROID NODULE CLINIC            Reason for visit: Follow-up of hyperthyroidism and thyroid nodules      ASSESSMENT AND PLAN:    1. Hyperthyroidism  She is biochemically euthyroid.  Continue low-dose methimazole as prescribed.  Reassess in 6 months.  I will plan to provide her with longitudinal care for this problem.  - methIMAzole (TAPAZOLE) 5 MG tablet; Take 0.5 tablets by mouth three times a week  Dispense: 20 tablet; Refill: 3  - TSH; Future  - T4, Free; Future  - T3; Future    2. Graves' disease    3. Multiple thyroid nodules  Prior biopsies of the dominant nodules in the left lobe in March 2018 were fortunately benign.  Ultrasound was repeated most recently in May 2023.  The nodule at the left lower pole had increased slightly in size.  I will repeat ultrasound at her next appointment.        Follow-up and Dispositions    Return in about 6 months (around 2/7/2025).             History of Present Illness:    THYROID DYSFUNCTION  Devi Chavez (\"Ri-wilson-kelsea\") is seen for follow-up of hyperthyroidism (now known to be caused by Graves' disease) and thyroid nodules.  Hyperthyroidism appears to have been present (off and on) since at least late 2015.   Thyroid nodules were noted in February 2018 as part of the evaluation of hyperthyroidism.      Current symptoms: See review of systems below     Prior treatment: Methimazole 10 mg daily was started 2/12/2018.  Her dose has been adjusted as follows:  -5 mg daily 3/30/2018  -2.5 mg daily 10/5/2018  -discontinued 11/17/2021  -2.5 mg daily 2/25/2022  -2.5 mg daily 3 days/week 11/7/2023     Pertinent labs:  12/14/2015: TSH 0.090, free T4 1.41, free T3 3.4.  1/4/2016: TSH 0.083.  6/13/2016: TSH 2.090.  1/20/2017: TSH 0.347.  9/21/2017: TSH 1.720.  1/26/2018: TSH less than 0.006.  2/1/2018: Free T4 2.19, T3 176, thyroid-stimulating immunoglobulins 419% (+).  3/26/2018: TSH less than 0.006, free T4 1.33, T3

## 2024-08-19 ENCOUNTER — HOSPITAL ENCOUNTER (OUTPATIENT)
Dept: MAMMOGRAPHY | Age: 64
Discharge: HOME | End: 2024-08-22
Payer: MEDICARE

## 2024-08-19 VITALS — BODY MASS INDEX: 31.28 KG/M2 | HEIGHT: 62 IN | WEIGHT: 170 LBS

## 2024-08-19 DIAGNOSIS — Z12.31 ENCOUNTER FOR SCREENING MAMMOGRAM FOR BREAST CANCER: ICD-10-CM

## 2024-08-19 PROCEDURE — 77063 BREAST TOMOSYNTHESIS BI: CPT

## 2024-08-26 ENCOUNTER — HOSPITAL ENCOUNTER (OUTPATIENT)
Dept: LAB | Age: 64
Discharge: HOME | End: 2024-08-29
Payer: MEDICARE

## 2024-08-26 DIAGNOSIS — C50.912 INVASIVE DUCTAL CARCINOMA OF BREAST, FEMALE, LEFT (HCC): ICD-10-CM

## 2024-08-26 DIAGNOSIS — E55.9 VITAMIN D DEFICIENCY: ICD-10-CM

## 2024-08-26 LAB
25(OH)D3 SERPL-MCNC: 66.9 NG/ML (ref 30–100)
ALBUMIN SERPL-MCNC: 4 G/DL (ref 3.2–4.6)
ALBUMIN/GLOB SERPL: 1.2 (ref 1–1.9)
ALP SERPL-CCNC: 96 U/L (ref 35–104)
ALT SERPL-CCNC: 21 U/L (ref 12–65)
ANION GAP SERPL CALC-SCNC: 11 MMOL/L (ref 9–18)
AST SERPL-CCNC: 28 U/L (ref 15–37)
BASOPHILS # BLD: 0 K/UL (ref 0–0.2)
BASOPHILS NFR BLD: 0 % (ref 0–2)
BILIRUB SERPL-MCNC: 0.5 MG/DL (ref 0–1.2)
BUN SERPL-MCNC: 12 MG/DL (ref 8–23)
CALCIUM SERPL-MCNC: 9.8 MG/DL (ref 8.8–10.2)
CHLORIDE SERPL-SCNC: 103 MMOL/L (ref 98–107)
CO2 SERPL-SCNC: 28 MMOL/L (ref 20–28)
CREAT SERPL-MCNC: 1.03 MG/DL (ref 0.6–1.1)
DIFFERENTIAL METHOD BLD: ABNORMAL
EOSINOPHIL # BLD: 0.2 K/UL (ref 0–0.8)
EOSINOPHIL NFR BLD: 5 % (ref 0.5–7.8)
ERYTHROCYTE [DISTWIDTH] IN BLOOD BY AUTOMATED COUNT: 13.2 % (ref 11.9–14.6)
GLOBULIN SER CALC-MCNC: 3.4 G/DL (ref 2.3–3.5)
GLUCOSE SERPL-MCNC: 138 MG/DL (ref 70–99)
HCT VFR BLD AUTO: 42.3 % (ref 35.8–46.3)
HGB BLD-MCNC: 13.7 G/DL (ref 11.7–15.4)
IMM GRANULOCYTES # BLD AUTO: 0 K/UL (ref 0–0.5)
IMM GRANULOCYTES NFR BLD AUTO: 0 % (ref 0–5)
LYMPHOCYTES # BLD: 1.5 K/UL (ref 0.5–4.6)
LYMPHOCYTES NFR BLD: 40 % (ref 13–44)
MCH RBC QN AUTO: 29.1 PG (ref 26.1–32.9)
MCHC RBC AUTO-ENTMCNC: 32.4 G/DL (ref 31.4–35)
MCV RBC AUTO: 89.8 FL (ref 82–102)
MONOCYTES # BLD: 0.3 K/UL (ref 0.1–1.3)
MONOCYTES NFR BLD: 7 % (ref 4–12)
NEUTS SEG # BLD: 1.8 K/UL (ref 1.7–8.2)
NEUTS SEG NFR BLD: 48 % (ref 43–78)
NRBC # BLD: 0 K/UL (ref 0–0.2)
PLATELET # BLD AUTO: 205 K/UL (ref 150–450)
PMV BLD AUTO: 9.2 FL (ref 9.4–12.3)
POTASSIUM SERPL-SCNC: 3.9 MMOL/L (ref 3.5–5.1)
PROT SERPL-MCNC: 7.4 G/DL (ref 6.3–8.2)
RBC # BLD AUTO: 4.71 M/UL (ref 4.05–5.2)
SODIUM SERPL-SCNC: 142 MMOL/L (ref 136–145)
WBC # BLD AUTO: 3.7 K/UL (ref 4.3–11.1)

## 2024-08-26 PROCEDURE — 85025 COMPLETE CBC W/AUTO DIFF WBC: CPT

## 2024-08-26 PROCEDURE — 82306 VITAMIN D 25 HYDROXY: CPT

## 2024-08-26 PROCEDURE — 80053 COMPREHEN METABOLIC PANEL: CPT

## 2024-08-26 PROCEDURE — 36415 COLL VENOUS BLD VENIPUNCTURE: CPT

## 2024-09-11 ENCOUNTER — OFFICE VISIT (OUTPATIENT)
Dept: ONCOLOGY | Age: 64
End: 2024-09-11
Payer: MEDICARE

## 2024-09-11 VITALS
WEIGHT: 171 LBS | DIASTOLIC BLOOD PRESSURE: 70 MMHG | RESPIRATION RATE: 16 BRPM | SYSTOLIC BLOOD PRESSURE: 132 MMHG | OXYGEN SATURATION: 96 % | TEMPERATURE: 97.5 F | HEIGHT: 63 IN | HEART RATE: 78 BPM | BODY MASS INDEX: 30.3 KG/M2

## 2024-09-11 DIAGNOSIS — C50.912 INVASIVE DUCTAL CARCINOMA OF BREAST, FEMALE, LEFT (HCC): Primary | ICD-10-CM

## 2024-09-11 PROCEDURE — 99213 OFFICE O/P EST LOW 20 MIN: CPT

## 2024-09-11 PROCEDURE — G8427 DOCREV CUR MEDS BY ELIG CLIN: HCPCS

## 2024-09-11 PROCEDURE — 1123F ACP DISCUSS/DSCN MKR DOCD: CPT

## 2024-09-11 PROCEDURE — G8417 CALC BMI ABV UP PARAM F/U: HCPCS

## 2024-09-11 PROCEDURE — 1090F PRES/ABSN URINE INCON ASSESS: CPT

## 2024-09-11 PROCEDURE — 3017F COLORECTAL CA SCREEN DOC REV: CPT

## 2024-09-11 PROCEDURE — G8400 PT W/DXA NO RESULTS DOC: HCPCS

## 2024-09-11 PROCEDURE — 1036F TOBACCO NON-USER: CPT

## 2024-09-11 RX ORDER — GARLIC 200 MG
TABLET ORAL
COMMUNITY

## 2024-09-11 ASSESSMENT — PATIENT HEALTH QUESTIONNAIRE - PHQ9
SUM OF ALL RESPONSES TO PHQ QUESTIONS 1-9: 0
SUM OF ALL RESPONSES TO PHQ QUESTIONS 1-9: 0
1. LITTLE INTEREST OR PLEASURE IN DOING THINGS: NOT AT ALL
SUM OF ALL RESPONSES TO PHQ QUESTIONS 1-9: 0
SUM OF ALL RESPONSES TO PHQ9 QUESTIONS 1 & 2: 0
2. FEELING DOWN, DEPRESSED OR HOPELESS: NOT AT ALL
SUM OF ALL RESPONSES TO PHQ QUESTIONS 1-9: 0

## 2024-10-09 ENCOUNTER — TELEPHONE (OUTPATIENT)
Dept: ONCOLOGY | Age: 64
End: 2024-10-09

## 2024-10-09 NOTE — TELEPHONE ENCOUNTER
Call to pt to check in on left chest wall lesion we have been monitoring and if she has been monitoring as well.  Pt reports that she feels it and checks it and it has been stable - no changes that she can tell.  Pt reports not measuring it - educated patient on how to measure the area and let her know the previous measurements so she can compare.  Pt VU and agreeable to that.  Pt VU to call us if she notices any changes in size or differences in the area.  Pt appreciated the call.

## 2024-11-08 DIAGNOSIS — E78.2 MIXED HYPERLIPIDEMIA: ICD-10-CM

## 2024-11-08 RX ORDER — ROSUVASTATIN CALCIUM 20 MG/1
20 TABLET, COATED ORAL NIGHTLY
Qty: 90 TABLET | Refills: 0 | Status: SHIPPED | OUTPATIENT
Start: 2024-11-08 | End: 2024-11-25 | Stop reason: SDUPTHER

## 2024-11-08 NOTE — TELEPHONE ENCOUNTER
RETURN TO WORK    3/13/2018      Re: Isreal MARIO Luong III        YOB: 1983        [address] [place] [address]                      This is to certify that Isreal has been under my care from 03/13/18    RESTRICTIONS: none      REMARKS:  Please excuse Isreal from work today        SIGNATURE:___________________________________________      Dr. Carlos Knapp          [address] [place] [id number] [phone]     Crestor sent to her pharmacy.  [facility].  Cha Trinidad

## 2024-11-25 ENCOUNTER — OFFICE VISIT (OUTPATIENT)
Dept: FAMILY MEDICINE CLINIC | Facility: CLINIC | Age: 64
End: 2024-11-25

## 2024-11-25 VITALS
BODY MASS INDEX: 30.3 KG/M2 | WEIGHT: 171 LBS | OXYGEN SATURATION: 97 % | SYSTOLIC BLOOD PRESSURE: 122 MMHG | DIASTOLIC BLOOD PRESSURE: 80 MMHG | RESPIRATION RATE: 16 BRPM | HEIGHT: 63 IN | HEART RATE: 74 BPM | TEMPERATURE: 97 F

## 2024-11-25 DIAGNOSIS — E78.2 MIXED HYPERLIPIDEMIA: ICD-10-CM

## 2024-11-25 DIAGNOSIS — Z01.419 ENCOUNTER FOR WELL WOMAN EXAM WITH ROUTINE GYNECOLOGICAL EXAM: ICD-10-CM

## 2024-11-25 DIAGNOSIS — L29.9: ICD-10-CM

## 2024-11-25 DIAGNOSIS — Z00.00 INITIAL MEDICARE ANNUAL WELLNESS VISIT: Primary | ICD-10-CM

## 2024-11-25 DIAGNOSIS — N63.21 MASS OF UPPER OUTER QUADRANT OF LEFT BREAST: ICD-10-CM

## 2024-11-25 DIAGNOSIS — K21.9 GASTROESOPHAGEAL REFLUX DISEASE WITHOUT ESOPHAGITIS: ICD-10-CM

## 2024-11-25 LAB
CHOLEST SERPL-MCNC: 160 MG/DL (ref 0–200)
HDLC SERPL-MCNC: 54 MG/DL (ref 40–60)
HDLC SERPL: 3 (ref 0–5)
LDLC SERPL CALC-MCNC: 81 MG/DL (ref 0–100)
TRIGL SERPL-MCNC: 127 MG/DL (ref 0–150)
VLDLC SERPL CALC-MCNC: 25 MG/DL (ref 6–23)

## 2024-11-25 RX ORDER — FLUOCINONIDE 0.5 MG/G
CREAM TOPICAL
Qty: 60 G | Refills: 0 | Status: SHIPPED | OUTPATIENT
Start: 2024-11-25

## 2024-11-25 RX ORDER — ROSUVASTATIN CALCIUM 20 MG/1
20 TABLET, COATED ORAL NIGHTLY
Qty: 90 TABLET | Refills: 3 | Status: SHIPPED | OUTPATIENT
Start: 2024-11-25

## 2024-11-25 SDOH — HEALTH STABILITY: PHYSICAL HEALTH: ON AVERAGE, HOW MANY MINUTES DO YOU ENGAGE IN EXERCISE AT THIS LEVEL?: 20 MIN

## 2024-11-25 SDOH — ECONOMIC STABILITY: FOOD INSECURITY: WITHIN THE PAST 12 MONTHS, THE FOOD YOU BOUGHT JUST DIDN'T LAST AND YOU DIDN'T HAVE MONEY TO GET MORE.: NEVER TRUE

## 2024-11-25 SDOH — ECONOMIC STABILITY: FOOD INSECURITY: WITHIN THE PAST 12 MONTHS, YOU WORRIED THAT YOUR FOOD WOULD RUN OUT BEFORE YOU GOT MONEY TO BUY MORE.: NEVER TRUE

## 2024-11-25 SDOH — ECONOMIC STABILITY: INCOME INSECURITY: HOW HARD IS IT FOR YOU TO PAY FOR THE VERY BASICS LIKE FOOD, HOUSING, MEDICAL CARE, AND HEATING?: NOT HARD AT ALL

## 2024-11-25 SDOH — HEALTH STABILITY: PHYSICAL HEALTH: ON AVERAGE, HOW MANY DAYS PER WEEK DO YOU ENGAGE IN MODERATE TO STRENUOUS EXERCISE (LIKE A BRISK WALK)?: 3 DAYS

## 2024-11-25 ASSESSMENT — PATIENT HEALTH QUESTIONNAIRE - PHQ9
SUM OF ALL RESPONSES TO PHQ QUESTIONS 1-9: 0
SUM OF ALL RESPONSES TO PHQ QUESTIONS 1-9: 0
2. FEELING DOWN, DEPRESSED OR HOPELESS: NOT AT ALL
SUM OF ALL RESPONSES TO PHQ QUESTIONS 1-9: 0
SUM OF ALL RESPONSES TO PHQ9 QUESTIONS 1 & 2: 0
1. LITTLE INTEREST OR PLEASURE IN DOING THINGS: NOT AT ALL
SUM OF ALL RESPONSES TO PHQ QUESTIONS 1-9: 0

## 2024-11-25 ASSESSMENT — ENCOUNTER SYMPTOMS
VOMITING: 0
EYE PAIN: 0
BLOOD IN STOOL: 0
COUGH: 0
CONSTIPATION: 0
ABDOMINAL PAIN: 0
CHEST TIGHTNESS: 0
SHORTNESS OF BREATH: 0
RHINORRHEA: 0
WHEEZING: 0
DIARRHEA: 0
EYE DISCHARGE: 0

## 2024-11-25 ASSESSMENT — VISUAL ACUITY
OS_CC: 20/25
OD_CC: 20/25

## 2024-11-25 ASSESSMENT — LIFESTYLE VARIABLES
HOW MANY STANDARD DRINKS CONTAINING ALCOHOL DO YOU HAVE ON A TYPICAL DAY: 1 OR 2
HOW OFTEN DO YOU HAVE A DRINK CONTAINING ALCOHOL: MONTHLY OR LESS
HOW MANY STANDARD DRINKS CONTAINING ALCOHOL DO YOU HAVE ON A TYPICAL DAY: 1
HOW OFTEN DO YOU HAVE A DRINK CONTAINING ALCOHOL: 2
HOW OFTEN DO YOU HAVE SIX OR MORE DRINKS ON ONE OCCASION: 1

## 2024-11-25 NOTE — ASSESSMENT & PLAN NOTE
Orders:    rosuvastatin (CRESTOR) 20 MG tablet; Take 1 tablet by mouth at bedtime    Lipid Panel; Future    Low fat choices with more vegetables and less processed foods

## 2024-11-25 NOTE — PROGRESS NOTES
Devi Chavez (:  1960) is a 64 y.o. female,Established patient, here for evaluation of the following chief complaint(s):  Medicare AWV (Initial AWV), Gynecologic Exam, and Medication Refill         Assessment & Plan  Initial Medicare annual wellness visit     Anticipatory guidance for age-seatbelts, avoid cell phone use in car (may use hands free), no texting while driving, avoid social drugs and tobacco, limit alcohol, fall safety, personal safety.  Encourage healthy diet and exercise daily.    Patient will complete her ACP documents and bring them up for scanning to her chart.    Wants to wait on vaccines--until after the holidays.        Encounter for well woman exam with routine gynecological exam   Chronic, at goal (stable), continue current treatment plan    Orders:    PAP IG, Aptima HPV and rfx 16/18,45 (427013); Future    Await pap results.   If breast mass increases in size--patient may need ultrasound.  Reviewed oncologist note.    Mass of upper outer quadrant of left breast   New, uncertain prognosis, managed by oncologist    Will do ultrasound if it increases in size.  .        Itching at injection site   Acute condition, new, chronic  begin lidex.  Incision looks good.      Orders:    fluocinonide (LIDEX) 0.05 % cream; Apply topically 2 times daily.    Begin steroid cream which should relieve the itching.    Mixed hyperlipidemia       Orders:    rosuvastatin (CRESTOR) 20 MG tablet; Take 1 tablet by mouth at bedtime    Lipid Panel; Future    Low fat choices with more vegetables and less processed foods  Gastroesophageal reflux disease without esophagitis   Chronic, at goal (stable), continue current treatment plan, medication adherence emphasized, and lifestyle modifications recommended    Orders:    omeprazole (PRILOSEC) 20 MG delayed release capsule; Take 1 capsule by mouth Daily PRN    Continue omeprazole.    Take on empty stomach  Avoid food triggers.     Return in 6 months (on

## 2024-11-27 NOTE — PATIENT INSTRUCTIONS
vision is often tested as part of a routine exam. You may also have this test when you apply for a job where recognizing different colors is important, such as , electronics, or the .  How are vision tests done?  Visual acuity test   You cover one eye at a time.  You read aloud from a wall chart across the room.  You read aloud from a small card that you hold in your hand.  Refraction   You look into a special device.  The device puts lenses of different strengths in front of each eye to see how strong your glasses or contact lenses need to be.  Visual field tests   Your doctor may have you look through special machines.  Or your doctor may simply have you stare straight ahead while they move a finger into and out of your field of vision.  Color vision test   You look at pieces of printed test patterns in various colors. You say what number or symbol you see.  Your doctor may have you trace the number or symbol using a pointer.  How do these tests feel?  There is very little chance of having a problem from this test. If dilating drops are used for a vision test, they may make the eyes sting and cause a medicine taste in the mouth.  Follow-up care is a key part of your treatment and safety. Be sure to make and go to all appointments, and call your doctor if you are having problems. It's also a good idea to know your test results and keep a list of the medicines you take.  Where can you learn more?  Go to https://www.[employer].net/patientEd and enter G551 to learn more about \"Learning About Vision Tests.\"  Current as of: June 5, 2023  Content Version: 14.2  © 2024 [employer].   Care instructions adapted under license by [employer]. If you have questions about a medical condition or this instruction, always ask your healthcare professional. [employer] disclaims any warranty or liability for your use of this information.           Starting a Weight Loss Plan: Care

## 2024-12-02 LAB
COLLECTION METHOD: NORMAL
CYTOLOGIST CVX/VAG CYTO: NORMAL
CYTOLOGY CVX/VAG DOC THIN PREP: NORMAL
HPV APTIMA: NEGATIVE
HPV GENOTYPE REFLEX: NORMAL
Lab: NORMAL
OTHER PT INFO: NORMAL
PAP SOURCE: NORMAL
PATH REPORT.FINAL DX SPEC: NORMAL
PREV TREATMENT RESULTS: NORMAL
PREV TREATMENT: NORMAL
STAT OF ADQ CVX/VAG CYTO-IMP: NORMAL

## 2025-02-05 ENCOUNTER — OFFICE VISIT (OUTPATIENT)
Dept: FAMILY MEDICINE CLINIC | Facility: CLINIC | Age: 65
End: 2025-02-05
Payer: MEDICARE

## 2025-02-05 VITALS
DIASTOLIC BLOOD PRESSURE: 60 MMHG | OXYGEN SATURATION: 98 % | HEIGHT: 63 IN | HEART RATE: 82 BPM | SYSTOLIC BLOOD PRESSURE: 114 MMHG | BODY MASS INDEX: 30.44 KG/M2 | WEIGHT: 171.8 LBS

## 2025-02-05 DIAGNOSIS — R31.9 HEMATURIA, UNSPECIFIED TYPE: ICD-10-CM

## 2025-02-05 DIAGNOSIS — R31.9 HEMATURIA, UNSPECIFIED TYPE: Primary | ICD-10-CM

## 2025-02-05 LAB
APPEARANCE UR: CLEAR
BACTERIA URNS QL MICRO: ABNORMAL /HPF
BILIRUB UR QL: NEGATIVE
BILIRUBIN, URINE, POC: NEGATIVE
BLOOD URINE, POC: NORMAL
CASTS URNS QL MICRO: 0 /LPF
COLOR UR: ABNORMAL
CRYSTALS URNS QL MICRO: ABNORMAL /LPF
EPI CELLS #/AREA URNS HPF: ABNORMAL /HPF
GLUCOSE UR STRIP.AUTO-MCNC: NEGATIVE MG/DL
GLUCOSE URINE, POC: NEGATIVE
HGB UR QL STRIP: NEGATIVE
KETONES UR QL STRIP.AUTO: NEGATIVE MG/DL
KETONES, URINE, POC: NEGATIVE
LEUKOCYTE ESTERASE UR QL STRIP.AUTO: NEGATIVE
LEUKOCYTE ESTERASE, URINE, POC: NEGATIVE
MUCOUS THREADS URNS QL MICRO: 0 /LPF
NITRITE UR QL STRIP.AUTO: NEGATIVE
NITRITE, URINE, POC: NEGATIVE
OTHER OBSERVATIONS: ABNORMAL
PH UR STRIP: 5 (ref 5–9)
PH, URINE, POC: 5.5 (ref 4.6–8)
PROT UR STRIP-MCNC: NEGATIVE MG/DL
PROTEIN,URINE, POC: NEGATIVE
RBC #/AREA URNS HPF: ABNORMAL /HPF
SP GR UR REFRACTOMETRY: 1.02 (ref 1–1.02)
SPECIFIC GRAVITY, URINE, POC: 1.03 (ref 1–1.03)
URINALYSIS CLARITY, POC: NORMAL
URINALYSIS COLOR, POC: YELLOW
URINE CULTURE IF INDICATED: ABNORMAL
UROBILINOGEN UR QL STRIP.AUTO: 0.2 EU/DL (ref 0.2–1)
UROBILINOGEN, POC: NORMAL
WBC URNS QL MICRO: ABNORMAL /HPF

## 2025-02-05 PROCEDURE — G8427 DOCREV CUR MEDS BY ELIG CLIN: HCPCS | Performed by: FAMILY MEDICINE

## 2025-02-05 PROCEDURE — 1123F ACP DISCUSS/DSCN MKR DOCD: CPT | Performed by: FAMILY MEDICINE

## 2025-02-05 PROCEDURE — 99213 OFFICE O/P EST LOW 20 MIN: CPT | Performed by: FAMILY MEDICINE

## 2025-02-05 PROCEDURE — 81003 URINALYSIS AUTO W/O SCOPE: CPT | Performed by: FAMILY MEDICINE

## 2025-02-05 PROCEDURE — 3017F COLORECTAL CA SCREEN DOC REV: CPT | Performed by: FAMILY MEDICINE

## 2025-02-05 PROCEDURE — 1090F PRES/ABSN URINE INCON ASSESS: CPT | Performed by: FAMILY MEDICINE

## 2025-02-05 PROCEDURE — G8417 CALC BMI ABV UP PARAM F/U: HCPCS | Performed by: FAMILY MEDICINE

## 2025-02-05 PROCEDURE — G8400 PT W/DXA NO RESULTS DOC: HCPCS | Performed by: FAMILY MEDICINE

## 2025-02-05 PROCEDURE — 1159F MED LIST DOCD IN RCRD: CPT | Performed by: FAMILY MEDICINE

## 2025-02-05 PROCEDURE — 1126F AMNT PAIN NOTED NONE PRSNT: CPT | Performed by: FAMILY MEDICINE

## 2025-02-05 PROCEDURE — 1036F TOBACCO NON-USER: CPT | Performed by: FAMILY MEDICINE

## 2025-02-05 RX ORDER — CEPHALEXIN 500 MG/1
500 CAPSULE ORAL 3 TIMES DAILY
Qty: 15 CAPSULE | Refills: 0 | Status: SHIPPED | OUTPATIENT
Start: 2025-02-05 | End: 2025-02-10

## 2025-02-05 RX ORDER — FLUCONAZOLE 150 MG/1
TABLET ORAL
Qty: 2 TABLET | Refills: 0 | Status: SHIPPED | OUTPATIENT
Start: 2025-02-05

## 2025-02-05 ASSESSMENT — PATIENT HEALTH QUESTIONNAIRE - PHQ9
1. LITTLE INTEREST OR PLEASURE IN DOING THINGS: NOT AT ALL
SUM OF ALL RESPONSES TO PHQ QUESTIONS 1-9: 0
2. FEELING DOWN, DEPRESSED OR HOPELESS: NOT AT ALL
SUM OF ALL RESPONSES TO PHQ9 QUESTIONS 1 & 2: 0
SUM OF ALL RESPONSES TO PHQ QUESTIONS 1-9: 0

## 2025-02-06 NOTE — PROGRESS NOTES
Devi Chavez (:  1960) is a 64 y.o. female,Established patient, here for evaluation of the following chief complaint(s):  Vaginal Bleeding (Patient states she had 2 episodes of \"really light pink discharge after urinating\" last week. She declines any urinary concerns or pelvic pain.)         Assessment & Plan  Hematuria, unspecified type   Treating empirically for infection as this may be the cause of the hematuria.  If no infection found and the bleeding persists, will consider options for further w/u with the patient by MyChart or phone.    Addendum: The patient had 3+ bacteria in her urine on micro this evening.  We have begun Keflex.  Awaiting results of culture to see if the infection is well covered.  Will get results to the patient at that point.    Orders:    AMB POC URINALYSIS DIP STICK AUTO W/O MICRO    Urinalysis with Reflex to Culture; Future      FU with LEBRON in May       Subjective   She has seen light blood tinging after voiding twice lately.  Denies any vag dc.  Last week when she was out of town.  No dysuria, frequency or urgency.  No f/c.  Has had a number of UTIs in past.    Vaginal Bleeding        Review of Systems   Genitourinary:  Positive for vaginal bleeding.          Objective   Physical Exam  AVSS abd soft and not tender to palp.  No back tenderness             An electronic signature was used to authenticate this note.    --Efren Soto MD

## 2025-02-06 NOTE — ASSESSMENT & PLAN NOTE
Treating empirically for infection as this may be the cause of the hematuria.  If no infection found and the bleeding persists, will consider options for further w/u with the patient by MyChart or phone.    Addendum: The patient had 3+ bacteria in her urine on micro this evening.  We have begun Keflex.  Awaiting results of culture to see if the infection is well covered.  Will get results to the patient at that point.    Orders:    AMB POC URINALYSIS DIP STICK AUTO W/O MICRO    Urinalysis with Reflex to Culture; Future

## 2025-02-06 NOTE — PATIENT INSTRUCTIONS
Drink extra water to help flush out your bladder and clear any infection.  We will call you after your urine test results are available to talk about any further testing that might be needed.  Best wishes, Dr. Soto

## 2025-02-07 LAB
BACTERIA SPEC CULT: NORMAL
SERVICE CMNT-IMP: NORMAL

## 2025-02-07 NOTE — PROGRESS NOTES
I called her and to her voice mail.  No blood noted on micro or dip at the lab.  Cult grew only skin farrah.  Recommended that we finish abx and only refer for eval if she has recurrence of bleeding.

## 2025-02-21 DIAGNOSIS — E05.90 HYPERTHYROIDISM: ICD-10-CM

## 2025-02-21 DIAGNOSIS — C50.912 INVASIVE DUCTAL CARCINOMA OF BREAST, FEMALE, LEFT (HCC): ICD-10-CM

## 2025-02-21 DIAGNOSIS — E55.9 VITAMIN D DEFICIENCY: ICD-10-CM

## 2025-02-21 LAB
25(OH)D3 SERPL-MCNC: 54.3 NG/ML (ref 30–100)
ALBUMIN SERPL-MCNC: 3.9 G/DL (ref 3.2–4.6)
ALBUMIN SERPL-MCNC: 3.9 G/DL (ref 3.2–4.6)
ALBUMIN/GLOB SERPL: 1.1 (ref 1–1.9)
ALBUMIN/GLOB SERPL: 1.2 (ref 1–1.9)
ALP SERPL-CCNC: 96 U/L (ref 35–104)
ALP SERPL-CCNC: 98 U/L (ref 35–104)
ALT SERPL-CCNC: 23 U/L (ref 8–45)
ALT SERPL-CCNC: 23 U/L (ref 8–45)
ANION GAP SERPL CALC-SCNC: 9 MMOL/L (ref 7–16)
AST SERPL-CCNC: 25 U/L (ref 15–37)
AST SERPL-CCNC: 25 U/L (ref 15–37)
BASOPHILS # BLD: 0.02 K/UL (ref 0–0.2)
BASOPHILS NFR BLD: 0.5 % (ref 0–2)
BILIRUB DIRECT SERPL-MCNC: <0.2 MG/DL (ref 0–0.4)
BILIRUB SERPL-MCNC: 0.4 MG/DL (ref 0–1.2)
BILIRUB SERPL-MCNC: 0.4 MG/DL (ref 0–1.2)
BUN SERPL-MCNC: 11 MG/DL (ref 8–23)
CALCIUM SERPL-MCNC: 9.8 MG/DL (ref 8.8–10.2)
CHLORIDE SERPL-SCNC: 103 MMOL/L (ref 98–107)
CO2 SERPL-SCNC: 29 MMOL/L (ref 20–29)
CREAT SERPL-MCNC: 0.85 MG/DL (ref 0.6–1.1)
DIFFERENTIAL METHOD BLD: ABNORMAL
EOSINOPHIL # BLD: 0.15 K/UL (ref 0–0.8)
EOSINOPHIL NFR BLD: 3.5 % (ref 0.5–7.8)
ERYTHROCYTE [DISTWIDTH] IN BLOOD BY AUTOMATED COUNT: 13.2 % (ref 11.9–14.6)
GLOBULIN SER CALC-MCNC: 3.3 G/DL (ref 2.3–3.5)
GLOBULIN SER CALC-MCNC: 3.4 G/DL (ref 2.3–3.5)
GLUCOSE SERPL-MCNC: 120 MG/DL (ref 70–99)
HCT VFR BLD AUTO: 40.8 % (ref 35.8–46.3)
HGB BLD-MCNC: 13.3 G/DL (ref 11.7–15.4)
IMM GRANULOCYTES # BLD AUTO: 0.02 K/UL (ref 0–0.5)
IMM GRANULOCYTES NFR BLD AUTO: 0.5 % (ref 0–5)
LYMPHOCYTES # BLD: 1.93 K/UL (ref 0.5–4.6)
LYMPHOCYTES NFR BLD: 45.4 % (ref 13–44)
MCH RBC QN AUTO: 29.4 PG (ref 26.1–32.9)
MCHC RBC AUTO-ENTMCNC: 32.6 G/DL (ref 31.4–35)
MCV RBC AUTO: 90.3 FL (ref 82–102)
MONOCYTES # BLD: 0.36 K/UL (ref 0.1–1.3)
MONOCYTES NFR BLD: 8.5 % (ref 4–12)
NEUTS SEG # BLD: 1.77 K/UL (ref 1.7–8.2)
NEUTS SEG NFR BLD: 41.6 % (ref 43–78)
NRBC # BLD: 0 K/UL (ref 0–0.2)
PLATELET # BLD AUTO: 224 K/UL (ref 150–450)
PMV BLD AUTO: 9.8 FL (ref 9.4–12.3)
POTASSIUM SERPL-SCNC: 4 MMOL/L (ref 3.5–5.1)
PROT SERPL-MCNC: 7.1 G/DL (ref 6.3–8.2)
PROT SERPL-MCNC: 7.2 G/DL (ref 6.3–8.2)
RBC # BLD AUTO: 4.52 M/UL (ref 4.05–5.2)
SODIUM SERPL-SCNC: 142 MMOL/L (ref 136–145)
T3 SERPL-MCNC: 0.87 NG/ML (ref 0.6–1.81)
T4 FREE SERPL-MCNC: 1.1 NG/DL (ref 0.9–1.7)
TSH, 3RD GENERATION: 1.41 UIU/ML (ref 0.27–4.2)
WBC # BLD AUTO: 4.3 K/UL (ref 4.3–11.1)

## 2025-02-21 NOTE — PROGRESS NOTES
SARAH Kent MD, [facility] ENDOCRINOLOGY   AND   THYROID NODULE CLINIC            Reason for visit: Follow-up of hyperthyroidism and thyroid nodules      ASSESSMENT AND PLAN:    1. Hyperthyroidism  She is biochemically euthyroid.  Continue low-dose methimazole as prescribed.  Reassess in 6 months.  I will plan to provide her with longitudinal care for this problem.  - methIMAzole (TAPAZOLE) 5 MG tablet; Take 0.5 tablets by mouth three times a week  Dispense: 20 tablet; Refill: 3  - TSH; Future  - T4, Free; Future  - T3; Future    2. Graves' disease    3. Multiple thyroid nodules  Prior biopsies of the dominant nodules in the left lobe in March 2018 were fortunately benign.  Ultrasound was repeated most recently in May 2023.  The nodule at the left lower pole had increased slightly in size.  I will repeat ultrasound at her next appointment.        Follow-up and Dispositions    Return in about 6 months (around 8/24/2025).               History of Present Illness:    THYROID DYSFUNCTION  Dvei Chavez (\"Ri-wilson-kelsea\") is seen for follow-up of hyperthyroidism (now known to be caused by Graves' disease) and thyroid nodules.  Hyperthyroidism appears to have been present (off and on) since at least late 2015.   Thyroid nodules were noted in February 2018 as part of the evaluation of hyperthyroidism.      Current symptoms: See review of systems below     Prior treatment: Methimazole 10 mg daily was started 2/12/2018.  Her dose has been adjusted as follows:  -5 mg daily 3/30/2018  -2.5 mg daily 10/5/2018  -discontinued 11/17/2021  -2.5 mg daily 2/25/2022  -2.5 mg daily 3 days/week 11/7/2023     Pertinent labs:  12/14/2015: TSH 0.090, free T4 1.41, free T3 3.4.  1/4/2016: TSH 0.083.  6/13/2016: TSH 2.090.  1/20/2017: TSH 0.347.  9/21/2017: TSH 1.720.  1/26/2018: TSH less than 0.006.  2/1/2018: Free T4 2.19, T3 176, thyroid-stimulating immunoglobulins 419% (+).  3/26/2018: TSH less than 0.006, free T4 1.33, T3

## 2025-02-24 ENCOUNTER — OFFICE VISIT (OUTPATIENT)
Dept: ENDOCRINOLOGY | Age: 65
End: 2025-02-24
Payer: MEDICARE

## 2025-02-24 ENCOUNTER — TRANSCRIBE ORDERS (OUTPATIENT)
Dept: SCHEDULING | Age: 65
End: 2025-02-24

## 2025-02-24 VITALS
OXYGEN SATURATION: 97 % | SYSTOLIC BLOOD PRESSURE: 115 MMHG | BODY MASS INDEX: 30.3 KG/M2 | WEIGHT: 171 LBS | HEART RATE: 83 BPM | HEIGHT: 63 IN | DIASTOLIC BLOOD PRESSURE: 70 MMHG | RESPIRATION RATE: 16 BRPM

## 2025-02-24 DIAGNOSIS — E04.2 MULTIPLE THYROID NODULES: ICD-10-CM

## 2025-02-24 DIAGNOSIS — E05.00 GRAVES' DISEASE: ICD-10-CM

## 2025-02-24 DIAGNOSIS — E05.90 HYPERTHYROIDISM: Primary | ICD-10-CM

## 2025-02-24 DIAGNOSIS — Z12.31 VISIT FOR SCREENING MAMMOGRAM: Primary | ICD-10-CM

## 2025-02-24 PROCEDURE — 1123F ACP DISCUSS/DSCN MKR DOCD: CPT | Performed by: INTERNAL MEDICINE

## 2025-02-24 PROCEDURE — 1159F MED LIST DOCD IN RCRD: CPT | Performed by: INTERNAL MEDICINE

## 2025-02-24 PROCEDURE — 99214 OFFICE O/P EST MOD 30 MIN: CPT | Performed by: INTERNAL MEDICINE

## 2025-02-24 PROCEDURE — 1036F TOBACCO NON-USER: CPT | Performed by: INTERNAL MEDICINE

## 2025-02-24 PROCEDURE — 1090F PRES/ABSN URINE INCON ASSESS: CPT | Performed by: INTERNAL MEDICINE

## 2025-02-24 PROCEDURE — G8417 CALC BMI ABV UP PARAM F/U: HCPCS | Performed by: INTERNAL MEDICINE

## 2025-02-24 PROCEDURE — G8427 DOCREV CUR MEDS BY ELIG CLIN: HCPCS | Performed by: INTERNAL MEDICINE

## 2025-02-24 PROCEDURE — G8400 PT W/DXA NO RESULTS DOC: HCPCS | Performed by: INTERNAL MEDICINE

## 2025-02-24 PROCEDURE — 1126F AMNT PAIN NOTED NONE PRSNT: CPT | Performed by: INTERNAL MEDICINE

## 2025-02-24 PROCEDURE — G2211 COMPLEX E/M VISIT ADD ON: HCPCS | Performed by: INTERNAL MEDICINE

## 2025-02-24 PROCEDURE — 3017F COLORECTAL CA SCREEN DOC REV: CPT | Performed by: INTERNAL MEDICINE

## 2025-02-24 RX ORDER — METHIMAZOLE 5 MG/1
2.5 TABLET ORAL
Qty: 20 TABLET | Refills: 3 | Status: SHIPPED | OUTPATIENT
Start: 2025-02-24

## 2025-03-20 DIAGNOSIS — C50.912 INVASIVE DUCTAL CARCINOMA OF BREAST, FEMALE, LEFT: Primary | ICD-10-CM

## 2025-03-20 DIAGNOSIS — E55.9 VITAMIN D DEFICIENCY: ICD-10-CM

## 2025-03-20 NOTE — PROGRESS NOTES
C/o chills low enegry, no appetite, no fever x1wk. Advised pt to go to her closest urgent care to have a COVID-19 test performed. If pt's symptoms continue and her test is negative we can bring her into the office to be evaluated by Dr. Joel Hutchinson. Pt understood and was thankful for the call. [facility] Hematology and Oncology: Established patient - follow up     Chief Complaint   Patient presents with    Follow-up     Reason for Referral: Breast cancer  Referring Provider: Self-referral   Primary Care Provider: MIR Borja CNP  Family History of Cancer/Hematologic Disorders: Family history is significant for two sisters and a niece with breast cancer.   Presenting Symptoms: Abnormal routine screening mammogram     History of Present Illness:  Ms. Chavez is a 64 y.o. female who presents today for follow up regarding breast cancer.  The past medical history is significant for multiple thyroid nodules, hyperthyroidism, hypercholesterolemia, Grave's disease, GERD, uterine fibroids,  section x 2, cyst removal, and hysterectomy.  She initially presented for a routine screening mammogram on 8/10/22 which identified a spiculated equal density mass in the left breast inferior medial quadrant posterior depth.  Further evaluation with limited ultrasound of the left breast on 2022 confirmed an 8 x 7 x 8 mm round, hypoechoic mass with spiculated margins at the 9:00 position in the left breast, 6 cm from the nipple, demonstrating mild posterior acoustic enhancement and corresponding to the mass seen mammographically.  US- guided biopsy of the 9:00 left breast mass was performed on 22 with pathology revealing ER/MI/HER-2 negative, grade 3, invasive carcinoma with high-grade DCIS focally present and no microcalcifications or lymphovascular space extension identified.  Testing so far has been done at [facility]; however, Ms. Chavez wishes to transfer care to [facility]. She is self-referred to [facility] for Oncology evaluation and treatment of newly diagnosed breast cancer. She has also been referred to surgery and is scheduled for initial surgical evaluation with Dr. Chitra Araujo, on 22.   Patient's daughter lives in [place], works at a hospital.    - At consultation, we discussed the

## 2025-03-26 ENCOUNTER — OFFICE VISIT (OUTPATIENT)
Dept: ONCOLOGY | Age: 65
End: 2025-03-26
Payer: MEDICARE

## 2025-03-26 VITALS
HEIGHT: 63 IN | WEIGHT: 171 LBS | BODY MASS INDEX: 30.3 KG/M2 | RESPIRATION RATE: 16 BRPM | OXYGEN SATURATION: 95 % | DIASTOLIC BLOOD PRESSURE: 62 MMHG | TEMPERATURE: 97.9 F | SYSTOLIC BLOOD PRESSURE: 133 MMHG | HEART RATE: 83 BPM

## 2025-03-26 DIAGNOSIS — N63.0 BREAST NODULE: ICD-10-CM

## 2025-03-26 DIAGNOSIS — E55.9 VITAMIN D DEFICIENCY: ICD-10-CM

## 2025-03-26 DIAGNOSIS — Z78.0 ENCOUNTER FOR OSTEOPOROSIS SCREENING IN ASYMPTOMATIC POSTMENOPAUSAL PATIENT: ICD-10-CM

## 2025-03-26 DIAGNOSIS — Z13.820 ENCOUNTER FOR OSTEOPOROSIS SCREENING IN ASYMPTOMATIC POSTMENOPAUSAL PATIENT: ICD-10-CM

## 2025-03-26 DIAGNOSIS — C50.912 INVASIVE DUCTAL CARCINOMA OF BREAST, FEMALE, LEFT: Primary | ICD-10-CM

## 2025-03-26 PROCEDURE — 1125F AMNT PAIN NOTED PAIN PRSNT: CPT | Performed by: INTERNAL MEDICINE

## 2025-03-26 PROCEDURE — 1036F TOBACCO NON-USER: CPT | Performed by: INTERNAL MEDICINE

## 2025-03-26 PROCEDURE — 1123F ACP DISCUSS/DSCN MKR DOCD: CPT | Performed by: INTERNAL MEDICINE

## 2025-03-26 PROCEDURE — 1090F PRES/ABSN URINE INCON ASSESS: CPT | Performed by: INTERNAL MEDICINE

## 2025-03-26 PROCEDURE — G2211 COMPLEX E/M VISIT ADD ON: HCPCS | Performed by: INTERNAL MEDICINE

## 2025-03-26 PROCEDURE — G8400 PT W/DXA NO RESULTS DOC: HCPCS | Performed by: INTERNAL MEDICINE

## 2025-03-26 PROCEDURE — G8417 CALC BMI ABV UP PARAM F/U: HCPCS | Performed by: INTERNAL MEDICINE

## 2025-03-26 PROCEDURE — 3017F COLORECTAL CA SCREEN DOC REV: CPT | Performed by: INTERNAL MEDICINE

## 2025-03-26 PROCEDURE — 99215 OFFICE O/P EST HI 40 MIN: CPT | Performed by: INTERNAL MEDICINE

## 2025-03-26 PROCEDURE — G8428 CUR MEDS NOT DOCUMENT: HCPCS | Performed by: INTERNAL MEDICINE

## 2025-03-26 ASSESSMENT — PATIENT HEALTH QUESTIONNAIRE - PHQ9
SUM OF ALL RESPONSES TO PHQ QUESTIONS 1-9: 0
2. FEELING DOWN, DEPRESSED OR HOPELESS: NOT AT ALL
SUM OF ALL RESPONSES TO PHQ QUESTIONS 1-9: 0
1. LITTLE INTEREST OR PLEASURE IN DOING THINGS: NOT AT ALL

## 2025-03-26 NOTE — PATIENT INSTRUCTIONS
Patient Information from Today's Visit    The members of your [facility] are listed below:    Physician Provider: Maria Guadalupe Adler, Medical Oncologist  Registered Nurse: Audrey SANDERS RN  Navigator: Mihaela PRICE RN or Chanell SLOAN RN  Medical Assistant: Renata JUNIOR MA  : Sandra MIN   Supportive Care Services: Cristina SANTO LMSW    Diagnosis: Breast      Follow Up Instructions: 3-4 months.    Labs reviewed.  Symptoms reviewed.  DEXA bone density scan recommended.  You can call to schedule this at [phone].  Mammogram due in August as scheduled.  Referral to Dr. Gomez with surgery.    Treatment Summary has been discussed and given to patient:N/A      Current Labs:   Orders Only on 02/21/2025   Component Date Value Ref Range Status    Vit D, 25-Hydroxy 02/21/2025 54.3  30.0 - 100.0 ng/mL Final    Comment: Deficiency         <20.0 ng/mL  Insufficiency       20.0-29.9 ng/mL      Sodium 02/21/2025 142  136 - 145 mmol/L Final    Potassium 02/21/2025 4.0  3.5 - 5.1 mmol/L Final    Chloride 02/21/2025 103  98 - 107 mmol/L Final    CO2 02/21/2025 29  20 - 29 mmol/L Final    Anion Gap 02/21/2025 9  7 - 16 mmol/L Final    Glucose 02/21/2025 120 (H)  70 - 99 mg/dL Final    Comment: <70 mg/dL Consistent with, but not fully diagnostic of hypoglycemia.  100 - 125 mg/dL Impaired fasting glucose/consistent with pre-diabetes mellitus.  > 126 mg/dl Fasting glucose consistent with overt diabetes mellitus      BUN 02/21/2025 11  8 - 23 MG/DL Final    Creatinine 02/21/2025 0.85  0.60 - 1.10 MG/DL Final    Est, Glom Filt Rate 02/21/2025 76  >60 ml/min/1.73m2 Final    Comment:   Pediatric calculator link: https://www.kidney.org/professionals/kdoqi/gfr_calculatorped    These results are not intended for use in patients <18 years of age.    eGFR results are calculated without a race factor using  the 2021 CKD-EPI equation. Careful clinical correlation is recommended, particularly when comparing to results calculated using

## 2025-03-27 ENCOUNTER — TELEPHONE (OUTPATIENT)
Dept: ONCOLOGY | Age: 65
End: 2025-03-27

## 2025-03-27 NOTE — TELEPHONE ENCOUNTER
Physician provider: Dr. Adler  Reason for today's call (Please detail here patients chief complaint): Surgery scheduling   Last office visit:na  Patient Callback Number: [phone]  Was callback number verified?: Yes  Preferred pharmacy (If refill request): na  Veriified that patient confirmed no refills left at pharmacy? :No  Calls to office within the last 48 hours?:No    Warm Transfer to (For Red Words): na    Patient notified that their information will be routed to the [facility] clinical triage team for review. Patient is advised that they will receive a phone call from the triage department. If symptom related and symptoms worsen before receiving a call back, the patient has been advised to proceed to the nearest ED.     Raul called with [facility] surgery scheduling asking about patient seeing Dr. Gomez. Provider will not be in office until the third week of April, and they are not sure when patient can get surgery. Raul is wondering if patient needs to be seen sooner than May for procedure?

## 2025-04-03 ENCOUNTER — HOSPITAL ENCOUNTER (OUTPATIENT)
Dept: MAMMOGRAPHY | Age: 65
Discharge: HOME | End: 2025-04-03
Attending: INTERNAL MEDICINE
Payer: MEDICARE

## 2025-04-03 DIAGNOSIS — Z13.820 ENCOUNTER FOR OSTEOPOROSIS SCREENING IN ASYMPTOMATIC POSTMENOPAUSAL PATIENT: ICD-10-CM

## 2025-04-03 DIAGNOSIS — Z78.0 ENCOUNTER FOR OSTEOPOROSIS SCREENING IN ASYMPTOMATIC POSTMENOPAUSAL PATIENT: ICD-10-CM

## 2025-04-03 PROCEDURE — 77080 DXA BONE DENSITY AXIAL: CPT

## 2025-04-14 ENCOUNTER — RESULTS FOLLOW-UP (OUTPATIENT)
Dept: ONCOLOGY | Age: 65
End: 2025-04-14

## 2025-04-28 ENCOUNTER — OFFICE VISIT (OUTPATIENT)
Dept: SURGERY | Age: 65
End: 2025-04-28
Payer: MEDICARE

## 2025-04-28 VITALS
SYSTOLIC BLOOD PRESSURE: 128 MMHG | HEIGHT: 61 IN | HEART RATE: 84 BPM | WEIGHT: 168 LBS | DIASTOLIC BLOOD PRESSURE: 74 MMHG | BODY MASS INDEX: 31.72 KG/M2

## 2025-04-28 DIAGNOSIS — N63.0 BREAST NODULE: Primary | ICD-10-CM

## 2025-04-28 DIAGNOSIS — C50.912 INVASIVE DUCTAL CARCINOMA OF BREAST, FEMALE, LEFT (HCC): ICD-10-CM

## 2025-04-28 DIAGNOSIS — F40.240 CLAUSTROPHOBIA: ICD-10-CM

## 2025-04-28 DIAGNOSIS — N64.4 MASTODYNIA OF LEFT BREAST: ICD-10-CM

## 2025-04-28 PROCEDURE — G8399 PT W/DXA RESULTS DOCUMENT: HCPCS | Performed by: SURGERY

## 2025-04-28 PROCEDURE — 3017F COLORECTAL CA SCREEN DOC REV: CPT | Performed by: SURGERY

## 2025-04-28 PROCEDURE — 1123F ACP DISCUSS/DSCN MKR DOCD: CPT | Performed by: SURGERY

## 2025-04-28 PROCEDURE — 1159F MED LIST DOCD IN RCRD: CPT | Performed by: SURGERY

## 2025-04-28 PROCEDURE — G8427 DOCREV CUR MEDS BY ELIG CLIN: HCPCS | Performed by: SURGERY

## 2025-04-28 PROCEDURE — G8417 CALC BMI ABV UP PARAM F/U: HCPCS | Performed by: SURGERY

## 2025-04-28 PROCEDURE — 1036F TOBACCO NON-USER: CPT | Performed by: SURGERY

## 2025-04-28 PROCEDURE — 1090F PRES/ABSN URINE INCON ASSESS: CPT | Performed by: SURGERY

## 2025-04-28 PROCEDURE — 99214 OFFICE O/P EST MOD 30 MIN: CPT | Performed by: SURGERY

## 2025-04-28 RX ORDER — DIAZEPAM 5 MG/1
TABLET ORAL
Qty: 2 TABLET | Refills: 0 | Status: SHIPPED | OUTPATIENT
Start: 2025-04-28 | End: 2025-05-28

## 2025-04-28 NOTE — PROGRESS NOTES
Chief Complaint:   Chief Complaint   Patient presents with    New Patient     Breast nodule, hx of L breast IDC      HPI:   Current Status:  Devi Chavez is a 64 y.o. female who presents today for evaluation of left breast mass in the mastectomy site. She was kindly referred by Dr. Canchola. The patient had a palpable lump/change in clinical breast exam/abnormal screening mammogram.  In  patient identified the breast mass and at that time had an ultrasound and a biopsy.  The biopsy noted to be scar tissue with no cancer diagnosis.  Patient has a history of breast cancer.  Left breast 15 mm IDC.  Triple negative which required AC-T for chemotherapy.  Her lymph nodes were evaluated at the time of her mastectomy 0 out of 2 positive.  Patient is a stage I triple negative breast cancer from .  Patient had a mastectomy at the time of treatment due to patient preference.    Pathology: IDC 15 mm 0 out of 2 lymph nodes triple negative at the time of mastectomy.  All margins clear.  Biopsy of mass on the left mastectomy site came back noncancerous.  Recent Imaging:    I personally reviewed her imaging including left screening mammogram on no concerning findings.,  Right targeted breast ultrasound on 1.8 x 2.1 cm mass, ultrasound-guided core needle biopsy of left breast OB/GYN History and Risk Factors for Breast Cancer:    Patient has a strong family history of breast cancer patient was genetically tested at the time of diagnosis in  and came back with a VUS.    Age at onset of periods:   OB History          2    Para   2    Term   2            AB        Living             SAB        IAB        Ectopic        Molar        Multiple        Live Births   2                Prior to Admission medications    Medication Sig Start Date End Date Taking? Authorizing Provider   methIMAzole (TAPAZOLE) 5 MG tablet Take 0.5 tablets by mouth three times a week 25  Yes Can Kent MD   rosuvastatin

## 2025-05-05 ENCOUNTER — HOSPITAL ENCOUNTER (OUTPATIENT)
Dept: MRI IMAGING | Age: 65
Discharge: HOME | End: 2025-05-08
Attending: SURGERY
Payer: MEDICARE

## 2025-05-05 DIAGNOSIS — N63.0 BREAST NODULE: ICD-10-CM

## 2025-05-05 DIAGNOSIS — C50.912 INVASIVE DUCTAL CARCINOMA OF BREAST, FEMALE, LEFT (HCC): ICD-10-CM

## 2025-05-05 DIAGNOSIS — N64.4 MASTODYNIA OF LEFT BREAST: ICD-10-CM

## 2025-05-05 PROCEDURE — 6360000004 HC RX CONTRAST MEDICATION: Performed by: SURGERY

## 2025-05-05 PROCEDURE — C8908 MRI W/O FOL W/CONT, BREAST,: HCPCS

## 2025-05-05 PROCEDURE — A9579 GAD-BASE MR CONTRAST NOS,1ML: HCPCS | Performed by: SURGERY

## 2025-05-05 RX ADMIN — GADOTERIDOL 15 ML: 279.3 INJECTION, SOLUTION INTRAVENOUS at 14:54

## 2025-05-19 ENCOUNTER — TELEPHONE (OUTPATIENT)
Dept: SURGERY | Age: 65
End: 2025-05-19

## 2025-05-19 ENCOUNTER — OFFICE VISIT (OUTPATIENT)
Dept: FAMILY MEDICINE CLINIC | Facility: CLINIC | Age: 65
End: 2025-05-19
Payer: MEDICARE

## 2025-05-19 VITALS
HEART RATE: 90 BPM | SYSTOLIC BLOOD PRESSURE: 110 MMHG | OXYGEN SATURATION: 96 % | RESPIRATION RATE: 16 BRPM | TEMPERATURE: 97 F | BODY MASS INDEX: 31.53 KG/M2 | WEIGHT: 167 LBS | HEIGHT: 61 IN | DIASTOLIC BLOOD PRESSURE: 68 MMHG

## 2025-05-19 DIAGNOSIS — K21.9 GASTROESOPHAGEAL REFLUX DISEASE WITHOUT ESOPHAGITIS: ICD-10-CM

## 2025-05-19 DIAGNOSIS — E78.2 MIXED HYPERLIPIDEMIA: Primary | ICD-10-CM

## 2025-05-19 DIAGNOSIS — R73.9 ELEVATED RANDOM BLOOD GLUCOSE LEVEL: ICD-10-CM

## 2025-05-19 LAB
CHOLEST SERPL-MCNC: 160 MG/DL (ref 0–200)
EST. AVERAGE GLUCOSE BLD GHB EST-MCNC: 154 MG/DL
HBA1C MFR BLD: 7 % (ref 0–5.6)
HDLC SERPL-MCNC: 51 MG/DL (ref 40–60)
HDLC SERPL: 3.2 (ref 0–5)
LDLC SERPL CALC-MCNC: 78 MG/DL (ref 0–100)
TRIGL SERPL-MCNC: 158 MG/DL (ref 0–150)
VLDLC SERPL CALC-MCNC: 32 MG/DL (ref 6–23)

## 2025-05-19 PROCEDURE — G8417 CALC BMI ABV UP PARAM F/U: HCPCS | Performed by: NURSE PRACTITIONER

## 2025-05-19 PROCEDURE — 1036F TOBACCO NON-USER: CPT | Performed by: NURSE PRACTITIONER

## 2025-05-19 PROCEDURE — G8399 PT W/DXA RESULTS DOCUMENT: HCPCS | Performed by: NURSE PRACTITIONER

## 2025-05-19 PROCEDURE — G8428 CUR MEDS NOT DOCUMENT: HCPCS | Performed by: NURSE PRACTITIONER

## 2025-05-19 PROCEDURE — 3017F COLORECTAL CA SCREEN DOC REV: CPT | Performed by: NURSE PRACTITIONER

## 2025-05-19 PROCEDURE — 99214 OFFICE O/P EST MOD 30 MIN: CPT | Performed by: NURSE PRACTITIONER

## 2025-05-19 PROCEDURE — 1123F ACP DISCUSS/DSCN MKR DOCD: CPT | Performed by: NURSE PRACTITIONER

## 2025-05-19 PROCEDURE — 1090F PRES/ABSN URINE INCON ASSESS: CPT | Performed by: NURSE PRACTITIONER

## 2025-05-19 RX ORDER — FLUOCINONIDE 0.5 MG/G
CREAM TOPICAL
Qty: 60 G | Refills: 0 | Status: CANCELLED | OUTPATIENT
Start: 2025-05-19

## 2025-05-19 RX ORDER — OMEPRAZOLE 20 MG/1
20 CAPSULE, DELAYED RELEASE ORAL PRN
Qty: 90 CAPSULE | Refills: 3 | Status: CANCELLED | OUTPATIENT
Start: 2025-05-19

## 2025-05-19 RX ORDER — ROSUVASTATIN CALCIUM 20 MG/1
20 TABLET, COATED ORAL NIGHTLY
Qty: 90 TABLET | Refills: 3 | Status: SHIPPED | OUTPATIENT
Start: 2025-05-19

## 2025-05-19 SDOH — ECONOMIC STABILITY: FOOD INSECURITY: WITHIN THE PAST 12 MONTHS, THE FOOD YOU BOUGHT JUST DIDN'T LAST AND YOU DIDN'T HAVE MONEY TO GET MORE.: NEVER TRUE

## 2025-05-19 SDOH — ECONOMIC STABILITY: FOOD INSECURITY: WITHIN THE PAST 12 MONTHS, YOU WORRIED THAT YOUR FOOD WOULD RUN OUT BEFORE YOU GOT MONEY TO BUY MORE.: NEVER TRUE

## 2025-05-19 ASSESSMENT — PATIENT HEALTH QUESTIONNAIRE - PHQ9
SUM OF ALL RESPONSES TO PHQ QUESTIONS 1-9: 0
SUM OF ALL RESPONSES TO PHQ QUESTIONS 1-9: 0
1. LITTLE INTEREST OR PLEASURE IN DOING THINGS: NOT AT ALL
2. FEELING DOWN, DEPRESSED OR HOPELESS: NOT AT ALL
SUM OF ALL RESPONSES TO PHQ QUESTIONS 1-9: 0
SUM OF ALL RESPONSES TO PHQ QUESTIONS 1-9: 0

## 2025-05-19 ASSESSMENT — ENCOUNTER SYMPTOMS
CONSTIPATION: 0
CHEST TIGHTNESS: 0
BLOOD IN STOOL: 0
EYE DISCHARGE: 0
DIARRHEA: 0
RHINORRHEA: 0
EYE PAIN: 0
COUGH: 0
WHEEZING: 0
SHORTNESS OF BREATH: 0
ABDOMINAL PAIN: 0
VOMITING: 0

## 2025-05-19 NOTE — PROGRESS NOTES
wheezing.    Cardiovascular:  Negative for chest pain, palpitations and leg swelling.   Gastrointestinal:  Negative for abdominal pain, blood in stool, constipation, diarrhea and vomiting.   Endocrine:        Hyperlipidemia--stable on rosuvastatin.  Hyperthyroidism--followed by endocrinologist.    Genitourinary:  Negative for difficulty urinating, frequency and urgency.   Musculoskeletal:  Negative for arthralgias, gait problem and myalgias.        Trigger finger due to over use.  Locks up on her. Is able to loosen it up with massage and stretching.    Skin:  Negative for rash.   Neurological:  Negative for dizziness, tremors, seizures and headaches.   Hematological:         Is followed by oncologist for her breast cancer.     Psychiatric/Behavioral:  Negative for decreased concentration and sleep disturbance. The patient is not nervous/anxious.       /68   Pulse 90   Temp 97 °F (36.1 °C) (Temporal)   Resp 16   Ht 1.549 m (5' 1\")   Wt 75.8 kg (167 lb)   SpO2 96%   BMI 31.55 kg/m²       Objective   Physical Exam  Constitutional:       Appearance: Normal appearance.   HENT:      Head: Normocephalic and atraumatic.      Right Ear: Tympanic membrane, ear canal and external ear normal.      Left Ear: Tympanic membrane, ear canal and external ear normal.      Nose: Nose normal.      Mouth/Throat:      Mouth: Mucous membranes are moist.   Eyes:      Extraocular Movements: Extraocular movements intact.      Conjunctiva/sclera: Conjunctivae normal.      Pupils: Pupils are equal, round, and reactive to light.   Cardiovascular:      Rate and Rhythm: Normal rate and regular rhythm.      Pulses: Normal pulses.      Heart sounds: Normal heart sounds.   Pulmonary:      Effort: Pulmonary effort is normal.      Breath sounds: Normal breath sounds.   Abdominal:      General: Bowel sounds are normal.   Musculoskeletal:         General: Normal range of motion.      Cervical back: Normal range of motion and neck supple.

## 2025-05-19 NOTE — ASSESSMENT & PLAN NOTE
Chronic, at goal (stable), continue current treatment plan, medication adherence emphasized, and lifestyle modifications recommended    Orders:    rosuvastatin (CRESTOR) 20 MG tablet; Take 1 tablet by mouth at bedtime    Lipid Panel; Future    Hemoglobin A1C; Future    Will do labs today.  Low fat chocies with more whole foods and less processed foods.

## 2025-05-19 NOTE — TELEPHONE ENCOUNTER
Attempted to contact the pt regarding her results. I left a voicemail for a return call.     ----- Message from Dr. Pau Gomez, DO sent at 5/19/2025 11:04 AM EDT -----  Please call with negative MRI results and schedule for 6 month follow up

## 2025-05-20 ENCOUNTER — TELEPHONE (OUTPATIENT)
Dept: SURGERY | Age: 65
End: 2025-05-20

## 2025-05-20 ENCOUNTER — RESULTS FOLLOW-UP (OUTPATIENT)
Dept: FAMILY MEDICINE CLINIC | Facility: CLINIC | Age: 65
End: 2025-05-20

## 2025-05-20 NOTE — TELEPHONE ENCOUNTER
Left a detailed message of the MRI results.    ----- Message from Dr. Pau Gomez DO sent at 5/19/2025 11:04 AM EDT -----  Please call with negative MRI results and schedule for 6 month follow up

## 2025-07-23 DIAGNOSIS — E55.9 VITAMIN D DEFICIENCY: ICD-10-CM

## 2025-07-23 DIAGNOSIS — C50.912 INVASIVE DUCTAL CARCINOMA OF BREAST, FEMALE, LEFT (HCC): ICD-10-CM

## 2025-07-23 LAB
25(OH)D3 SERPL-MCNC: 58 NG/ML (ref 30–100)
ALBUMIN SERPL-MCNC: 3.7 G/DL (ref 3.2–4.6)
ALBUMIN/GLOB SERPL: 1.1 (ref 1–1.9)
ALP SERPL-CCNC: 84 U/L (ref 35–104)
ALT SERPL-CCNC: 23 U/L (ref 8–45)
ANION GAP SERPL CALC-SCNC: 10 MMOL/L (ref 7–16)
AST SERPL-CCNC: 24 U/L (ref 15–37)
BASOPHILS # BLD: 0.01 K/UL (ref 0–0.2)
BASOPHILS NFR BLD: 0.3 % (ref 0–2)
BILIRUB SERPL-MCNC: 0.7 MG/DL (ref 0–1.2)
BUN SERPL-MCNC: 13 MG/DL (ref 8–23)
CALCIUM SERPL-MCNC: 9.8 MG/DL (ref 8.8–10.2)
CHLORIDE SERPL-SCNC: 105 MMOL/L (ref 98–107)
CO2 SERPL-SCNC: 27 MMOL/L (ref 20–29)
CREAT SERPL-MCNC: 0.91 MG/DL (ref 0.6–1.1)
DIFFERENTIAL METHOD BLD: ABNORMAL
EOSINOPHIL # BLD: 0.13 K/UL (ref 0–0.8)
EOSINOPHIL NFR BLD: 3.4 % (ref 0.5–7.8)
ERYTHROCYTE [DISTWIDTH] IN BLOOD BY AUTOMATED COUNT: 13 % (ref 11.9–14.6)
GLOBULIN SER CALC-MCNC: 3.3 G/DL (ref 2.3–3.5)
GLUCOSE SERPL-MCNC: 126 MG/DL (ref 70–99)
HCT VFR BLD AUTO: 39 % (ref 35.8–46.3)
HGB BLD-MCNC: 13 G/DL (ref 11.7–15.4)
IMM GRANULOCYTES # BLD AUTO: 0.01 K/UL (ref 0–0.5)
IMM GRANULOCYTES NFR BLD AUTO: 0.3 % (ref 0–5)
LYMPHOCYTES # BLD: 1.47 K/UL (ref 0.5–4.6)
LYMPHOCYTES NFR BLD: 39 % (ref 13–44)
MCH RBC QN AUTO: 30 PG (ref 26.1–32.9)
MCHC RBC AUTO-ENTMCNC: 33.3 G/DL (ref 31.4–35)
MCV RBC AUTO: 89.9 FL (ref 82–102)
MONOCYTES # BLD: 0.38 K/UL (ref 0.1–1.3)
MONOCYTES NFR BLD: 10.1 % (ref 4–12)
NEUTS SEG # BLD: 1.77 K/UL (ref 1.7–8.2)
NEUTS SEG NFR BLD: 46.9 % (ref 43–78)
NRBC # BLD: 0 K/UL (ref 0–0.2)
PLATELET # BLD AUTO: 208 K/UL (ref 150–450)
PMV BLD AUTO: 10 FL (ref 9.4–12.3)
POTASSIUM SERPL-SCNC: 4.3 MMOL/L (ref 3.5–5.1)
PROT SERPL-MCNC: 7 G/DL (ref 6.3–8.2)
RBC # BLD AUTO: 4.34 M/UL (ref 4.05–5.2)
SODIUM SERPL-SCNC: 142 MMOL/L (ref 136–145)
WBC # BLD AUTO: 3.8 K/UL (ref 4.3–11.1)

## 2025-07-24 ASSESSMENT — ENCOUNTER SYMPTOMS
NAUSEA: 0
COUGH: 0
GASTROINTESTINAL NEGATIVE: 1
CONSTIPATION: 0
DIARRHEA: 0
EYES NEGATIVE: 1
SHORTNESS OF BREATH: 0
ALLERGIC/IMMUNOLOGIC NEGATIVE: 1
RESPIRATORY NEGATIVE: 1

## 2025-07-24 NOTE — PROGRESS NOTES
[facility]: Established patient - follow up     Chief Complaint   Patient presents with    Follow-up     Reason for Referral: Breast cancer  Referring Provider: Self-referral   Primary Care Provider: MIR Borja CNP  Family History of Cancer/Hematologic Disorders: Family history is significant for two sisters and a niece with breast cancer.   Presenting Symptoms: Abnormal routine screening mammogram     History of Present Illness:  Ms. Chavez is a 64 y.o. female who presents today for follow up regarding breast cancer.  The past medical history is significant for multiple thyroid nodules, hyperthyroidism, hypercholesterolemia, Grave's disease, GERD, uterine fibroids,  section x 2, cyst removal, and hysterectomy.  She initially presented for a routine screening mammogram on 8/10/22 which identified a spiculated equal density mass in the left breast inferior medial quadrant posterior depth.  Further evaluation with limited ultrasound of the left breast on 2022 confirmed an 8 x 7 x 8 mm round, hypoechoic mass with spiculated margins at the 9:00 position in the left breast, 6 cm from the nipple, demonstrating mild posterior acoustic enhancement and corresponding to the mass seen mammographically.  US- guided biopsy of the 9:00 left breast mass was performed on 22 with pathology revealing ER/MI/HER-2 negative, grade 3, invasive carcinoma with high-grade DCIS focally present and no microcalcifications or lymphovascular space extension identified.  Testing so far has been done at [facility]; however, Ms. Chavez wishes to transfer care to [facility]. She is self-referred to [facility] for Oncology evaluation and treatment of newly diagnosed breast cancer. She has also been referred to surgery and is scheduled for initial surgical evaluation with Dr. Chitra Araujo, on 22.   Patient's daughter lives in [place], works at a hospital.    - At consultation, we discussed the

## 2025-07-25 ENCOUNTER — OFFICE VISIT (OUTPATIENT)
Dept: ONCOLOGY | Age: 65
End: 2025-07-25
Payer: MEDICARE

## 2025-07-25 VITALS
WEIGHT: 169.4 LBS | TEMPERATURE: 97.6 F | SYSTOLIC BLOOD PRESSURE: 126 MMHG | RESPIRATION RATE: 16 BRPM | DIASTOLIC BLOOD PRESSURE: 66 MMHG | OXYGEN SATURATION: 99 % | BODY MASS INDEX: 30.02 KG/M2 | HEIGHT: 63 IN | HEART RATE: 76 BPM

## 2025-07-25 DIAGNOSIS — E55.9 VITAMIN D DEFICIENCY: ICD-10-CM

## 2025-07-25 DIAGNOSIS — C50.912 INVASIVE DUCTAL CARCINOMA OF BREAST, FEMALE, LEFT (HCC): Primary | ICD-10-CM

## 2025-07-25 PROCEDURE — 3017F COLORECTAL CA SCREEN DOC REV: CPT | Performed by: NURSE PRACTITIONER

## 2025-07-25 PROCEDURE — 1036F TOBACCO NON-USER: CPT | Performed by: NURSE PRACTITIONER

## 2025-07-25 PROCEDURE — 1126F AMNT PAIN NOTED NONE PRSNT: CPT | Performed by: NURSE PRACTITIONER

## 2025-07-25 PROCEDURE — 1160F RVW MEDS BY RX/DR IN RCRD: CPT | Performed by: NURSE PRACTITIONER

## 2025-07-25 PROCEDURE — G8427 DOCREV CUR MEDS BY ELIG CLIN: HCPCS | Performed by: NURSE PRACTITIONER

## 2025-07-25 PROCEDURE — G8417 CALC BMI ABV UP PARAM F/U: HCPCS | Performed by: NURSE PRACTITIONER

## 2025-07-25 PROCEDURE — 1090F PRES/ABSN URINE INCON ASSESS: CPT | Performed by: NURSE PRACTITIONER

## 2025-07-25 PROCEDURE — 1159F MED LIST DOCD IN RCRD: CPT | Performed by: NURSE PRACTITIONER

## 2025-07-25 PROCEDURE — G8399 PT W/DXA RESULTS DOCUMENT: HCPCS | Performed by: NURSE PRACTITIONER

## 2025-07-25 PROCEDURE — 1123F ACP DISCUSS/DSCN MKR DOCD: CPT | Performed by: NURSE PRACTITIONER

## 2025-07-25 PROCEDURE — 99214 OFFICE O/P EST MOD 30 MIN: CPT | Performed by: NURSE PRACTITIONER

## 2025-07-25 ASSESSMENT — PATIENT HEALTH QUESTIONNAIRE - PHQ9
SUM OF ALL RESPONSES TO PHQ QUESTIONS 1-9: 0
1. LITTLE INTEREST OR PLEASURE IN DOING THINGS: NOT AT ALL
SUM OF ALL RESPONSES TO PHQ QUESTIONS 1-9: 0
2. FEELING DOWN, DEPRESSED OR HOPELESS: NOT AT ALL
SUM OF ALL RESPONSES TO PHQ QUESTIONS 1-9: 0
SUM OF ALL RESPONSES TO PHQ QUESTIONS 1-9: 0

## 2025-08-22 DIAGNOSIS — E05.90 HYPERTHYROIDISM: ICD-10-CM

## 2025-08-22 LAB
ALBUMIN SERPL-MCNC: 3.6 G/DL (ref 3.2–4.6)
ALBUMIN/GLOB SERPL: 1.1 (ref 1–1.9)
ALP SERPL-CCNC: 84 U/L (ref 35–104)
ALT SERPL-CCNC: 18 U/L (ref 8–45)
AST SERPL-CCNC: 23 U/L (ref 15–37)
BILIRUB DIRECT SERPL-MCNC: 0.3 MG/DL (ref 0–0.3)
BILIRUB SERPL-MCNC: 0.7 MG/DL (ref 0–1.2)
GLOBULIN SER CALC-MCNC: 3.2 G/DL (ref 2.3–3.5)
PROT SERPL-MCNC: 6.8 G/DL (ref 6.3–8.2)
T3 SERPL-MCNC: 0.85 NG/ML (ref 0.6–1.81)
T4 FREE SERPL-MCNC: 1.1 NG/DL (ref 0.9–1.7)
TSH, 3RD GENERATION: 1.63 UIU/ML (ref 0.27–4.2)

## 2025-08-25 ENCOUNTER — OFFICE VISIT (OUTPATIENT)
Dept: ENDOCRINOLOGY | Age: 65
End: 2025-08-25
Payer: MEDICARE

## 2025-08-25 ENCOUNTER — HOSPITAL ENCOUNTER (OUTPATIENT)
Dept: MAMMOGRAPHY | Age: 65
Discharge: HOME | End: 2025-08-28
Payer: MEDICARE

## 2025-08-25 VITALS
WEIGHT: 168.4 LBS | OXYGEN SATURATION: 92 % | HEART RATE: 90 BPM | HEIGHT: 62 IN | DIASTOLIC BLOOD PRESSURE: 60 MMHG | SYSTOLIC BLOOD PRESSURE: 118 MMHG | BODY MASS INDEX: 30.99 KG/M2

## 2025-08-25 DIAGNOSIS — Z12.31 VISIT FOR SCREENING MAMMOGRAM: ICD-10-CM

## 2025-08-25 DIAGNOSIS — E05.00 GRAVES' DISEASE: ICD-10-CM

## 2025-08-25 DIAGNOSIS — E05.90 HYPERTHYROIDISM: Primary | ICD-10-CM

## 2025-08-25 DIAGNOSIS — E04.2 MULTIPLE THYROID NODULES: ICD-10-CM

## 2025-08-25 PROCEDURE — 99214 OFFICE O/P EST MOD 30 MIN: CPT | Performed by: INTERNAL MEDICINE

## 2025-08-25 PROCEDURE — G8427 DOCREV CUR MEDS BY ELIG CLIN: HCPCS | Performed by: INTERNAL MEDICINE

## 2025-08-25 PROCEDURE — 77067 SCR MAMMO BI INCL CAD: CPT

## 2025-08-25 PROCEDURE — G8399 PT W/DXA RESULTS DOCUMENT: HCPCS | Performed by: INTERNAL MEDICINE

## 2025-08-25 PROCEDURE — 1123F ACP DISCUSS/DSCN MKR DOCD: CPT | Performed by: INTERNAL MEDICINE

## 2025-08-25 PROCEDURE — 1090F PRES/ABSN URINE INCON ASSESS: CPT | Performed by: INTERNAL MEDICINE

## 2025-08-25 PROCEDURE — 1159F MED LIST DOCD IN RCRD: CPT | Performed by: INTERNAL MEDICINE

## 2025-08-25 PROCEDURE — 1036F TOBACCO NON-USER: CPT | Performed by: INTERNAL MEDICINE

## 2025-08-25 PROCEDURE — 3017F COLORECTAL CA SCREEN DOC REV: CPT | Performed by: INTERNAL MEDICINE

## 2025-08-25 PROCEDURE — G2211 COMPLEX E/M VISIT ADD ON: HCPCS | Performed by: INTERNAL MEDICINE

## 2025-08-25 PROCEDURE — G8417 CALC BMI ABV UP PARAM F/U: HCPCS | Performed by: INTERNAL MEDICINE

## 2025-08-25 RX ORDER — METHIMAZOLE 5 MG/1
2.5 TABLET ORAL
Qty: 20 TABLET | Refills: 3 | Status: SHIPPED | OUTPATIENT
Start: 2025-08-25

## 2025-08-25 ASSESSMENT — ENCOUNTER SYMPTOMS
CONSTIPATION: 0
TROUBLE SWALLOWING: 0
VOICE CHANGE: 0
DIARRHEA: 0

## 2025-08-26 ENCOUNTER — CLINICAL DOCUMENTATION (OUTPATIENT)
Dept: ONCOLOGY | Age: 65
End: 2025-08-26

## (undated) DEVICE — ELECTRODE PT RET AD L9FT HI MOIST COND ADH HYDRGEL CORDED

## (undated) DEVICE — NEEDLE HYPO 21GA L1.5IN INTRAMUSCULAR S STL LATCH BVL UP

## (undated) DEVICE — APPLIER CLP L9.38IN M LIG TI DISP STR RNG HNDL LIGACLP

## (undated) DEVICE — BANDAGE COMPR XL KNEE ELBW TAN TUBIGRIP ARTHRO-PAD LTX

## (undated) DEVICE — COVER US PRB W12XL244CM FLD IORT STR TIP

## (undated) DEVICE — KIT INFUS PMP 270ML 2M/HR SOAK CATH L2.5IN N NARC ON-Q

## (undated) DEVICE — SUTURE MCRYL SZ 3-0 L27IN ABSRB UD L19MM PS-2 3/8 CIR PRIM Y427H

## (undated) DEVICE — DRAIN SURG 15FR SIL RND CHN W/ TRCR FULL FLUT DBL WRP TRAD

## (undated) DEVICE — 48" PROBE COVER W/GEL, ULTRASOUND, STERILE: Brand: SITE-RITE

## (undated) DEVICE — Device

## (undated) DEVICE — DRAIN JACKSON PRATT ROUND 15FR: Brand: CARDINAL HEALTH

## (undated) DEVICE — SOLUTION IRRIG 1000ML STRL H2O USP PLAS POUR BTL

## (undated) DEVICE — DRAPE,T,LAPARO,TRANS,STERILE: Brand: MEDLINE

## (undated) DEVICE — GAUZE,SPONGE,4"X4",16PLY,STRL,LF,10/TRAY: Brand: MEDLINE

## (undated) DEVICE — STAPLER SKIN SQ 30 ABSRB STPL DISP INSORB

## (undated) DEVICE — NEEDLE HYPO 21GA L1IN GRN S STL HUB POLYPR SHLD REG BVL

## (undated) DEVICE — DRAPE TWL SURG 16X26IN BLU ORB04] ALLCARE INC]

## (undated) DEVICE — GLOVE ORANGE PI 7   MSG9070

## (undated) DEVICE — RESERVOIR,SUCTION,100CC,SILICONE: Brand: MEDLINE

## (undated) DEVICE — MINOR SPLIT GENERAL: Brand: MEDLINE INDUSTRIES, INC.

## (undated) DEVICE — CANISTER, RIGID, 2000CC: Brand: MEDLINE INDUSTRIES, INC.

## (undated) DEVICE — SOLUTION IRRIG 1000ML 09% SOD CHL USP PIC PLAS CONTAINER

## (undated) DEVICE — DRESSING,GAUZE,XEROFORM,CURAD,5"X9",ST: Brand: CURAD

## (undated) DEVICE — ADHESIVE SKIN CLSR 0.7ML TOP DERMBND ADV

## (undated) DEVICE — BLADE ES ELASTOMERIC COAT INSUL DURABLE BEND UPTO 90DEG

## (undated) DEVICE — SOLUTION PREP POVIDONE IOD FOR SKIN MUCOUS MEM PRIOR TO

## (undated) DEVICE — SPONGE LAPAROTOMY W18XL18IN WHITE STRUNG RADIOPAQUE STERILE

## (undated) DEVICE — SUTURE PERMA-HAND SZ 2-0 L30IN NONABSORBABLE BLK L26MM SH K833H

## (undated) DEVICE — PAD,NON-ADHERENT,3X8,STERILE,LF,1/PK: Brand: MEDLINE

## (undated) DEVICE — GLOVE SURG SZ 7 L12IN FNGR THK79MIL GRN LTX FREE

## (undated) DEVICE — SYSTEM SKIN CLSR 22CM DERMBND PRINEO

## (undated) DEVICE — SUTURE VCRL SZ 3-0 L18IN ABSRB UD L26MM SH 1/2 CIR J864D

## (undated) DEVICE — SOLUTION SCRB 0.04 CHG DYNA HEX

## (undated) DEVICE — NEEDLE HYPO 25GA L5/8IN ORNG HUB S STL LATCH BVL UP